# Patient Record
Sex: MALE | Race: BLACK OR AFRICAN AMERICAN | NOT HISPANIC OR LATINO | Employment: OTHER | URBAN - METROPOLITAN AREA
[De-identification: names, ages, dates, MRNs, and addresses within clinical notes are randomized per-mention and may not be internally consistent; named-entity substitution may affect disease eponyms.]

---

## 2021-11-11 LAB — CRC RECOMMENDATION EXT: NORMAL

## 2023-06-01 ENCOUNTER — LAB VISIT (OUTPATIENT)
Dept: LAB | Facility: HOSPITAL | Age: 68
End: 2023-06-01
Attending: STUDENT IN AN ORGANIZED HEALTH CARE EDUCATION/TRAINING PROGRAM
Payer: MEDICARE

## 2023-06-01 ENCOUNTER — PATIENT OUTREACH (OUTPATIENT)
Dept: ADMINISTRATIVE | Facility: HOSPITAL | Age: 68
End: 2023-06-01
Payer: MEDICARE

## 2023-06-01 ENCOUNTER — OFFICE VISIT (OUTPATIENT)
Dept: INTERNAL MEDICINE | Facility: CLINIC | Age: 68
End: 2023-06-01
Payer: MEDICARE

## 2023-06-01 VITALS
SYSTOLIC BLOOD PRESSURE: 138 MMHG | BODY MASS INDEX: 27.99 KG/M2 | WEIGHT: 174.19 LBS | TEMPERATURE: 98 F | DIASTOLIC BLOOD PRESSURE: 80 MMHG | OXYGEN SATURATION: 99 % | HEIGHT: 66 IN | HEART RATE: 85 BPM

## 2023-06-01 DIAGNOSIS — N52.9 ERECTILE DYSFUNCTION, UNSPECIFIED ERECTILE DYSFUNCTION TYPE: ICD-10-CM

## 2023-06-01 DIAGNOSIS — Z79.899 LONG TERM USE OF DRUG: ICD-10-CM

## 2023-06-01 DIAGNOSIS — E78.00 HYPERCHOLESTEROLEMIA: ICD-10-CM

## 2023-06-01 DIAGNOSIS — Z12.5 SCREENING PSA (PROSTATE SPECIFIC ANTIGEN): ICD-10-CM

## 2023-06-01 DIAGNOSIS — Z00.00 LABORATORY EXAMINATION ORDERED AS PART OF A ROUTINE GENERAL MEDICAL EXAMINATION: ICD-10-CM

## 2023-06-01 DIAGNOSIS — Z00.00 ENCOUNTER FOR PREVENTIVE HEALTH EXAMINATION: Primary | ICD-10-CM

## 2023-06-01 DIAGNOSIS — Z11.3 SCREENING FOR STD (SEXUALLY TRANSMITTED DISEASE): ICD-10-CM

## 2023-06-01 DIAGNOSIS — Z13.6 SCREENING FOR HEART DISEASE: ICD-10-CM

## 2023-06-01 DIAGNOSIS — M25.519 ARTHRALGIA OF SHOULDER, UNSPECIFIED LATERALITY: ICD-10-CM

## 2023-06-01 DIAGNOSIS — Z76.89 ESTABLISHING CARE WITH NEW DOCTOR, ENCOUNTER FOR: ICD-10-CM

## 2023-06-01 LAB
ALBUMIN SERPL BCP-MCNC: 3.9 G/DL (ref 3.5–5.2)
ALP SERPL-CCNC: 64 U/L (ref 55–135)
ALT SERPL W/O P-5'-P-CCNC: 16 U/L (ref 10–44)
ANION GAP SERPL CALC-SCNC: 8 MMOL/L (ref 8–16)
AST SERPL-CCNC: 20 U/L (ref 10–40)
BASOPHILS # BLD AUTO: 0.01 K/UL (ref 0–0.2)
BASOPHILS NFR BLD: 0.2 % (ref 0–1.9)
BILIRUB SERPL-MCNC: 0.5 MG/DL (ref 0.1–1)
BUN SERPL-MCNC: 15 MG/DL (ref 8–23)
CALCIUM SERPL-MCNC: 10.3 MG/DL (ref 8.7–10.5)
CHLORIDE SERPL-SCNC: 105 MMOL/L (ref 95–110)
CHOLEST SERPL-MCNC: 138 MG/DL (ref 120–199)
CHOLEST/HDLC SERPL: 3 {RATIO} (ref 2–5)
CO2 SERPL-SCNC: 26 MMOL/L (ref 23–29)
COMPLEXED PSA SERPL-MCNC: 1.3 NG/ML (ref 0–4)
CREAT SERPL-MCNC: 1.2 MG/DL (ref 0.5–1.4)
DIFFERENTIAL METHOD: ABNORMAL
EOSINOPHIL # BLD AUTO: 0.1 K/UL (ref 0–0.5)
EOSINOPHIL NFR BLD: 0.9 % (ref 0–8)
ERYTHROCYTE [DISTWIDTH] IN BLOOD BY AUTOMATED COUNT: 15.3 % (ref 11.5–14.5)
EST. GFR  (NO RACE VARIABLE): >60 ML/MIN/1.73 M^2
ESTIMATED AVG GLUCOSE: 120 MG/DL (ref 68–131)
GLUCOSE SERPL-MCNC: 102 MG/DL (ref 70–110)
HBA1C MFR BLD: 5.8 % (ref 4–5.6)
HCT VFR BLD AUTO: 42.2 % (ref 40–54)
HCV AB SERPL QL IA: NORMAL
HDLC SERPL-MCNC: 46 MG/DL (ref 40–75)
HDLC SERPL: 33.3 % (ref 20–50)
HGB BLD-MCNC: 13.3 G/DL (ref 14–18)
HIV 1+2 AB+HIV1 P24 AG SERPL QL IA: NORMAL
IMM GRANULOCYTES # BLD AUTO: 0.02 K/UL (ref 0–0.04)
IMM GRANULOCYTES NFR BLD AUTO: 0.3 % (ref 0–0.5)
LDLC SERPL CALC-MCNC: 80.2 MG/DL (ref 63–159)
LYMPHOCYTES # BLD AUTO: 1.3 K/UL (ref 1–4.8)
LYMPHOCYTES NFR BLD: 20.2 % (ref 18–48)
MCH RBC QN AUTO: 28.9 PG (ref 27–31)
MCHC RBC AUTO-ENTMCNC: 31.5 G/DL (ref 32–36)
MCV RBC AUTO: 92 FL (ref 82–98)
MONOCYTES # BLD AUTO: 0.4 K/UL (ref 0.3–1)
MONOCYTES NFR BLD: 5.7 % (ref 4–15)
NEUTROPHILS # BLD AUTO: 4.7 K/UL (ref 1.8–7.7)
NEUTROPHILS NFR BLD: 72.7 % (ref 38–73)
NONHDLC SERPL-MCNC: 92 MG/DL
NRBC BLD-RTO: 0 /100 WBC
PLATELET # BLD AUTO: 328 K/UL (ref 150–450)
PMV BLD AUTO: 9.1 FL (ref 9.2–12.9)
POTASSIUM SERPL-SCNC: 3.9 MMOL/L (ref 3.5–5.1)
PROT SERPL-MCNC: 8.4 G/DL (ref 6–8.4)
RBC # BLD AUTO: 4.6 M/UL (ref 4.6–6.2)
SODIUM SERPL-SCNC: 139 MMOL/L (ref 136–145)
T4 FREE SERPL-MCNC: 0.89 NG/DL (ref 0.71–1.51)
TRIGL SERPL-MCNC: 59 MG/DL (ref 30–150)
TSH SERPL DL<=0.005 MIU/L-ACNC: 2.04 UIU/ML (ref 0.4–4)
WBC # BLD AUTO: 6.5 K/UL (ref 3.9–12.7)

## 2023-06-01 PROCEDURE — 3079F PR MOST RECENT DIASTOLIC BLOOD PRESSURE 80-89 MM HG: ICD-10-PCS | Mod: CPTII,S$GLB,, | Performed by: STUDENT IN AN ORGANIZED HEALTH CARE EDUCATION/TRAINING PROGRAM

## 2023-06-01 PROCEDURE — 1159F PR MEDICATION LIST DOCUMENTED IN MEDICAL RECORD: ICD-10-PCS | Mod: CPTII,S$GLB,, | Performed by: STUDENT IN AN ORGANIZED HEALTH CARE EDUCATION/TRAINING PROGRAM

## 2023-06-01 PROCEDURE — 84153 ASSAY OF PSA TOTAL: CPT | Performed by: STUDENT IN AN ORGANIZED HEALTH CARE EDUCATION/TRAINING PROGRAM

## 2023-06-01 PROCEDURE — 3008F PR BODY MASS INDEX (BMI) DOCUMENTED: ICD-10-PCS | Mod: CPTII,S$GLB,, | Performed by: STUDENT IN AN ORGANIZED HEALTH CARE EDUCATION/TRAINING PROGRAM

## 2023-06-01 PROCEDURE — 80061 LIPID PANEL: CPT | Performed by: STUDENT IN AN ORGANIZED HEALTH CARE EDUCATION/TRAINING PROGRAM

## 2023-06-01 PROCEDURE — 3075F SYST BP GE 130 - 139MM HG: CPT | Mod: CPTII,S$GLB,, | Performed by: STUDENT IN AN ORGANIZED HEALTH CARE EDUCATION/TRAINING PROGRAM

## 2023-06-01 PROCEDURE — 1159F MED LIST DOCD IN RCRD: CPT | Mod: CPTII,S$GLB,, | Performed by: STUDENT IN AN ORGANIZED HEALTH CARE EDUCATION/TRAINING PROGRAM

## 2023-06-01 PROCEDURE — 3008F BODY MASS INDEX DOCD: CPT | Mod: CPTII,S$GLB,, | Performed by: STUDENT IN AN ORGANIZED HEALTH CARE EDUCATION/TRAINING PROGRAM

## 2023-06-01 PROCEDURE — 86592 SYPHILIS TEST NON-TREP QUAL: CPT | Performed by: STUDENT IN AN ORGANIZED HEALTH CARE EDUCATION/TRAINING PROGRAM

## 2023-06-01 PROCEDURE — 84439 ASSAY OF FREE THYROXINE: CPT | Performed by: STUDENT IN AN ORGANIZED HEALTH CARE EDUCATION/TRAINING PROGRAM

## 2023-06-01 PROCEDURE — 83036 HEMOGLOBIN GLYCOSYLATED A1C: CPT | Performed by: STUDENT IN AN ORGANIZED HEALTH CARE EDUCATION/TRAINING PROGRAM

## 2023-06-01 PROCEDURE — 87389 HIV-1 AG W/HIV-1&-2 AB AG IA: CPT | Performed by: STUDENT IN AN ORGANIZED HEALTH CARE EDUCATION/TRAINING PROGRAM

## 2023-06-01 PROCEDURE — 3075F PR MOST RECENT SYSTOLIC BLOOD PRESS GE 130-139MM HG: ICD-10-PCS | Mod: CPTII,S$GLB,, | Performed by: STUDENT IN AN ORGANIZED HEALTH CARE EDUCATION/TRAINING PROGRAM

## 2023-06-01 PROCEDURE — 84443 ASSAY THYROID STIM HORMONE: CPT | Performed by: STUDENT IN AN ORGANIZED HEALTH CARE EDUCATION/TRAINING PROGRAM

## 2023-06-01 PROCEDURE — 99387 PR PREVENTIVE VISIT,NEW,65 & OVER: ICD-10-PCS | Mod: GZ,S$GLB,, | Performed by: STUDENT IN AN ORGANIZED HEALTH CARE EDUCATION/TRAINING PROGRAM

## 2023-06-01 PROCEDURE — 80053 COMPREHEN METABOLIC PANEL: CPT | Performed by: STUDENT IN AN ORGANIZED HEALTH CARE EDUCATION/TRAINING PROGRAM

## 2023-06-01 PROCEDURE — 1126F PR PAIN SEVERITY QUANTIFIED, NO PAIN PRESENT: ICD-10-PCS | Mod: CPTII,S$GLB,, | Performed by: STUDENT IN AN ORGANIZED HEALTH CARE EDUCATION/TRAINING PROGRAM

## 2023-06-01 PROCEDURE — 36415 COLL VENOUS BLD VENIPUNCTURE: CPT | Performed by: STUDENT IN AN ORGANIZED HEALTH CARE EDUCATION/TRAINING PROGRAM

## 2023-06-01 PROCEDURE — 86803 HEPATITIS C AB TEST: CPT | Performed by: STUDENT IN AN ORGANIZED HEALTH CARE EDUCATION/TRAINING PROGRAM

## 2023-06-01 PROCEDURE — 85025 COMPLETE CBC W/AUTO DIFF WBC: CPT | Performed by: STUDENT IN AN ORGANIZED HEALTH CARE EDUCATION/TRAINING PROGRAM

## 2023-06-01 PROCEDURE — 3079F DIAST BP 80-89 MM HG: CPT | Mod: CPTII,S$GLB,, | Performed by: STUDENT IN AN ORGANIZED HEALTH CARE EDUCATION/TRAINING PROGRAM

## 2023-06-01 PROCEDURE — 1160F PR REVIEW ALL MEDS BY PRESCRIBER/CLIN PHARMACIST DOCUMENTED: ICD-10-PCS | Mod: CPTII,S$GLB,, | Performed by: STUDENT IN AN ORGANIZED HEALTH CARE EDUCATION/TRAINING PROGRAM

## 2023-06-01 PROCEDURE — 99387 INIT PM E/M NEW PAT 65+ YRS: CPT | Mod: GZ,S$GLB,, | Performed by: STUDENT IN AN ORGANIZED HEALTH CARE EDUCATION/TRAINING PROGRAM

## 2023-06-01 PROCEDURE — 1126F AMNT PAIN NOTED NONE PRSNT: CPT | Mod: CPTII,S$GLB,, | Performed by: STUDENT IN AN ORGANIZED HEALTH CARE EDUCATION/TRAINING PROGRAM

## 2023-06-01 PROCEDURE — 1160F RVW MEDS BY RX/DR IN RCRD: CPT | Mod: CPTII,S$GLB,, | Performed by: STUDENT IN AN ORGANIZED HEALTH CARE EDUCATION/TRAINING PROGRAM

## 2023-06-01 PROCEDURE — 99999 PR PBB SHADOW E&M-NEW PATIENT-LVL III: ICD-10-PCS | Mod: PBBFAC,,, | Performed by: STUDENT IN AN ORGANIZED HEALTH CARE EDUCATION/TRAINING PROGRAM

## 2023-06-01 PROCEDURE — 99999 PR PBB SHADOW E&M-NEW PATIENT-LVL III: CPT | Mod: PBBFAC,,, | Performed by: STUDENT IN AN ORGANIZED HEALTH CARE EDUCATION/TRAINING PROGRAM

## 2023-06-01 RX ORDER — SILDENAFIL 100 MG/1
100 TABLET, FILM COATED ORAL DAILY PRN
COMMUNITY
Start: 2023-02-23 | End: 2023-06-01 | Stop reason: SDUPTHER

## 2023-06-01 RX ORDER — TRIAMCINOLONE ACETONIDE 1 MG/G
CREAM TOPICAL 2 TIMES DAILY PRN
Qty: 45 G | Refills: 0 | Status: SHIPPED | OUTPATIENT
Start: 2023-06-01 | End: 2023-09-13

## 2023-06-01 RX ORDER — TRIAMCINOLONE ACETONIDE 1 MG/G
CREAM TOPICAL 2 TIMES DAILY PRN
COMMUNITY
Start: 2023-02-23 | End: 2023-06-01 | Stop reason: SDUPTHER

## 2023-06-01 RX ORDER — MELOXICAM 7.5 MG/1
7.5 TABLET ORAL 2 TIMES DAILY
COMMUNITY
Start: 2023-02-23 | End: 2023-06-01 | Stop reason: SDUPTHER

## 2023-06-01 RX ORDER — SILDENAFIL 100 MG/1
100 TABLET, FILM COATED ORAL DAILY PRN
Qty: 10 TABLET | Refills: 3 | Status: SHIPPED | OUTPATIENT
Start: 2023-06-01

## 2023-06-01 RX ORDER — ROSUVASTATIN CALCIUM 20 MG/1
20 TABLET, COATED ORAL
COMMUNITY
Start: 2023-02-23 | End: 2023-06-01 | Stop reason: SDUPTHER

## 2023-06-01 RX ORDER — ROSUVASTATIN CALCIUM 20 MG/1
20 TABLET, COATED ORAL DAILY
Qty: 90 TABLET | Refills: 3 | Status: SHIPPED | OUTPATIENT
Start: 2023-06-01 | End: 2024-05-26

## 2023-06-01 RX ORDER — MELOXICAM 7.5 MG/1
7.5 TABLET ORAL 2 TIMES DAILY
Qty: 60 TABLET | Refills: 0 | Status: SHIPPED | OUTPATIENT
Start: 2023-06-01 | End: 2023-09-13

## 2023-06-01 NOTE — PROGRESS NOTES
Health Maintenance Due   Topic Date Due    TETANUS VACCINE  Never done    Hemoglobin A1c (Diabetic Prevention Screening)  Never done    Colorectal Cancer Screening  Never done    Shingles Vaccine (1 of 2) Never done    PROSTATE-SPECIFIC ANTIGEN  06/11/2006    Lipid Panel  06/11/2010    Pneumococcal Vaccines (Age 65+) (1 - PCV) Never done    Abdominal Aortic Aneurysm Screening  Never done    COVID-19 Vaccine (5 - Moderna series) 02/28/2023     Triggered LINKS. Updated Care Everywhere. Record request sent to [facility] asking for a copy of pt's most recent colonoscopy, AAA screening, and immunizations results. Chart review completed.

## 2023-06-01 NOTE — LETTER
AUTHORIZATION FOR RELEASE OF   CONFIDENTIAL INFORMATION    Dear Dr. Osbaldo Butterfield,    We are seeing Felipe Jiménez, date of birth 1955, in the clinic at [facility] INTERNAL MEDICINE. Sami Asif MD is the patient's PCP. Felipe Jiménez has an outstanding lab/procedure at the time we reviewed his chart. In order to help keep his health information updated, he has authorized us to request the following medical record(s):        (  )  MAMMOGRAM                                      ( XX )  COLONOSCOPY      (  )  PAP SMEAR                                          (  )  OUTSIDE LAB RESULTS     (  )  DEXA SCAN                                          (  )  EYE EXAM            (  )  FOOT EXAM                                          (  )  ENTIRE RECORD     ( XX )  OUTSIDE IMMUNIZATIONS             ( XX )  CAR US AAA Screening         Please fax records to Sami Asif MD, [phone]     If you have any questions, please contact DALILA Morin at [phone].           Patient Name: Felipe Jiménez  : 1955  Patient Phone #: [phone]

## 2023-06-01 NOTE — LETTER
AUTHORIZATION FOR RELEASE OF   CONFIDENTIAL INFORMATION    Dear Mckayla Grullon  records,    We are seeing Felipe Jiménez, date of birth 1955, in the clinic at [facility] INTERNAL MEDICINE. Sami Asif MD is the patient's PCP. Felipe Jiménez has an outstanding lab/procedure at the time we reviewed his chart. In order to help keep his health information updated, he has authorized us to request the following medical record(s):        (  )  MAMMOGRAM                                      ( XX )  COLONOSCOPY      (  )  PAP SMEAR                                          (  )  OUTSIDE LAB RESULTS     (  )  DEXA SCAN                                          (  )  EYE EXAM            (  )  FOOT EXAM                                          (  )  ENTIRE RECORD     ( XX )  OUTSIDE IMMUNIZATIONS             ( XX )  CAR US AAA Screening         Please fax records to Sami Asif MD, [phone]     If you have any questions, please contact DALILA Morin at [phone].           Patient Name: Felipe Jiménez  : 1955  Patient Phone #: [phone]

## 2023-06-01 NOTE — PROGRESS NOTES
SUBJECTIVE     Chief Complaint   Patient presents with    Establish Care       HPI  Felipe Jiménez is a 67 y.o. male with  HLD, erectile dysfunction  that presents for establishment of care.     Takes Rosuvastatin for hypercholesterolemia.   Meloxicam for chronic shoulder pain.   Sildenafil PRN for erectile dysfunction.   History of dermatitis treated with PRN triamcinolone.   Recently moved back to the area from [place], where he had relocated after [place]. 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 He is , lives with his wife. Has adult children.     Risk Assessment:  Tobacco use: Quit in ; smoked ppd x 20 years. Never have used smokeless tobacco.   EtOH use: Infrequent.   Illicit drug use: Not currently. Has used THC and cocaine in the past.   Diet: Tries to keep a balanced diet.   Exercise: Starting to work out at [employer]  Contributory FHx: Early heart disease in father.      Vaccinations:  Flu: UTD  Pneumococcal: Not UTD  Tdap: Reports being up to date at [facility] in [place].   Shingrix: Reports being up to date at [facility] in [place].   COVID: Moderna x 4, last was in 10/2022. Declines booster today.      Health Maintenance:  Colonoscopy: Reports up to date in [place], done in .   HIV screen: Ordered  HCV screen: Ordered  Lipid panel: Ordered  PSA: Ordered  AAA US: Reports up to date in [place], done in .   LDCT: N/A     Functionality:  Lives with wife  Is able to complete daily activities  Ambulates without assistance       PAST MEDICAL HISTORY:  No past medical history on file.    PAST SURGICAL HISTORY:  No past surgical history on file.  Lipoma on left knee 77  Quad tendon surgery on left knee     FAMILY HISTORY:  No family history on file.  Heart disease in both parents  Father  at 38 from heart attack  Mother  at 43 from heart attack    ALLERGIES AND MEDICATIONS: updated and reviewed.  Review of patient's allergies  indicates:  Not on File  No current outpatient medications on file.     No current facility-administered medications for this visit.       ROS  Review of Systems   Constitutional:  Negative for activity change, chills and fever.   HENT:  Negative for congestion and hearing loss.    Eyes:  Negative for pain and visual disturbance.   Respiratory:  Negative for cough and shortness of breath.    Cardiovascular:  Negative for chest pain and palpitations.   Gastrointestinal:  Negative for abdominal pain, constipation, diarrhea, nausea and vomiting.   Endocrine: Negative.    Genitourinary: Negative.    Musculoskeletal:  Negative for arthralgias and myalgias.   Skin: Negative.    Allergic/Immunologic: Negative.    Neurological:  Negative for dizziness, light-headedness and headaches.   Hematological: Negative.        OBJECTIVE     Physical Exam  Vitals:    06/01/23 0909   BP: 138/80   Pulse: 85   Temp: 97.7 °F (36.5 °C)    Body mass index is 28.11 kg/m².            Physical Exam  Vitals reviewed.   Constitutional:       General: He is not in acute distress.     Appearance: Normal appearance.   HENT:      Head: Normocephalic and atraumatic.      Mouth/Throat:      Mouth: Mucous membranes are moist.      Pharynx: Oropharynx is clear.   Eyes:      Extraocular Movements: Extraocular movements intact.      Conjunctiva/sclera: Conjunctivae normal.      Pupils: Pupils are equal, round, and reactive to light.   Cardiovascular:      Rate and Rhythm: Normal rate and regular rhythm.      Pulses: Normal pulses.      Heart sounds: Normal heart sounds.   Pulmonary:      Effort: Pulmonary effort is normal.      Breath sounds: Normal breath sounds.   Abdominal:      General: Bowel sounds are normal. There is no distension.      Palpations: Abdomen is soft. There is no mass.      Tenderness: There is no abdominal tenderness. There is no guarding.   Musculoskeletal:         General: Normal range of motion.      Cervical back: Normal range of  motion and neck supple. No rigidity or tenderness.      Right lower leg: No edema.      Left lower leg: No edema.   Lymphadenopathy:      Cervical: No cervical adenopathy.   Skin:     General: Skin is warm and dry.   Neurological:      General: No focal deficit present.      Mental Status: He is alert.   Psychiatric:         Mood and Affect: Mood normal.         Behavior: Behavior normal.         Health Maintenance         Date Due Completion Date    COVID-19 Vaccine (1) Never done ---    TETANUS VACCINE Never done ---    Colorectal Cancer Screening Never done ---    Shingles Vaccine (1 of 2) Never done ---    Lipid Panel 06/11/2010 6/11/2005    Pneumococcal Vaccines (Age 65+) (1 - PCV) Never done ---    Influenza Vaccine (Season Ended) 09/01/2023 ---              ASSESSMENT     67 y.o. male with     1. Encounter for preventive health examination    2. Establishing care with new doctor, encounter for    3. Hypercholesterolemia    4. Laboratory examination ordered as part of a routine general medical examination    5. Screening PSA (prostate specific antigen)    6. Screening for STD (sexually transmitted disease)    7. Long term use of therapeutic medications    8. Screening for heart disease    9. Arthralgia of shoulder, unspecified laterality    10. Erectile dysfunction, unspecified erectile dysfunction type        PLAN:     Encounter for preventive health examination  - Discussed age and gender appropriate screenings at this visit and encouraged a healthy diet low in simple carbohydrates, and increased physical activity.  Counseled on medically appropriate vaccines based on age and current health condition.  Screening test reviewed and discussed with patient.       2. Establishing care with new doctor, encounter for  - Reviewed patient's medical, surgical, family, and social history; chart updated.     3. Hypercholesterolemia  - Stable  current regimen, continue.   - rosuvastatin (CRESTOR) 20 MG tablet; Take 1  tablet (20 mg total) by mouth once daily.  Dispense: 90 tablet; Refill: 3    4. Laboratory examination ordered as part of a routine general medical examination  - Hemoglobin A1C; Future  - T4, Free; Future  - TSH; Future  - Lipid Panel; Future  - Comprehensive Metabolic Panel; Future  - CBC Auto Differential; Future    5. Screening PSA (prostate specific antigen)  - No LUTS  - PSA, SCREENING; Future    6. Screening for STD (sexually transmitted disease)  - HIV 1/2 Ag/Ab (4th Gen); Future  - HEPATITIS C ANTIBODY; Future  - C. trachomatis/N. gonorrhoeae by AMP DNA [facility]; Urine; Future  - RPR; Future    7. Long term use of therapeutic medications  - Hemoglobin A1C; Future  - T4, Free; Future  - TSH; Future  - CBC Auto Differential; Future    8. Screening for heart disease  - Lipid Panel; Future    9. Arthralgia of shoulder, unspecified laterality  - Stable with PRN Mobic, continue.   - meloxicam (MOBIC) 7.5 MG tablet; Take 1 tablet (7.5 mg total) by mouth 2 (two) times daily.  Dispense: 60 tablet; Refill: 0    10. Erectile dysfunction, unspecified erectile dysfunction type  - Controlled with PRN Viagra, continue.   - sildenafiL (VIAGRA) 100 MG tablet; Take 1 tablet (100 mg total) by mouth daily as needed for Erectile Dysfunction.  Dispense: 10 tablet; Refill: 3        RTC in 6 months, earlier PRN.      Sami Asif MD  06/01/2023 7:57 AM        No follow-ups on file.

## 2023-06-02 ENCOUNTER — TELEPHONE (OUTPATIENT)
Dept: INTERNAL MEDICINE | Facility: CLINIC | Age: 68
End: 2023-06-02
Payer: MEDICARE

## 2023-06-02 ENCOUNTER — PATIENT MESSAGE (OUTPATIENT)
Dept: ADMINISTRATIVE | Facility: HOSPITAL | Age: 68
End: 2023-06-02
Payer: MEDICARE

## 2023-06-02 LAB — RPR SER QL: NORMAL

## 2023-06-02 NOTE — TELEPHONE ENCOUNTER
----- Message from Aminah Canas sent at 6/2/2023  8:08 AM CDT -----  Contact: [phone] Patient  Requesting an RX refill or new RX.  Is this a refill or new RX:   RX name and strength DIAZETAM 5 MG  Is this a 30 day or 90 day RX:   Pharmacy name and phone #[facility] - [place], [place] - [facility] [address]  The doctors have asked that we provide their patients with the following 2 reminders -- prescription refills can take up to 72 hours, and a friendly reminder that in the future you can use your [facility] account to request refills:     Comment :

## 2023-06-07 ENCOUNTER — PATIENT OUTREACH (OUTPATIENT)
Dept: ADMINISTRATIVE | Facility: HOSPITAL | Age: 68
End: 2023-06-07
Payer: MEDICARE

## 2023-06-07 NOTE — LETTER
AUTHORIZATION FOR RELEASE OF   CONFIDENTIAL INFORMATION    Dear [facility] records,    We are seeing Felipe Jiménez, date of birth 1955, in the clinic at [facility] INTERNAL MEDICINE. Sami Asif MD is the patient's PCP. Felipe Jiménez has an outstanding lab/procedure at the time we reviewed his chart. In order to help keep his health information updated, he has authorized us to request the following medical record(s):        (  )  MAMMOGRAM                                      (  )  COLONOSCOPY      (  )  PAP SMEAR                                          (  )  OUTSIDE LAB RESULTS     (  )  DEXA SCAN                                          (  )  EYE EXAM            (  )  FOOT EXAM                                          (  )  ENTIRE RECORD     ( XX )  OUTSIDE IMMUNIZATIONS             ( XX )  _CAR US AAA Screening_                Tetanus, Pneumococcal       Please fax records to Sami Asif MD, [phone]     If you have any questions, please contact DALILA Morin at [phone].           Patient Name: Felipe Jiménez  : 1955  Patient Phone #: [phone]

## 2023-06-07 NOTE — PROGRESS NOTES
Health Maintenance Due   Topic Date Due    TETANUS VACCINE  Never done    Shingles Vaccine (1 of 2) Never done    Pneumococcal Vaccines (Age 65+) (1 - PCV) Never done    Abdominal Aortic Aneurysm Screening  Never done    COVID-19 Vaccine (5 - Moderna series) 02/28/2023     Triggered LINKS. Updated Care Everywhere. Imported outside colonoscopy results received from [facility] [place] into HM. HM frequency set to repeat colonoscopy in 5 yrs per previous provider recommendation. Record request sent to [facility] [place] asking for a copy of pt's most recent AAA screening and immunizations results. Chart review completed.

## 2023-06-12 ENCOUNTER — PATIENT OUTREACH (OUTPATIENT)
Dept: ADMINISTRATIVE | Facility: HOSPITAL | Age: 68
End: 2023-06-12
Payer: MEDICARE

## 2023-06-12 ENCOUNTER — PES CALL (OUTPATIENT)
Dept: ADMINISTRATIVE | Facility: CLINIC | Age: 68
End: 2023-06-12
Payer: MEDICARE

## 2023-06-12 NOTE — PROGRESS NOTES
Health Maintenance Due   Topic Date Due    TETANUS VACCINE  Never done    Shingles Vaccine (1 of 2) Never done    Pneumococcal Vaccines (Age 65+) (1 - PCV) Never done    COVID-19 Vaccine (5 - Moderna series) 02/28/2023     Triggered LINKS. Updated Care Everywhere. Imported outside CAR  AAA screening results into . Chart review completed.

## 2023-06-15 RX ORDER — DIAZEPAM 5 MG/1
5 TABLET ORAL EVERY 12 HOURS PRN
Qty: 30 TABLET | Refills: 0 | Status: SHIPPED | OUTPATIENT
Start: 2023-06-15 | End: 2023-09-13

## 2023-07-14 ENCOUNTER — PES CALL (OUTPATIENT)
Dept: ADMINISTRATIVE | Facility: CLINIC | Age: 68
End: 2023-07-14
Payer: MEDICARE

## 2023-09-12 DIAGNOSIS — M25.519 ARTHRALGIA OF SHOULDER, UNSPECIFIED LATERALITY: ICD-10-CM

## 2023-09-12 NOTE — TELEPHONE ENCOUNTER
No care due was identified.  [facility] Embedded Care Due Messages. Reference number: [id number].   9/12/2023 3:26:04 PM CDT

## 2023-09-12 NOTE — TELEPHONE ENCOUNTER
Refill Routing Note   Medication(s) are not appropriate for processing by [facility] for the following reason(s):      Medication outside of protocol : Meloxicam, Diazepam  New or recently adjusted medication    [facility] action(s):  Defer  Route Care Due:  None identified     Medication Therapy Plan: New or recently adjusted medication: less than 90 days under the signature of responsible provider        Appointments  past 12m or future 3m with PCP    Date Provider   Last Visit   6/1/2023 Sami Asif MD   Next Visit   11/29/2023 Sami Asif MD   ED visits in past 90 days: 0        Note composed:4:32 PM 09/12/2023

## 2023-09-13 RX ORDER — MELOXICAM 7.5 MG/1
7.5 TABLET ORAL 2 TIMES DAILY
Qty: 60 TABLET | Refills: 0 | Status: SHIPPED | OUTPATIENT
Start: 2023-09-13

## 2023-09-13 RX ORDER — TRIAMCINOLONE ACETONIDE 1 MG/G
CREAM TOPICAL
Qty: 45 G | Refills: 0 | Status: SHIPPED | OUTPATIENT
Start: 2023-09-13

## 2023-09-13 RX ORDER — DIAZEPAM 5 MG/1
5 TABLET ORAL EVERY 12 HOURS PRN
Qty: 30 TABLET | Refills: 0 | Status: SHIPPED | OUTPATIENT
Start: 2023-09-13

## 2023-11-29 ENCOUNTER — HOSPITAL ENCOUNTER (OUTPATIENT)
Dept: RADIOLOGY | Facility: HOSPITAL | Age: 68
Discharge: HOME | End: 2023-11-29
Attending: STUDENT IN AN ORGANIZED HEALTH CARE EDUCATION/TRAINING PROGRAM
Payer: MEDICARE

## 2023-11-29 ENCOUNTER — OFFICE VISIT (OUTPATIENT)
Dept: INTERNAL MEDICINE | Facility: CLINIC | Age: 68
End: 2023-11-29
Payer: MEDICARE

## 2023-11-29 VITALS
SYSTOLIC BLOOD PRESSURE: 130 MMHG | OXYGEN SATURATION: 97 % | DIASTOLIC BLOOD PRESSURE: 70 MMHG | HEART RATE: 98 BPM | BODY MASS INDEX: 27.83 KG/M2 | WEIGHT: 172.38 LBS

## 2023-11-29 DIAGNOSIS — R10.31 RIGHT GROIN PAIN: ICD-10-CM

## 2023-11-29 DIAGNOSIS — M25.551 RIGHT HIP PAIN: Primary | ICD-10-CM

## 2023-11-29 DIAGNOSIS — M25.551 RIGHT HIP PAIN: ICD-10-CM

## 2023-11-29 DIAGNOSIS — E78.00 HYPERCHOLESTEROLEMIA: ICD-10-CM

## 2023-11-29 DIAGNOSIS — M26.621 ARTHRALGIA OF RIGHT TEMPOROMANDIBULAR JOINT: ICD-10-CM

## 2023-11-29 DIAGNOSIS — L29.0 ANAL PRURITUS: ICD-10-CM

## 2023-11-29 DIAGNOSIS — R73.03 PREDIABETES: ICD-10-CM

## 2023-11-29 PROBLEM — S46.009A INJURY OF TENDON OF ROTATOR CUFF: Status: ACTIVE | Noted: 2019-12-26

## 2023-11-29 PROCEDURE — 3075F PR MOST RECENT SYSTOLIC BLOOD PRESS GE 130-139MM HG: ICD-10-PCS | Mod: CPTII,S$GLB,, | Performed by: STUDENT IN AN ORGANIZED HEALTH CARE EDUCATION/TRAINING PROGRAM

## 2023-11-29 PROCEDURE — 99214 OFFICE O/P EST MOD 30 MIN: CPT | Mod: S$GLB,,, | Performed by: STUDENT IN AN ORGANIZED HEALTH CARE EDUCATION/TRAINING PROGRAM

## 2023-11-29 PROCEDURE — 3008F BODY MASS INDEX DOCD: CPT | Mod: CPTII,S$GLB,, | Performed by: STUDENT IN AN ORGANIZED HEALTH CARE EDUCATION/TRAINING PROGRAM

## 2023-11-29 PROCEDURE — 73502 X-RAY EXAM HIP UNI 2-3 VIEWS: CPT | Mod: 26,RT,, | Performed by: INTERNAL MEDICINE

## 2023-11-29 PROCEDURE — 99214 PR OFFICE/OUTPT VISIT, EST, LEVL IV, 30-39 MIN: ICD-10-PCS | Mod: S$GLB,,, | Performed by: STUDENT IN AN ORGANIZED HEALTH CARE EDUCATION/TRAINING PROGRAM

## 2023-11-29 PROCEDURE — 3078F DIAST BP <80 MM HG: CPT | Mod: CPTII,S$GLB,, | Performed by: STUDENT IN AN ORGANIZED HEALTH CARE EDUCATION/TRAINING PROGRAM

## 2023-11-29 PROCEDURE — 1101F PR PT FALLS ASSESS DOC 0-1 FALLS W/OUT INJ PAST YR: ICD-10-PCS | Mod: CPTII,S$GLB,, | Performed by: STUDENT IN AN ORGANIZED HEALTH CARE EDUCATION/TRAINING PROGRAM

## 2023-11-29 PROCEDURE — 1125F PR PAIN SEVERITY QUANTIFIED, PAIN PRESENT: ICD-10-PCS | Mod: CPTII,S$GLB,, | Performed by: STUDENT IN AN ORGANIZED HEALTH CARE EDUCATION/TRAINING PROGRAM

## 2023-11-29 PROCEDURE — 1159F PR MEDICATION LIST DOCUMENTED IN MEDICAL RECORD: ICD-10-PCS | Mod: CPTII,S$GLB,, | Performed by: STUDENT IN AN ORGANIZED HEALTH CARE EDUCATION/TRAINING PROGRAM

## 2023-11-29 PROCEDURE — 3288F PR FALLS RISK ASSESSMENT DOCUMENTED: ICD-10-PCS | Mod: CPTII,S$GLB,, | Performed by: STUDENT IN AN ORGANIZED HEALTH CARE EDUCATION/TRAINING PROGRAM

## 2023-11-29 PROCEDURE — 3078F PR MOST RECENT DIASTOLIC BLOOD PRESSURE < 80 MM HG: ICD-10-PCS | Mod: CPTII,S$GLB,, | Performed by: STUDENT IN AN ORGANIZED HEALTH CARE EDUCATION/TRAINING PROGRAM

## 2023-11-29 PROCEDURE — 3075F SYST BP GE 130 - 139MM HG: CPT | Mod: CPTII,S$GLB,, | Performed by: STUDENT IN AN ORGANIZED HEALTH CARE EDUCATION/TRAINING PROGRAM

## 2023-11-29 PROCEDURE — 99999 PR PBB SHADOW E&M-EST. PATIENT-LVL IV: CPT | Mod: PBBFAC,,, | Performed by: STUDENT IN AN ORGANIZED HEALTH CARE EDUCATION/TRAINING PROGRAM

## 2023-11-29 PROCEDURE — 99999 PR PBB SHADOW E&M-EST. PATIENT-LVL IV: ICD-10-PCS | Mod: PBBFAC,,, | Performed by: STUDENT IN AN ORGANIZED HEALTH CARE EDUCATION/TRAINING PROGRAM

## 2023-11-29 PROCEDURE — 3044F HG A1C LEVEL LT 7.0%: CPT | Mod: CPTII,S$GLB,, | Performed by: STUDENT IN AN ORGANIZED HEALTH CARE EDUCATION/TRAINING PROGRAM

## 2023-11-29 PROCEDURE — 1160F PR REVIEW ALL MEDS BY PRESCRIBER/CLIN PHARMACIST DOCUMENTED: ICD-10-PCS | Mod: CPTII,S$GLB,, | Performed by: STUDENT IN AN ORGANIZED HEALTH CARE EDUCATION/TRAINING PROGRAM

## 2023-11-29 PROCEDURE — 73502 X-RAY EXAM HIP UNI 2-3 VIEWS: CPT | Mod: TC,RT

## 2023-11-29 PROCEDURE — 1159F MED LIST DOCD IN RCRD: CPT | Mod: CPTII,S$GLB,, | Performed by: STUDENT IN AN ORGANIZED HEALTH CARE EDUCATION/TRAINING PROGRAM

## 2023-11-29 PROCEDURE — 1125F AMNT PAIN NOTED PAIN PRSNT: CPT | Mod: CPTII,S$GLB,, | Performed by: STUDENT IN AN ORGANIZED HEALTH CARE EDUCATION/TRAINING PROGRAM

## 2023-11-29 PROCEDURE — 1101F PT FALLS ASSESS-DOCD LE1/YR: CPT | Mod: CPTII,S$GLB,, | Performed by: STUDENT IN AN ORGANIZED HEALTH CARE EDUCATION/TRAINING PROGRAM

## 2023-11-29 PROCEDURE — 3288F FALL RISK ASSESSMENT DOCD: CPT | Mod: CPTII,S$GLB,, | Performed by: STUDENT IN AN ORGANIZED HEALTH CARE EDUCATION/TRAINING PROGRAM

## 2023-11-29 PROCEDURE — 73502 XR HIP WITH PELVIS WHEN PERFORMED, 2 OR 3  VIEWS RIGHT: ICD-10-PCS | Mod: 26,RT,, | Performed by: INTERNAL MEDICINE

## 2023-11-29 PROCEDURE — 1160F RVW MEDS BY RX/DR IN RCRD: CPT | Mod: CPTII,S$GLB,, | Performed by: STUDENT IN AN ORGANIZED HEALTH CARE EDUCATION/TRAINING PROGRAM

## 2023-11-29 PROCEDURE — 3008F PR BODY MASS INDEX (BMI) DOCUMENTED: ICD-10-PCS | Mod: CPTII,S$GLB,, | Performed by: STUDENT IN AN ORGANIZED HEALTH CARE EDUCATION/TRAINING PROGRAM

## 2023-11-29 PROCEDURE — 3044F PR MOST RECENT HEMOGLOBIN A1C LEVEL <7.0%: ICD-10-PCS | Mod: CPTII,S$GLB,, | Performed by: STUDENT IN AN ORGANIZED HEALTH CARE EDUCATION/TRAINING PROGRAM

## 2023-11-29 RX ORDER — MAG HYDROX/ALUMINUM HYD/SIMETH 200-200-20
SUSPENSION, ORAL (FINAL DOSE FORM) ORAL 2 TIMES DAILY
Qty: 28 G | Refills: 0 | Status: SHIPPED | OUTPATIENT
Start: 2023-11-29 | End: 2023-12-13

## 2023-11-29 NOTE — PROGRESS NOTES
SUBJECTIVE     Chief Complaint   Patient presents with    Follow-up       HPI  Felipe Jiménez is a 67 y.o. male with  HLD, h/o rotator cuff injury with chronic shoulder pain, anxiety, ED, prediabetes  that presents for follow-up. LOV 23.     A1c 5.8%. Patient reports that he has been taking a lemon and olive oil cocktail (1 tsp of each) twice a day. Staying active.     Patient reporting pain in his right jaw when he eats. Mostly when chewing fruits and nuts. Will be around the TMJ joint. H/o dental implants on that side.     Pain in his right groin. First noted when lifting heavy object.     Has pain in right hip that has been present for several weeks. Will be in the back, sometimes radiates to the front.     Has been having some itching around the anus. Has not noted any melena, hematochezia. Has a h/o hemorrhoids. Denies overt pain. Takes several showers daily. Patient is concerned about parasites.         PAST MEDICAL HISTORY:  Past Medical History:   Diagnosis Date    Hyperlipidemia     Male erectile dysfunction, unspecified        PAST SURGICAL HISTORY:  Past Surgical History:   Procedure Laterality Date    LIPOMA RESECTION Left     left knee    QUADRICEPS TENDON REPAIR Left        FAMILY HISTORY:  Family History   Problem Relation Age of Onset    Heart disease Mother     Heart attacks under age 50 Mother 43         2/2 heart attack.    Heart disease Father     Heart attacks under age 50 Father 38         2/2 heart attack.       ALLERGIES AND MEDICATIONS: updated and reviewed.  Review of patient's allergies indicates:  No Known Allergies  Current Outpatient Medications   Medication Sig Dispense Refill    diazePAM (VALIUM) 5 MG tablet TAKE 1 TABLET BY MOUTH EVERY 12 HOURS AS NEEDED FOR ANXIETY 30 tablet 0    meloxicam (MOBIC) 7.5 MG tablet Take 1 tablet by mouth twice daily 60 tablet 0    rosuvastatin (CRESTOR) 20 MG tablet Take 1 tablet (20 mg total) by mouth once daily. 90 tablet 3     sildenafiL (VIAGRA) 100 MG tablet Take 1 tablet (100 mg total) by mouth daily as needed for Erectile Dysfunction. 10 tablet 3    triamcinolone acetonide 0.1% (KENALOG) 0.1 % cream APPLY  CREAM EXTERNALLY TWICE DAILY AS NEEDED FOR  DERMATITIS 45 g 0     No current facility-administered medications for this visit.       ROS  Review of Systems      OBJECTIVE     Physical Exam  There were no vitals filed for this visit. There is no height or weight on file to calculate BMI.            Physical Exam  Vitals reviewed.   Constitutional:       General: He is not in acute distress.     Appearance: Normal appearance.   HENT:      Head: Normocephalic and atraumatic.      Jaw: Pain on movement (With palpable click over the right TMJ) present.      Mouth/Throat:      Mouth: Mucous membranes are moist.      Pharynx: Oropharynx is clear.   Eyes:      Extraocular Movements: Extraocular movements intact.      Conjunctiva/sclera: Conjunctivae normal.      Pupils: Pupils are equal, round, and reactive to light.   Cardiovascular:      Rate and Rhythm: Normal rate and regular rhythm.      Pulses: Normal pulses.      Heart sounds: Normal heart sounds.   Pulmonary:      Effort: Pulmonary effort is normal.      Breath sounds: Normal breath sounds.   Abdominal:      General: There is no distension.      Hernia: There is no hernia in the right femoral area or right inguinal area.   Musculoskeletal:         General: Normal range of motion.      Cervical back: Normal range of motion and neck supple.      Right hip: Tenderness (Positive LUISITO test) present. No bony tenderness or crepitus. Normal range of motion.   Skin:     General: Skin is warm and dry.   Neurological:      General: No focal deficit present.      Mental Status: He is alert.           Health Maintenance         Date Due Completion Date    TETANUS VACCINE Never done ---    Shingles Vaccine (1 of 2) Never done ---    RSV Vaccine (Age 60+ and Pregnant patients) (1 - 1-dose 60+  series) Never done ---    Pneumococcal Vaccines (Age 65+) (1 - PCV) Never done ---    Influenza Vaccine (1) 09/01/2023 10/28/2022    COVID-19 Vaccine (6 - 2023-24 season) 09/01/2023 10/28/2022    PROSTATE-SPECIFIC ANTIGEN 06/01/2024 6/1/2023    Hemoglobin A1c (Diabetic Prevention Screening) 06/01/2026 6/1/2023    Colorectal Cancer Screening 11/11/2026 11/11/2021    Lipid Panel 06/01/2028 6/1/2023              ASSESSMENT     67 y.o. male with     1. Right hip pain    2. Prediabetes    3. Hypercholesterolemia    4. Arthralgia of right temporomandibular joint    5. Right groin pain    6. Anal pruritus        PLAN:     1. Right hip pain  - Positive LUISITO test. Obtain imaging.   - continue meloxicam and Tylenol.  - Given stretches/exercises from the [employer] hip conditioning program.   - X-Ray Hip 2 or 3 views Right (with Pelvis when performed); Future    2. Prediabetes  - counseled on prediabetic diet.    3. Hypercholesterolemia  - Stable on current regimen, continue.    4. Arthralgia of right temporomandibular joint  - continue meloxicam and Tylenol.  Given stretches/exercises for TMJ joint.    5. Right groin pain  - no hernia on exam today, pain likely due to referred pain from hip.  Imaging ordered as above.  Rest of care as above.    6. Anal pruritus  - counseled on keeping area clean, avoiding tight clothing.  Reassurance provided that this is not secondary to a parasite.  - hydrocortisone 1 % ointment; Apply topically 2 (two) times daily. for 14 days  Dispense: 28 g; Refill: 0        RTC in 6 months       Sami Asif MD  Family Medicine  [facility]  11/29/2023    This document was created using voice recognition software (M*Modal Fluency Direct). Although it may be edited, this document may contain errors related to incorrect recognition of the spoken word. Please call the physician if clarification is needed.       No follow-ups on  file.

## 2023-11-29 NOTE — PATIENT INSTRUCTIONS
Do not use topical antihistamine for more than 14 days, this can thin out the skin.  Continue using the Meloxicam and Tylenol for hip pain. Start the home stretches/exercises I give you.

## 2023-12-19 ENCOUNTER — TELEPHONE (OUTPATIENT)
Dept: INTERNAL MEDICINE | Facility: CLINIC | Age: 68
End: 2023-12-19
Payer: MEDICARE

## 2023-12-19 NOTE — TELEPHONE ENCOUNTER
----- Message from Uzair Carroll sent at 12/19/2023 10:12 AM CST -----  Contact: Pt [phone]  Type: Test Results    What test was performed? Xray    When and where were the test performed? 11/29/23

## 2024-03-14 ENCOUNTER — TELEPHONE (OUTPATIENT)
Dept: INTERNAL MEDICINE | Facility: CLINIC | Age: 69
End: 2024-03-14
Payer: MEDICARE

## 2024-05-09 ENCOUNTER — OFFICE VISIT (OUTPATIENT)
Dept: INTERNAL MEDICINE | Facility: CLINIC | Age: 69
End: 2024-05-09
Payer: MEDICARE

## 2024-05-09 VITALS
OXYGEN SATURATION: 95 % | WEIGHT: 163.38 LBS | SYSTOLIC BLOOD PRESSURE: 130 MMHG | DIASTOLIC BLOOD PRESSURE: 74 MMHG | TEMPERATURE: 98 F | BODY MASS INDEX: 24.76 KG/M2 | HEART RATE: 89 BPM | HEIGHT: 68 IN | RESPIRATION RATE: 18 BRPM

## 2024-05-09 DIAGNOSIS — E78.00 HYPERCHOLESTEROLEMIA: ICD-10-CM

## 2024-05-09 DIAGNOSIS — Z23 NEED FOR PNEUMOCOCCAL VACCINATION: ICD-10-CM

## 2024-05-09 DIAGNOSIS — Z00.00 ENCOUNTER FOR PREVENTIVE HEALTH EXAMINATION: Primary | ICD-10-CM

## 2024-05-09 DIAGNOSIS — Z29.11 NEED FOR RSV IMMUNIZATION: ICD-10-CM

## 2024-05-09 PROCEDURE — 3075F SYST BP GE 130 - 139MM HG: CPT | Mod: CPTII,S$GLB,, | Performed by: NURSE PRACTITIONER

## 2024-05-09 PROCEDURE — 1158F ADVNC CARE PLAN TLK DOCD: CPT | Mod: CPTII,S$GLB,, | Performed by: NURSE PRACTITIONER

## 2024-05-09 PROCEDURE — 1101F PT FALLS ASSESS-DOCD LE1/YR: CPT | Mod: CPTII,S$GLB,, | Performed by: NURSE PRACTITIONER

## 2024-05-09 PROCEDURE — 1160F RVW MEDS BY RX/DR IN RCRD: CPT | Mod: CPTII,S$GLB,, | Performed by: NURSE PRACTITIONER

## 2024-05-09 PROCEDURE — 99999 PR PBB SHADOW E&M-EST. PATIENT-LVL IV: CPT | Mod: PBBFAC,,, | Performed by: NURSE PRACTITIONER

## 2024-05-09 PROCEDURE — 1170F FXNL STATUS ASSESSED: CPT | Mod: CPTII,S$GLB,, | Performed by: NURSE PRACTITIONER

## 2024-05-09 PROCEDURE — G0439 PPPS, SUBSEQ VISIT: HCPCS | Mod: S$GLB,,, | Performed by: NURSE PRACTITIONER

## 2024-05-09 PROCEDURE — 3288F FALL RISK ASSESSMENT DOCD: CPT | Mod: CPTII,S$GLB,, | Performed by: NURSE PRACTITIONER

## 2024-05-09 PROCEDURE — 1159F MED LIST DOCD IN RCRD: CPT | Mod: CPTII,S$GLB,, | Performed by: NURSE PRACTITIONER

## 2024-05-09 PROCEDURE — 3078F DIAST BP <80 MM HG: CPT | Mod: CPTII,S$GLB,, | Performed by: NURSE PRACTITIONER

## 2024-05-09 NOTE — PROGRESS NOTES
"  Felipe Jiménez presented for a  Medicare AWV and comprehensive Health Risk Assessment today. The following components were reviewed and updated:    Medical history  Family History  Social history  Allergies and Current Medications  Health Risk Assessment  Health Maintenance  Care Team         ** See Completed Assessments for Annual Wellness Visit within the encounter summary.**         The following assessments were completed:  Living Situation  CAGE  Depression Screening  Timed Get Up and Go  Whisper Test  Cognitive Function Screening    Nutrition Screening  ADL Screening  PAQ Screening      Opioid documentation:      Patient does not have a current opioid prescription.        Vitals:    05/09/24 1355   BP: 130/74   Pulse: 89   Resp: 18   Temp: 98 °F (36.7 °C)   TempSrc: Oral   SpO2: 95%   Weight: 74.1 kg (163 lb 5.8 oz)   Height: 5' 8" (1.727 m)     Body mass index is 24.84 kg/m².  Physical Exam  Vitals and nursing note reviewed.   Constitutional:       Appearance: Normal appearance. He is normal weight.   HENT:      Head: Normocephalic.      Nose: Nose normal.      Mouth/Throat:      Mouth: Mucous membranes are moist.   Eyes:      Conjunctiva/sclera: Conjunctivae normal.   Cardiovascular:      Rate and Rhythm: Normal rate and regular rhythm.      Heart sounds: Normal heart sounds.   Pulmonary:      Effort: Pulmonary effort is normal.      Breath sounds: Normal breath sounds.   Musculoskeletal:         General: Normal range of motion.      Cervical back: Normal range of motion.   Skin:     General: Skin is warm and dry.   Neurological:      General: No focal deficit present.      Mental Status: He is alert and oriented to person, place, and time.   Psychiatric:         Mood and Affect: Mood normal.         Behavior: Behavior normal.         Thought Content: Thought content normal.         Judgment: Judgment normal.             Diagnoses and health risks identified today and associated recommendations/orders:    1. " Encounter for preventive health examination  Exam done    Health Maintenance updated    Records reviewed    2. Hypercholesterolemia  Chronic, followed by PCP    3. Need for RSV immunization  Declined    4. Need for pneumococcal vaccination  Declined    5. BMI 24.0-24.9, adult  BMI reviewed      Provided Felipe with a 5-10 year written screening schedule and personal prevention plan. Recommendations were developed using the USPSTF age appropriate recommendations. Education, counseling, and referrals were provided as needed. After Visit Summary printed and given to patient which includes a list of additional screenings\tests needed.    Follow up in about 6 months (around 11/9/2024) for with PCP Dr. Asif or sooner as needed.    Saira Boles, ARSALAN      I offered to discuss advanced care planning, including how to pick a person who would make decisions for you if you were unable to make them for yourself, called a health care power of , and what kind of decisions you might make such as use of life sustaining treatments such as ventilators and tube feeding when faced with a life limiting illness recorded on a living will that they will need to know. (How you want to be cared for as you near the end of your natural life)     X Patient is interested in learning more about how to make advanced directives.  I provided them paperwork and offered to discuss this with them.

## 2024-05-09 NOTE — PATIENT INSTRUCTIONS
Counseling and Referral of Other Preventative  (Italic type indicates deductible and co-insurance are waived)    Patient Name: Felipe Jiménez  Today's Date: 5/9/2024    Health Maintenance         Date Due Completion Date    RSV Vaccine (Age 60+ and Pregnant patients) (1 - 1-dose 60+ series) 05/09/2024 (Originally 12/6/2013) ---    TETANUS VACCINE 05/09/2025 (Originally 12/6/1971) ---    Shingles Vaccine (1 of 2) 05/09/2025 (Originally 12/6/2003) ---    COVID-19 Vaccine (7 - 2023-24 season) 05/09/2025 (Originally 4/20/2024) 12/20/2023    Pneumococcal Vaccines (Age 65+) (1 of 1 - PCV) 05/09/2025 (Originally 12/6/2018) ---    PROSTATE-SPECIFIC ANTIGEN 06/01/2024 6/1/2023    Hemoglobin A1c (Prediabetes) 06/01/2024 6/1/2023    High Dose Statin 11/29/2024 11/29/2023    Colorectal Cancer Screening 11/11/2026 11/11/2021    Lipid Panel 06/01/2028 6/1/2023          No orders of the defined types were placed in this encounter.      The following information is provided to all patients.  This information is to help you find resources for any of the problems found today that may be affecting your health:                  Living healthy guide: www.[facility].[place].gov      Understanding Diabetes: www.diabetes.org      Eating healthy: www.cdc.gov/healthyweight      CDC home safety checklist: www.cdc.gov/steadi/patient.html      Agency on Aging: www.goea.[place].[facility]      Alcoholics anonymous (AA): www.aa.org      Physical Activity: www.irina.nih.gov/ng2deii      Tobacco use: www.quitwithusla.org

## 2024-05-15 ENCOUNTER — TELEPHONE (OUTPATIENT)
Dept: INTERNAL MEDICINE | Facility: CLINIC | Age: 69
End: 2024-05-15
Payer: MEDICARE

## 2024-05-15 NOTE — TELEPHONE ENCOUNTER
----- Message from Lynne Pérez sent at 5/15/2024 10:26 AM CDT -----  Contact: Self/ [phone]  1MEDICALADVICE     Patient is calling for Medical Advice regarding:need handicap decal     How long has patient had these symptoms:MIGUEL    Pharmacy name and phone#:NA    Would like response via [employer]:  Would like a return call     Comments: pt said that he is calling in regards to needing to get the handicap decal . Please advise

## 2024-06-10 ENCOUNTER — PATIENT MESSAGE (OUTPATIENT)
Dept: INTERNAL MEDICINE | Facility: CLINIC | Age: 69
End: 2024-06-10
Payer: MEDICARE

## 2024-06-20 ENCOUNTER — LAB VISIT (OUTPATIENT)
Dept: LAB | Facility: HOSPITAL | Age: 69
End: 2024-06-20
Attending: STUDENT IN AN ORGANIZED HEALTH CARE EDUCATION/TRAINING PROGRAM
Payer: MEDICARE

## 2024-06-20 ENCOUNTER — OFFICE VISIT (OUTPATIENT)
Dept: INTERNAL MEDICINE | Facility: CLINIC | Age: 69
End: 2024-06-20
Payer: MEDICARE

## 2024-06-20 VITALS
DIASTOLIC BLOOD PRESSURE: 74 MMHG | HEART RATE: 81 BPM | OXYGEN SATURATION: 98 % | WEIGHT: 162.25 LBS | BODY MASS INDEX: 24.59 KG/M2 | SYSTOLIC BLOOD PRESSURE: 128 MMHG | HEIGHT: 68 IN

## 2024-06-20 DIAGNOSIS — R73.03 PREDIABETES: ICD-10-CM

## 2024-06-20 DIAGNOSIS — Z12.5 SCREENING PSA (PROSTATE SPECIFIC ANTIGEN): ICD-10-CM

## 2024-06-20 DIAGNOSIS — N50.89 MASS OF RIGHT TESTICLE: ICD-10-CM

## 2024-06-20 DIAGNOSIS — G47.09 OTHER INSOMNIA: Primary | ICD-10-CM

## 2024-06-20 DIAGNOSIS — E78.00 HYPERCHOLESTEROLEMIA: ICD-10-CM

## 2024-06-20 LAB
ALBUMIN SERPL BCP-MCNC: 3.3 G/DL (ref 3.5–5.2)
ALP SERPL-CCNC: 76 U/L (ref 55–135)
ALT SERPL W/O P-5'-P-CCNC: 14 U/L (ref 10–44)
ANION GAP SERPL CALC-SCNC: 8 MMOL/L (ref 8–16)
AST SERPL-CCNC: 23 U/L (ref 10–40)
BASOPHILS # BLD AUTO: 0.01 K/UL (ref 0–0.2)
BASOPHILS NFR BLD: 0.1 % (ref 0–1.9)
BILIRUB SERPL-MCNC: 0.4 MG/DL (ref 0.1–1)
BUN SERPL-MCNC: 15 MG/DL (ref 8–23)
CALCIUM SERPL-MCNC: 10.3 MG/DL (ref 8.7–10.5)
CHLORIDE SERPL-SCNC: 103 MMOL/L (ref 95–110)
CHOLEST SERPL-MCNC: 110 MG/DL (ref 120–199)
CHOLEST/HDLC SERPL: 2.7 {RATIO} (ref 2–5)
CO2 SERPL-SCNC: 25 MMOL/L (ref 23–29)
COMPLEXED PSA SERPL-MCNC: 2.1 NG/ML (ref 0–4)
CREAT SERPL-MCNC: 1.4 MG/DL (ref 0.5–1.4)
DIFFERENTIAL METHOD BLD: ABNORMAL
EOSINOPHIL # BLD AUTO: 0 K/UL (ref 0–0.5)
EOSINOPHIL NFR BLD: 0.3 % (ref 0–8)
ERYTHROCYTE [DISTWIDTH] IN BLOOD BY AUTOMATED COUNT: 15.6 % (ref 11.5–14.5)
EST. GFR  (NO RACE VARIABLE): 54.1 ML/MIN/1.73 M^2
ESTIMATED AVG GLUCOSE: 128 MG/DL (ref 68–131)
GLUCOSE SERPL-MCNC: 102 MG/DL (ref 70–110)
HBA1C MFR BLD: 6.1 % (ref 4–5.6)
HCT VFR BLD AUTO: 37 % (ref 40–54)
HDLC SERPL-MCNC: 41 MG/DL (ref 40–75)
HDLC SERPL: 37.3 % (ref 20–50)
HGB BLD-MCNC: 11.4 G/DL (ref 14–18)
IMM GRANULOCYTES # BLD AUTO: 0.03 K/UL (ref 0–0.04)
IMM GRANULOCYTES NFR BLD AUTO: 0.3 % (ref 0–0.5)
LDLC SERPL CALC-MCNC: 59.4 MG/DL (ref 63–159)
LYMPHOCYTES # BLD AUTO: 1.5 K/UL (ref 1–4.8)
LYMPHOCYTES NFR BLD: 15.9 % (ref 18–48)
MCH RBC QN AUTO: 28.1 PG (ref 27–31)
MCHC RBC AUTO-ENTMCNC: 30.8 G/DL (ref 32–36)
MCV RBC AUTO: 91 FL (ref 82–98)
MONOCYTES # BLD AUTO: 0.6 K/UL (ref 0.3–1)
MONOCYTES NFR BLD: 6.4 % (ref 4–15)
NEUTROPHILS # BLD AUTO: 7.2 K/UL (ref 1.8–7.7)
NEUTROPHILS NFR BLD: 77 % (ref 38–73)
NONHDLC SERPL-MCNC: 69 MG/DL
NRBC BLD-RTO: 0 /100 WBC
PLATELET # BLD AUTO: 438 K/UL (ref 150–450)
PMV BLD AUTO: 9.7 FL (ref 9.2–12.9)
POTASSIUM SERPL-SCNC: 4.1 MMOL/L (ref 3.5–5.1)
PROT SERPL-MCNC: 9.1 G/DL (ref 6–8.4)
RBC # BLD AUTO: 4.06 M/UL (ref 4.6–6.2)
SODIUM SERPL-SCNC: 136 MMOL/L (ref 136–145)
TRIGL SERPL-MCNC: 48 MG/DL (ref 30–150)
TSH SERPL DL<=0.005 MIU/L-ACNC: 1.84 UIU/ML (ref 0.4–4)
WBC # BLD AUTO: 9.29 K/UL (ref 3.9–12.7)

## 2024-06-20 PROCEDURE — 1160F RVW MEDS BY RX/DR IN RCRD: CPT | Mod: CPTII,S$GLB,, | Performed by: STUDENT IN AN ORGANIZED HEALTH CARE EDUCATION/TRAINING PROGRAM

## 2024-06-20 PROCEDURE — 36415 COLL VENOUS BLD VENIPUNCTURE: CPT | Performed by: STUDENT IN AN ORGANIZED HEALTH CARE EDUCATION/TRAINING PROGRAM

## 2024-06-20 PROCEDURE — 84443 ASSAY THYROID STIM HORMONE: CPT | Performed by: STUDENT IN AN ORGANIZED HEALTH CARE EDUCATION/TRAINING PROGRAM

## 2024-06-20 PROCEDURE — 83036 HEMOGLOBIN GLYCOSYLATED A1C: CPT | Performed by: STUDENT IN AN ORGANIZED HEALTH CARE EDUCATION/TRAINING PROGRAM

## 2024-06-20 PROCEDURE — 3074F SYST BP LT 130 MM HG: CPT | Mod: CPTII,S$GLB,, | Performed by: STUDENT IN AN ORGANIZED HEALTH CARE EDUCATION/TRAINING PROGRAM

## 2024-06-20 PROCEDURE — G2211 COMPLEX E/M VISIT ADD ON: HCPCS | Mod: S$GLB,,, | Performed by: STUDENT IN AN ORGANIZED HEALTH CARE EDUCATION/TRAINING PROGRAM

## 2024-06-20 PROCEDURE — 3288F FALL RISK ASSESSMENT DOCD: CPT | Mod: CPTII,S$GLB,, | Performed by: STUDENT IN AN ORGANIZED HEALTH CARE EDUCATION/TRAINING PROGRAM

## 2024-06-20 PROCEDURE — 1159F MED LIST DOCD IN RCRD: CPT | Mod: CPTII,S$GLB,, | Performed by: STUDENT IN AN ORGANIZED HEALTH CARE EDUCATION/TRAINING PROGRAM

## 2024-06-20 PROCEDURE — 80061 LIPID PANEL: CPT | Performed by: STUDENT IN AN ORGANIZED HEALTH CARE EDUCATION/TRAINING PROGRAM

## 2024-06-20 PROCEDURE — 99214 OFFICE O/P EST MOD 30 MIN: CPT | Mod: S$GLB,,, | Performed by: STUDENT IN AN ORGANIZED HEALTH CARE EDUCATION/TRAINING PROGRAM

## 2024-06-20 PROCEDURE — 84153 ASSAY OF PSA TOTAL: CPT | Performed by: STUDENT IN AN ORGANIZED HEALTH CARE EDUCATION/TRAINING PROGRAM

## 2024-06-20 PROCEDURE — 80053 COMPREHEN METABOLIC PANEL: CPT | Performed by: STUDENT IN AN ORGANIZED HEALTH CARE EDUCATION/TRAINING PROGRAM

## 2024-06-20 PROCEDURE — 3008F BODY MASS INDEX DOCD: CPT | Mod: CPTII,S$GLB,, | Performed by: STUDENT IN AN ORGANIZED HEALTH CARE EDUCATION/TRAINING PROGRAM

## 2024-06-20 PROCEDURE — 3078F DIAST BP <80 MM HG: CPT | Mod: CPTII,S$GLB,, | Performed by: STUDENT IN AN ORGANIZED HEALTH CARE EDUCATION/TRAINING PROGRAM

## 2024-06-20 PROCEDURE — 1101F PT FALLS ASSESS-DOCD LE1/YR: CPT | Mod: CPTII,S$GLB,, | Performed by: STUDENT IN AN ORGANIZED HEALTH CARE EDUCATION/TRAINING PROGRAM

## 2024-06-20 PROCEDURE — 99999 PR PBB SHADOW E&M-EST. PATIENT-LVL IV: CPT | Mod: PBBFAC,,, | Performed by: STUDENT IN AN ORGANIZED HEALTH CARE EDUCATION/TRAINING PROGRAM

## 2024-06-20 PROCEDURE — 85025 COMPLETE CBC W/AUTO DIFF WBC: CPT | Performed by: STUDENT IN AN ORGANIZED HEALTH CARE EDUCATION/TRAINING PROGRAM

## 2024-06-20 PROCEDURE — 1126F AMNT PAIN NOTED NONE PRSNT: CPT | Mod: CPTII,S$GLB,, | Performed by: STUDENT IN AN ORGANIZED HEALTH CARE EDUCATION/TRAINING PROGRAM

## 2024-06-20 RX ORDER — ZOLPIDEM TARTRATE 5 MG/1
5 TABLET ORAL NIGHTLY PRN
Qty: 30 TABLET | Refills: 0 | Status: SHIPPED | OUTPATIENT
Start: 2024-06-20 | End: 2024-07-20

## 2024-06-20 NOTE — PATIENT INSTRUCTIONS
Behavioral Management of Sleep   Go to bed only when sleepy.   Do not use bed or bedroom for anything but sleep (or intimacy)  If not asleep within about 20-30 minutes at the beginning of the night or after an awakening, exit the bedroom.   If not asleep within 20-30 minutes of returning to bed, repeat #3.   Use alarm to awaken at the same time every morning, weekends and holidays included.   Do not nap.   Avoid caffeine after 12:00PM.   Avoid exercise, nicotine, alcohol, and big meals within 2 hours of bedtime.

## 2024-06-20 NOTE — PROGRESS NOTES
SUBJECTIVE     Chief Complaint   Patient presents with    Follow-up     Hasn't been sleeping well & feels fatigued       HPI  Felipe Jiménez is a 68 y.o. male with  HLD  that presents for follow-up. LOV 23.     Difficulty falling asleep over the past month. Sleeps well once he falls asleep.   Has been using Diazepam, not as effective as before.  Tries to force self to sleep, will toss and turn. Eventually will get up and do some things depending on what he has planned the next day.   No increased caffeine, EtOH intake.   Denies lack of need for sleep.   No other interventions tried.   Has felt fatigued in the daytime, so has had to do workouts in the evenings.   Says that daughter told him he has a mild snore, but no witnessed apneic spells.  No heightened anxiety.      Complaint of a nodule on the back of his right testicle. Feels a little larger than a grain of sand, sensitive to the touch. No pain otherwise. No skin changes. Reports that the testicles will seem larger over the course of the day. No hematuria, hematospermia. No dysuria. No accident or injury to area recently.     HLD -   Endorses taking statin as directed  Denies side effects or concerns while taking medication  : 2023  Lab Results   Component Value Date    LDLCALC 80.2 2023     Due for lipid recheck.        PAST MEDICAL HISTORY:  Past Medical History:   Diagnosis Date    Hyperlipidemia     Male erectile dysfunction, unspecified        PAST SURGICAL HISTORY:  Past Surgical History:   Procedure Laterality Date    FRACTURE SURGERY      LIPOMA RESECTION Left     left knee    QUADRICEPS TENDON REPAIR Left        FAMILY HISTORY:  Family History   Problem Relation Name Age of Onset    Heart disease Mother      Heart attacks under age 50 Mother  43         2/2 heart attack.    Heart disease Father      Heart attacks under age 50 Father  38         2/2 heart attack.       ALLERGIES AND  MEDICATIONS: updated and reviewed.  Review of patient's allergies indicates:  No Known Allergies  Current Outpatient Medications   Medication Sig Dispense Refill    diazePAM (VALIUM) 5 MG tablet TAKE 1 TABLET BY MOUTH EVERY 12 HOURS AS NEEDED FOR ANXIETY 30 tablet 0    meloxicam (MOBIC) 7.5 MG tablet Take 1 tablet by mouth twice daily 60 tablet 0    rosuvastatin (CRESTOR) 20 MG tablet Take 1 tablet (20 mg total) by mouth once daily. 90 tablet 3    sildenafiL (VIAGRA) 100 MG tablet Take 1 tablet (100 mg total) by mouth daily as needed for Erectile Dysfunction. 10 tablet 3    triamcinolone acetonide 0.1% (KENALOG) 0.1 % cream APPLY  CREAM EXTERNALLY TWICE DAILY AS NEEDED FOR  DERMATITIS 45 g 0     No current facility-administered medications for this visit.       ROS  Review of Systems   Constitutional:  Positive for fatigue. Negative for activity change, chills and fever.   HENT:  Negative for congestion and hearing loss.    Eyes:  Negative for pain and visual disturbance.   Respiratory:  Negative for cough and shortness of breath.    Cardiovascular:  Negative for chest pain and palpitations.   Gastrointestinal:  Negative for abdominal pain, constipation, diarrhea, nausea and vomiting.   Endocrine: Negative.    Genitourinary:  Positive for testicular pain. Negative for dysuria, genital sores, hematuria, penile discharge, penile pain and penile swelling.   Musculoskeletal:  Negative for arthralgias and myalgias.   Skin: Negative.    Allergic/Immunologic: Negative.    Neurological:  Negative for dizziness, light-headedness and headaches.   Hematological: Negative.    Psychiatric/Behavioral:  Positive for sleep disturbance. The patient is not nervous/anxious.          OBJECTIVE     Physical Exam  Vitals:    06/20/24 1101   BP: 128/74   Pulse: 81    Body mass index is 24.67 kg/m².            Physical Exam  Vitals reviewed.   Constitutional:       General: He is not in acute distress.     Appearance: Normal appearance.    HENT:      Head: Normocephalic and atraumatic.      Mouth/Throat:      Mouth: Mucous membranes are moist.      Pharynx: Oropharynx is clear.   Eyes:      Extraocular Movements: Extraocular movements intact.      Conjunctiva/sclera: Conjunctivae normal.      Pupils: Pupils are equal, round, and reactive to light.   Cardiovascular:      Rate and Rhythm: Normal rate and regular rhythm.      Pulses: Normal pulses.      Heart sounds: Normal heart sounds.   Pulmonary:      Effort: Pulmonary effort is normal.      Breath sounds: Normal breath sounds.   Abdominal:      General: There is no distension.      Hernia: A hernia is present. There is no hernia in the left inguinal area or right inguinal area.   Genitourinary:     Penis: Normal and uncircumcised.       Testes:         Right: Mass and tenderness present. Swelling not present. Right testis is descended.         Left: Mass, tenderness or swelling not present. Left testis is descended.      Bonifacio stage (genital): 5.   Musculoskeletal:         General: Normal range of motion.      Cervical back: Normal range of motion and neck supple.   Lymphadenopathy:      Lower Body: No right inguinal adenopathy. No left inguinal adenopathy.   Skin:     General: Skin is warm and dry.   Neurological:      General: No focal deficit present.      Mental Status: He is alert.           Health Maintenance         Date Due Completion Date    High Dose Statin Never done ---    RSV Vaccine (Age 60+ and Pregnant patients) (1 - 1-dose 60+ series) Never done ---    PROSTATE-SPECIFIC ANTIGEN 06/01/2024 6/1/2023    Hemoglobin A1c (Prediabetes) 06/01/2024 6/1/2023    TETANUS VACCINE 05/09/2025 (Originally 12/6/1971) ---    Shingles Vaccine (1 of 2) 05/09/2025 (Originally 12/6/2003) ---    COVID-19 Vaccine (7 - 2023-24 season) 05/09/2025 (Originally 4/20/2024) 12/20/2023    Pneumococcal Vaccines (Age 65+) (1 of 1 - PCV) 05/09/2025 (Originally 12/6/2018) ---    Colorectal Cancer Screening  11/11/2026 11/11/2021    Lipid Panel 06/01/2028 6/1/2023              ASSESSMENT     68 y.o. male with     1. Other insomnia    2. Mass of right testicle    3. Hypercholesterolemia    4. Prediabetes    5. Screening PSA (prostate specific antigen)        PLAN:     1. Other insomnia  - Counseled patient at length about sleep hygiene practices to help promote good sleep.  Reviewed use of melatonin.  Patient given copy of sleep hygiene recommendations.  - Will discontinue diazepam.  Trial Ambien.  Counseled patient at length about risk of chronic continuous use of benzodiazepines and sedative hypnotics, particularly there association with dementia in the setting of chronic, continuous use.  Patient verbalized understanding.  - Avoid evening work outs at least 2 hours before targeted bedtime.   -  reviewed.  - zolpidem (AMBIEN) 5 MG Tab; Take 1 tablet (5 mg total) by mouth nightly as needed (insomnia).  Dispense: 30 tablet; Refill: 0    2. Mass of right testicle  - Tender mass of right testicle.   - US Scrotum And Testicles; Future  - Urinalysis; Future  - C. trachomatis/N. gonorrhoeae by AMP DNA [facility]; Urine; Future    3. Hypercholesterolemia  - Stable on statin. Continue.   - Lipid Panel; Future  - Comprehensive Metabolic Panel; Future  - CBC Auto Differential; Future    4. Prediabetes  - Last A1c in 6/2023 was 5.8%. Encourage diet low in processed carbohydrates, continue exercise routine.   - Hemoglobin A1C; Future  - TSH; Future  - Comprehensive Metabolic Panel; Future    5. Screening PSA (prostate specific antigen)  - PSA, SCREENING; Future    Counseled patient on safe and effective use of new medication, including common adverse effects. Patient verbalized understanding.       RTC in 6 months, earlier PRN     Visit today included increased complexity associated with the care of the episodic problem of insomnia, right testicular mass addressed and managing the longitudinal care of the patient due to the serious  and/or complex managed problems of hypercholesterolemia and prediabetes.      Sami Asif MD  Family Medicine  [facility]  06/20/2024    This document was created using voice recognition software ([employer]). Although it may be edited, this document may contain errors related to incorrect recognition of the spoken word. Please call the physician if clarification is needed.       No follow-ups on file.

## 2024-06-21 ENCOUNTER — PATIENT MESSAGE (OUTPATIENT)
Dept: INTERNAL MEDICINE | Facility: CLINIC | Age: 69
End: 2024-06-21
Payer: MEDICARE

## 2024-06-21 ENCOUNTER — TELEPHONE (OUTPATIENT)
Dept: INTERNAL MEDICINE | Facility: CLINIC | Age: 69
End: 2024-06-21
Payer: MEDICARE

## 2024-06-21 DIAGNOSIS — N30.01 ACUTE CYSTITIS WITH HEMATURIA: ICD-10-CM

## 2024-06-21 DIAGNOSIS — D53.9 DEFICIENCY ANEMIA: ICD-10-CM

## 2024-06-21 DIAGNOSIS — R77.9 ELEVATED SERUM PROTEIN LEVEL: ICD-10-CM

## 2024-06-21 DIAGNOSIS — D64.9 NORMOCYTIC ANEMIA: Primary | ICD-10-CM

## 2024-06-21 RX ORDER — CIPROFLOXACIN 500 MG/1
500 TABLET ORAL 2 TIMES DAILY
Qty: 14 TABLET | Refills: 0 | Status: SHIPPED | OUTPATIENT
Start: 2024-06-21 | End: 2024-06-28

## 2024-06-21 NOTE — TELEPHONE ENCOUNTER
"Spoke to pt and gave results:"blood counts show worsening of anemia.  I would like to recheck his blood counts in a week along with checking for causes of anemia including iron, B12, folate levels.   Metabolic panel shows elevated serum protein.  Want to recheck CMP when he has his blood counts rechecked to make sure that this has just a transient elevation.    Urinalysis shows UTI.  I have since in a prescription for ciprofloxacin for him to take twice a day for 7 days.  We will follow up on his urine cultures to find out which bacteria is growing.   Labs also show prediabetes.  Monitor carbohydrate intake, continue physical activity.   Rest of labs look fine". Pateint verbalized understanding. Repeat labs scheduled and confirmed with patient    "

## 2024-07-01 ENCOUNTER — HOSPITAL ENCOUNTER (OUTPATIENT)
Dept: RADIOLOGY | Facility: HOSPITAL | Age: 69
Discharge: HOME | End: 2024-07-01
Attending: STUDENT IN AN ORGANIZED HEALTH CARE EDUCATION/TRAINING PROGRAM
Payer: MEDICARE

## 2024-07-01 DIAGNOSIS — N50.89 MASS OF RIGHT TESTICLE: ICD-10-CM

## 2024-07-01 PROCEDURE — 76870 US EXAM SCROTUM: CPT | Mod: 26,,, | Performed by: INTERNAL MEDICINE

## 2024-07-01 PROCEDURE — 93975 VASCULAR STUDY: CPT | Mod: 26,,, | Performed by: INTERNAL MEDICINE

## 2024-07-01 PROCEDURE — 93975 VASCULAR STUDY: CPT | Mod: TC

## 2024-07-09 ENCOUNTER — TELEPHONE (OUTPATIENT)
Dept: INTERNAL MEDICINE | Facility: CLINIC | Age: 69
End: 2024-07-09
Payer: MEDICARE

## 2024-07-09 NOTE — TELEPHONE ENCOUNTER
----- Message from Lanny Renee sent at 7/9/2024 11:01 AM CDT -----  Contact: [phone]  Pt has a form that he needs to have filled out by Dr Asif for a handicap license plates. Pt would like to know when he can drop it off. Please call to advise.                     Thank you

## 2024-07-09 NOTE — TELEPHONE ENCOUNTER
I called and spoke with pt, and he will drop off his paperwork.  I informed him that pnce it is done, he will receive a call to pick it up.

## 2024-07-16 ENCOUNTER — TELEPHONE (OUTPATIENT)
Dept: INTERNAL MEDICINE | Facility: CLINIC | Age: 69
End: 2024-07-16
Payer: MEDICARE

## 2024-07-17 ENCOUNTER — TELEPHONE (OUTPATIENT)
Dept: INTERNAL MEDICINE | Facility: CLINIC | Age: 69
End: 2024-07-17
Payer: MEDICARE

## 2024-07-17 NOTE — TELEPHONE ENCOUNTER
----- Message from Shelby Kumar sent at 7/17/2024 12:23 PM CDT -----  Contact: [phone] Patient  Patient is returning a phone call.    Who left a message for the patient: Emi Andersen MA    Does patient know what this is regarding:  paperwork/pt is asking where to  the paperwork.     Would you like a call back, or a response through your [facility] portal?:   call back    Comments:

## 2024-07-18 ENCOUNTER — TELEPHONE (OUTPATIENT)
Dept: INTERNAL MEDICINE | Facility: CLINIC | Age: 69
End: 2024-07-18
Payer: MEDICARE

## 2024-07-18 ENCOUNTER — PATIENT MESSAGE (OUTPATIENT)
Dept: INTERNAL MEDICINE | Facility: CLINIC | Age: 69
End: 2024-07-18
Payer: MEDICARE

## 2024-07-18 DIAGNOSIS — D50.9 IRON DEFICIENCY ANEMIA, UNSPECIFIED IRON DEFICIENCY ANEMIA TYPE: Primary | ICD-10-CM

## 2024-07-18 NOTE — TELEPHONE ENCOUNTER
----- Message from Sami Asif MD sent at 7/18/2024  2:45 PM CDT -----  Please let patient know that recent blood work showed that he has anemia that appears to be due to low iron. Because low iron is unusual in male patients and could be caused by chronic blood loss in the GI tract, I have placed a referral to GI for evaluation. Please help arrange.     Thanks,   TP  ----- Message -----  From: [facility] [employer] [facility]  Sent: 7/1/2024   3:32 PM CDT  To: Sami Asif MD

## 2024-07-19 ENCOUNTER — TELEPHONE (OUTPATIENT)
Dept: INTERNAL MEDICINE | Facility: CLINIC | Age: 69
End: 2024-07-19
Payer: MEDICARE

## 2024-07-19 NOTE — PROGRESS NOTES
Subjective:       Patient ID: Felipe Jiménez is a 68 y.o. male Body mass index is 23.36 kg/m².    Chief Complaint: Anemia    This patient is new to me.  Referring Provider: Dr. Sami Asif for anemia.       No s/s of bleeding - no blood in stool, no hematemesis   Ferritin is high but iron level is low.   24   H/H   10.7/33.0    Ferritin 424    Iron 27  Transferrin 170  TIBC 252      Review of Systems   Constitutional:  Negative for activity change, appetite change, fatigue, fever and unexpected weight change.   HENT:  Negative for sore throat and trouble swallowing.    Respiratory:  Negative for cough and shortness of breath.    Cardiovascular:  Negative for chest pain.   Gastrointestinal:  Negative for abdominal distention, abdominal pain, anal bleeding, blood in stool, constipation, diarrhea, nausea, rectal pain and vomiting.       No LMP for male patient.  Past Medical History:   Diagnosis Date    Hyperlipidemia     Male erectile dysfunction, unspecified      Past Surgical History:   Procedure Laterality Date    FRACTURE SURGERY      LIPOMA RESECTION Left     left knee    QUADRICEPS TENDON REPAIR Left      Family History   Problem Relation Name Age of Onset    Heart disease Mother      Heart attacks under age 50 Mother  43         2/2 heart attack.    Heart disease Father      Heart attacks under age 50 Father  38         2/2 heart attack.     Social History     Tobacco Use    Smoking status: Former     Current packs/day: 0.00     Average packs/day: 1 pack/day for 20.0 years (20.0 ttl pk-yrs)     Types: Cigarettes     Start date: 1987     Quit date: 2007     Years since quittin.5    Smokeless tobacco: Never   Substance Use Topics    Alcohol use: Yes     Comment: Infrequently    Drug use: Not Currently     Wt Readings from Last 10 Encounters:   24 69.7 kg (153 lb 10.6 oz)   24 73.6 kg (162 lb 4.1 oz)   24 74.1 kg (163 lb 5.8 oz)  "  11/29/23 78.2 kg (172 lb 6.4 oz)   06/01/23 79 kg (174 lb 2.6 oz)     Lab Results   Component Value Date    WBC 9.90 07/01/2024    HGB 10.7 (L) 07/01/2024    HCT 33.0 (L) 07/01/2024    MCV 88 07/01/2024     07/01/2024     CMP  Sodium   Date Value Ref Range Status   07/01/2024 135 (L) 136 - 145 mmol/L Final     Potassium   Date Value Ref Range Status   07/01/2024 3.7 3.5 - 5.1 mmol/L Final     Chloride   Date Value Ref Range Status   07/01/2024 104 95 - 110 mmol/L Final     CO2   Date Value Ref Range Status   07/01/2024 23 23 - 29 mmol/L Final     Glucose   Date Value Ref Range Status   07/01/2024 91 70 - 110 mg/dL Final     BUN   Date Value Ref Range Status   07/01/2024 10 8 - 23 mg/dL Final     Creatinine   Date Value Ref Range Status   07/01/2024 1.4 0.5 - 1.4 mg/dL Final     Calcium   Date Value Ref Range Status   07/01/2024 9.8 8.7 - 10.5 mg/dL Final     Total Protein   Date Value Ref Range Status   07/01/2024 8.8 (H) 6.0 - 8.4 g/dL Final     Albumin   Date Value Ref Range Status   07/01/2024 3.1 (L) 3.5 - 5.2 g/dL Final     Total Bilirubin   Date Value Ref Range Status   07/01/2024 0.4 0.1 - 1.0 mg/dL Final     Comment:     For infants and newborns, interpretation of results should be based  on gestational age, weight and in agreement with clinical  observations.    Premature Infant recommended reference ranges:  Up to 24 hours.............<8.0 mg/dL  Up to 48 hours............<12.0 mg/dL  3-5 days..................<15.0 mg/dL  6-29 days.................<15.0 mg/dL       Alkaline Phosphatase   Date Value Ref Range Status   07/01/2024 72 55 - 135 U/L Final     AST   Date Value Ref Range Status   07/01/2024 20 10 - 40 U/L Final     ALT   Date Value Ref Range Status   07/01/2024 13 10 - 44 U/L Final     Anion Gap   Date Value Ref Range Status   07/01/2024 8 8 - 16 mmol/L Final     No results found for: "AMYLASE"  No results found for: "LIPASE"  No results found for: "LIPASERES"  Lab Results   Component " Value Date    TSH 1.836 06/20/2024       Reviewed prior medical records including radiology report of no GI imaging to review at this time & endoscopy history (see surgical history).    Objective:      Physical Exam  Vitals and nursing note reviewed.   Constitutional:       General: He is not in acute distress.     Appearance: He is not ill-appearing.   Cardiovascular:      Rate and Rhythm: Normal rate.      Pulses: Normal pulses.   Pulmonary:      Effort: Pulmonary effort is normal. No respiratory distress.   Abdominal:      General: Abdomen is flat. Bowel sounds are normal. There is no distension.      Palpations: Abdomen is soft.      Tenderness: There is no abdominal tenderness.   Skin:     General: Skin is warm and dry.      Capillary Refill: Capillary refill takes 2 to 3 seconds.   Neurological:      Mental Status: He is alert and oriented to person, place, and time.         Assessment:       1. Iron deficiency anemia, unspecified iron deficiency anemia type        Plan:       Iron deficiency anemia, unspecified iron deficiency anemia type  - discussed with patient the different ways that anemia occurs: blood loss (such as from the gi tract), the body is not making enough, or the body is breaking down the rbcs too quickly; recommend EGD and colonoscopy to further evaluate gi tract for possible blood loss and pending results of endoscopies, possible UGI with Small Bowel Follow Through/video capsule study  -follow-up with PCP and/or hematology for continued evaluation and management     - schedule EGD, discussed procedure with patient, including risks and benefits, patient verbalized understanding  - schedule Colonoscopy, discussed procedure with the patient, including risks and benefits, patient verbalized understanding    -     Ambulatory referral/consult to Gastroenterology      No follow-ups on file.      If no improvement in symptoms or symptoms worsen, call/follow-up at clinic or go to [facility]  CALOS Angulo, FNP-C    Encounter includes face to face time and non-face to face time preparing to see the patient (eg, review of tests), obtaining and/or reviewing separately obtained history, documenting clinical information in the electronic or other health record, independently interpreting results (not separately reported) and communicating results to the patient/family/caregiver, or care coordination (not separately reported).     A dictation software program was used for this note. Please expect some simple typographical errors in this note.

## 2024-07-19 NOTE — TELEPHONE ENCOUNTER
Notified pt of message from PCP and scheduled pt's GI 'appt for Tuesday 07/23/24.    ----- Message from Sami Asif MD sent at 7/18/2024  2:45 PM CDT -----  Please let patient know that recent blood work showed that he has anemia that appears to be due to low iron. Because low iron is unusual in male patients and could be caused by chronic blood loss in the GI tract, I have placed a referral to GI for evaluation. Please help arrange.     Thanks,   TP  ----- Message -----  From: [facility] [employer] [facility] Interface  Sent: 7/1/2024   3:32 PM CDT  To: Sami Asif MD

## 2024-07-22 ENCOUNTER — TELEPHONE (OUTPATIENT)
Dept: GASTROENTEROLOGY | Facility: CLINIC | Age: 69
End: 2024-07-22
Payer: MEDICARE

## 2024-07-22 NOTE — TELEPHONE ENCOUNTER
----- Message from Maggy Lizarraga sent at 7/22/2024  2:03 PM CDT -----  Regarding: advise  Contact: patient  Type: Needs Medical Advice  Who Called:  patient   Symptoms (please be specific):    How long has patient had these symptoms:    Pharmacy name and phone #:    Best Call Back Number: [phone]    Additional Information:   rosalva christina pt wants to know how long his apt will be tomorrow MRN: [id number] MAIA MORRISON

## 2024-07-23 ENCOUNTER — OFFICE VISIT (OUTPATIENT)
Dept: GASTROENTEROLOGY | Facility: CLINIC | Age: 69
End: 2024-07-23
Payer: MEDICARE

## 2024-07-23 VITALS
WEIGHT: 153.69 LBS | HEART RATE: 93 BPM | BODY MASS INDEX: 23.29 KG/M2 | DIASTOLIC BLOOD PRESSURE: 81 MMHG | HEIGHT: 68 IN | SYSTOLIC BLOOD PRESSURE: 134 MMHG

## 2024-07-23 DIAGNOSIS — D50.9 IRON DEFICIENCY ANEMIA, UNSPECIFIED IRON DEFICIENCY ANEMIA TYPE: ICD-10-CM

## 2024-07-23 PROCEDURE — 3075F SYST BP GE 130 - 139MM HG: CPT | Mod: CPTII,S$GLB,,

## 2024-07-23 PROCEDURE — 3044F HG A1C LEVEL LT 7.0%: CPT | Mod: CPTII,S$GLB,,

## 2024-07-23 PROCEDURE — 99204 OFFICE O/P NEW MOD 45 MIN: CPT | Mod: S$GLB,,,

## 2024-07-23 PROCEDURE — 1101F PT FALLS ASSESS-DOCD LE1/YR: CPT | Mod: CPTII,S$GLB,,

## 2024-07-23 PROCEDURE — 1126F AMNT PAIN NOTED NONE PRSNT: CPT | Mod: CPTII,S$GLB,,

## 2024-07-23 PROCEDURE — 3288F FALL RISK ASSESSMENT DOCD: CPT | Mod: CPTII,S$GLB,,

## 2024-07-23 PROCEDURE — 1159F MED LIST DOCD IN RCRD: CPT | Mod: CPTII,S$GLB,,

## 2024-07-23 PROCEDURE — 3008F BODY MASS INDEX DOCD: CPT | Mod: CPTII,S$GLB,,

## 2024-07-23 PROCEDURE — 99999 PR PBB SHADOW E&M-EST. PATIENT-LVL III: CPT | Mod: PBBFAC,,,

## 2024-07-23 PROCEDURE — 3079F DIAST BP 80-89 MM HG: CPT | Mod: CPTII,S$GLB,,

## 2024-07-23 NOTE — PATIENT INSTRUCTIONS
Phayzme - OTC gas medication    MIRALAX PREP FOR COLONOSCOPY(OVER THE COUNTER PREP)    PROCEDURE DATE: 10/2/2024  REPORT TO [facility] ([address]) ONE HOUR BEFORE PROCEDURE.    NO BLOOD THINNERS/NO ASPIRIN (OK TO TAKE 81mg ASPIRIN) OR MEDICATIONS CONTAINING ASPIRIN, MOTRIN, IBUPROFEN, ALEVE, OR ANY ANTI-INFLAMMATORY MEDICATIONS FOR 7 DAYS PRIOR TO PROCEDURE. TYLENOL IS OK.    Avoid eating beans, peas, corn, nuts, popcorn, okra, and tomatoes for 5 days prior to your colonoscopy    **PURCHASE One 64 oz Bottle of Lemon-Lime Gatorade, one 8.3 oz bottle of MiraLAX (generic is acceptable), One box of Dulcolax Laxatives**    MIX MIRALAX PREP WITH GATORADE ON DAY BEFORE AND REFRIGERATE                          THE ENTIRE DAY BEFORE YOUR COLONOSCOPY CLEAR LIQUIDS ONLY (LISTED BELOW)                                                     NO FOOD   ___________________________________________________   Coffee (no cream), tea, carbonated beverages, gelatin-plain or fruit flavored, Gatorade, Crystal Light, Popsicles, snowballs, hard candy (avoid anything red, purple or blue). Juices - apple, white grape, or cranberry juice, lemonade, limeade, clear beef or chicken bouillon or broth. Avoid liquids not listed, Alcohol is not permitted.  Take 2 Dulcolax laxative tablets at 10a  Take 2 more Dulcolax laxative tablets at 12p  At 5p drink 32oz of MiraLAX prep mix   Follow with 5(8oz) fluids of clear liquid over next 5 hours  At 10p drink other 32oz of MiraLAX prep mix  Follow with 3(8oz) fluids of clear liquid within 3 hours     NOTHING BY MOUTH AFTER 4am THE MORNING OF YOUR COLONOSCOPY     Ok to take morning medication by 4am (except no Diabetic medications)    Since sedation is used, you need someone to drive you home, No TAXI   Any Questions, please call Kylee or Bianka [phone]

## 2024-08-15 ENCOUNTER — TELEPHONE (OUTPATIENT)
Dept: INTERNAL MEDICINE | Facility: CLINIC | Age: 69
End: 2024-08-15
Payer: MEDICARE

## 2024-08-15 NOTE — TELEPHONE ENCOUNTER
----- Message from Mary Lou Junior sent at 8/15/2024 10:40 AM CDT -----  Contact: Mobile  [phone]  There was a Boiled water advisory this week and the patient said that he cooked a pot of beans and he would like to know if he should throw them away.

## 2024-09-23 ENCOUNTER — TELEPHONE (OUTPATIENT)
Dept: GASTROENTEROLOGY | Facility: CLINIC | Age: 69
End: 2024-09-23
Payer: MEDICARE

## 2024-09-23 NOTE — TELEPHONE ENCOUNTER
----- Message from Ada Cavazos sent at 9/23/2024  1:12 PM CDT -----  Type: Needs Medical Advice  Who Called:  pt  Symptoms (please be specific):    How long has patient had these symptoms:    Pharmacy name and phone #:    Best Call Back Number: [phone]    Additional Information: pt needs some clarification on the pre colonoscopy meds and how to take them.  He is headed out to pharmacy to get this stuff now  Please call to advise  [phone]

## 2024-09-30 ENCOUNTER — NURSE TRIAGE (OUTPATIENT)
Dept: ADMINISTRATIVE | Facility: CLINIC | Age: 69
End: 2024-09-30
Payer: MEDICARE

## 2024-10-01 NOTE — TELEPHONE ENCOUNTER
Endo will review instructions again with patient today when they call with time of arrival and to confirm.

## 2024-10-01 NOTE — TELEPHONE ENCOUNTER
Pt calling to report that the instructions for Gatorade/ Miralax should be clarified better. Reports there is no 64oz Gatorade and you need two 28 ounce bottles. While on the line I have assisted him in finding a 24 hour pharmacy. No other questions at this time.   Reason for Disposition   Health information question, no triage required and triager able to answer question    Protocols used: Information Only Call - No Triage-A-

## 2024-10-02 ENCOUNTER — ANESTHESIA EVENT (OUTPATIENT)
Dept: ENDOSCOPY | Facility: HOSPITAL | Age: 69
End: 2024-10-02
Payer: MEDICARE

## 2024-10-02 ENCOUNTER — ANESTHESIA (OUTPATIENT)
Dept: ENDOSCOPY | Facility: HOSPITAL | Age: 69
End: 2024-10-02
Payer: MEDICARE

## 2024-10-02 ENCOUNTER — HOSPITAL ENCOUNTER (OUTPATIENT)
Facility: HOSPITAL | Age: 69
Discharge: HOME | End: 2024-10-02
Attending: INTERNAL MEDICINE | Admitting: INTERNAL MEDICINE
Payer: MEDICARE

## 2024-10-02 DIAGNOSIS — K44.9 HIATAL HERNIA: ICD-10-CM

## 2024-10-02 DIAGNOSIS — K64.8 INTERNAL HEMORRHOIDS: ICD-10-CM

## 2024-10-02 DIAGNOSIS — K29.60 EROSIVE GASTRITIS: Primary | ICD-10-CM

## 2024-10-02 DIAGNOSIS — K63.5 POLYP OF COLON, UNSPECIFIED PART OF COLON, UNSPECIFIED TYPE: ICD-10-CM

## 2024-10-02 DIAGNOSIS — D50.9 IRON DEFICIENCY ANEMIA: ICD-10-CM

## 2024-10-02 PROCEDURE — 27201012 HC FORCEPS, HOT/COLD, DISP: Performed by: INTERNAL MEDICINE

## 2024-10-02 PROCEDURE — 45380 COLONOSCOPY AND BIOPSY: CPT | Performed by: INTERNAL MEDICINE

## 2024-10-02 PROCEDURE — 88305 TISSUE EXAM BY PATHOLOGIST: CPT | Mod: TC,59 | Performed by: PATHOLOGY

## 2024-10-02 PROCEDURE — 25000003 PHARM REV CODE 250

## 2024-10-02 PROCEDURE — 43239 EGD BIOPSY SINGLE/MULTIPLE: CPT | Performed by: INTERNAL MEDICINE

## 2024-10-02 PROCEDURE — 37000008 HC ANESTHESIA 1ST 15 MINUTES: Performed by: INTERNAL MEDICINE

## 2024-10-02 PROCEDURE — 63600175 PHARM REV CODE 636 W HCPCS

## 2024-10-02 PROCEDURE — 25000003 PHARM REV CODE 250: Performed by: INTERNAL MEDICINE

## 2024-10-02 PROCEDURE — 45380 COLONOSCOPY AND BIOPSY: CPT | Mod: ,,, | Performed by: INTERNAL MEDICINE

## 2024-10-02 PROCEDURE — 43239 EGD BIOPSY SINGLE/MULTIPLE: CPT | Mod: 51,,, | Performed by: INTERNAL MEDICINE

## 2024-10-02 PROCEDURE — 37000009 HC ANESTHESIA EA ADD 15 MINS: Performed by: INTERNAL MEDICINE

## 2024-10-02 RX ORDER — PANTOPRAZOLE SODIUM 40 MG/1
40 TABLET, DELAYED RELEASE ORAL DAILY
Qty: 90 TABLET | Refills: 3 | Status: SHIPPED | OUTPATIENT
Start: 2024-10-02 | End: 2025-10-02

## 2024-10-02 RX ORDER — PROPOFOL 10 MG/ML
VIAL (ML) INTRAVENOUS
Status: DISCONTINUED | OUTPATIENT
Start: 2024-10-02 | End: 2024-10-02

## 2024-10-02 RX ORDER — LIDOCAINE HYDROCHLORIDE 20 MG/ML
INJECTION, SOLUTION EPIDURAL; INFILTRATION; INTRACAUDAL; PERINEURAL
Status: DISCONTINUED | OUTPATIENT
Start: 2024-10-02 | End: 2024-10-02

## 2024-10-02 RX ORDER — SODIUM CHLORIDE 9 MG/ML
INJECTION, SOLUTION INTRAVENOUS CONTINUOUS
Status: DISCONTINUED | OUTPATIENT
Start: 2024-10-02 | End: 2024-10-02 | Stop reason: HOSPADM

## 2024-10-02 RX ADMIN — PROPOFOL 50 MG: 10 INJECTION, EMULSION INTRAVENOUS at 11:10

## 2024-10-02 RX ADMIN — SODIUM CHLORIDE: 0.9 INJECTION, SOLUTION INTRAVENOUS at 11:10

## 2024-10-02 RX ADMIN — PROPOFOL 40 MG: 10 INJECTION, EMULSION INTRAVENOUS at 11:10

## 2024-10-02 RX ADMIN — LIDOCAINE HYDROCHLORIDE 100 MG: 20 INJECTION, SOLUTION EPIDURAL; INFILTRATION; INTRACAUDAL; PERINEURAL at 11:10

## 2024-10-02 RX ADMIN — PROPOFOL 80 MG: 10 INJECTION, EMULSION INTRAVENOUS at 11:10

## 2024-10-02 RX ADMIN — SODIUM CHLORIDE: 9 INJECTION, SOLUTION INTRAVENOUS at 10:10

## 2024-10-02 NOTE — ANESTHESIA PREPROCEDURE EVALUATION
10/02/2024  Felipe Jiménez is a 68 y.o., male.      Pre-op Assessment    I have reviewed the NPO Status.   I have reviewed the Medications.     Review of Systems  Cardiovascular:  Exercise tolerance: good                                               Physical Exam  General: Well nourished    Airway:  Mallampati: II   Mouth Opening: Normal  TM Distance: Normal  Tongue: Normal  Neck ROM: Normal ROM    Dental:  Intact    Chest/Lungs:  Clear to auscultation, Normal Respiratory Rate        Anesthesia Plan  Type of Anesthesia, risks & benefits discussed:    Anesthesia Type: Gen Natural Airway  Intra-op Monitoring Plan: Standard ASA Monitors  Induction:  IV  Informed Consent: Informed consent signed with the Patient and all parties understand the risks and agree with anesthesia plan.  All questions answered.   ASA Score: 2    Ready For Surgery From Anesthesia Perspective.     .

## 2024-10-02 NOTE — PLAN OF CARE
Vss, medhat po fluids, denies pain, ambulates easily. IV removed, catheter intact. Discharge instructions provided and states understanding. States ready to go home.  Discharged from facility with family per wheelchair.

## 2024-10-02 NOTE — H&P
CC: LENORA    68 year old male with above. States that symptoms are stable, no alleviating/exacerbating factors. No family history of colorectal CA. Positive personal history of polyps. No bleeding or weight loss.     ROS:  No headache, no fever/chills, no chest pain/SOB, no nausea/vomiting/diarrhea/constipation/GI bleeding/abdominal pain, no dysuria/hematuria.    VSSAF   Exam:   Alert and oriented x 3; no apparent distress   PERRLA, sclera anicteric  CV: Regular rate/rhythm, normal PMI   Lungs: Clear bilaterally with no wheeze/rales   Abdomen: Soft, NT/ND, normal bowel sounds   Ext: No cyanosis, clubbing     Impression:   As above    Plan:   Proceed with endoscopy. Further recs to follow.

## 2024-10-02 NOTE — TRANSFER OF CARE
Anesthesia Transfer of Care Note    Patient: Felipe Jiménez    Procedure(s) Performed: Procedure(s) (LRB):  EGD (ESOPHAGOGASTRODUODENOSCOPY) (N/A)  COLONOSCOPY (N/A)    Patient location: PACU    Anesthesia Type: general    Transport from OR: Transported from OR on room air with adequate spontaneous ventilation    Post pain: adequate analgesia    Post assessment: no apparent anesthetic complications and tolerated procedure well    Post vital signs: stable    Level of consciousness: sedated    Nausea/Vomiting: no nausea/vomiting    Complications: none    Transfer of care protocol was followed      Last vitals: Visit Vitals  /62 (BP Location: Left arm, Patient Position: Lying)   Pulse 90   Temp 36.9 °C (98.4 °F) (Skin)   Resp 18   SpO2 99%

## 2024-10-02 NOTE — PROVATION PATIENT INSTRUCTIONS
Discharge Summary/Instructions after an Endoscopic Procedure  Patient Name: Felipe Jiménez  Patient MRN: [id number]  Patient YOB: 1955 Wednesday, October 2, 2024  Brennan Spear MD  Dear patient,  As a result of recent federal legislation (The Federal Cures Act), you may   receive lab or pathology results from your procedure in your [facility]   account before your physician is able to contact you. Your physician or   their representative will relay the results to you with their   recommendations at their soonest availability.  Thank you,  RESTRICTIONS:  During your procedure today, you received medications for sedation.  These   medications may affect your judgment, balance and coordination.  Therefore,   for 24 hours, you have the following restrictions:   - DO NOT drive a car, operate machinery, make legal/financial decisions,   sign important papers or drink alcohol.    ACTIVITY:  Today: no heavy lifting, straining or running due to procedural   sedation/anesthesia.  The following day: return to full activity including work.  DIET:  Eat and drink normally unless instructed otherwise.     TREATMENT FOR COMMON SIDE EFFECTS:  - Mild abdominal pain, nausea, belching, bloating or excessive gas:  rest,   eat lightly and use a heating pad.  - Sore Throat: treat with throat lozenges and/or gargle with warm salt   water.  - Because air was used during the procedure, expelling large amounts of air   from your rectum or belching is normal.  - If a bowel prep was taken, you may not have a bowel movement for 1-3 days.    This is normal.  SYMPTOMS TO WATCH FOR AND REPORT TO YOUR PHYSICIAN:  1. Abdominal pain or bloating, other than gas cramps.  2. Chest pain.  3. Back pain.  4. Signs of infection such as: chills or fever occurring within 24 hours   after the procedure.  5. Rectal bleeding, which would show as bright red, maroon, or black stools.   (A tablespoon of blood from the rectum is not serious, especially  if   hemorrhoids are present.)  6. Vomiting.  7. Weakness or dizziness.  GO DIRECTLY TO THE NEAREST EMERGENCY ROOM IF YOU HAVE ANY OF THE FOLLOWING:      Difficulty breathing              Chills and/or fever over 101 F   Persistent vomiting and/or vomiting blood   Severe abdominal pain   Severe chest pain   Black, tarry stools   Bleeding- more than one tablespoon   Any other symptom or condition that you feel may need urgent attention  Your doctor recommends these additional instructions:  If any biopsies were taken, your doctors clinic will contact you in 1 to 2   weeks with any results.  - Patient has a contact number available for emergencies.  The signs and   symptoms of potential delayed complications were discussed with the   patient.  Return to normal activities tomorrow.  Written discharge   instructions were provided to the patient.   - Resume previous diet.   - Continue present medications.   - No aspirin, ibuprofen, naproxen, or other non-steroidal anti-inflammatory   drugs.   - Await pathology results.   - Discharge patient to home (ambulatory).   - Follow an antireflux regimen.   - Perform a colonoscopy today.   - Use Protonix (pantoprazole) 40 mg PO daily.   - Return to GI office after studies are complete.  For questions, problems or results please call your physician - Brennan Spear MD at Work:  [phone].  [facility], EMERGENCY ROOM PHONE NUMBER: [phone]  IF A COMPLICATION OR EMERGENCY SITUATION ARISES AND YOU ARE UNABLE TO REACH   YOUR PHYSICIAN - GO DIRECTLY TO THE EMERGENCY ROOM.  Brennan Spear MD  10/2/2024 11:41:32 AM  This report has been verified and signed electronically.  Dear patient,  As a result of recent federal legislation (The Federal Cures Act), you may   receive lab or pathology results from your procedure in your [facility]   account before your physician is able to contact you. Your physician or   their representative will relay the results to you with  their   recommendations at their soonest availability.  Thank you,  PROVATION

## 2024-10-02 NOTE — PROVATION PATIENT INSTRUCTIONS
Discharge Summary/Instructions after an Endoscopic Procedure  Patient Name: Felipe Jiménez  Patient MRN: [id number]  Patient YOB: 1955 Wednesday, October 2, 2024  Brennan Spear MD  Dear patient,  As a result of recent federal legislation (The Federal Cures Act), you may   receive lab or pathology results from your procedure in your [facility]   account before your physician is able to contact you. Your physician or   their representative will relay the results to you with their   recommendations at their soonest availability.  Thank you,  RESTRICTIONS:  During your procedure today, you received medications for sedation.  These   medications may affect your judgment, balance and coordination.  Therefore,   for 24 hours, you have the following restrictions:   - DO NOT drive a car, operate machinery, make legal/financial decisions,   sign important papers or drink alcohol.    ACTIVITY:  Today: no heavy lifting, straining or running due to procedural   sedation/anesthesia.  The following day: return to full activity including work.  DIET:  Eat and drink normally unless instructed otherwise.     TREATMENT FOR COMMON SIDE EFFECTS:  - Mild abdominal pain, nausea, belching, bloating or excessive gas:  rest,   eat lightly and use a heating pad.  - Sore Throat: treat with throat lozenges and/or gargle with warm salt   water.  - Because air was used during the procedure, expelling large amounts of air   from your rectum or belching is normal.  - If a bowel prep was taken, you may not have a bowel movement for 1-3 days.    This is normal.  SYMPTOMS TO WATCH FOR AND REPORT TO YOUR PHYSICIAN:  1. Abdominal pain or bloating, other than gas cramps.  2. Chest pain.  3. Back pain.  4. Signs of infection such as: chills or fever occurring within 24 hours   after the procedure.  5. Rectal bleeding, which would show as bright red, maroon, or black stools.   (A tablespoon of blood from the rectum is not serious, especially  if   hemorrhoids are present.)  6. Vomiting.  7. Weakness or dizziness.  GO DIRECTLY TO THE NEAREST EMERGENCY ROOM IF YOU HAVE ANY OF THE FOLLOWING:      Difficulty breathing              Chills and/or fever over 101 F   Persistent vomiting and/or vomiting blood   Severe abdominal pain   Severe chest pain   Black, tarry stools   Bleeding- more than one tablespoon   Any other symptom or condition that you feel may need urgent attention  Your doctor recommends these additional instructions:  If any biopsies were taken, your doctors clinic will contact you in 1 to 2   weeks with any results.  - Patient has a contact number available for emergencies.  The signs and   symptoms of potential delayed complications were discussed with the   patient.  Return to normal activities tomorrow.  Written discharge   instructions were provided to the patient.   - High fiber diet.   - Continue present medications.   - Await pathology results.   - Repeat colonoscopy in 5 years for surveillance.   - Discharge patient to home (ambulatory).   - To visualize the small bowel, perform video capsule endoscopy at   appointment to be scheduled.   - Return to GI office after studies are complete.  For questions, problems or results please call your physician - Brennan Spear MD at Work:  [phone].  [facility], EMERGENCY ROOM PHONE NUMBER: [phone]  IF A COMPLICATION OR EMERGENCY SITUATION ARISES AND YOU ARE UNABLE TO REACH   YOUR PHYSICIAN - GO DIRECTLY TO THE EMERGENCY ROOM.  Brennan Spear MD  10/2/2024 11:53:56 AM  This report has been verified and signed electronically.  Dear patient,  As a result of recent federal legislation (The Federal Cures Act), you may   receive lab or pathology results from your procedure in your [facility]   account before your physician is able to contact you. Your physician or   their representative will relay the results to you with their   recommendations at their soonest  availability.  Thank you,  PROVATION

## 2024-10-03 ENCOUNTER — TELEPHONE (OUTPATIENT)
Dept: GASTROENTEROLOGY | Facility: CLINIC | Age: 69
End: 2024-10-03
Payer: MEDICARE

## 2024-10-03 VITALS
TEMPERATURE: 98 F | DIASTOLIC BLOOD PRESSURE: 56 MMHG | HEART RATE: 74 BPM | OXYGEN SATURATION: 100 % | RESPIRATION RATE: 18 BRPM | SYSTOLIC BLOOD PRESSURE: 98 MMHG

## 2024-10-03 DIAGNOSIS — R63.4 WEIGHT LOSS: Primary | ICD-10-CM

## 2024-10-03 NOTE — ANESTHESIA POSTPROCEDURE EVALUATION
Anesthesia Post Evaluation    Patient: Felipe Jiménez    Procedure(s) Performed: Procedure(s) (LRB):  EGD (ESOPHAGOGASTRODUODENOSCOPY) (N/A)  COLONOSCOPY (N/A)    Final Anesthesia Type: general      Patient location during evaluation: PACU  Patient participation: Yes- Able to Participate  Level of consciousness: awake and alert and oriented  Post-procedure vital signs: reviewed and stable  Pain management: adequate  Airway patency: patent    PONV status at discharge: No PONV  Anesthetic complications: no      Cardiovascular status: blood pressure returned to baseline  Respiratory status: unassisted, spontaneous ventilation and room air  Hydration status: euvolemic  Follow-up not needed.              Vitals Value Taken Time   BP 98/56 10/02/24 1215   Temp 36.5 °C (97.7 °F) 10/02/24 1200   Pulse 74 10/02/24 1215   Resp 18 10/02/24 1215   SpO2 100 % 10/02/24 1215         Event Time   Out of Recovery 12:23:21         Pain/Rojas Score: Rojas Score: 10 (10/2/2024 12:04 PM)

## 2024-10-07 ENCOUNTER — TELEPHONE (OUTPATIENT)
Dept: INTERNAL MEDICINE | Facility: CLINIC | Age: 69
End: 2024-10-07
Payer: MEDICARE

## 2024-10-07 DIAGNOSIS — R39.9 URINARY TRACT INFECTION SYMPTOMS: Primary | ICD-10-CM

## 2024-10-07 NOTE — TELEPHONE ENCOUNTER
----- Message from [place] sent at 10/7/2024  9:55 AM CDT -----  Contact: Self [phone]  Would like to receive medical advice.    Would they like a call back or a response via [facility]:  call back    Additional information:  Calling to speak with the office about getting lab orders put in asap.

## 2024-10-08 ENCOUNTER — LAB VISIT (OUTPATIENT)
Dept: LAB | Facility: HOSPITAL | Age: 69
End: 2024-10-08
Attending: STUDENT IN AN ORGANIZED HEALTH CARE EDUCATION/TRAINING PROGRAM
Payer: MEDICARE

## 2024-10-08 DIAGNOSIS — R39.9 URINARY TRACT INFECTION SYMPTOMS: ICD-10-CM

## 2024-10-08 LAB
BACTERIA #/AREA URNS AUTO: ABNORMAL /HPF
BILIRUB UR QL STRIP: NEGATIVE
CLARITY UR REFRACT.AUTO: ABNORMAL
COLOR UR AUTO: YELLOW
GLUCOSE UR QL STRIP: NEGATIVE
HGB UR QL STRIP: ABNORMAL
HYALINE CASTS UR QL AUTO: 0 /LPF
KETONES UR QL STRIP: NEGATIVE
LEUKOCYTE ESTERASE UR QL STRIP: ABNORMAL
MICROSCOPIC COMMENT: ABNORMAL
NITRITE UR QL STRIP: NEGATIVE
PH UR STRIP: 6 [PH] (ref 5–8)
PROT UR QL STRIP: ABNORMAL
RBC #/AREA URNS AUTO: 95 /HPF (ref 0–4)
SP GR UR STRIP: 1.01 (ref 1–1.03)
SQUAMOUS #/AREA URNS AUTO: 8 /HPF
URN SPEC COLLECT METH UR: ABNORMAL
WBC #/AREA URNS AUTO: >100 /HPF (ref 0–5)

## 2024-10-08 PROCEDURE — 81001 URINALYSIS AUTO W/SCOPE: CPT | Performed by: STUDENT IN AN ORGANIZED HEALTH CARE EDUCATION/TRAINING PROGRAM

## 2024-10-08 PROCEDURE — 87086 URINE CULTURE/COLONY COUNT: CPT | Performed by: STUDENT IN AN ORGANIZED HEALTH CARE EDUCATION/TRAINING PROGRAM

## 2024-10-09 ENCOUNTER — OFFICE VISIT (OUTPATIENT)
Dept: INTERNAL MEDICINE | Facility: CLINIC | Age: 69
End: 2024-10-09
Payer: MEDICARE

## 2024-10-09 VITALS
OXYGEN SATURATION: 97 % | HEART RATE: 94 BPM | SYSTOLIC BLOOD PRESSURE: 116 MMHG | DIASTOLIC BLOOD PRESSURE: 61 MMHG | HEIGHT: 68 IN | BODY MASS INDEX: 22.38 KG/M2 | WEIGHT: 147.69 LBS

## 2024-10-09 DIAGNOSIS — E78.00 HYPERCHOLESTEROLEMIA: ICD-10-CM

## 2024-10-09 DIAGNOSIS — D50.9 IRON DEFICIENCY ANEMIA, UNSPECIFIED IRON DEFICIENCY ANEMIA TYPE: ICD-10-CM

## 2024-10-09 DIAGNOSIS — N52.9 ERECTILE DYSFUNCTION, UNSPECIFIED ERECTILE DYSFUNCTION TYPE: ICD-10-CM

## 2024-10-09 DIAGNOSIS — N30.00 ACUTE CYSTITIS WITHOUT HEMATURIA: Primary | ICD-10-CM

## 2024-10-09 LAB — BACTERIA UR CULT: NO GROWTH

## 2024-10-09 PROCEDURE — 1160F RVW MEDS BY RX/DR IN RCRD: CPT | Mod: CPTII,S$GLB,, | Performed by: STUDENT IN AN ORGANIZED HEALTH CARE EDUCATION/TRAINING PROGRAM

## 2024-10-09 PROCEDURE — 3078F DIAST BP <80 MM HG: CPT | Mod: CPTII,S$GLB,, | Performed by: STUDENT IN AN ORGANIZED HEALTH CARE EDUCATION/TRAINING PROGRAM

## 2024-10-09 PROCEDURE — 3288F FALL RISK ASSESSMENT DOCD: CPT | Mod: CPTII,S$GLB,, | Performed by: STUDENT IN AN ORGANIZED HEALTH CARE EDUCATION/TRAINING PROGRAM

## 2024-10-09 PROCEDURE — 3008F BODY MASS INDEX DOCD: CPT | Mod: CPTII,S$GLB,, | Performed by: STUDENT IN AN ORGANIZED HEALTH CARE EDUCATION/TRAINING PROGRAM

## 2024-10-09 PROCEDURE — 3074F SYST BP LT 130 MM HG: CPT | Mod: CPTII,S$GLB,, | Performed by: STUDENT IN AN ORGANIZED HEALTH CARE EDUCATION/TRAINING PROGRAM

## 2024-10-09 PROCEDURE — 1126F AMNT PAIN NOTED NONE PRSNT: CPT | Mod: CPTII,S$GLB,, | Performed by: STUDENT IN AN ORGANIZED HEALTH CARE EDUCATION/TRAINING PROGRAM

## 2024-10-09 PROCEDURE — G2211 COMPLEX E/M VISIT ADD ON: HCPCS | Mod: S$GLB,,, | Performed by: STUDENT IN AN ORGANIZED HEALTH CARE EDUCATION/TRAINING PROGRAM

## 2024-10-09 PROCEDURE — 99214 OFFICE O/P EST MOD 30 MIN: CPT | Mod: S$GLB,,, | Performed by: STUDENT IN AN ORGANIZED HEALTH CARE EDUCATION/TRAINING PROGRAM

## 2024-10-09 PROCEDURE — 3044F HG A1C LEVEL LT 7.0%: CPT | Mod: CPTII,S$GLB,, | Performed by: STUDENT IN AN ORGANIZED HEALTH CARE EDUCATION/TRAINING PROGRAM

## 2024-10-09 PROCEDURE — 99999 PR PBB SHADOW E&M-EST. PATIENT-LVL III: CPT | Mod: PBBFAC,,, | Performed by: STUDENT IN AN ORGANIZED HEALTH CARE EDUCATION/TRAINING PROGRAM

## 2024-10-09 PROCEDURE — 1101F PT FALLS ASSESS-DOCD LE1/YR: CPT | Mod: CPTII,S$GLB,, | Performed by: STUDENT IN AN ORGANIZED HEALTH CARE EDUCATION/TRAINING PROGRAM

## 2024-10-09 PROCEDURE — 1159F MED LIST DOCD IN RCRD: CPT | Mod: CPTII,S$GLB,, | Performed by: STUDENT IN AN ORGANIZED HEALTH CARE EDUCATION/TRAINING PROGRAM

## 2024-10-09 RX ORDER — CIPROFLOXACIN 500 MG/1
500 TABLET ORAL 2 TIMES DAILY
Qty: 20 TABLET | Refills: 0 | Status: SHIPPED | OUTPATIENT
Start: 2024-10-09 | End: 2024-10-19

## 2024-10-09 RX ORDER — SILDENAFIL 100 MG/1
100 TABLET, FILM COATED ORAL DAILY PRN
Qty: 10 TABLET | Refills: 3 | Status: SHIPPED | OUTPATIENT
Start: 2024-10-09

## 2024-10-09 RX ORDER — ROSUVASTATIN CALCIUM 20 MG/1
20 TABLET, COATED ORAL DAILY
Qty: 90 TABLET | Refills: 3 | Status: SHIPPED | OUTPATIENT
Start: 2024-10-09 | End: 2025-10-04

## 2024-10-09 NOTE — PROGRESS NOTES
SUBJECTIVE     Chief Complaint   Patient presents with    Follow-up       HPI  Felipe Jiménez is a 68 y.o. male with  HLD, ED  that presents for follow-up.     Recent dark, cloudy urine. No dysuria, flank pain, fever, retention, rectal pain. UA with Leukocytes, 3+ blood, 1+ protein.     Following up with GI for work up of LENORA. EGD biopsies with changes of marked erosive inactive chronic inflammation. Negative for H pylori. Started on PPI. Colonoscopy with tubular adenoma of the descending colon. Pill endoscopy scheduled for further evaluation.     Requesting refill for rosuvastatin, sildenafil.     PAST MEDICAL HISTORY:  Past Medical History:   Diagnosis Date    Hyperlipidemia     Male erectile dysfunction, unspecified        PAST SURGICAL HISTORY:  Past Surgical History:   Procedure Laterality Date    COLONOSCOPY N/A 10/2/2024    Procedure: COLONOSCOPY;  Surgeon: Brennan Balderrama MD;  Location: [facility];  Service: Endoscopy;  Laterality: N/A;    ESOPHAGOGASTRODUODENOSCOPY N/A 10/2/2024    Procedure: EGD (ESOPHAGOGASTRODUODENOSCOPY);  Surgeon: Brennan Balderrama MD;  Location: [facility];  Service: Endoscopy;  Laterality: N/A;    FRACTURE SURGERY      LIPOMA RESECTION Left     left knee    QUADRICEPS TENDON REPAIR Left        FAMILY HISTORY:  Family History   Problem Relation Name Age of Onset    Heart disease Mother      Heart attacks under age 50 Mother  43         2/2 heart attack.    Heart disease Father      Heart attacks under age 50 Father  38         2/2 heart attack.       ALLERGIES AND MEDICATIONS: updated and reviewed.  Review of patient's allergies indicates:  No Known Allergies  Current Outpatient Medications   Medication Sig Dispense Refill    diazePAM (VALIUM) 5 MG tablet TAKE 1 TABLET BY MOUTH EVERY 12 HOURS AS NEEDED FOR ANXIETY 30 tablet 0    meloxicam (MOBIC) 7.5 MG tablet Take 1 tablet by mouth twice daily 60 tablet 0    pantoprazole (PROTONIX) 40  "MG tablet Take 1 tablet (40 mg total) by mouth once daily. 90 tablet 3    triamcinolone acetonide 0.1% (KENALOG) 0.1 % cream APPLY  CREAM EXTERNALLY TWICE DAILY AS NEEDED FOR  DERMATITIS 45 g 0    ciprofloxacin HCl (CIPRO) 500 MG tablet Take 1 tablet (500 mg total) by mouth 2 (two) times daily. for 10 days 20 tablet 0    rosuvastatin (CRESTOR) 20 MG tablet Take 1 tablet (20 mg total) by mouth once daily. 90 tablet 3    sildenafiL (VIAGRA) 100 MG tablet Take 1 tablet (100 mg total) by mouth daily as needed for Erectile Dysfunction. 10 tablet 3    zolpidem (AMBIEN) 5 MG Tab Take 1 tablet (5 mg total) by mouth nightly as needed (insomnia). 30 tablet 0     No current facility-administered medications for this visit.       ROS  Review of Systems   Constitutional:  Negative for activity change, chills and fever.   HENT:  Negative for congestion and hearing loss.    Eyes:  Negative for pain and visual disturbance.   Respiratory:  Negative for cough and shortness of breath.    Cardiovascular:  Negative for chest pain and palpitations.   Gastrointestinal:  Negative for abdominal pain, constipation, diarrhea, nausea and vomiting.   Endocrine: Negative.    Musculoskeletal:  Negative for arthralgias and myalgias.   Skin: Negative.    Allergic/Immunologic: Negative.    Neurological:  Negative for dizziness, light-headedness and headaches.   Hematological: Negative.          OBJECTIVE     Physical Exam  Vitals:    10/09/24 1309   BP: 116/61   Pulse: 94    Body mass index is 22.46 kg/m².  Weight: 67 kg (147 lb 11.3 oz)   Height: 5' 8" (172.7 cm)     Physical Exam  Vitals reviewed.   Constitutional:       General: He is not in acute distress.     Appearance: Normal appearance.   HENT:      Head: Normocephalic and atraumatic.      Mouth/Throat:      Mouth: Mucous membranes are moist.      Pharynx: Oropharynx is clear.   Eyes:      Extraocular Movements: Extraocular movements intact.      Conjunctiva/sclera: Conjunctivae normal.      " Pupils: Pupils are equal, round, and reactive to light.   Cardiovascular:      Rate and Rhythm: Normal rate and regular rhythm.      Pulses: Normal pulses.      Heart sounds: Normal heart sounds.   Pulmonary:      Effort: Pulmonary effort is normal.      Breath sounds: Normal breath sounds.   Abdominal:      General: There is no distension.      Tenderness: There is no right CVA tenderness or left CVA tenderness.   Musculoskeletal:         General: Normal range of motion.      Cervical back: Normal range of motion and neck supple.   Skin:     General: Skin is warm and dry.   Neurological:      General: No focal deficit present.      Mental Status: He is alert.           Health Maintenance         Date Due Completion Date    High Dose Statin Never done ---    RSV Vaccine (Age 60+ and Pregnant patients) (1 - Risk 60-74 years 1-dose series) Never done ---    Influenza Vaccine (1) 09/01/2024 12/20/2023    COVID-19 Vaccine (7 - 2024-25 season) 09/01/2024 12/20/2023    TETANUS VACCINE 05/09/2025 (Originally 12/6/1973) ---    Shingles Vaccine (1 of 2) 05/09/2025 (Originally 12/6/2003) ---    Pneumococcal Vaccines (Age 65+) (1 of 1 - PCV) 05/09/2025 (Originally 12/6/2018) ---    PROSTATE-SPECIFIC ANTIGEN 06/20/2025 6/20/2024    Hemoglobin A1c (Prediabetes) 06/20/2025 6/20/2024    Lipid Panel 06/20/2029 6/20/2024    Colorectal Cancer Screening 10/02/2029 10/2/2024              ASSESSMENT     68 y.o. male with     1. Acute cystitis without hematuria    2. Erectile dysfunction, unspecified erectile dysfunction type    3. Hypercholesterolemia    4. Iron deficiency anemia, unspecified iron deficiency anemia type        PLAN:     1. Acute cystitis without hematuria  - Treat with empiric Cipro. Urine culture pending, follow up speciation and sensitivities.   - ciprofloxacin HCl (CIPRO) 500 MG tablet; Take 1 tablet (500 mg total) by mouth 2 (two) times daily. for 10 days  Dispense: 20 tablet; Refill: 0    2. Erectile dysfunction,  unspecified erectile dysfunction type  - Controlled on current regimen. Continue.   - sildenafiL (VIAGRA) 100 MG tablet; Take 1 tablet (100 mg total) by mouth daily as needed for Erectile Dysfunction.  Dispense: 10 tablet; Refill: 3    3. Hypercholesterolemia  - Stable on current regimen, continue.  - rosuvastatin (CRESTOR) 20 MG tablet; Take 1 tablet (20 mg total) by mouth once daily.  Dispense: 90 tablet; Refill: 3    4. Iron deficiency anemia, unspecified iron deficiency anemia type  - Continue PPI. Follow up with GI.     Visit today included increased complexity associated with the care of the episodic problem addressed and managing the longitudinal care of the patient due to the serious and/or complex managed problem(s).      RTC PRN       Sami Asif MD  Family Medicine  [facility]  10/09/2024    This document was created using voice recognition software (M*Modal Fluency Direct). Although it may be edited, this document may contain errors related to incorrect recognition of the spoken word. Please call the physician if clarification is needed.       No follow-ups on file.

## 2024-10-15 ENCOUNTER — TELEPHONE (OUTPATIENT)
Dept: RADIOLOGY | Facility: HOSPITAL | Age: 69
End: 2024-10-15

## 2024-10-16 ENCOUNTER — HOSPITAL ENCOUNTER (OUTPATIENT)
Dept: RADIOLOGY | Facility: HOSPITAL | Age: 69
Discharge: HOME | End: 2024-10-16
Attending: INTERNAL MEDICINE
Payer: MEDICARE

## 2024-10-16 DIAGNOSIS — R63.4 WEIGHT LOSS: ICD-10-CM

## 2024-10-16 PROCEDURE — 71260 CT THORAX DX C+: CPT | Mod: 26,,, | Performed by: RADIOLOGY

## 2024-10-16 PROCEDURE — 74177 CT ABD & PELVIS W/CONTRAST: CPT | Mod: TC

## 2024-10-16 PROCEDURE — 74177 CT ABD & PELVIS W/CONTRAST: CPT | Mod: 26,,, | Performed by: RADIOLOGY

## 2024-10-16 PROCEDURE — 71260 CT THORAX DX C+: CPT | Mod: TC

## 2024-10-16 PROCEDURE — A9698 NON-RAD CONTRAST MATERIALNOC: HCPCS

## 2024-10-16 PROCEDURE — 25500020 PHARM REV CODE 255

## 2024-10-16 RX ADMIN — IOHEXOL 75 ML: 350 INJECTION, SOLUTION INTRAVENOUS at 03:10

## 2024-10-16 RX ADMIN — IOHEXOL 1000 ML: 12 SOLUTION ORAL at 03:10

## 2024-10-16 NOTE — PROGRESS NOTES
Please advise patient that CT showed no obvious cause for weight loss but did show kidney inflammation likely related to his urinary issues.  Keep follow up with PCP for this and send referral to urology if he hasn't already been seen.

## 2024-10-22 ENCOUNTER — HOSPITAL ENCOUNTER (OUTPATIENT)
Facility: HOSPITAL | Age: 69
Discharge: HOME | End: 2024-10-22
Attending: INTERNAL MEDICINE | Admitting: INTERNAL MEDICINE
Payer: MEDICARE

## 2024-10-22 DIAGNOSIS — D50.9 IRON DEFICIENCY ANEMIA: ICD-10-CM

## 2024-10-22 PROCEDURE — 25000003 PHARM REV CODE 250: Performed by: INTERNAL MEDICINE

## 2024-10-22 PROCEDURE — 91110 GI TRC IMG INTRAL ESOPH-ILE: CPT | Mod: TC | Performed by: INTERNAL MEDICINE

## 2024-10-22 RX ORDER — DEXTROMETHORPHAN/PSEUDOEPHED 2.5-7.5/.8
DROPS ORAL
Status: DISCONTINUED
Start: 2024-10-22 | End: 2024-10-22 | Stop reason: HOSPADM

## 2024-10-22 RX ORDER — DEXTROMETHORPHAN/PSEUDOEPHED 2.5-7.5/.8
40 DROPS ORAL ONCE
Status: COMPLETED | OUTPATIENT
Start: 2024-10-22 | End: 2024-10-22

## 2024-10-22 RX ADMIN — SIMETHICONE 40 MG: 20 SUSPENSION/ DROPS ORAL at 07:10

## 2024-10-23 VITALS
DIASTOLIC BLOOD PRESSURE: 64 MMHG | SYSTOLIC BLOOD PRESSURE: 127 MMHG | HEART RATE: 100 BPM | OXYGEN SATURATION: 99 % | RESPIRATION RATE: 18 BRPM | TEMPERATURE: 100 F

## 2024-10-24 PROCEDURE — 91110 GI TRC IMG INTRAL ESOPH-ILE: CPT | Mod: 26,,, | Performed by: INTERNAL MEDICINE

## 2024-11-05 ENCOUNTER — TELEPHONE (OUTPATIENT)
Dept: INTERNAL MEDICINE | Facility: CLINIC | Age: 69
End: 2024-11-05
Payer: MEDICARE

## 2024-11-05 NOTE — TELEPHONE ENCOUNTER
Patient is calling in regards to flying out on Thursday morning, patient would like to know how would that effect his low blood pressure and anemia? Patient would also like to know if he still has protein in his urine

## 2024-11-05 NOTE — TELEPHONE ENCOUNTER
----- Message from Shelby sent at 11/5/2024  2:13 PM CST -----  Contact: [phone] Patient  Patient would like to get medical advice.  Symptoms (please be specific):   Pt states he has been calling to speak to Dr Asif or someone in his office and has not received a call back. Pt states he needs to speak to someone before he goes out of town on 11/07/2024. Pt did not provide any other information.   How long have you had these symptoms: N/A  Would you like a call back, or a response through your [facility] portal?:   call back   Pharmacy name and phone # (copy from chart):   N/A  Comments:

## 2024-11-25 ENCOUNTER — OFFICE VISIT (OUTPATIENT)
Dept: URGENT CARE | Facility: CLINIC | Age: 69
End: 2024-11-25
Payer: MEDICARE

## 2024-11-25 VITALS
SYSTOLIC BLOOD PRESSURE: 118 MMHG | BODY MASS INDEX: 21.22 KG/M2 | OXYGEN SATURATION: 96 % | RESPIRATION RATE: 18 BRPM | WEIGHT: 140 LBS | HEART RATE: 103 BPM | DIASTOLIC BLOOD PRESSURE: 72 MMHG | HEIGHT: 68 IN | TEMPERATURE: 99 F

## 2024-11-25 DIAGNOSIS — Z23 NEED FOR TDAP VACCINATION: ICD-10-CM

## 2024-11-25 DIAGNOSIS — S01.112A LACERATION OF LEFT EYEBROW, INITIAL ENCOUNTER: Primary | ICD-10-CM

## 2024-11-25 PROCEDURE — 99499 UNLISTED E&M SERVICE: CPT | Mod: S$GLB,,, | Performed by: NURSE PRACTITIONER

## 2024-11-25 PROCEDURE — 90715 TDAP VACCINE 7 YRS/> IM: CPT | Mod: S$GLB,,, | Performed by: FAMILY MEDICINE

## 2024-11-25 PROCEDURE — 90471 IMMUNIZATION ADMIN: CPT | Mod: S$GLB,,, | Performed by: FAMILY MEDICINE

## 2024-11-25 PROCEDURE — 12051 INTMD RPR FACE/MM 2.5 CM/<: CPT | Mod: S$GLB,,, | Performed by: NURSE PRACTITIONER

## 2024-11-25 RX ORDER — NAPROXEN SODIUM 220 MG/1
81 TABLET, FILM COATED ORAL DAILY
Status: ON HOLD | COMMUNITY

## 2024-11-25 NOTE — PROCEDURES
Laceration Repair    Date/Time: 2024 3:45 PM    Performed by: Neda Gerber NP  Authorized by: Neda Gerber NP  Consent Done: Yes  Consent: Verbal consent obtained.  Risks and benefits: risks, benefits and alternatives were discussed  Consent given by: patient  Patient understanding: patient states understanding of the procedure being performed  Patient consent: the patient's understanding of the procedure matches consent given  Procedure consent: procedure consent matches procedure scheduled  Relevant documents: relevant documents present and verified  Site marked: the operative site was marked  Patient identity confirmed:  and name  Body area: head/neck  Location details: left eyebrow  Laceration length: 1 cm  Foreign bodies: no foreign bodies  Tendon involvement: none  Nerve involvement: none  Vascular damage: no    Anesthesia:  Local Anesthetic: LET (lido,epi,tetracaine)  Irrigation solution: saline  Irrigation method: syringe  Amount of cleaning: extensive  Skin closure: Ethilon (6)  Number of sutures: 4  Approximation: close  Approximation difficulty: simple  Dressing: open (no dressing)  Patient tolerance: Patient tolerated the procedure well with no immediate complications

## 2024-11-25 NOTE — PATIENT INSTRUCTIONS
PLEASE READ YOUR DISCHARGE INSTRUCTIONS ENTIRELY AS IT CONTAINS IMPORTANT INFORMATION.      Do not wet laceration for 12 hours.  Do not submerge laceration in water.  Gently clean wound with soap and water at least twice daily unless more frequently soiled then clean more frequently.    DO NOT REMOVE SUTURES YOURSELF.  Return to clinic for suture removal as directed.   Signs of infection:  Increase in redness, increase in pain, purulent (pus) drainage. Contact clinic if infection concerns arise. Do not apply a great deal of tension or stress to the suture line.    Return in 5-7 days to have the sutures removed.     Please return or see your primary care doctor if you develop new or worsening symptoms (fever, spreading redness, pus drainage, severe pain or swelling).     Please arrange follow up with your primary medical clinic as soon as possible. You must understand that you've received an Urgent Care treatment only and that you may be released before all of your medical problems are known or treated. You, the patient, will arrange for follow up as instructed. If your symptoms worsen or fail to improve you should go to the Emergency Room.

## 2024-11-25 NOTE — PROGRESS NOTES
"Subjective:      Patient ID: Felipe Jiménez is a 68 y.o. male.    Vitals:  height is 5' 8" (1.727 m) and weight is 63.5 kg (140 lb). His oral temperature is 98.8 °F (37.1 °C). His blood pressure is 118/72 and his pulse is 103. His respiration is 18 and oxygen saturation is 96%.     Chief Complaint: Facial Laceration    69 yo male c/o facial laceration. Pt reports he was climbing into bed and he  slipped and hit his  right eyebrow area on the wooden bed railing. Pt presents with a  1 cm laceration on left eyebrow. Pt denies LOC, abdominal pain, chest pain, bladder/nahid incontinence, fever, headache, and SOB. Pt reports unknown last tetanus, reports he called his previous doctor and was notified that his tetanus is not up-to-date. Pt denies self medicating and being in pain.     Laceration   The incident occurred 6 to 12 hours ago. The laceration is located on the Face. The laceration is 2 cm in size. The laceration mechanism was a blunt object. The pain is at a severity of 0/10. The patient is experiencing no pain. The pain has been Constant since onset. It is unknown if a foreign body is present. His tetanus status is unknown.       Skin:  Positive for laceration. Negative for erythema.      Objective:     Physical Exam   Constitutional: He is oriented to person, place, and time. He appears well-developed.   HENT:   Head: Normocephalic. Head is with laceration (approx 1 cm lac to left eyebrow, no step-off noted, no active bleeding noted, no erythema or tenderness noted). Head is without abrasion and without contusion.   Ears:   Right Ear: External ear normal.   Left Ear: External ear normal.   Nose: Nose normal.   Mouth/Throat: Oropharynx is clear and moist and mucous membranes are normal.   Eyes: Conjunctivae, EOM and lids are normal. Pupils are equal, round, and reactive to light.   Neck: Trachea normal and phonation normal. Neck supple.   Cardiovascular: Normal rate, regular rhythm and normal heart sounds. "   Pulmonary/Chest: Effort normal and breath sounds normal. No stridor. No respiratory distress.   Musculoskeletal: Normal range of motion.         General: Normal range of motion.   Neurological: He is alert and oriented to person, place, and time.   Skin: Skin is warm, dry, intact and no rash. Capillary refill takes less than 2 seconds. No abrasion, No burn, No bruising, No erythema and No ecchymosis   Psychiatric: His speech is normal and behavior is normal. Judgment and thought content normal.   Nursing note and vitals reviewed.              Patient in no acute distress.  Vitals reassuring.  Discussed results/diagnosis/plan in depth with patient in clinic. Strict precautions given to patient to monitor for worsening signs and symptoms. Advised to follow up with primary.All questions answered. Strict ER precautions given. If your symptoms worsens or fail to improve you should go to the Emergency Room. Discharge and follow-up instructions given verbally/printed. Discharge and follow-up instructions discussed with the patient who expressed understanding and willingness to comply with my recommendations.Patient voiced understanding and in agreement with current treatment plan.     Please be advised this text was dictated with [employer] software and may contain errors due to translation.   Assessment:     1. Laceration of left eyebrow, initial encounter    2. Need for Tdap vaccination        Plan:       Laceration of left eyebrow, initial encounter  -     Tdap (BOOSTRIX) vaccine injection 0.5 mL  -     Laceration Repair    Need for Tdap vaccination  -     Tdap (BOOSTRIX) vaccine injection 0.5 mL          Medical Decision Making:   Urgent Care Management:  Patient in no acute distress.  Vitals reassuring.  On exam, patient is nontoxic appearing and afebrile.  Lungs CTA.  Tetanus updated in clinic.  Physical examination with approximately 1 cm laceration to left eyebrow.  No erythema step-off noted.  No tenderness noted.   No active bleeding noted.  Wound cleaned in clinic.  Laceration repaired with 4 sutures.  Extensive wound care discussed with patient in detail.  Advised to return back to clinic in 5-7 days for suture removal.  over-the-counter medication discussed with patient at length.  Proper hydration advised.  I reiterated the importance of further evaluation if no improvement symptoms and follow-up with primary. Patient voiced understanding and in agreement with current treatment plan.             Patient Instructions   PLEASE READ YOUR DISCHARGE INSTRUCTIONS ENTIRELY AS IT CONTAINS IMPORTANT INFORMATION.      Do not wet laceration for 12 hours.  Do not submerge laceration in water.  Gently clean wound with soap and water at least twice daily unless more frequently soiled then clean more frequently.    DO NOT REMOVE SUTURES YOURSELF.  Return to clinic for suture removal as directed.   Signs of infection:  Increase in redness, increase in pain, purulent (pus) drainage. Contact clinic if infection concerns arise. Do not apply a great deal of tension or stress to the suture line.    Return in 5-7 days to have the sutures removed.     Please return or see your primary care doctor if you develop new or worsening symptoms (fever, spreading redness, pus drainage, severe pain or swelling).     Please arrange follow up with your primary medical clinic as soon as possible. You must understand that you've received an Urgent Care treatment only and that you may be released before all of your medical problems are known or treated. You, the patient, will arrange for follow up as instructed. If your symptoms worsen or fail to improve you should go to the Emergency Room.

## 2024-11-26 ENCOUNTER — TELEPHONE (OUTPATIENT)
Dept: INTERNAL MEDICINE | Facility: CLINIC | Age: 69
End: 2024-11-26
Payer: MEDICARE

## 2024-11-26 NOTE — TELEPHONE ENCOUNTER
----- Message from [employer] sent at 11/22/2024  4:51 PM CST -----  Type : Patient Call      Who Called : Rebeka Jiménez      Does the patient know what this is regarding?: Patient daughter would like a call back regarding establishing care for pt; I did attempt to schedule pt a ECA apt; no available time; please advise      Would the patient rather a call back or a response via [facility]? Call        Best Call Back Number:  [phone]        Additional Information:

## 2024-11-26 NOTE — TELEPHONE ENCOUNTER
----- Message from Gloria sent at 11/26/2024 10:22 AM CST -----  Contact: [phone]  [email or web address]     Patient is calling for Medical Advice regarding: Pt said he needs a call back about getting a referral to   hematology  How long has patient had these symptoms:    Pharmacy name and phone#:    Patient wants a call back or thru [facility]:call back     Comments:    Please advise patient replies from provider may take up to 48 hours.

## 2024-11-26 NOTE — TELEPHONE ENCOUNTER
Spoke with patient in regards to getting a follow up appointment in regards to getting a referral patient rushed me off the phone and stated he has a very important call and hung up

## 2024-11-29 ENCOUNTER — HOSPITAL ENCOUNTER (INPATIENT)
Facility: HOSPITAL | Age: 69
LOS: 6 days | Discharge: HOME HEALTH | DRG: 853 | End: 2024-12-06
Attending: EMERGENCY MEDICINE | Admitting: HOSPITALIST
Payer: MEDICARE

## 2024-11-29 DIAGNOSIS — N20.0 NEPHROLITHIASIS: Primary | ICD-10-CM

## 2024-11-29 DIAGNOSIS — N20.0 STONE IN KIDNEY: ICD-10-CM

## 2024-11-29 DIAGNOSIS — R07.9 CHEST PAIN: ICD-10-CM

## 2024-11-29 DIAGNOSIS — R53.1 WEAKNESS GENERALIZED: ICD-10-CM

## 2024-11-29 DIAGNOSIS — N15.1 PERINEPHRIC ABSCESS: ICD-10-CM

## 2024-11-29 DIAGNOSIS — D64.9 SYMPTOMATIC ANEMIA: ICD-10-CM

## 2024-11-29 DIAGNOSIS — D50.9 IRON DEFICIENCY ANEMIA, UNSPECIFIED IRON DEFICIENCY ANEMIA TYPE: ICD-10-CM

## 2024-11-29 DIAGNOSIS — I50.9 CHF (CONGESTIVE HEART FAILURE): ICD-10-CM

## 2024-11-29 DIAGNOSIS — R06.02 SHORTNESS OF BREATH: ICD-10-CM

## 2024-11-29 PROBLEM — R79.89 POSITIVE D DIMER: Status: ACTIVE | Noted: 2024-11-29

## 2024-11-29 PROBLEM — N39.0 UTI (URINARY TRACT INFECTION): Status: ACTIVE | Noted: 2024-11-29

## 2024-11-29 PROBLEM — R42 DIZZINESS: Status: ACTIVE | Noted: 2024-11-29

## 2024-11-29 PROBLEM — D72.829 LEUCOCYTOSIS: Status: ACTIVE | Noted: 2024-11-29

## 2024-11-29 PROBLEM — R29.6 FALLS FREQUENTLY: Status: ACTIVE | Noted: 2024-11-29

## 2024-11-29 LAB
ABO + RH BLD: NORMAL
ALBUMIN SERPL BCP-MCNC: 2.1 G/DL (ref 3.5–5.2)
ALP SERPL-CCNC: 131 U/L (ref 40–150)
ALT SERPL W/O P-5'-P-CCNC: 24 U/L (ref 10–44)
ANION GAP SERPL CALC-SCNC: 9 MMOL/L (ref 8–16)
AST SERPL-CCNC: 45 U/L (ref 10–40)
BACTERIA #/AREA URNS AUTO: ABNORMAL /HPF
BASOPHILS # BLD AUTO: 0.03 K/UL (ref 0–0.2)
BASOPHILS NFR BLD: 0.2 % (ref 0–1.9)
BILIRUB SERPL-MCNC: 0.5 MG/DL (ref 0.1–1)
BILIRUB UR QL STRIP: NEGATIVE
BLD GP AB SCN CELLS X3 SERPL QL: NORMAL
BLD PROD TYP BPU: NORMAL
BLOOD UNIT EXPIRATION DATE: NORMAL
BLOOD UNIT TYPE CODE: 6200
BLOOD UNIT TYPE: NORMAL
BNP SERPL-MCNC: 92 PG/ML (ref 0–99)
BUN SERPL-MCNC: 19 MG/DL (ref 8–23)
CALCIUM SERPL-MCNC: 10.2 MG/DL (ref 8.7–10.5)
CHLORIDE SERPL-SCNC: 105 MMOL/L (ref 95–110)
CLARITY UR REFRACT.AUTO: ABNORMAL
CO2 SERPL-SCNC: 22 MMOL/L (ref 23–29)
CODING SYSTEM: NORMAL
COLOR UR AUTO: ABNORMAL
CREAT SERPL-MCNC: 1.3 MG/DL (ref 0.5–1.4)
CROSSMATCH INTERPRETATION: NORMAL
D DIMER PPP IA.FEU-MCNC: 2.07 MG/L FEU
DIFFERENTIAL METHOD BLD: ABNORMAL
DISPENSE STATUS: NORMAL
EOSINOPHIL # BLD AUTO: 0 K/UL (ref 0–0.5)
EOSINOPHIL NFR BLD: 0.1 % (ref 0–8)
ERYTHROCYTE [DISTWIDTH] IN BLOOD BY AUTOMATED COUNT: 17.6 % (ref 11.5–14.5)
EST. GFR  (NO RACE VARIABLE): 59.1 ML/MIN/1.73 M^2
GLUCOSE SERPL-MCNC: 109 MG/DL (ref 70–110)
GLUCOSE UR QL STRIP: NEGATIVE
HCT VFR BLD AUTO: 22.7 % (ref 40–54)
HCV AB SERPL QL IA: NORMAL
HGB BLD-MCNC: 7 G/DL (ref 14–18)
HGB UR QL STRIP: ABNORMAL
HIV 1+2 AB+HIV1 P24 AG SERPL QL IA: NORMAL
HYALINE CASTS UR QL AUTO: 0 /LPF
IMM GRANULOCYTES # BLD AUTO: 0.12 K/UL (ref 0–0.04)
IMM GRANULOCYTES NFR BLD AUTO: 0.7 % (ref 0–0.5)
KETONES UR QL STRIP: NEGATIVE
LEUKOCYTE ESTERASE UR QL STRIP: ABNORMAL
LYMPHOCYTES # BLD AUTO: 0.8 K/UL (ref 1–4.8)
LYMPHOCYTES NFR BLD: 4.1 % (ref 18–48)
MCH RBC QN AUTO: 23.9 PG (ref 27–31)
MCHC RBC AUTO-ENTMCNC: 30.8 G/DL (ref 32–36)
MCV RBC AUTO: 78 FL (ref 82–98)
MICROSCOPIC COMMENT: ABNORMAL
MONOCYTES # BLD AUTO: 1.3 K/UL (ref 0.3–1)
MONOCYTES NFR BLD: 7 % (ref 4–15)
NEUTROPHILS # BLD AUTO: 16.1 K/UL (ref 1.8–7.7)
NEUTROPHILS NFR BLD: 87.9 % (ref 38–73)
NITRITE UR QL STRIP: NEGATIVE
NRBC BLD-RTO: 0 /100 WBC
NUM UNITS TRANS PACKED RBC: NORMAL
OHS QRS DURATION: 84 MS
OHS QTC CALCULATION: 432 MS
PH UR STRIP: 6 [PH] (ref 5–8)
PLATELET # BLD AUTO: 616 K/UL (ref 150–450)
PMV BLD AUTO: 9.1 FL (ref 9.2–12.9)
POTASSIUM SERPL-SCNC: 3.9 MMOL/L (ref 3.5–5.1)
PROT SERPL-MCNC: 8.8 G/DL (ref 6–8.4)
PROT UR QL STRIP: ABNORMAL
RBC # BLD AUTO: 2.93 M/UL (ref 4.6–6.2)
RBC #/AREA URNS AUTO: 52 /HPF (ref 0–4)
SODIUM SERPL-SCNC: 136 MMOL/L (ref 136–145)
SP GR UR STRIP: 1.02 (ref 1–1.03)
SPECIMEN OUTDATE: NORMAL
TROPONIN I SERPL DL<=0.01 NG/ML-MCNC: 0.01 NG/ML (ref 0–0.03)
TROPONIN I SERPL DL<=0.01 NG/ML-MCNC: <0.006 NG/ML (ref 0–0.03)
URN SPEC COLLECT METH UR: ABNORMAL
WBC # BLD AUTO: 18.31 K/UL (ref 3.9–12.7)
WBC #/AREA URNS AUTO: >100 /HPF (ref 0–5)
WBC CLUMPS UR QL AUTO: ABNORMAL

## 2024-11-29 PROCEDURE — 85379 FIBRIN DEGRADATION QUANT: CPT | Performed by: PHYSICIAN ASSISTANT

## 2024-11-29 PROCEDURE — 80053 COMPREHEN METABOLIC PANEL: CPT | Performed by: PHYSICIAN ASSISTANT

## 2024-11-29 PROCEDURE — 30233N1 TRANSFUSION OF NONAUTOLOGOUS RED BLOOD CELLS INTO PERIPHERAL VEIN, PERCUTANEOUS APPROACH: ICD-10-PCS

## 2024-11-29 PROCEDURE — 87389 HIV-1 AG W/HIV-1&-2 AB AG IA: CPT | Performed by: PHYSICIAN ASSISTANT

## 2024-11-29 PROCEDURE — G0378 HOSPITAL OBSERVATION PER HR: HCPCS

## 2024-11-29 PROCEDURE — 83880 ASSAY OF NATRIURETIC PEPTIDE: CPT | Performed by: PHYSICIAN ASSISTANT

## 2024-11-29 PROCEDURE — 86920 COMPATIBILITY TEST SPIN: CPT | Performed by: PHYSICIAN ASSISTANT

## 2024-11-29 PROCEDURE — 84484 ASSAY OF TROPONIN QUANT: CPT | Mod: 91 | Performed by: PHYSICIAN ASSISTANT

## 2024-11-29 PROCEDURE — 63600175 PHARM REV CODE 636 W HCPCS: Performed by: PHYSICIAN ASSISTANT

## 2024-11-29 PROCEDURE — 86901 BLOOD TYPING SEROLOGIC RH(D): CPT | Performed by: PHYSICIAN ASSISTANT

## 2024-11-29 PROCEDURE — 84484 ASSAY OF TROPONIN QUANT: CPT | Performed by: PHYSICIAN ASSISTANT

## 2024-11-29 PROCEDURE — 85025 COMPLETE CBC W/AUTO DIFF WBC: CPT | Performed by: PHYSICIAN ASSISTANT

## 2024-11-29 PROCEDURE — 87040 BLOOD CULTURE FOR BACTERIA: CPT | Performed by: HOSPITALIST

## 2024-11-29 PROCEDURE — 81001 URINALYSIS AUTO W/SCOPE: CPT | Performed by: PHYSICIAN ASSISTANT

## 2024-11-29 PROCEDURE — 87086 URINE CULTURE/COLONY COUNT: CPT | Performed by: PHYSICIAN ASSISTANT

## 2024-11-29 PROCEDURE — 93005 ELECTROCARDIOGRAM TRACING: CPT

## 2024-11-29 PROCEDURE — 99285 EMERGENCY DEPT VISIT HI MDM: CPT | Mod: 25

## 2024-11-29 PROCEDURE — P9016 RBC LEUKOCYTES REDUCED: HCPCS | Performed by: PHYSICIAN ASSISTANT

## 2024-11-29 PROCEDURE — 36430 TRANSFUSION BLD/BLD COMPNT: CPT

## 2024-11-29 PROCEDURE — 86803 HEPATITIS C AB TEST: CPT | Performed by: PHYSICIAN ASSISTANT

## 2024-11-29 PROCEDURE — 96360 HYDRATION IV INFUSION INIT: CPT

## 2024-11-29 PROCEDURE — 93010 ELECTROCARDIOGRAM REPORT: CPT | Mod: ,,, | Performed by: INTERNAL MEDICINE

## 2024-11-29 RX ORDER — PANTOPRAZOLE SODIUM 40 MG/1
40 TABLET, DELAYED RELEASE ORAL DAILY
Status: DISCONTINUED | OUTPATIENT
Start: 2024-11-30 | End: 2024-12-04

## 2024-11-29 RX ORDER — IBUPROFEN 200 MG
24 TABLET ORAL
Status: DISCONTINUED | OUTPATIENT
Start: 2024-11-29 | End: 2024-12-06 | Stop reason: HOSPADM

## 2024-11-29 RX ORDER — GLUCAGON 1 MG
1 KIT INJECTION
Status: DISCONTINUED | OUTPATIENT
Start: 2024-11-29 | End: 2024-12-06 | Stop reason: HOSPADM

## 2024-11-29 RX ORDER — IBUPROFEN 200 MG
16 TABLET ORAL
Status: DISCONTINUED | OUTPATIENT
Start: 2024-11-29 | End: 2024-12-06 | Stop reason: HOSPADM

## 2024-11-29 RX ORDER — HYDROCODONE BITARTRATE AND ACETAMINOPHEN 500; 5 MG/1; MG/1
TABLET ORAL
Status: DISCONTINUED | OUTPATIENT
Start: 2024-11-29 | End: 2024-12-06 | Stop reason: HOSPADM

## 2024-11-29 RX ORDER — SODIUM CHLORIDE 0.9 % (FLUSH) 0.9 %
10 SYRINGE (ML) INJECTION EVERY 12 HOURS PRN
Status: DISCONTINUED | OUTPATIENT
Start: 2024-11-29 | End: 2024-12-06 | Stop reason: HOSPADM

## 2024-11-29 RX ORDER — ATORVASTATIN CALCIUM 40 MG/1
80 TABLET, FILM COATED ORAL DAILY
Status: DISCONTINUED | OUTPATIENT
Start: 2024-11-30 | End: 2024-12-06 | Stop reason: HOSPADM

## 2024-11-29 RX ORDER — NALOXONE HCL 0.4 MG/ML
0.02 VIAL (ML) INJECTION
Status: DISCONTINUED | OUTPATIENT
Start: 2024-11-29 | End: 2024-12-06 | Stop reason: HOSPADM

## 2024-11-29 RX ADMIN — SODIUM CHLORIDE, POTASSIUM CHLORIDE, SODIUM LACTATE AND CALCIUM CHLORIDE 500 ML: 600; 310; 30; 20 INJECTION, SOLUTION INTRAVENOUS at 03:11

## 2024-11-29 NOTE — H&P
[facility] - Emergency Dept  [facility] Medicine  History & Physical    Patient Name: Felipe Jiménez  MRN: [id number]  Patient Class: OP- Observation  Admission Date: 11/29/2024  Attending Physician: Gilmar Cid MD   Primary Care Provider: Sami Asif MD         Patient information was obtained from patient and ER records.     Subjective:     Principal Problem:Iron deficiency anemia    Chief Complaint:   Chief Complaint   Patient presents with    Shortness of Breath    Weakness     X 4 days        HPI: Felipe Moffett is a 67 Yo male with PMH of Iron deficiency anemia, hyperlipidemia presents ED with dizziness, generalized weakness and shortness of breath with exertion for the past 6 months.     AAOX3. c/o generalized weakness and shortness of breath with exertion for the past 6 months - progressively worsening.  reports he had  2 falls recently due to weakness.  denies loss of consciousness.   Followed by GI for LENORA and underwent recent colonoscopy, upper GI as well as video capsule endoscopy.  Reports occasional bright red blood when he wipes but otherwise denies any other abnormal bleeding.  50 lb weight loss over the past year,  initially on purpose as he was over 200 lb but mentions he had unintentional weight loss as well.   had iron-deficiency anemia since childhood was previously on iron supplementation which he discontinued a year ago in favor of dietary changes. Denies any leg swelling. currently on any anticoagulation. reports   occasional bright red blood when wiping but denies any melena.  on ASA 81 mg daily. admits taking tylenol and meloxicam intermittently for arthritis     EGD 10/2/24                           Normal esophagus.                          - Z-line regular, 40 cm from the incisors.                          - Small hiatal hernia.                          - Gastritis. Biopsied.                          - Normal examined duodenum. Biopsied   Colonoscopy 10/2/24   Non-bleeding  internal hemorrhoids.                          - One 3 mm polyp in the descending colon, removed                          with a cold biopsy forceps. Resected and retrieved.                          - The rectum, sigmoid colon, transverse colon,                          ascending colon, cecum, ileocecal valve and                          appendiceal orifice are normal.                          - The examined portion of the ileum was normal    VCE 10/22 Normal small bowel with no findings to explain anemia. No further GI endoscopy  recommended unless  overt bleeding occurs.     ER course - .  Hemoglobin is 7.0. Transfusion with  1 unit PRBC ordered . leukocytosis of 18 today but denies any infectious symptoms. UA + . UC pending.         Review of patient's allergies indicates:  No Known Allergies       Past Medical History:   Diagnosis Date    Hyperlipidemia      Male erectile dysfunction, unspecified              Past Surgical History:   Procedure Laterality Date    COLONOSCOPY N/A 10/2/2024     Procedure: COLONOSCOPY;  Surgeon: Brennan Balderrama MD;  Location: [facility];  Service: Endoscopy;  Laterality: N/A;    ESOPHAGOGASTRODUODENOSCOPY N/A 10/2/2024     Procedure: EGD (ESOPHAGOGASTRODUODENOSCOPY);  Surgeon: Brennan Balderrama MD;  Location: [facility];  Service: Endoscopy;  Laterality: N/A;    FRACTURE SURGERY        INTRALUMINAL GASTROINTESTINAL TRACT IMAGING VIA CAPSULE N/A 10/22/2024     Procedure: IMAGING PROCEDURE, GI TRACT, INTRALUMINAL, VIA CAPSULE;  Surgeon: Brennan Balderrama MD;  Location: [facility];  Service: Endoscopy;  Laterality: N/A;    LIPOMA RESECTION Left      left knee    QUADRICEPS TENDON REPAIR Left              Family History   Problem Relation Name Age of Onset    Heart disease Mother       Heart attacks under age 50 Mother  43          2/2 heart attack.    Heart disease Father       Heart attacks under age 50 Father  38          2/2 heart  attack.      Social History   Social History            Tobacco Use    Smoking status: Former       Current packs/day: 0.00       Average packs/day: 1 pack/day for 20.0 years (20.0 ttl pk-yrs)       Types: Cigarettes       Start date: 1987       Quit date: 2007       Years since quittin.9    Smokeless tobacco: Never   Substance Use Topics    Alcohol use: Yes       Comment: Infrequently    Drug use: Not Currently           Review of Systems:   Pain scale:   Constitutional:  fever,  chills, headache, vision loss, hearing loss, weight loss, Generalized weakness, falls, loss of smell, loss of taste, poor appetite,  sore throat, weight loss  Respiratory: cough, shortness of breath.   Cardiovascular: chest pain, dizziness, palpitations, orthopnea, swelling of feet, syncope  Gastrointestinal: nausea, vomiting, abdominal pain, diarrhea, black stool,  blood in stool, change in bowel habits, constipation  Genitourinary: hematuria, dysuria, urgency, frequency  Integument/Breast: rash,  pruritis  Hematologic/Lymphatic: easy bruising, lymphadenopathy  Musculoskeletal: arthralgias , myalgias, back pain, neck pain, knee pain  Neurological: confusion, seizures, tremors, slurred speech  Behavioral/Psych:  depression, anxiety, auditory or visual hallucinations     OBJECTIVE:     Physical Exam:  Body mass index is 21.29 kg/m².    Constitutional: Appears  thin built   Head: Normocephalic and atraumatic.   Neck: Normal range of motion. Neck supple.   Cardiovascular: Normal heart rate.  Regular heart rhythm.  Pulmonary/Chest: Effort normal.   Abdominal: No distension.  No tenderness  Musculoskeletal: Normal range of motion. No edema.   Neurological: Alert and oriented to person, place, and time. able to move bilateral upper and lower extremities without limitation   Skin: Skin is warm and dry.   Psychiatric: Normal mood and affect. Behavior is normal.                  Vital Signs  Temp: 98.7 °F (37.1 °C) (24  "1324)  Pulse: 91 (11/29/24 1802)  Resp: 18 (11/29/24 1700)  BP: 126/82 (11/29/24 1700)  SpO2: 99 % (11/29/24 1700)     24 Hour VS Range    Temp:  [98.7 °F (37.1 °C)]   Pulse:  []   Resp:  [16-18]   BP: (119-152)/(61-89)   SpO2:  [97 %-100 %]   No intake or output data in the 24 hours ending 11/29/24 1813      I/O This Shift:  No intake/output data recorded.    Wt Readings from Last 3 Encounters:   11/29/24 63.5 kg (140 lb)   11/25/24 63.5 kg (140 lb)   10/09/24 67 kg (147 lb 11.3 oz)       I have personally reviewed the vitals and recorded Intake/Output     Laboratory/Diagnostic Data:    CBC/Anemia Labs: Coags:    Recent Labs   Lab 11/29/24  1439   WBC 18.31*   HGB 7.0*   HCT 22.7*   *   MCV 78*   RDW 17.6*    No results for input(s): "PT", "INR", "APTT" in the last 168 hours.     Chemistries: ABG:   Recent Labs   Lab 11/29/24  1439      K 3.9      CO2 22*   BUN 19   CREATININE 1.3   CALCIUM 10.2   PROT 8.8*   BILITOT 0.5   ALKPHOS 131   ALT 24   AST 45*    No results for input(s): "PH", "PCO2", "PO2", "HCO3", "POCSATURATED", "BE" in the last 168 hours.     POCT Glucose: HbA1c:    No results for input(s): "POCTGLUCOSE" in the last 168 hours. Hemoglobin A1C   Date Value Ref Range Status   06/20/2024 6.1 (H) 4.0 - 5.6 % Final     Comment:     ADA Screening Guidelines:  5.7-6.4%  Consistent with prediabetes  >or=6.5%  Consistent with diabetes    High levels of fetal hemoglobin interfere with the HbA1C  assay. Heterozygous hemoglobin variants (HbS, HgC, etc)do  not significantly interfere with this assay.   However, presence of multiple variants may affect accuracy.     06/01/2023 5.8 (H) 4.0 - 5.6 % Final     Comment:     ADA Screening Guidelines:  5.7-6.4%  Consistent with prediabetes  >or=6.5%  Consistent with diabetes    High levels of fetal hemoglobin interfere with the HbA1C  assay. Heterozygous hemoglobin variants (HbS, HgC, etc)do  not significantly interfere with this assay.   However, " "presence of multiple variants may affect accuracy.          Cardiac Enzymes: Ejection Fractions:    Recent Labs     11/29/24  1439   TROPONINI 0.007    No results found for: "EF"       Recent Labs   Lab 11/29/24  1529   COLORU Orange*   APPEARANCEUA Cloudy*   PHUR 6.0   SPECGRAV 1.020   PROTEINUA 2+*   GLUCUA Negative   KETONESU Negative   BILIRUBINUA Negative   OCCULTUA 2+*   NITRITE Negative   LEUKOCYTESUR 3+*   RBCUA 52*   WBCUA >100*   BACTERIA Many*   HYALINECASTS 0       No results found for: "PROCAL", "LACTATE"  BNP (pg/mL)   Date Value   11/29/2024 92     No results found for: "CRP", "SEDRATE"  D-Dimer (mg/L FEU)   Date Value   11/29/2024 2.07 (H)     Ferritin (ng/mL)   Date Value   07/01/2024 424 (H)     No results found for: "LDH"  Troponin I (ng/mL)   Date Value   11/29/2024 0.007     No results found for this or any previous visit.  No results found for: "[id number]"    Microbiology labs for the last week  Microbiology Results (last 7 days)       Procedure Component Value Units Date/Time    Blood culture [[id number]] Collected: 11/29/24 1644    Order Status: Sent Specimen: Blood from Peripheral, Antecubital, Right Updated: 11/29/24 1705    Blood culture [[id number]] Collected: 11/29/24 1644    Order Status: Sent Specimen: Blood from Peripheral, Antecubital, Right Updated: 11/29/24 1705    Urine culture [[id number]] Collected: 11/29/24 1529    Order Status: No result Specimen: Urine Updated: 11/29/24 1624            Reviewed and noted in plan where applicable- Please see chart for full lab data.    Lines/Drains:       Peripheral IV - Single Lumen 11/29/24 1439 20 G Left Antecubital (Active)   Site Assessment Clean;Dry;Intact 11/29/24 1439   Extremity Assessment Distal to IV No abnormal discoloration;No redness;No swelling 11/29/24 1439   Dressing Status Clean;Dry;Intact 11/29/24 1439   Dressing Intervention Integrity maintained 11/29/24 1439   Number of days: 0       Imaging  ECG Results              " EKG 12-lead (Final result)        Collection Time Result Time QRS Duration OHS QTC Calculation    11/29/24 13:31:06 11/29/24 13:43:01 84 432                     Final result by Interface, Lab In [facility] (11/29/24 13:43:09)                   Narrative:    Test Reason : R53.1,    Vent. Rate :  93 BPM     Atrial Rate :  93 BPM     P-R Int : 128 ms          QRS Dur :  84 ms      QT Int : 348 ms       P-R-T Axes :  68  41  41 degrees    QTcB Int : 432 ms    Normal sinus rhythm  Normal ECG  No previous ECGs available  Confirmed by Dain Barajas ([id number]) on 11/29/2024 1:42:58 PM    Referred By:            Confirmed By: Dain Barajas                                    No results found for this or any previous visit.      X-Ray Chest AP Portable  Narrative: EXAMINATION:  XR CHEST AP PORTABLE    CLINICAL HISTORY:  shortness of breath;    TECHNIQUE:  Single frontal view of the chest was performed.    COMPARISON:  None    FINDINGS:  The lungs are clear, with normal appearance of pulmonary vasculature and no pleural effusion or pneumothorax.    The cardiac silhouette is normal in size. The hilar and mediastinal contours are unremarkable.    Bones are intact.  Impression: No acute abnormality.    Electronically signed by: Lynne Fagan MD  Date:    11/29/2024  Time:    16:14    EKG - normal sinus rhythm at 93bpm    Labs, Imaging, EKG and Diagnostic results from 11/29/2024 were reviewed.    Medications:  Medication list was reviewed and changes noted under Assessment/Plan.  No current facility-administered medications on file prior to encounter.     Current Outpatient Medications on File Prior to Encounter   Medication Sig Dispense Refill    aspirin 81 MG Chew Take 81 mg by mouth once daily.      diazePAM (VALIUM) 5 MG tablet TAKE 1 TABLET BY MOUTH EVERY 12 HOURS AS NEEDED FOR ANXIETY 30 tablet 0    meloxicam (MOBIC) 7.5 MG tablet Take 1 tablet by mouth twice daily 60 tablet 0    pantoprazole (PROTONIX) 40 MG tablet Take 1  tablet (40 mg total) by mouth once daily. 90 tablet 3    rosuvastatin (CRESTOR) 20 MG tablet Take 1 tablet (20 mg total) by mouth once daily. 90 tablet 3    sildenafiL (VIAGRA) 100 MG tablet Take 1 tablet (100 mg total) by mouth daily as needed for Erectile Dysfunction. 10 tablet 3    triamcinolone acetonide 0.1% (KENALOG) 0.1 % cream APPLY  CREAM EXTERNALLY TWICE DAILY AS NEEDED FOR  DERMATITIS 45 g 0    zolpidem (AMBIEN) 5 MG Tab Take 1 tablet (5 mg total) by mouth nightly as needed (insomnia). 30 tablet 0     Scheduled Medications:  Current Facility-Administered Medications   Medication Dose Route Frequency    [START ON 11/30/2024] atorvastatin  80 mg Oral Daily    [START ON 11/30/2024] pantoprazole  40 mg Oral Daily     PRN:   Current Facility-Administered Medications:     0.9%  NaCl infusion (for blood administration), , Intravenous, Q24H PRN    dextrose 10%, 12.5 g, Intravenous, PRN    dextrose 10%, 25 g, Intravenous, PRN    glucagon (human recombinant), 1 mg, Intramuscular, PRN    glucose, 16 g, Oral, PRN    glucose, 24 g, Oral, PRN    naloxone, 0.02 mg, Intravenous, PRN    sodium chloride 0.9%, 10 mL, Intravenous, Q12H PRN  Infusions:   Estimated Creatinine Clearance: 47.5 mL/min (based on SCr of 1.3 mg/dL).           Assessment/Plan:     * Iron deficiency anemia    Patient's with microcytic anemia.. Hemoglobin downtrending. Etiology likely due to Iron deficiency.  Current CBC reviewed-    Recent Labs   Lab 11/29/24  1439   HGB 7.0*         Component Value Date/Time    MCV 78 (L) 11/29/2024 1439    RDW 17.6 (H) 11/29/2024 1439    IRON 27 (L) 07/01/2024 1357    FERRITIN 424 (H) 07/01/2024 1357    RETIC 1.3 07/01/2024 1357    FOLATE 13.8 07/01/2024 1357    IBNSMTZL80 595 07/01/2024 1357     Monitor CBC and transfuse if H/H drops below 7/21.        EGD 10/2/24                           Normal esophagus.                          - Z-line regular, 40 cm from the incisors.                          - Small hiatal  hernia.                          - Gastritis. Biopsied.                          - Normal examined duodenum. Biopsied   Colonoscopy 10/2/24   Non-bleeding internal hemorrhoids.                          - One 3 mm polyp in the descending colon, removed                          with a cold biopsy forceps. Resected and retrieved.                          - The rectum, sigmoid colon, transverse colon,                          ascending colon, cecum, ileocecal valve and                          appendiceal orifice are normal.                          - The examined portion of the ileum was normal    VCE 10/22 Normal small bowel with no findings to explain anemia. No further GI endoscopy  recommended unless  overt bleeding occurs.     symptomatic anemia -  Hemoglobin is 7.0. Transfusion with  1 unit PRBC ordered . GI consulted       Falls frequently  secondary to above. fall precautions. PT/OT consulted   reported 2 falls in the last week. one with forehead trauma. CT head wo contrast ordered     Dizziness  likely from symptomatic anemia. orthostasis + by pulse. encourage oral hydration. monitor after PRBC. echo ordered . telemetry r/o arrhythmia       Positive D dimer  D dimer elevated at 2.07 . DVT sonogram LE ordered       UTI (urinary tract infection)  UA + . UC pending. started ceftriaxone        Leucocytosis    Patient with leucocytosis   Recent Labs   Lab 11/29/24  1439   WBC 18.31*     . Afebrile. BCX 2, urine culture pending . likely secondary to sepsis.?        Hypercholesterolemia  continue crestor         VTE Risk Mitigation (From admission, onward)           Ordered     IP VTE HIGH RISK PATIENT  Once         11/29/24 1638     Place sequential compression device  Until discontinued         11/29/24 1638                       On 11/29/2024, patient should be placed in hospital observation services under my care.             Gilmar Cid MD  Department of Hospital Medicine  [facility] Emergency  Dept

## 2024-11-29 NOTE — FIRST PROVIDER EVALUATION
Emergency Department TeleTriage Encounter Note      CHIEF COMPLAINT    Chief Complaint   Patient presents with    Shortness of Breath    Weakness     X 4 days       VITAL SIGNS   Initial Vitals [11/29/24 1324]   BP Pulse Resp Temp SpO2   138/61 (!) 124 16 98.7 °F (37.1 °C) 97 %      MAP       --            ALLERGIES    Review of patient's allergies indicates:  No Known Allergies    PROVIDER TRIAGE NOTE  This is a teletriage evaluation of a 68 y.o. male presenting to the ED complaining of shortness of breath. Patient reports shortness of breath, dizziness, lightheadedness, and generalized weakness for 4 days. He denies chest pain.     Patient is alert and oriented. He speaks in complete sentences. He is sitting upright in the chair in no distress.     Initial orders will be placed and care will be transferred to an alternate provider when patient is roomed for a full evaluation. Any additional orders and the final disposition will be determined by that provider.         ORDERS  Labs Reviewed   HEPATITIS C ANTIBODY   HIV 1 / 2 ANTIBODY   CBC W/ AUTO DIFFERENTIAL   COMPREHENSIVE METABOLIC PANEL   URINALYSIS, REFLEX TO URINE CULTURE   TROPONIN I   B-TYPE NATRIURETIC PEPTIDE       ED Orders (720h ago, onward)      Start Ordered     Status Ordering Provider    11/29/24 1435 11/29/24 1434  CBC auto differential  STAT         Ordered SUHAS, COURTNEY G.    11/29/24 1435 11/29/24 1434  Comprehensive metabolic panel  STAT         Ordered SUHAS, COURTNEY G.    11/29/24 1435 11/29/24 1434  Insert Saline lock IV  Once         Ordered SUHAS, COURTNEY G.    11/29/24 1435 11/29/24 1434  EKG 12-lead  Once         Ordered SUHAS, COURTNEY G.    11/29/24 1435 11/29/24 1434  Urinalysis, Reflex to Urine Culture Urine, Clean Catch  STAT         Ordered SUHAS, COURTNEY G.    11/29/24 1435 11/29/24 1434  Troponin I  STAT         Ordered SUHAS OCURTNEY G.    11/29/24 1435 11/29/24 1434  Brain natriuretic peptide  STAT         Ordered SUHAS, COURTNEY G.     11/29/24 1435 11/29/24 1434  Orthostatic blood pressure  Once         Ordered COURTNEY DAI.    11/29/24 1435 11/29/24 1434  X-Ray Chest AP Portable  1 time imaging         Ordered COURTNEY DAI.    11/29/24 1326 11/29/24 1325  Hepatitis C Antibody  STAT         Acknowledged KEEGAN LR    11/29/24 1326 11/29/24 1325  HIV 1/2 Ag/Ab (4th Gen)  STAT         Acknowledged KEEGAN LR    11/29/24 1326 11/29/24 1326  EKG 12-lead  Once         Final result PAGE REYES              Virtual Visit Note: The provider triage portion of this emergency department evaluation and documentation was performed via [employer], a HIPAA-compliant telemedicine application, in concert with a tele-presenter in the room. A face to face patient evaluation with one of my colleagues will occur once the patient is placed in an emergency department room.      DISCLAIMER: This note was prepared with [employer] voice recognition transcription software. Garbled syntax, mangled pronouns, and other bizarre constructions may be attributed to that software system.

## 2024-11-29 NOTE — ED TRIAGE NOTES
Patient arrived via personal transport for the chief complaint of SOB, weakness, fatigue and dizzy ness for the last 6 months. The patient stated that the symptoms got worse one month ago.

## 2024-11-29 NOTE — ASSESSMENT & PLAN NOTE
secondary to above. fall precautions. PT/OT consulted   reported 2 falls in the last week. one with forehead trauma. CT head wo contrast ordered

## 2024-11-29 NOTE — ED NOTES
Assumed care of the patient.Pt in hospital gown, side rails up X2, bed low and locked, and call light is placed within reach. No family/visitors at bedside at this time. Pt denies any complaints or needs.     Felipe Jiménez, a 68 y.o. male presents to the ED w/ complaint of shortness of breath, weakness    Triage note:  Chief Complaint   Patient presents with    Shortness of Breath    Weakness     X 4 days     Review of patient's allergies indicates:  No Known Allergies  Past Medical History:   Diagnosis Date    Hyperlipidemia     Male erectile dysfunction, unspecified

## 2024-11-29 NOTE — HPI
Felipe Moffett is a 69 Yo male with PMH of Iron deficiency anemia, hyperlipidemia presents ED with dizziness, generalized weakness and shortness of breath with exertion for the past 6 months.     AAOX3. c/o generalized weakness and shortness of breath with exertion for the past 6 months - progressively worsening.  reports he had  2 falls recently due to weakness.  denies loss of consciousness.   Followed by GI for LENORA and underwent recent colonoscopy, upper GI as well as video capsule endoscopy.  Reports occasional bright red blood when he wipes but otherwise denies any other abnormal bleeding.  50 lb weight loss over the past year,  initially on purpose as he was over 200 lb but mentions he had unintentional weight loss as well.   had iron-deficiency anemia since childhood was previously on iron supplementation which he discontinued a year ago in favor of dietary changes. Denies any leg swelling. currently not on any anticoagulation. reports   occasional bright red blood when wiping but denies any melena.  on ASA 81 mg daily. admits taking tylenol and meloxicam intermittently for arthritis     EGD 10/2/24                           Normal esophagus.                          - Z-line regular, 40 cm from the incisors.                          - Small hiatal hernia.                          - Gastritis. Biopsied.                          - Normal examined duodenum. Biopsied   Colonoscopy 10/2/24   Non-bleeding internal hemorrhoids.                          - One 3 mm polyp in the descending colon, removed                          with a cold biopsy forceps. Resected and retrieved.                          - The rectum, sigmoid colon, transverse colon,                          ascending colon, cecum, ileocecal valve and                          appendiceal orifice are normal.                          - The examined portion of the ileum was normal    VCE 10/22 Normal small bowel with no findings to explain anemia. No  further GI endoscopy  recommended unless  overt bleeding occurs.     ER course - .  Hemoglobin is 7.0. Transfusion with  1 unit PRBC ordered . leukocytosis of 18 today but denies any infectious symptoms. UA + . UC pending.

## 2024-11-29 NOTE — ED PROVIDER NOTES
Encounter Date: 11/29/2024       History     Chief Complaint   Patient presents with    Shortness of Breath    Weakness     X 4 days     68-year-old male with past history of hyperlipidemia presents ED with dizziness, generalized weakness and shortness of breath with exertion for the past 6 months.  Reports it his symptoms are progressively worsening.  Reports 2 falls recently due to weakness.  Denies any leg swelling.  He is not on any anticoagulation.  Followed by GI for LENORA and underwent recent colonoscopy, upper GI as well as video capsule endoscopy.  Reports occasional bright red blood when he wipes but otherwise denies any other abnormal bleeding.  Patient also reports 50 lb weight loss over the past year which she was concerned about.  States was initially on purpose as he was over 200 lb but states is continue weight loss is unintentional.  Additionally suffered from iron-deficiency anemia since childhood was previously on iron supplementation which she stopped a year ago in favor of dietary changes.        Review of patient's allergies indicates:  No Known Allergies  Past Medical History:   Diagnosis Date    Hyperlipidemia     Male erectile dysfunction, unspecified      Past Surgical History:   Procedure Laterality Date    COLONOSCOPY N/A 10/2/2024    Procedure: COLONOSCOPY;  Surgeon: Brennan Balderrama MD;  Location: [facility];  Service: Endoscopy;  Laterality: N/A;    ESOPHAGOGASTRODUODENOSCOPY N/A 10/2/2024    Procedure: EGD (ESOPHAGOGASTRODUODENOSCOPY);  Surgeon: Brennan Balderrama MD;  Location: [facility];  Service: Endoscopy;  Laterality: N/A;    FRACTURE SURGERY      INTRALUMINAL GASTROINTESTINAL TRACT IMAGING VIA CAPSULE N/A 10/22/2024    Procedure: IMAGING PROCEDURE, GI TRACT, INTRALUMINAL, VIA CAPSULE;  Surgeon: Brennan Balderrama MD;  Location: [facility];  Service: Endoscopy;  Laterality: N/A;    LIPOMA RESECTION Left 1979    left knee    QUADRICEPS TENDON REPAIR Left 2012     Family History    Problem Relation Name Age of Onset    Heart disease Mother      Heart attacks under age 50 Mother  43         2/2 heart attack.    Heart disease Father      Heart attacks under age 50 Father  38         2/2 heart attack.     Social History     Tobacco Use    Smoking status: Former     Current packs/day: 0.00     Average packs/day: 1 pack/day for 20.0 years (20.0 ttl pk-yrs)     Types: Cigarettes     Start date: 1987     Quit date: 2007     Years since quittin.9    Smokeless tobacco: Never   Substance Use Topics    Alcohol use: Yes     Comment: Infrequently    Drug use: Not Currently     Review of Systems    Physical Exam     Initial Vitals [24 1324]   BP Pulse Resp Temp SpO2   138/61 (!) 124 16 98.7 °F (37.1 °C) 97 %      MAP       --         Physical Exam    Nursing note and vitals reviewed.  Constitutional: He appears well-developed and well-nourished.   HENT:   Head: Normocephalic and atraumatic.   Eyes: Conjunctivae are normal. Pupils are equal, round, and reactive to light.   Neck: Neck supple.   Normal range of motion.  Cardiovascular:  Regular rhythm, normal heart sounds and intact distal pulses.           Pulmonary/Chest: Breath sounds normal.   Abdominal: Abdomen is soft. There is no abdominal tenderness.   Musculoskeletal:         General: Normal range of motion.      Cervical back: Normal range of motion and neck supple.     Neurological: He is alert and oriented to person, place, and time. GCS score is 15. GCS eye subscore is 4. GCS verbal subscore is 5. GCS motor subscore is 6.   Skin: Skin is warm and dry. Capillary refill takes less than 2 seconds.         ED Course   Procedures  Labs Reviewed   CBC W/ AUTO DIFFERENTIAL - Abnormal       Result Value    WBC 18.31 (*)     RBC 2.93 (*)     Hemoglobin 7.0 (*)     Hematocrit 22.7 (*)     MCV 78 (*)     MCH 23.9 (*)     MCHC 30.8 (*)     RDW 17.6 (*)     Platelets 616 (*)     MPV 9.1 (*)      Immature Granulocytes 0.7 (*)     Gran # (ANC) 16.1 (*)     Immature Grans (Abs) 0.12 (*)     Lymph # 0.8 (*)     Mono # 1.3 (*)     Eos # 0.0      Baso # 0.03      nRBC 0      Gran % 87.9 (*)     Lymph % 4.1 (*)     Mono % 7.0      Eosinophil % 0.1      Basophil % 0.2      Differential Method Automated     COMPREHENSIVE METABOLIC PANEL - Abnormal    Sodium 136      Potassium 3.9      Chloride 105      CO2 22 (*)     Glucose 109      BUN 19      Creatinine 1.3      Calcium 10.2      Total Protein 8.8 (*)     Albumin 2.1 (*)     Total Bilirubin 0.5      Alkaline Phosphatase 131      AST 45 (*)     ALT 24      eGFR 59.1 (*)     Anion Gap 9     TROPONIN I    Troponin I 0.007     B-TYPE NATRIURETIC PEPTIDE    BNP 92     HEPATITIS C ANTIBODY   HIV 1 / 2 ANTIBODY   URINALYSIS, REFLEX TO URINE CULTURE   D DIMER, QUANTITATIVE   TYPE & SCREEN   PREPARE RBC SOFT        ECG Results              EKG 12-lead (Final result)        Collection Time Result Time QRS Duration OHS QTC Calculation    11/29/24 13:31:06 11/29/24 13:43:01 84 432                     Final result by Interface, Lab In [facility] (11/29/24 13:43:09)                   Narrative:    Test Reason : R53.1,    Vent. Rate :  93 BPM     Atrial Rate :  93 BPM     P-R Int : 128 ms          QRS Dur :  84 ms      QT Int : 348 ms       P-R-T Axes :  68  41  41 degrees    QTcB Int : 432 ms    Normal sinus rhythm  Normal ECG  No previous ECGs available  Confirmed by Dain Barajas ([id number]) on 11/29/2024 1:42:58 PM    Referred By:            Confirmed By: Dain Barajas                                  Imaging Results              X-Ray Chest AP Portable (In process)                      Medications   lactated ringers bolus 500 mL (0 mLs Intravenous Stopped 11/29/24 1621)   0.9%  NaCl infusion (for blood administration) (has no administration in time range)     Medical Decision Making  68-year-old male presents ED with generalized weakness, fatigue and shortness of breath which has  progressively worsening.      Differential includes but not limited to PE, symptomatic anemia, dehydration, electrolyte derangement GI bleed     His abdominal exam is benign.  Significantly tachycardic arrival with rate of 124.  Reports occasional bright red blood on TP per denies any melena or other abnormal bleeding.  Not on any anticoagulation.  Has had extensive outpatient GI workup.  Hemoglobin continues to down trend 7.0 today and given his symptoms will transfuse 1 unit PRBC.  Consent was obtained.  Will admit to hospital medicine further management.    Amount and/or Complexity of Data Reviewed  Labs:  Decision-making details documented in ED Course.    Risk  Prescription drug management.               ED Course as of 11/29/24 1551   Fri Nov 29, 2024   1545 BNP: 92  Within normal limits [HJ]   1545 WBC(!): 18.31  Leukocytosis.  Denies any infectious symptoms. [HJ]   1545 Hemoglobin(!): 7.0  Significant microcytic anemia Down trending from previous [HJ]   1545 Creatinine: 1.3  No TOD [HJ]      ED Course User Index  [HJ] Farhan Melvin PA-C                           Clinical Impression:  Final diagnoses:  [R53.1] Weakness generalized  [R06.02] Shortness of breath  [D64.9] Symptomatic anemia (Primary)          ED Disposition Condition    Observation Stable                Farhan Melvin PA-C  11/29/24 1606       Farhan Melvin PA-C  11/29/24 1619

## 2024-11-29 NOTE — ASSESSMENT & PLAN NOTE
Patient's with microcytic anemia.. Hemoglobin downtrending. Etiology likely due to Iron deficiency.  Current CBC reviewed-    Recent Labs   Lab 11/29/24  1439   HGB 7.0*         Component Value Date/Time    MCV 78 (L) 11/29/2024 1439    RDW 17.6 (H) 11/29/2024 1439    IRON 27 (L) 07/01/2024 1357    FERRITIN 424 (H) 07/01/2024 1357    RETIC 1.3 07/01/2024 1357    FOLATE 13.8 07/01/2024 1357    [id number] 595 07/01/2024 1357     Monitor CBC and transfuse if H/H drops below 7/21.        EGD 10/2/24                           Normal esophagus.                          - Z-line regular, 40 cm from the incisors.                          - Small hiatal hernia.                          - Gastritis. Biopsied.                          - Normal examined duodenum. Biopsied   Colonoscopy 10/2/24   Non-bleeding internal hemorrhoids.                          - One 3 mm polyp in the descending colon, removed                          with a cold biopsy forceps. Resected and retrieved.                          - The rectum, sigmoid colon, transverse colon,                          ascending colon, cecum, ileocecal valve and                          appendiceal orifice are normal.                          - The examined portion of the ileum was normal    VCE 10/22 Normal small bowel with no findings to explain anemia. No further GI endoscopy  recommended unless  overt bleeding occurs.     symptomatic anemia -  Hemoglobin is 7.0. Transfusion with  1 unit PRBC ordered . GI consulted

## 2024-11-29 NOTE — ASSESSMENT & PLAN NOTE
Patient with leucocytosis   Recent Labs   Lab 11/29/24  1439   WBC 18.31*     . Afebrile. BCX 2, urine culture pending . likely secondary to sepsis.?

## 2024-11-29 NOTE — ASSESSMENT & PLAN NOTE
likely from symptomatic anemia. orthostasis + by pulse. encourage oral hydration. monitor after PRBC. echo ordered . telemetry r/o arrhythmia

## 2024-11-30 ENCOUNTER — ANESTHESIA EVENT (OUTPATIENT)
Dept: SURGERY | Facility: HOSPITAL | Age: 69
End: 2024-11-30
Payer: MEDICARE

## 2024-11-30 ENCOUNTER — ANESTHESIA (OUTPATIENT)
Dept: SURGERY | Facility: HOSPITAL | Age: 69
End: 2024-11-30
Payer: MEDICARE

## 2024-11-30 PROBLEM — N15.1 PERINEPHRIC ABSCESS: Status: ACTIVE | Noted: 2024-11-30

## 2024-11-30 PROBLEM — R63.4 WEIGHT LOSS: Status: ACTIVE | Noted: 2024-11-30

## 2024-11-30 PROBLEM — R50.9 FEVER: Status: ACTIVE | Noted: 2024-11-30

## 2024-11-30 PROBLEM — N13.30 HYDRONEPHROSIS OF RIGHT KIDNEY: Status: ACTIVE | Noted: 2024-11-30

## 2024-11-30 PROBLEM — N20.0 NEPHROLITHIASIS: Status: ACTIVE | Noted: 2024-11-30

## 2024-11-30 LAB
ALBUMIN SERPL BCP-MCNC: 1.9 G/DL (ref 3.5–5.2)
ALP SERPL-CCNC: 130 U/L (ref 40–150)
ALT SERPL W/O P-5'-P-CCNC: 23 U/L (ref 10–44)
ANION GAP SERPL CALC-SCNC: 7 MMOL/L (ref 8–16)
ASCENDING AORTA: 2.88 CM
AST SERPL-CCNC: 33 U/L (ref 10–40)
AV AREA BY CONTINUOUS VTI: 2.4 CM2
AV INDEX (PROSTH): 0.68
AV LVOT MEAN GRADIENT: 2 MMHG
AV LVOT PEAK GRADIENT: 4 MMHG
AV MEAN GRADIENT: 6.5 MMHG
AV PEAK GRADIENT: 9 MMHG
AV REGURGITATION PRESSURE HALF TIME: 577.21 MS
AV VALVE AREA BY VELOCITY RATIO: 2.3 CM²
AV VALVE AREA: 2.4 CM2
AV VELOCITY RATIO: 0.67
BACTERIA UR CULT: NO GROWTH
BASOPHILS # BLD AUTO: 0.02 K/UL (ref 0–0.2)
BASOPHILS # BLD AUTO: 0.03 K/UL (ref 0–0.2)
BASOPHILS NFR BLD: 0.1 % (ref 0–1.9)
BASOPHILS NFR BLD: 0.2 % (ref 0–1.9)
BILIRUB SERPL-MCNC: 1 MG/DL (ref 0.1–1)
BSA FOR ECHO PROCEDURE: 1.75 M2
BUN SERPL-MCNC: 19 MG/DL (ref 8–23)
CALCIUM SERPL-MCNC: 9.5 MG/DL (ref 8.7–10.5)
CHLORIDE SERPL-SCNC: 104 MMOL/L (ref 95–110)
CO2 SERPL-SCNC: 21 MMOL/L (ref 23–29)
CREAT SERPL-MCNC: 1.2 MG/DL (ref 0.5–1.4)
CV ECHO LV RWT: 0.4 CM
DIFFERENTIAL METHOD BLD: ABNORMAL
DIFFERENTIAL METHOD BLD: ABNORMAL
DOP CALC AO PEAK VEL: 1.5 M/S
DOP CALC AO VTI: 28.8 CM
DOP CALC LVOT AREA: 3.5 CM2
DOP CALC LVOT DIAMETER: 2.1 CM
DOP CALC LVOT PEAK VEL: 1 M/S
DOP CALC LVOT STROKE VOLUME: 67.9 CM3
DOP CALCLVOT PEAK VEL VTI: 19.6 CM
E WAVE DECELERATION TIME: 205.33 MS
E/A RATIO: 1.18
E/E' RATIO: 4.47 M/S
ECHO EF ESTIMATED: 42 %
ECHO LV POSTERIOR WALL: 0.9 CM (ref 0.6–1.1)
EJECTION FRACTION: 50 %
EOSINOPHIL # BLD AUTO: 0 K/UL (ref 0–0.5)
EOSINOPHIL # BLD AUTO: 0 K/UL (ref 0–0.5)
EOSINOPHIL NFR BLD: 0.2 % (ref 0–8)
EOSINOPHIL NFR BLD: 0.2 % (ref 0–8)
ERYTHROCYTE [DISTWIDTH] IN BLOOD BY AUTOMATED COUNT: 17.3 % (ref 11.5–14.5)
ERYTHROCYTE [DISTWIDTH] IN BLOOD BY AUTOMATED COUNT: 17.3 % (ref 11.5–14.5)
EST. GFR  (NO RACE VARIABLE): >60 ML/MIN/1.73 M^2
FERRITIN SERPL-MCNC: 3596 NG/ML (ref 20–300)
FOLATE SERPL-MCNC: 12.6 NG/ML (ref 4–24)
FRACTIONAL SHORTENING: 20 % (ref 28–44)
GLUCOSE SERPL-MCNC: 96 MG/DL (ref 70–110)
HAPTOGLOB SERPL-MCNC: 721 MG/DL (ref 30–250)
HCT VFR BLD AUTO: 22.6 % (ref 40–54)
HCT VFR BLD AUTO: 23.4 % (ref 40–54)
HGB BLD-MCNC: 7.2 G/DL (ref 14–18)
HGB BLD-MCNC: 7.6 G/DL (ref 14–18)
IMM GRANULOCYTES # BLD AUTO: 0.09 K/UL (ref 0–0.04)
IMM GRANULOCYTES # BLD AUTO: 0.11 K/UL (ref 0–0.04)
IMM GRANULOCYTES NFR BLD AUTO: 0.6 % (ref 0–0.5)
IMM GRANULOCYTES NFR BLD AUTO: 0.7 % (ref 0–0.5)
INTERVENTRICULAR SEPTUM: 0.7 CM (ref 0.6–1.1)
IRON SERPL-MCNC: 13 UG/DL (ref 45–160)
IVRT: 70.41 MS
LA MAJOR: 5.78 CM
LA MINOR: 5.86 CM
LA WIDTH: 3.69 CM
LDH SERPL L TO P-CCNC: 179 U/L (ref 110–260)
LEFT ATRIUM SIZE: 2.66 CM
LEFT ATRIUM VOLUME INDEX MOD: 32.2 ML/M2
LEFT ATRIUM VOLUME INDEX: 27.6 ML/M2
LEFT ATRIUM VOLUME MOD: 56.59 ML
LEFT ATRIUM VOLUME: 48.55 CM3
LEFT INTERNAL DIMENSION IN SYSTOLE: 3.6 CM (ref 2.1–4)
LEFT VENTRICLE DIASTOLIC VOLUME INDEX: 53.22 ML/M2
LEFT VENTRICLE DIASTOLIC VOLUME: 93.67 ML
LEFT VENTRICLE MASS INDEX: 64.6 G/M2
LEFT VENTRICLE SYSTOLIC VOLUME INDEX: 30.8 ML/M2
LEFT VENTRICLE SYSTOLIC VOLUME: 54.27 ML
LEFT VENTRICULAR INTERNAL DIMENSION IN DIASTOLE: 4.5 CM (ref 3.5–6)
LEFT VENTRICULAR MASS: 113.6 G
LV LATERAL E/E' RATIO: 3.94
LV SEPTAL E/E' RATIO: 5.15
LYMPHOCYTES # BLD AUTO: 0.9 K/UL (ref 1–4.8)
LYMPHOCYTES # BLD AUTO: 1.2 K/UL (ref 1–4.8)
LYMPHOCYTES NFR BLD: 6.1 % (ref 18–48)
LYMPHOCYTES NFR BLD: 7.8 % (ref 18–48)
MAGNESIUM SERPL-MCNC: 1.9 MG/DL (ref 1.6–2.6)
MCH RBC QN AUTO: 25 PG (ref 27–31)
MCH RBC QN AUTO: 25.3 PG (ref 27–31)
MCHC RBC AUTO-ENTMCNC: 31.9 G/DL (ref 32–36)
MCHC RBC AUTO-ENTMCNC: 32.5 G/DL (ref 32–36)
MCV RBC AUTO: 78 FL (ref 82–98)
MCV RBC AUTO: 79 FL (ref 82–98)
MONOCYTES # BLD AUTO: 1.4 K/UL (ref 0.3–1)
MONOCYTES # BLD AUTO: 1.4 K/UL (ref 0.3–1)
MONOCYTES NFR BLD: 9.5 % (ref 4–15)
MONOCYTES NFR BLD: 9.5 % (ref 4–15)
MV PEAK A VEL: 0.57 M/S
MV PEAK E VEL: 0.67 M/S
NEUTROPHILS # BLD AUTO: 12.4 K/UL (ref 1.8–7.7)
NEUTROPHILS # BLD AUTO: 12.5 K/UL (ref 1.8–7.7)
NEUTROPHILS NFR BLD: 81.6 % (ref 38–73)
NEUTROPHILS NFR BLD: 83.5 % (ref 38–73)
NRBC BLD-RTO: 0 /100 WBC
NRBC BLD-RTO: 0 /100 WBC
OHS CV RV/LV RATIO: 0.69 CM
OHS LV EJECTION FRACTION SIMPSONS BIPLANE MOD: 49 %
PHOSPHATE SERPL-MCNC: 3.1 MG/DL (ref 2.7–4.5)
PISA TR MAX VEL: 2.53 M/S
PLATELET # BLD AUTO: 565 K/UL (ref 150–450)
PLATELET # BLD AUTO: 616 K/UL (ref 150–450)
PMV BLD AUTO: 8.9 FL (ref 9.2–12.9)
PMV BLD AUTO: 9.2 FL (ref 9.2–12.9)
POTASSIUM SERPL-SCNC: 3.7 MMOL/L (ref 3.5–5.1)
PROT SERPL-MCNC: 7.9 G/DL (ref 6–8.4)
RA MAJOR: 4.23 CM
RA PRESSURE ESTIMATED: 3 MMHG
RA WIDTH: 3.5 CM
RBC # BLD AUTO: 2.88 M/UL (ref 4.6–6.2)
RBC # BLD AUTO: 3 M/UL (ref 4.6–6.2)
RETICS/RBC NFR AUTO: 0.9 % (ref 0.4–2)
RIGHT VENTRICLE DIASTOLIC BASEL DIMENSION: 3.1 CM
RV TB RVSP: 6 MMHG
RV TISSUE DOPPLER FREE WALL SYSTOLIC VELOCITY 1 (APICAL 4 CHAMBER VIEW): 14.35 CM/S
SATURATED IRON: 10 % (ref 20–50)
SINUS: 3.45 CM
SODIUM SERPL-SCNC: 132 MMOL/L (ref 136–145)
STJ: 2.84 CM
TDI LATERAL: 0.17 M/S
TDI SEPTAL: 0.13 M/S
TDI: 0.15 M/S
TOTAL IRON BINDING CAPACITY: 133 UG/DL (ref 250–450)
TR MAX PG: 26 MMHG
TRANSFERRIN SERPL-MCNC: 90 MG/DL (ref 200–375)
TRICUSPID ANNULAR PLANE SYSTOLIC EXCURSION: 1.97 CM
TV PEAK GRADIENT: 26 MMHG
TV REST PULMONARY ARTERY PRESSURE: 29 MMHG
VIT B12 SERPL-MCNC: 1087 PG/ML (ref 210–950)
WBC # BLD AUTO: 14.96 K/UL (ref 3.9–12.7)
WBC # BLD AUTO: 15.18 K/UL (ref 3.9–12.7)
Z-SCORE OF LEFT VENTRICULAR DIMENSION IN END DIASTOLE: -0.78
Z-SCORE OF LEFT VENTRICULAR DIMENSION IN END SYSTOLE: 1.41

## 2024-11-30 PROCEDURE — 25000003 PHARM REV CODE 250: Performed by: HOSPITALIST

## 2024-11-30 PROCEDURE — 84100 ASSAY OF PHOSPHORUS: CPT | Performed by: HOSPITALIST

## 2024-11-30 PROCEDURE — 37000008 HC ANESTHESIA 1ST 15 MINUTES: Performed by: UROLOGY

## 2024-11-30 PROCEDURE — 37000009 HC ANESTHESIA EA ADD 15 MINS: Performed by: UROLOGY

## 2024-11-30 PROCEDURE — C2617 STENT, NON-COR, TEM W/O DEL: HCPCS | Performed by: UROLOGY

## 2024-11-30 PROCEDURE — 27200677 HC TRANSDUCER MONITOR KIT SINGLE: Performed by: STUDENT IN AN ORGANIZED HEALTH CARE EDUCATION/TRAINING PROGRAM

## 2024-11-30 PROCEDURE — 84630 ASSAY OF ZINC: CPT | Performed by: STUDENT IN AN ORGANIZED HEALTH CARE EDUCATION/TRAINING PROGRAM

## 2024-11-30 PROCEDURE — 82746 ASSAY OF FOLIC ACID SERUM: CPT | Performed by: STUDENT IN AN ORGANIZED HEALTH CARE EDUCATION/TRAINING PROGRAM

## 2024-11-30 PROCEDURE — D9220A PRA ANESTHESIA: Mod: CRNA,,, | Performed by: NURSE ANESTHETIST, CERTIFIED REGISTERED

## 2024-11-30 PROCEDURE — 82525 ASSAY OF COPPER: CPT | Performed by: STUDENT IN AN ORGANIZED HEALTH CARE EDUCATION/TRAINING PROGRAM

## 2024-11-30 PROCEDURE — 99222 1ST HOSP IP/OBS MODERATE 55: CPT | Mod: 25,,, | Performed by: UROLOGY

## 2024-11-30 PROCEDURE — C1769 GUIDE WIRE: HCPCS | Performed by: UROLOGY

## 2024-11-30 PROCEDURE — C1751 CATH, INF, PER/CENT/MIDLINE: HCPCS | Performed by: STUDENT IN AN ORGANIZED HEALTH CARE EDUCATION/TRAINING PROGRAM

## 2024-11-30 PROCEDURE — 80053 COMPREHEN METABOLIC PANEL: CPT | Performed by: HOSPITALIST

## 2024-11-30 PROCEDURE — 71000015 HC POSTOP RECOV 1ST HR: Performed by: UROLOGY

## 2024-11-30 PROCEDURE — 0T768DZ DILATION OF RIGHT URETER WITH INTRALUMINAL DEVICE, VIA NATURAL OR ARTIFICIAL OPENING ENDOSCOPIC: ICD-10-PCS | Performed by: UROLOGY

## 2024-11-30 PROCEDURE — 25500020 PHARM REV CODE 255: Performed by: HOSPITALIST

## 2024-11-30 PROCEDURE — 36000706: Performed by: UROLOGY

## 2024-11-30 PROCEDURE — 25000003 PHARM REV CODE 250: Performed by: INTERNAL MEDICINE

## 2024-11-30 PROCEDURE — 85045 AUTOMATED RETICULOCYTE COUNT: CPT | Performed by: STUDENT IN AN ORGANIZED HEALTH CARE EDUCATION/TRAINING PROGRAM

## 2024-11-30 PROCEDURE — 82607 VITAMIN B-12: CPT | Performed by: STUDENT IN AN ORGANIZED HEALTH CARE EDUCATION/TRAINING PROGRAM

## 2024-11-30 PROCEDURE — 83540 ASSAY OF IRON: CPT | Performed by: STUDENT IN AN ORGANIZED HEALTH CARE EDUCATION/TRAINING PROGRAM

## 2024-11-30 PROCEDURE — 83615 LACTATE (LD) (LDH) ENZYME: CPT | Performed by: STUDENT IN AN ORGANIZED HEALTH CARE EDUCATION/TRAINING PROGRAM

## 2024-11-30 PROCEDURE — 82728 ASSAY OF FERRITIN: CPT | Performed by: STUDENT IN AN ORGANIZED HEALTH CARE EDUCATION/TRAINING PROGRAM

## 2024-11-30 PROCEDURE — 21400001 HC TELEMETRY ROOM

## 2024-11-30 PROCEDURE — 25000003 PHARM REV CODE 250: Performed by: NURSE ANESTHETIST, CERTIFIED REGISTERED

## 2024-11-30 PROCEDURE — 71000033 HC RECOVERY, INTIAL HOUR: Performed by: UROLOGY

## 2024-11-30 PROCEDURE — 52332 CYSTOSCOPY AND TREATMENT: CPT | Mod: RT,,, | Performed by: UROLOGY

## 2024-11-30 PROCEDURE — 99223 1ST HOSP IP/OBS HIGH 75: CPT | Mod: GC,,, | Performed by: HOSPITALIST

## 2024-11-30 PROCEDURE — 36620 INSERTION CATHETER ARTERY: CPT | Mod: 59,,, | Performed by: STUDENT IN AN ORGANIZED HEALTH CARE EDUCATION/TRAINING PROGRAM

## 2024-11-30 PROCEDURE — 83735 ASSAY OF MAGNESIUM: CPT | Performed by: HOSPITALIST

## 2024-11-30 PROCEDURE — 0T7D8ZZ DILATION OF URETHRA, VIA NATURAL OR ARTIFICIAL OPENING ENDOSCOPIC: ICD-10-PCS | Performed by: UROLOGY

## 2024-11-30 PROCEDURE — 83010 ASSAY OF HAPTOGLOBIN QUANT: CPT | Performed by: STUDENT IN AN ORGANIZED HEALTH CARE EDUCATION/TRAINING PROGRAM

## 2024-11-30 PROCEDURE — 36000707: Performed by: UROLOGY

## 2024-11-30 PROCEDURE — 36415 COLL VENOUS BLD VENIPUNCTURE: CPT | Performed by: HOSPITALIST

## 2024-11-30 PROCEDURE — 63600175 PHARM REV CODE 636 W HCPCS: Performed by: HOSPITALIST

## 2024-11-30 PROCEDURE — D9220A PRA ANESTHESIA: Mod: ANES,,, | Performed by: STUDENT IN AN ORGANIZED HEALTH CARE EDUCATION/TRAINING PROGRAM

## 2024-11-30 PROCEDURE — 85025 COMPLETE CBC W/AUTO DIFF WBC: CPT | Performed by: HOSPITALIST

## 2024-11-30 PROCEDURE — 63600175 PHARM REV CODE 636 W HCPCS: Performed by: NURSE ANESTHETIST, CERTIFIED REGISTERED

## 2024-11-30 DEVICE — STENT URETERAL UNIV 6FR 26CM: Type: IMPLANTABLE DEVICE | Site: URETER | Status: FUNCTIONAL

## 2024-11-30 RX ORDER — PHENYLEPHRINE HYDROCHLORIDE 10 MG/ML
INJECTION INTRAVENOUS
Status: DISCONTINUED | OUTPATIENT
Start: 2024-11-30 | End: 2024-11-30

## 2024-11-30 RX ORDER — LIDOCAINE HYDROCHLORIDE 20 MG/ML
INJECTION INTRAVENOUS
Status: DISCONTINUED | OUTPATIENT
Start: 2024-11-30 | End: 2024-11-30

## 2024-11-30 RX ORDER — GLUCAGON 1 MG
1 KIT INJECTION
Status: DISCONTINUED | OUTPATIENT
Start: 2024-11-30 | End: 2024-12-06 | Stop reason: HOSPADM

## 2024-11-30 RX ORDER — FENTANYL CITRATE 50 UG/ML
INJECTION, SOLUTION INTRAMUSCULAR; INTRAVENOUS
Status: DISCONTINUED | OUTPATIENT
Start: 2024-11-30 | End: 2024-11-30

## 2024-11-30 RX ORDER — HALOPERIDOL 5 MG/ML
0.5 INJECTION INTRAMUSCULAR EVERY 10 MIN PRN
Status: DISCONTINUED | OUTPATIENT
Start: 2024-11-30 | End: 2024-12-06 | Stop reason: HOSPADM

## 2024-11-30 RX ORDER — OXYCODONE HYDROCHLORIDE 5 MG/1
5 TABLET ORAL EVERY 6 HOURS PRN
Status: DISCONTINUED | OUTPATIENT
Start: 2024-11-30 | End: 2024-12-06 | Stop reason: HOSPADM

## 2024-11-30 RX ORDER — ETOMIDATE 2 MG/ML
INJECTION INTRAVENOUS
Status: DISCONTINUED | OUTPATIENT
Start: 2024-11-30 | End: 2024-11-30

## 2024-11-30 RX ORDER — FENTANYL CITRATE 50 UG/ML
25 INJECTION, SOLUTION INTRAMUSCULAR; INTRAVENOUS EVERY 5 MIN PRN
Status: DISCONTINUED | OUTPATIENT
Start: 2024-11-30 | End: 2024-12-03

## 2024-11-30 RX ORDER — SODIUM CHLORIDE 0.9 % (FLUSH) 0.9 %
10 SYRINGE (ML) INJECTION
Status: DISCONTINUED | OUTPATIENT
Start: 2024-11-30 | End: 2024-12-06 | Stop reason: HOSPADM

## 2024-11-30 RX ORDER — SUCCINYLCHOLINE CHLORIDE 20 MG/ML
INJECTION INTRAMUSCULAR; INTRAVENOUS
Status: DISCONTINUED | OUTPATIENT
Start: 2024-11-30 | End: 2024-11-30

## 2024-11-30 RX ORDER — PROPOFOL 10 MG/ML
VIAL (ML) INTRAVENOUS
Status: DISCONTINUED | OUTPATIENT
Start: 2024-11-30 | End: 2024-11-30

## 2024-11-30 RX ORDER — FERROUS GLUCONATE 324(37.5)
324 TABLET ORAL 2 TIMES DAILY WITH MEALS
Status: DISCONTINUED | OUTPATIENT
Start: 2024-11-30 | End: 2024-12-06 | Stop reason: HOSPADM

## 2024-11-30 RX ORDER — SODIUM CHLORIDE, SODIUM LACTATE, POTASSIUM CHLORIDE, CALCIUM CHLORIDE 600; 310; 30; 20 MG/100ML; MG/100ML; MG/100ML; MG/100ML
INJECTION, SOLUTION INTRAVENOUS CONTINUOUS
Status: DISCONTINUED | OUTPATIENT
Start: 2024-11-30 | End: 2024-12-01

## 2024-11-30 RX ORDER — CEFTRIAXONE 1 G/1
1 INJECTION, POWDER, FOR SOLUTION INTRAMUSCULAR; INTRAVENOUS
Status: DISCONTINUED | OUTPATIENT
Start: 2024-11-30 | End: 2024-11-30

## 2024-11-30 RX ORDER — ROCURONIUM BROMIDE 10 MG/ML
INJECTION, SOLUTION INTRAVENOUS
Status: DISCONTINUED | OUTPATIENT
Start: 2024-11-30 | End: 2024-11-30

## 2024-11-30 RX ORDER — ACETAMINOPHEN 325 MG/1
650 TABLET ORAL EVERY 6 HOURS PRN
Status: DISCONTINUED | OUTPATIENT
Start: 2024-11-30 | End: 2024-12-06 | Stop reason: HOSPADM

## 2024-11-30 RX ADMIN — PANTOPRAZOLE SODIUM 40 MG: 40 TABLET, DELAYED RELEASE ORAL at 08:11

## 2024-11-30 RX ADMIN — PHENYLEPHRINE HYDROCHLORIDE 200 MCG: 10 INJECTION INTRAVENOUS at 09:11

## 2024-11-30 RX ADMIN — SODIUM CHLORIDE, POTASSIUM CHLORIDE, SODIUM LACTATE AND CALCIUM CHLORIDE: 600; 310; 30; 20 INJECTION, SOLUTION INTRAVENOUS at 03:11

## 2024-11-30 RX ADMIN — SODIUM CHLORIDE, SODIUM GLUCONATE, SODIUM ACETATE, POTASSIUM CHLORIDE, MAGNESIUM CHLORIDE, SODIUM PHOSPHATE, DIBASIC, AND POTASSIUM PHOSPHATE: .53; .5; .37; .037; .03; .012; .00082 INJECTION, SOLUTION INTRAVENOUS at 09:11

## 2024-11-30 RX ADMIN — ACETAMINOPHEN 650 MG: 325 TABLET ORAL at 06:11

## 2024-11-30 RX ADMIN — ATORVASTATIN CALCIUM 80 MG: 40 TABLET, FILM COATED ORAL at 08:11

## 2024-11-30 RX ADMIN — CEFTRIAXONE SODIUM 1 G: 1 INJECTION, POWDER, FOR SOLUTION INTRAMUSCULAR; INTRAVENOUS at 08:11

## 2024-11-30 RX ADMIN — ROCURONIUM BROMIDE 10 MG: 10 INJECTION, SOLUTION INTRAVENOUS at 09:11

## 2024-11-30 RX ADMIN — IOHEXOL 100 ML: 350 INJECTION, SOLUTION INTRAVENOUS at 04:11

## 2024-11-30 RX ADMIN — VANCOMYCIN HYDROCHLORIDE 1500 MG: 1.5 INJECTION, POWDER, LYOPHILIZED, FOR SOLUTION INTRAVENOUS at 08:11

## 2024-11-30 RX ADMIN — FENTANYL CITRATE 50 MCG: 50 INJECTION, SOLUTION INTRAMUSCULAR; INTRAVENOUS at 10:11

## 2024-11-30 RX ADMIN — PHENYLEPHRINE HYDROCHLORIDE 100 MCG: 10 INJECTION INTRAVENOUS at 10:11

## 2024-11-30 RX ADMIN — LIDOCAINE HYDROCHLORIDE 100 MG: 20 INJECTION INTRAVENOUS at 09:11

## 2024-11-30 RX ADMIN — SUGAMMADEX 200 MG: 100 INJECTION, SOLUTION INTRAVENOUS at 10:11

## 2024-11-30 RX ADMIN — Medication 324 MG: at 05:11

## 2024-11-30 RX ADMIN — DEXTROSE MONOHYDRATE 1020 MG: 50 INJECTION, SOLUTION INTRAVENOUS at 02:11

## 2024-11-30 RX ADMIN — PIPERACILLIN SODIUM AND TAZOBACTAM SODIUM 4.5 G: 4; .5 INJECTION, POWDER, FOR SOLUTION INTRAVENOUS at 03:11

## 2024-11-30 RX ADMIN — SUCCINYLCHOLINE 120 MG: 20 INJECTION, SOLUTION INTRAMUSCULAR; INTRAVENOUS at 09:11

## 2024-11-30 RX ADMIN — ACETAMINOPHEN 650 MG: 325 TABLET ORAL at 05:11

## 2024-11-30 RX ADMIN — ETOMIDATE 6 MG: 2 INJECTION, SOLUTION INTRAVENOUS at 09:11

## 2024-11-30 RX ADMIN — PROPOFOL 100 MG: 10 INJECTION, EMULSION INTRAVENOUS at 09:11

## 2024-11-30 NOTE — TRANSFER OF CARE
Anesthesia Transfer of Care Note    Patient: Felipe Jiménez    Procedure(s) Performed: Procedure(s) (LRB):  CYSTOSCOPY, WITH URETERAL STENT INSERTION (Right)  DILATION, URETHRA (N/A)    Patient location: PACU    Anesthesia Type: general    Transport from OR: Transported from OR on 100% O2 by closed face mask with adequate spontaneous ventilation    Post pain: adequate analgesia    Post assessment: no apparent anesthetic complications and tolerated procedure well    Post vital signs: stable    Level of consciousness: awake, alert and oriented    Nausea/Vomiting: no nausea/vomiting    Complications: none    Transfer of care protocol was followed      Last vitals: Visit Vitals  BP (!) 100/59   Pulse 72   Temp 36.7 °C (98.1 °F) (Temporal)   Resp 18   Wt 63.5 kg (140 lb)   SpO2 100%   BMI 21.29 kg/m²

## 2024-11-30 NOTE — ASSESSMENT & PLAN NOTE
likely from symptomatic anemia. orthostasis + by pulse. encourage oral hydration. monitor after PRBC. echo ordered . telemetry r/o arrhythmia     11/30 orthostatic by pulse. received LR

## 2024-11-30 NOTE — PT/OT/SLP PROGRESS
Physical Therapy      Patient Name:  Felipe Jiménez   MRN:  [id number]    Patient not seen today secondary to Off the floor for procedure/surgery. Will follow-up tomorrow.    Wes Ramos, PT  11/30/2024

## 2024-11-30 NOTE — ASSESSMENT & PLAN NOTE
- Significant right renal stone burden with hydronephrosis and concern for infected urine along with fever and leukocytosis  - Plan for class A right ureteral stent placement  - Diet NPO  - Consent obtained, anesthesia notified

## 2024-11-30 NOTE — ASSESSMENT & PLAN NOTE
Nutrition consulted. Most recent weight and BMI monitored-     Measurements:  Wt Readings from Last 1 Encounters:   11/29/24 63.5 kg (140 lb)   Body mass index is 21.29 kg/m².    Patient has been screened and assessed by RD.    Malnutrition Type:  Context:    Level:      Malnutrition Characteristic Summary:       Interventions/Recommendations (treatment strategy):       11/30 GI no plan for further work up unless overt bleed. Hematology consulted for recurrent iron deficiency anemia/ s/p GI work up and weight loss.

## 2024-11-30 NOTE — OP NOTE
[facility]  Operative Note    Date: 11/30/2024    Pre-Op Diagnosis: Right UPJ stones    Patient Active Problem List    Diagnosis Date Noted    Nephrolithiasis 11/30/2024    Fever 11/30/2024    Weight loss 11/30/2024    Hydronephrosis of right kidney 11/30/2024    Iron deficiency anemia 11/29/2024    Leucocytosis 11/29/2024    UTI (urinary tract infection) 11/29/2024    Positive D dimer 11/29/2024    Dizziness 11/29/2024    Falls frequently 11/29/2024    Hypercholesterolemia 06/01/2023    Injury of tendon of rotator cuff 12/26/2019    Shoulder joint pain 02/16/2017         Post-Op Diagnosis: same    Procedure(s) Performed:    1. Cystoscopy with right ureteral JJ stent placement  2. Urethral dilation    Specimen(s): none    Staff Surgeon: Dain Eugene MD    Assistant Surgeon: Kameron Lipscomb MD    Anesthesia: Monitored Local Anesthesia with Sedation    Indications: Felipe Jiménez is a 68 y.o. male with right renal pelvis/UPJ stones, hydronephrosis and fever.    Findings:    Stones radiopaque on  film  Membranous urethral stricture passively dilated with scope  Turbid urine on bladder entry  Uncomplicated right ureteral stent placement  Katz placement over wire by MD    Estimated Blood Loss: min    Drains:  6 Mongolian x 26 cm right JJ ureteral stent without strings    Procedure in Detail:  After risks, benefits and possible complications of the procedure were explained, the patient elected to undergo the procedure and informed consent was obtained. All questions were answered in the geetha-operative area. The patient was transferred to the cystoscopy suite and placed on the fluoroscopy table in the supine position.  SCDs were applied and working. Time out was performed, geetha-procedural antibiotics were given. Anesthesia was administered.  After adequate anesthesia the patient was placed in dorsal lithotomy position and prepped and draped in the usual sterile fashion.     A rigid cystoscope in a 22  Fr sheath was introduced into the patients bladder per urethra.  A focal membranous urethral stricture was encountered and passively dilated with the scope over the wire.  Cystoscopy was performed which showed the right and left ureteral orifices in the normal anatomic position.  There were no bladder tumors, no  trabeculations, and no stones. Turbid urine was  present in the bladder.      Our attention was turned to the patient's right ureteral orifice.  A motion3 wire was advanced up the right ureteral orifice to the level of the expected renal pelvis.  This was confirmed using fluoroscopy.     We then passed a 6 Fr x 26 cm JJ ureteral stent without strings over the wire to the level of the renal pelvis under direct vision as well as flouroscopy. The guide wire was removed.  A 180 degree coil was observed in the renal pelvis as well as the bladder using fluoroscopy.  A 180 degree coil was also seen using direct visualization in the bladder.     The motion wire was then reintroduced into the bladder in the cystoscope removed.  An 18 Hungarian Eagle tip Katz was passed over the wire into the bladder and 10 cc were placed in the balloon.  The wire was removed.    The patient tolerated the procedure well and was transferred to the recovery room in stable condition.      Disposition: The patient will return to the [facility]  medicine. He will follow up outpatient to discuss stone management with Dr. Eugene.      Kameron Lipscomb MD    I have reviewed the operative note performed by Dr. Lipscomb, and I concur with her/his documentation of Felipe Jiménez. I was present for the critical or key portions of the procedure.

## 2024-11-30 NOTE — ANESTHESIA POSTPROCEDURE EVALUATION
Anesthesia Post Evaluation    Patient: Felipe Jiménez    Procedure(s) Performed: Procedure(s) (LRB):  CYSTOSCOPY, WITH URETERAL STENT INSERTION (Right)  DILATION, URETHRA (N/A)    Final Anesthesia Type: general      Patient location during evaluation: PACU  Patient participation: Yes- Able to Participate  Level of consciousness: awake and alert  Post-procedure vital signs: reviewed and stable  Pain management: adequate  Airway patency: patent    PONV status at discharge: No PONV  Anesthetic complications: no      Cardiovascular status: hemodynamically stable  Hydration status: euvolemic  Follow-up not needed.              Vitals Value Taken Time   BP 94/55 11/30/24 1116   Temp 36.8 °C (98.2 °F) 11/30/24 1115   Pulse 69 11/30/24 1120   Resp 8 11/30/24 1120   SpO2 100 % 11/30/24 1120   Vitals shown include unfiled device data.      Event Time   Out of Recovery 11/30/2024 10:39:00         Pain/Rojas Score: Pain Rating Prior to Med Admin: 0 (11/30/2024  6:01 AM)  Rojas Score: 10 (11/30/2024 10:40 AM)

## 2024-11-30 NOTE — ANESTHESIA PROCEDURE NOTES
Arterial    Diagnosis: sepsis, pyelnephritis    Patient location during procedure: done in OR    Staffing  Authorizing Provider: Howard Monroy MD  Performing Provider: Howard Monroy MD    Staffing  Performed by: Howard Monroy MD  Authorized by: Howard Monroy MD    Anesthesiologist was present at the time of the procedure.    Preanesthetic Checklist  Completed: patient identified, IV checked, site marked, risks and benefits discussed, surgical consent, monitors and equipment checked, pre-op evaluation, timeout performed and anesthesia consent givenArterial  Skin Prep: chlorhexidine gluconate  Orientation: left  Location: radial    Catheter Size: 18 G  Catheter placement by Ultrasound guidance. Heme positive aspiration all ports.   Vessel Caliber: patent, compressibility normal  Needle advanced into vessel with real time Ultrasound guidance.Insertion Attempts: 1  Assessment  Dressing: secured with tape and tegaderm  Patient: Tolerated well

## 2024-11-30 NOTE — ASSESSMENT & PLAN NOTE
11/30 Leucocytosis trended down to 14. fever of 101F. started on ceftriaxone.   CT abdomen 10/16 - Enlarged edematous right kidney with perinephric inflammatory changes and moderate hydronephrosis consistent with pyelonephritis.. Multiple right intrarenal stones including 3 stones in the pelvis measuring 1.1 cm, 8 mm and 5 mm an 1 cm stone in the upper pole calyx and several other smaller stones. Echo pending . repeat CT abdomen. Urology consulted for  right hydronephrosis/ fever

## 2024-11-30 NOTE — PROGRESS NOTES
[facility] Medicine  Progress Note    Patient Name: Felipe Jiménez  MRN: [id number]  Patient Class: OP- Observation   Admission Date: 11/29/2024  Length of Stay: 0 days  Attending Physician: Gilmar Cid MD  Primary Care Provider: Sami Asif MD        Subjective:     Principal Problem:Iron deficiency anemia        HPI:  Felipe Moffett is a 67 Yo male with PMH of Iron deficiency anemia, hyperlipidemia presents ED with dizziness, generalized weakness and shortness of breath with exertion for the past 6 months.     AAOX3. c/o generalized weakness and shortness of breath with exertion for the past 6 months - progressively worsening.  reports he had  2 falls recently due to weakness.  denies loss of consciousness.   Followed by GI for LENORA and underwent recent colonoscopy, upper GI as well as video capsule endoscopy.  Reports occasional bright red blood when he wipes but otherwise denies any other abnormal bleeding.  50 lb weight loss over the past year,  initially on purpose as he was over 200 lb but mentions he had unintentional weight loss as well.   had iron-deficiency anemia since childhood was previously on iron supplementation which he discontinued a year ago in favor of dietary changes. Denies any leg swelling. currently not on any anticoagulation. reports   occasional bright red blood when wiping but denies any melena.  on ASA 81 mg daily. admits taking tylenol and meloxicam intermittently for arthritis     EGD 10/2/24                           Normal esophagus.                          - Z-line regular, 40 cm from the incisors.                          - Small hiatal hernia.                          - Gastritis. Biopsied.                          - Normal examined duodenum. Biopsied   Colonoscopy 10/2/24   Non-bleeding internal hemorrhoids.                          - One 3 mm polyp in the descending colon, removed                          with a cold biopsy forceps. Resected and  retrieved.                          - The rectum, sigmoid colon, transverse colon,                          ascending colon, cecum, ileocecal valve and                          appendiceal orifice are normal.                          - The examined portion of the ileum was normal    VCE 10/22 Normal small bowel with no findings to explain anemia. No further GI endoscopy  recommended unless  overt bleeding occurs.     ER course - .  Hemoglobin is 7.0. Transfusion with  1 unit PRBC ordered . leukocytosis of 18 today but denies any infectious symptoms. UA + . UC pending.     Overview/Hospital Course:  11/30 CT head - No acute intracranial pathology.  Specifically no evidence of intracranial hemorrhage or major vascular distribution infarct. Mild generalized cerebral volume loss DVT sonogram -No imaging evidence of deep venous thrombosis in either lower extremity. Hb 7.2  s/p PRBC transfusion . GI no plan for further work up unless overt bleed. Hematology consulted for recurrent iron deficiency anemia/ s/p GI work up and weight loss.  Leucocytosis trended down to 14. fever of 101F. CT abdomen 10/16 - Enlarged edematous right kidney with perinephric inflammatory changes and moderate hydronephrosis consistent with pyelonephritis.. Multiple right intrarenal stones including 3 stones in the pelvis measuring 1.1 cm, 8 mm and 5 mm an 1 cm stone in the upper pole calyx and several other smaller stones. Echo pending . repeat CT abdomen.  Urology eval - s/p Membranous urethral stricture passively dilated with scope, patient had  right ureteral stent placement and Katz placement . CT abdomen today -. Enlarging mixed cystic and solid appearance of inferior pole of the right kidney with invasion into the right psoas muscle, concerning for developing abscess in setting of pyelonephritis. Incompletely characterized without IV contrast. repeat CT Abdomen with IV contrast. IR evaluation.  started on IV Zosyn and  vancomycin        Review of Systems:   Pain scale:   Constitutional:  fever,  chills, headache, vision loss, hearing loss, weight loss, Generalized weakness, falls, loss of smell, loss of taste, poor appetite,  sore throat, weight loss  Respiratory: cough, shortness of breath.   Cardiovascular: chest pain, dizziness, palpitations, orthopnea, swelling of feet, syncope  Gastrointestinal: nausea, vomiting, abdominal pain, diarrhea, black stool,  blood in stool, change in bowel habits, constipation  Genitourinary: hematuria, dysuria, urgency, frequency  Integument/Breast: rash,  pruritis  Hematologic/Lymphatic: easy bruising, lymphadenopathy  Musculoskeletal: arthralgias , myalgias, back pain, neck pain, knee pain  Neurological: confusion, seizures, tremors, slurred speech  Behavioral/Psych:  depression, anxiety, auditory or visual hallucinations     OBJECTIVE:     Physical Exam:  Body mass index is 21.29 kg/m².  Constitutional: Appears  thin built   Head: Normocephalic and atraumatic.   Neck: Normal range of motion. Neck supple.   Cardiovascular: Normal heart rate.  Regular heart rhythm.  Pulmonary/Chest: Effort normal.   Abdominal: No distension.  No tenderness  Musculoskeletal: Normal range of motion. No edema.   Neurological: Alert and oriented to person, place, and time. able to move bilateral upper and lower extremities without limitation   Skin: Skin is warm and dry.   Psychiatric: Normal mood and affect. Behavior is normal.       Vital Signs  Temp: 98.4 °F (36.9 °C) (11/30/24 1145)  Pulse: 71 (11/30/24 1421)  Resp: 16 (11/30/24 1145)  BP: (!) 104/57 (11/30/24 1421)  SpO2: 96 % (11/30/24 1145)     24 Hour VS Range    Temp:  [98.1 °F (36.7 °C)-101.1 °F (38.4 °C)]   Pulse:  []   Resp:  [10-23]   BP: ()/(53-89)   SpO2:  [95 %-100 %]     Intake/Output Summary (Last 24 hours) at 11/30/2024 7009  Last data filed at 11/30/2024 1035  Gross per 24 hour   Intake 500 ml   Output --   Net 500 ml         I/O This  "Shift:  I/O this shift:  In: 500 [IV Piggyback:500]  Out: -     Wt Readings from Last 3 Encounters:   11/30/24 63.5 kg (140 lb)   11/25/24 63.5 kg (140 lb)   10/09/24 67 kg (147 lb 11.3 oz)       I have personally reviewed the vitals and recorded Intake/Output     Laboratory/Diagnostic Data:    CBC/Anemia Labs: Coags:    Recent Labs   Lab 11/29/24  1439 11/30/24  0333 11/30/24  0918   WBC 18.31* 14.96* 15.18*   HGB 7.0* 7.2* 7.6*   HCT 22.7* 22.6* 23.4*   * 565* 616*   MCV 78* 79* 78*   RDW 17.6* 17.3* 17.3*   IRON  --   --  13*   FERRITIN  --   --  3,596*   RETIC  --   --  0.9   FOLATE  --   --  12.6   [id number]  --   --  1087*    No results for input(s): "PT", "INR", "APTT" in the last 168 hours.     Chemistries: ABG:   Recent Labs   Lab 11/29/24  1439 11/30/24  0333    132*   K 3.9 3.7    104   CO2 22* 21*   BUN 19 19   CREATININE 1.3 1.2   CALCIUM 10.2 9.5   PROT 8.8* 7.9   BILITOT 0.5 1.0   ALKPHOS 131 130   ALT 24 23   AST 45* 33   MG  --  1.9   PHOS  --  3.1    No results for input(s): "PH", "PCO2", "PO2", "HCO3", "POCSATURATED", "BE" in the last 168 hours.     POCT Glucose: HbA1c:    No results for input(s): "POCTGLUCOSE" in the last 168 hours. Hemoglobin A1C   Date Value Ref Range Status   06/20/2024 6.1 (H) 4.0 - 5.6 % Final     Comment:     ADA Screening Guidelines:  5.7-6.4%  Consistent with prediabetes  >or=6.5%  Consistent with diabetes    High levels of fetal hemoglobin interfere with the HbA1C  assay. Heterozygous hemoglobin variants (HbS, HgC, etc)do  not significantly interfere with this assay.   However, presence of multiple variants may affect accuracy.     06/01/2023 5.8 (H) 4.0 - 5.6 % Final     Comment:     ADA Screening Guidelines:  5.7-6.4%  Consistent with prediabetes  >or=6.5%  Consistent with diabetes    High levels of fetal hemoglobin interfere with the HbA1C  assay. Heterozygous hemoglobin variants (HbS, HgC, etc)do  not significantly interfere with this assay. " "  However, presence of multiple variants may affect accuracy.          Cardiac Enzymes: Ejection Fractions:    Recent Labs     11/29/24  1439   TROPONINI <0.006  0.007    EF   Date Value Ref Range Status   11/30/2024 50 % Final          Recent Labs   Lab 11/29/24  1529   COLORU Orange*   APPEARANCEUA Cloudy*   PHUR 6.0   SPECGRAV 1.020   PROTEINUA 2+*   GLUCUA Negative   KETONESU Negative   BILIRUBINUA Negative   OCCULTUA 2+*   NITRITE Negative   LEUKOCYTESUR 3+*   RBCUA 52*   WBCUA >100*   BACTERIA Many*   HYALINECASTS 0       No results found for: "PROCAL", "LACTATE"  BNP (pg/mL)   Date Value   11/29/2024 92     No results found for: "CRP", "SEDRATE"  D-Dimer (mg/L FEU)   Date Value   11/29/2024 2.07 (H)     Ferritin (ng/mL)   Date Value   11/30/2024 3,596 (H)   07/01/2024 424 (H)     LD (U/L)   Date Value   11/30/2024 179     Troponin I (ng/mL)   Date Value   11/29/2024 0.007   11/29/2024 <0.006     No results found for this or any previous visit.  No results found for: "[id number]"    Microbiology labs for the last week  Microbiology Results (last 7 days)       Procedure Component Value Units Date/Time    Blood culture [[id number]] Collected: 11/29/24 1644    Order Status: Completed Specimen: Blood from Peripheral, Antecubital, Right Updated: 11/30/24 0115     Blood Culture, Routine No Growth to date    Blood culture [[id number]] Collected: 11/29/24 1644    Order Status: Completed Specimen: Blood from Peripheral, Antecubital, Right Updated: 11/30/24 0115     Blood Culture, Routine No Growth to date    Urine culture [[id number]] Collected: 11/29/24 1529    Order Status: No result Specimen: Urine Updated: 11/29/24 1624            Reviewed and noted in plan where applicable- Please see chart for full lab data.    Lines/Drains:       Peripheral IV - Single Lumen 11/29/24 1439 20 G Left Antecubital (Active)   Site Assessment Clean;Dry;Intact;No redness;No swelling 11/29/24 2100   Extremity Assessment Distal to IV " No abnormal discoloration;No redness;No swelling;No warmth 11/29/24 2100   Dressing Status Clean;Dry;Intact 11/29/24 2100   Dressing Intervention Integrity maintained 11/29/24 2100   Number of days: 0            Peripheral IV - Single Lumen 11/29/24 1646 20 G Right Antecubital (Active)   Site Assessment Clean;Dry;Intact;No redness;No swelling 11/29/24 2100   Extremity Assessment Distal to IV No abnormal discoloration;No redness;No swelling;No warmth 11/29/24 2100   Line Status Saline locked 11/29/24 2100   Dressing Status Clean;Dry;Intact 11/29/24 2100   Dressing Intervention Integrity maintained 11/29/24 2100   Number of days: 0       Imaging  ECG Results              EKG 12-lead (Final result)        Collection Time Result Time QRS Duration OHS QTC Calculation    11/29/24 13:31:06 11/29/24 13:43:01 84 432                     Final result by Interface, Lab In [facility] (11/29/24 13:43:09)                   Narrative:    Test Reason : R53.1,    Vent. Rate :  93 BPM     Atrial Rate :  93 BPM     P-R Int : 128 ms          QRS Dur :  84 ms      QT Int : 348 ms       P-R-T Axes :  68  41  41 degrees    QTcB Int : 432 ms    Normal sinus rhythm  Normal ECG  No previous ECGs available  Confirmed by Dain Barajas ([id number]) on 11/29/2024 1:42:58 PM    Referred By:            Confirmed By: Dain Barajas                                    No results found for this or any previous visit.      Echo    Left Ventricle: The left ventricle is normal in size. Ventricular mass   is normal. Normal wall thickness. Low normal ro slight  global hypokinesis   present. There is low normal to mildly reduced systolic function with a   visually estimated ejection fraction of 50 - 55%. Ejection fraction is   approximately 50%. Quantitated ejection fraction is 49%. There is normal   diastolic function.    Right Ventricle: Normal right ventricular cavity size. Wall thickness   is normal. Systolic function is normal.    Aortic Valve: There is aortic valve  sclerosis. There is mild aortic   regurgitation.    Mitral Valve: There is mild regurgitation.    Tricuspid Valve: There is mild to moderate regurgitation.    Pulmonary Artery: The estimated pulmonary artery systolic pressure is   29 mmHg.    IVC/SVC: Normal venous pressure at 3 mmHg.  CT Abdomen Pelvis  Without Contrast  Narrative: EXAMINATION:  CT ABDOMEN PELVIS WITHOUT CONTRAST    CLINICAL HISTORY:  fever;    TECHNIQUE:  Low dose axial images, sagittal and coronal reformations were obtained from the lung bases to the pubic symphysis.  Oral contrast was not administered.    COMPARISON:  CT chest abdomen pelvis 10/16/2024    FINDINGS:  Evaluation of soft tissue and vascular structures is limited due to lack of intravenous contrast.    SOFT TISSUES: Unremarkable.    LUNG BASES/VISUALIZED MEDIASTINUM: Bibasilar dependent atelectasis.  Unchanged right middle lobe lung cyst.  Visualized heart is normal size without evidence pericardial effusion.  Hypoattenuation of the mediastinal blood pool which may be seen the setting of anemia.    HEPATOBILIARY: Liver is normal size.  Fluid attenuating lesion in the left hepatic lobe, likely represents cyst.  No biliary ductal dilatation. Normal gallbladder.    PANCREAS: No focal masses or ductal dilatation.    SPLEEN: Normal size.    ADRENALS: No adrenal nodules.    KIDNEYS/URETERS: Interval placement of right double-J ureteral stent.  Right kidney is enlarged with numerous hypoattenuating regions within the renal medulla.  Along the lower pole of the right kidney, there is an ill-defined 4.0 x 8.6 cm hypoattenuating lesion with adjacent inflammatory stranding an apparent invasion into the right psoas muscle.  No anu right-sided hydronephrosis.  Several calcifications throughout the right kidney, likely stones.  Punctate nonobstructive stone in the inferior pole of left kidney.  Otherwise, left kidney is unremarkable.    BLADDER/PELVIC ORGANS: Bladder is decompressed around a  Katz catheter.  Dystrophic prostate calcification.    PERITONEUM / RETROPERITONEUM: No free air or fluid.    LYMPH NODES: No lymphadenopathy.    VESSELS: Aorta maintains normal course and caliber.  Mild aortoiliac calcific atherosclerosis.    GI TRACT: Stomach and duodenum are unremarkable.  No evidence of bowel obstruction or inflammation.  Appendix is not definitively visualized..    BONES: Degenerative changes of the spine and hips.  No acute fractures or suspicious osseous lesions.  Impression: 1. Enlarged right kidney with adjacent inflammatory change and several hypoattenuating regions throughout the renal medulla without typical appearance of hydronephrosis interval placement of right-sided double-J ureteral stent.  Enlarging mixed cystic and solid appearance of inferior pole of the right kidney with invasion into the right psoas muscle, concerning for developing abscess in setting of pyelonephritis.  Incompletely characterized without IV contrast.  Recommend clinical correlation.  2. Additional findings as above.  This report was flagged in Epic as abnormal.    Electronically signed by resident: Panchito Goel  Date:    11/30/2024  Time:    11:43    Electronically signed by: Garo Covarrubias  Date:    11/30/2024  Time:    12:28  SURG FL Surgery Fluoro Usage  See OP Notes for results.     IMPRESSION: See OP Notes for results.     This procedure was auto-finalized by: Virtual Radiologist  US Lower Extremity Veins Bilateral  Narrative: EXAMINATION:  US LOWER EXTREMITY VEINS BILATERAL    CLINICAL HISTORY:  r/o DVT;    TECHNIQUE:  Duplex and color flow Doppler and dynamic compression was performed of the bilateral lower extremity veins was performed.    COMPARISON:  CT chest abdomen pelvis 10/16/2024    FINDINGS:  Right thigh veins: The common femoral, femoral, popliteal, upper greater saphenous, and deep femoral veins are patent and free of thrombus. The veins are normally compressible and have normal phasic  flow and augmentation response.    Right calf veins: The visualized calf veins are patent.    Left thigh veins: The common femoral, femoral, popliteal, upper greater saphenous, and deep femoral veins are patent and free of thrombus. The veins are normally compressible and have normal phasic flow and augmentation response.    Left calf veins: The visualized calf veins are patent.    Miscellaneous: None  Impression: No imaging evidence of deep venous thrombosis in either lower extremity.    Electronically signed by resident: Mayito Martinez  Date:    11/29/2024  Time:    23:32    Electronically signed by: Aramis Hopson  Date:    11/30/2024  Time:    02:51  CT Head Without Contrast  Narrative: EXAMINATION:  CT HEAD WITHOUT CONTRAST    CLINICAL HISTORY:  fall/ head trauma;    TECHNIQUE:  Low dose axial CT images obtained throughout the head without the use of intravenous contrast.  Axial, sagittal and coronal reconstructions were performed.    COMPARISON:  None    FINDINGS:  Mild generalized cerebral volume loss with dilation of the ventricles and sulci.  No evidence of hydrocephalus.  Basal cisterns are patent.  Few punctate hypodensities with in the bilateral periventricular white matter favored to represent prominent perivascular spaces.  No new hemorrhage, edema, or acute major vascular distribution infarct.  No mass effect or midline shift.    No new extra-axial blood or fluid collections.    No displaced calvarial fracture.    Mastoid air cells and paranasal sinuses are essentially clear.  Impression: No acute intracranial pathology.  Specifically no evidence of intracranial hemorrhage or major vascular distribution infarct.    Mild generalized cerebral volume loss.    Electronically signed by resident: Mayito Martinez  Date:    11/30/2024  Time:    01:04    Electronically signed by: Aramis Hopson  Date:    11/30/2024  Time:    02:25      Labs, Imaging, EKG and Diagnostic results from 11/30/2024 were  reviewed.    Medications:  Medication list was reviewed and changes noted under Assessment/Plan.  No current facility-administered medications on file prior to encounter.     Current Outpatient Medications on File Prior to Encounter   Medication Sig Dispense Refill    aspirin 81 MG Chew Take 81 mg by mouth once daily.      diazePAM (VALIUM) 5 MG tablet TAKE 1 TABLET BY MOUTH EVERY 12 HOURS AS NEEDED FOR ANXIETY 30 tablet 0    meloxicam (MOBIC) 7.5 MG tablet Take 1 tablet by mouth twice daily 60 tablet 0    pantoprazole (PROTONIX) 40 MG tablet Take 1 tablet (40 mg total) by mouth once daily. 90 tablet 3    rosuvastatin (CRESTOR) 20 MG tablet Take 1 tablet (20 mg total) by mouth once daily. 90 tablet 3    sildenafiL (VIAGRA) 100 MG tablet Take 1 tablet (100 mg total) by mouth daily as needed for Erectile Dysfunction. 10 tablet 3    triamcinolone acetonide 0.1% (KENALOG) 0.1 % cream APPLY  CREAM EXTERNALLY TWICE DAILY AS NEEDED FOR  DERMATITIS 45 g 0    zolpidem (AMBIEN) 5 MG Tab Take 1 tablet (5 mg total) by mouth nightly as needed (insomnia). 30 tablet 0     Scheduled Medications:  Current Facility-Administered Medications   Medication Dose Route Frequency    atorvastatin  80 mg Oral Daily    ferrous gluconate  324 mg Oral BID WM    pantoprazole  40 mg Oral Daily    piperacillin-tazobactam (Zosyn) IV (PEDS and ADULTS) (extended infusion is not appropriate)  4.5 g Intravenous Q8H     PRN:   Current Facility-Administered Medications:     0.9%  NaCl infusion (for blood administration), , Intravenous, Q24H PRN    acetaminophen, 650 mg, Oral, Q6H PRN    dextrose 10%, 12.5 g, Intravenous, PRN    dextrose 10%, 12.5 g, Intravenous, PRN    dextrose 10%, 25 g, Intravenous, PRN    dextrose 10%, 25 g, Intravenous, PRN    fentaNYL, 25 mcg, Intravenous, Q5 Min PRN    glucagon (human recombinant), 1 mg, Intramuscular, PRN    glucagon (human recombinant), 1 mg, Intramuscular, PRN    glucose, 16 g, Oral, PRN    glucose, 24 g, Oral,  PRN    haloperidol lactate, 0.5 mg, Intravenous, Q10 Min PRN    naloxone, 0.02 mg, Intravenous, PRN    sodium chloride 0.9%, 10 mL, Intravenous, Q12H PRN    sodium chloride 0.9%, 10 mL, Intravenous, PRN    Pharmacy to dose Vancomycin consult, , , Once **AND** vancomycin - pharmacy to dose, , Intravenous, pharmacy to manage frequency  Infusions:    lactated ringers   Intravenous Continuous         Estimated Creatinine Clearance: 51.4 mL/min (based on SCr of 1.2 mg/dL).             Assessment/Plan:      * Iron deficiency anemia    Patient's with microcytic anemia.. Hemoglobin downtrending. Etiology likely due to Iron deficiency.  Current CBC reviewed-    Recent Labs   Lab 11/29/24  1439 11/30/24  0333 11/30/24  0918   HGB 7.0* 7.2* 7.6*         Component Value Date/Time    MCV 78 (L) 11/30/2024 0918    RDW 17.3 (H) 11/30/2024 0918    IRON 13 (L) 11/30/2024 0918    FERRITIN 3,596 (H) 11/30/2024 0918    RETIC 0.9 11/30/2024 0918    FOLATE 12.6 11/30/2024 0918    [id number] 1087 (H) 11/30/2024 0918     Monitor CBC and transfuse if H/H drops below 7/21.        EGD 10/2/24                           Normal esophagus.                          - Z-line regular, 40 cm from the incisors.                          - Small hiatal hernia.                          - Gastritis. Biopsied.                          - Normal examined duodenum. Biopsied   Colonoscopy 10/2/24   Non-bleeding internal hemorrhoids.                          - One 3 mm polyp in the descending colon, removed                          with a cold biopsy forceps. Resected and retrieved.                          - The rectum, sigmoid colon, transverse colon,                          ascending colon, cecum, ileocecal valve and                          appendiceal orifice are normal.                          - The examined portion of the ileum was normal    VCE 10/22 Normal small bowel with no findings to explain anemia. No further GI endoscopy  recommended unless   overt bleeding occurs.     symptomatic anemia -  Hemoglobin is 7.0. Transfusion with  1 unit PRBC ordered . GI consulted   11/30 Hb 7.6  s/p PRBC transfusion . hematology evaluation. started on ferrous gluconate and Feraheme IV x 2 doses. needs hematolgoy f/u outpatient     Perinephric abscess  11/30 CT abdomen today -. Enlarging mixed cystic and solid appearance of inferior pole of the right kidney with invasion into the right psoas muscle, concerning for developing abscess in setting of pyelonephritis. Incompletely characterized without IV contrast. repeat CT Abdomen with IV contrast. IR evaluation.  started on IV Zosyn and vancomycin      Hydronephrosis of right kidney  CT abdomen 10/16 - Enlarged edematous right kidney with perinephric inflammatory changes and moderate hydronephrosis consistent with pyelonephritis.. Multiple right intrarenal stones including 3 stones in the pelvis measuring 1.1 cm, 8 mm and 5 mm an 1 cm stone in the upper pole calyx and several other smaller stones. Echo pending . repeat CT abdomen.  Urology eval - right ureteral stent placement today      Nephrolithiasis  11/30 CT abdomen 10/16 - Enlarged edematous right kidney with perinephric inflammatory changes and moderate hydronephrosis consistent with pyelonephritis.. Multiple right intrarenal stones including 3 stones in the pelvis measuring 1.1 cm, 8 mm and 5 mm an 1 cm stone in the upper pole calyx and several other smaller stones. Echo pending . repeat CT abdomen.  Urology eval -  Urology eval - s/p Membranous urethral stricture passively dilated with scope, patient had  right ureteral stent placement and Katz placement       Weight loss  Nutrition consulted. Most recent weight and BMI monitored-     Measurements:  Wt Readings from Last 1 Encounters:   11/29/24 63.5 kg (140 lb)   Body mass index is 21.29 kg/m².    Patient has been screened and assessed by RD.    Malnutrition Type:  Context:    Level:      Malnutrition Characteristic  Summary:       Interventions/Recommendations (treatment strategy):       11/30 GI no plan for further work up unless overt bleed. Hematology consulted for recurrent iron deficiency anemia/ s/p GI work up and weight loss.      Fever  11/30 Leucocytosis trended down to 14. fever of 101F. started on ceftriaxone.   CT abdomen 10/16 - Enlarged edematous right kidney with perinephric inflammatory changes and moderate hydronephrosis consistent with pyelonephritis.. Multiple right intrarenal stones including 3 stones in the pelvis measuring 1.1 cm, 8 mm and 5 mm an 1 cm stone in the upper pole calyx and several other smaller stones. Echo pending . repeat CT abdomen. Urology consulted for  right hydronephrosis/ fever       Falls frequently  secondary to above. fall precautions. PT/OT consulted   reported 2 falls in the last week. one with forehead trauma. CT head wo contrast ordered   11/30  CT head - No acute intracranial pathology.  Specifically no evidence of intracranial hemorrhage or major vascular distribution infarct. Mild generalized cerebral volume loss     Dizziness  likely from symptomatic anemia. orthostasis + by pulse. encourage oral hydration. monitor after PRBC. echo ordered . telemetry r/o arrhythmia     11/30 orthostatic by pulse. received LR       Positive D dimer  D dimer elevated at 2.07 . DVT sonogram- No imaging evidence of deep venous thrombosis in either lower extremity.       UTI (urinary tract infection)  UA + . UC pending. started ceftriaxone        Leucocytosis    Patient with leucocytosis   Recent Labs   Lab 11/29/24  1439 11/30/24  0333   WBC 18.31* 14.96*     . Afebrile. BCX 2, urine culture pending . likely secondary to sepsis.?    11/30 Leucocytosis trended down to 14. fever of 101F. started on ceftriaxone. Echo pending       Hypercholesterolemia  continue crestor         VTE Risk Mitigation (From admission, onward)           Ordered     IP VTE HIGH RISK PATIENT  Once         11/29/24 7048      Place sequential compression device  Until discontinued         11/29/24 1638                    Discharge Planning   LYNN: 12/1/2024     Code Status: Full Code   Is the patient medically ready for discharge?:     Reason for patient still in hospital (select all that apply): Treatment                     Gilmar Cid MD  Department of Hospital Medicine   [facility]

## 2024-11-30 NOTE — SUBJECTIVE & OBJECTIVE
Past Medical History:   Diagnosis Date    Hyperlipidemia     Male erectile dysfunction, unspecified        Past Surgical History:   Procedure Laterality Date    COLONOSCOPY N/A 10/2/2024    Procedure: COLONOSCOPY;  Surgeon: Brennan Balderrama MD;  Location: [facility];  Service: Endoscopy;  Laterality: N/A;    ESOPHAGOGASTRODUODENOSCOPY N/A 10/2/2024    Procedure: EGD (ESOPHAGOGASTRODUODENOSCOPY);  Surgeon: Brennan Balderrama MD;  Location: [facility];  Service: Endoscopy;  Laterality: N/A;    FRACTURE SURGERY      INTRALUMINAL GASTROINTESTINAL TRACT IMAGING VIA CAPSULE N/A 10/22/2024    Procedure: IMAGING PROCEDURE, GI TRACT, INTRALUMINAL, VIA CAPSULE;  Surgeon: Brennan Balderrama MD;  Location: [facility] END[facility];  Service: Endoscopy;  Laterality: N/A;    LIPOMA RESECTION Left     left knee    QUADRICEPS TENDON REPAIR Left        Review of patient's allergies indicates:  No Known Allergies    Family History       Problem Relation (Age of Onset)    Heart attacks under age 50 Mother (43), Father (38)    Heart disease Mother, Father            Tobacco Use    Smoking status: Former     Current packs/day: 0.00     Average packs/day: 1 pack/day for 20.0 years (20.0 ttl pk-yrs)     Types: Cigarettes     Start date: 1987     Quit date: 2007     Years since quittin.9    Smokeless tobacco: Never   Substance and Sexual Activity    Alcohol use: Yes     Comment: Infrequently    Drug use: Not Currently    Sexual activity: Yes     Partners: Female       Review of Systems  See HPI  Objective:     Temp:  [98.1 °F (36.7 °C)-101.1 °F (38.4 °C)] 98.1 °F (36.7 °C)  Pulse:  [] 74  Resp:  [16-20] 18  SpO2:  [95 %-100 %] 98 %  BP: (105-152)/(55-89) 121/59  Weight: 63.5 kg (140 lb)  Body mass index is 21.29 kg/m².           Drains       None                    Physical Exam  Constitutional:       General: He is not in acute distress.     Appearance: Normal appearance. He is not ill-appearing.   HENT:      Head:  Normocephalic and atraumatic.      Mouth/Throat:      Mouth: Mucous membranes are moist.   Eyes:      Extraocular Movements: Extraocular movements intact.   Cardiovascular:      Rate and Rhythm: Normal rate.   Pulmonary:      Effort: Pulmonary effort is normal.   Abdominal:      Palpations: Abdomen is soft.      Tenderness: There is right CVA tenderness. There is no left CVA tenderness.   Musculoskeletal:      Cervical back: Normal range of motion.   Skin:     General: Skin is warm and dry.   Neurological:      Mental Status: He is alert and oriented to person, place, and time.   Psychiatric:         Mood and Affect: Mood normal.         Behavior: Behavior normal.          Significant Labs:    BMP:  Recent Labs   Lab 11/29/24  1439 11/30/24  0333    132*   K 3.9 3.7    104   CO2 22* 21*   BUN 19 19   CREATININE 1.3 1.2   CALCIUM 10.2 9.5       CBC:  Recent Labs   Lab 11/29/24  1439 11/30/24  0333   WBC 18.31* 14.96*   HGB 7.0* 7.2*   HCT 22.7* 22.6*   * 565*       Urine Studies:   Recent Labs   Lab 11/29/24  1529   COLORU Orange*   APPEARANCEUA Cloudy*   PHUR 6.0   SPECGRAV 1.020   PROTEINUA 2+*   GLUCUA Negative   KETONESU Negative   BILIRUBINUA Negative   OCCULTUA 2+*   NITRITE Negative   LEUKOCYTESUR 3+*   RBCUA 52*   WBCUA >100*   BACTERIA Many*   HYALINECASTS 0     All pertinent labs results from the past 24 hours have been reviewed.    Significant Imaging:  All pertinent imaging results/findings from the past 24 hours have been reviewed.

## 2024-11-30 NOTE — ASSESSMENT & PLAN NOTE
secondary to above. fall precautions. PT/OT consulted   reported 2 falls in the last week. one with forehead trauma. CT head wo contrast ordered   11/30  CT head - No acute intracranial pathology.  Specifically no evidence of intracranial hemorrhage or major vascular distribution infarct. Mild generalized cerebral volume loss

## 2024-11-30 NOTE — ASSESSMENT & PLAN NOTE
D dimer elevated at 2.07 . DVT sonogram- No imaging evidence of deep venous thrombosis in either lower extremity.

## 2024-11-30 NOTE — ASSESSMENT & PLAN NOTE
Patient with leucocytosis   Recent Labs   Lab 11/29/24  1439 11/30/24  0333   WBC 18.31* 14.96*     . Afebrile. BCX 2, urine culture pending . likely secondary to sepsis.?    11/30 Leucocytosis trended down to 14. fever of 101F. started on ceftriaxone. Echo pending

## 2024-11-30 NOTE — PT/OT/SLP PROGRESS
Occupational Therapy      Patient Name:  Felipe Jiménez   MRN:  [id number]    Patient not seen today secondary to AM attempt pt was at emergent sx to address multiple R ureter stones, severe hydronephrosis and fever; midday check in pt was in CT following sx, PM attempt 1515 pt meeting with MD team following sx regarding plan  . Will follow-up 12/1 as appropriate.    11/30/2024

## 2024-11-30 NOTE — PLAN OF CARE
Pt IV flushed. Pt does not c/o pain or discomfort. Katz in place and draining, sediment in urine noted. CT scan before returning to room

## 2024-11-30 NOTE — ASSESSMENT & PLAN NOTE
11/30 CT abdomen -. Enlarging mixed cystic and solid appearance of inferior pole of the right kidney with invasion into the right psoas muscle, concerning for developing abscess in setting of pyelonephritis. Incompletely characterized without IV contrast. repeat CT Abdomen with IV contrast -enlarged right kidney with multiple hypodense parenchymal collections, largest collection extending inferiorly and posteriorly with involvement of the right psoas muscle/posterior chest wall. 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 . IR evaluation.  started on IV Zosyn and vancomycin  Plan:  -s/p perirenal drain placement by IR  -Transitioned  from Unasyn to Augmentin to complete a total of 14 day course from the day of drain placement with IR due to source control. needs CT scan on outpatient basis to assess for abscess resolution. Discharge with ID and Urology follow up   -contact Urology before discharge to assess if drain can be removed   -contacted RN for accurate output measurements

## 2024-11-30 NOTE — CONSULTS
[facility]  Urology  Consult Note    Patient Name: Felipe Jiménez  MRN: [id number]  Admission Date: 11/29/2024  Hospital Length of Stay: 0   Code Status: Full Code   Attending Provider: Gilmar Cid MD   Consulting Provider: Kameron Lipscomb MD  Primary Care Physician: Sami Asif MD  Principal Problem:<principal problem not specified>    Inpatient consult to Urology  Consult performed by: Kameron Lipscomb MD  Consult ordered by: Gilmar Cid MD  Reason for consult: Fever, right hydronephrosis        Subjective:     HPI:  Felipe Jiménez is a 68 y.o.M with history of iron deficiency anemia who has had shortness of breath with exertion who presented to the [facility] ED overnight due to weakness, dizziness, and a fall earlier this week. He was found to be anemic on presentation with hemoglobin 7.0. He was transfused 1 unit with follow up hemoglobin 7.2. Urology consulted for right hydronephrosis and fever.     On assessment, he is AFVSS, however he has been febrile to 101.1 this morning and intermittently tachycardic to 124. WBC 15 from 18, Hgb 7.2, Cr 1.2 (baseline). UA nitrite negative with many bacteria, >52 RBCs, >100 WBCs. Blood cultures NGTD, urine culture pending. He denies nausea or vomiting. Denies issues voiding, dysuria, frequency, or urgency.  CT chest abdomen pelvis form 10/16/2024 obtained for workup of anemia/weight loss showed multiple right renal stones including approximately 2-2.5 cm of stone burden in the renal pelvis with moderate to severe hydronephrosis. No ureteral stones bilaterally. Last PSA 06/2024 2.1.     Past Medical History:   Diagnosis Date    Hyperlipidemia     Male erectile dysfunction, unspecified        Past Surgical History:   Procedure Laterality Date    COLONOSCOPY N/A 10/2/2024    Procedure: COLONOSCOPY;  Surgeon: Brennan Balderrama MD;  Location: [facility];  Service: Endoscopy;  Laterality: N/A;    ESOPHAGOGASTRODUODENOSCOPY N/A 10/2/2024    Procedure: EGD  (ESOPHAGOGASTRODUODENOSCOPY);  Surgeon: Brennan Balderrama MD;  Location: [facility] ENDO;  Service: Endoscopy;  Laterality: N/A;    FRACTURE SURGERY      INTRALUMINAL GASTROINTESTINAL TRACT IMAGING VIA CAPSULE N/A 10/22/2024    Procedure: IMAGING PROCEDURE, GI TRACT, INTRALUMINAL, VIA CAPSULE;  Surgeon: Brennan Balderrama MD;  Location: [facility] ENDO;  Service: Endoscopy;  Laterality: N/A;    LIPOMA RESECTION Left     left knee    QUADRICEPS TENDON REPAIR Left        Review of patient's allergies indicates:  No Known Allergies    Family History       Problem Relation (Age of Onset)    Heart attacks under age 50 Mother (43), Father (38)    Heart disease Mother, Father            Tobacco Use    Smoking status: Former     Current packs/day: 0.00     Average packs/day: 1 pack/day for 20.0 years (20.0 ttl pk-yrs)     Types: Cigarettes     Start date: 1987     Quit date: 2007     Years since quittin.9    Smokeless tobacco: Never   Substance and Sexual Activity    Alcohol use: Yes     Comment: Infrequently    Drug use: Not Currently    Sexual activity: Yes     Partners: Female       Review of Systems  See HPI  Objective:     Temp:  [98.1 °F (36.7 °C)-101.1 °F (38.4 °C)] 98.1 °F (36.7 °C)  Pulse:  [] 74  Resp:  [16-20] 18  SpO2:  [95 %-100 %] 98 %  BP: (105-152)/(55-89) 121/59  Weight: 63.5 kg (140 lb)  Body mass index is 21.29 kg/m².           Drains       None                    Physical Exam  Constitutional:       General: He is not in acute distress.     Appearance: Normal appearance. He is not ill-appearing.   HENT:      Head: Normocephalic and atraumatic.      Mouth/Throat:      Mouth: Mucous membranes are moist.   Eyes:      Extraocular Movements: Extraocular movements intact.   Cardiovascular:      Rate and Rhythm: Normal rate.   Pulmonary:      Effort: Pulmonary effort is normal.   Abdominal:      Palpations: Abdomen is soft.      Tenderness: There is right CVA tenderness. There is no left CVA  tenderness.   Musculoskeletal:      Cervical back: Normal range of motion.   Skin:     General: Skin is warm and dry.   Neurological:      Mental Status: He is alert and oriented to person, place, and time.   Psychiatric:         Mood and Affect: Mood normal.         Behavior: Behavior normal.          Significant Labs:    BMP:  Recent Labs   Lab 11/29/24  1439 11/30/24  0333    132*   K 3.9 3.7    104   CO2 22* 21*   BUN 19 19   CREATININE 1.3 1.2   CALCIUM 10.2 9.5       CBC:  Recent Labs   Lab 11/29/24  1439 11/30/24  0333   WBC 18.31* 14.96*   HGB 7.0* 7.2*   HCT 22.7* 22.6*   * 565*       Urine Studies:   Recent Labs   Lab 11/29/24  1529   COLORU Orange*   APPEARANCEUA Cloudy*   PHUR 6.0   SPECGRAV 1.020   PROTEINUA 2+*   GLUCUA Negative   KETONESU Negative   BILIRUBINUA Negative   OCCULTUA 2+*   NITRITE Negative   LEUKOCYTESUR 3+*   RBCUA 52*   WBCUA >100*   BACTERIA Many*   HYALINECASTS 0     All pertinent labs results from the past 24 hours have been reviewed.    Significant Imaging:  All pertinent imaging results/findings from the past 24 hours have been reviewed.                    Assessment and Plan:     Nephrolithiasis  - Significant right renal stone burden with hydronephrosis and concern for infected urine along with fever and leukocytosis  - Plan for class A right ureteral stent placement  - Diet NPO  - Consent obtained, anesthesia notified      Weight loss  - Last PSA 2.1 this year  - Do not suspect weight loss is secondary to advanced prostate malignancy    Fever  - Received 1g Rocephin today at approximately 8:30 am  - Blood cultures NGTD  - Urine culture in process  - Follow up cultures, tailor antibiotics to cultures    UTI (urinary tract infection)  - Urine culture pending  - Follow up and tailor antibiotics to urine culture    Iron deficiency anemia  - Transfuse PRN  - Do not suspect urologic source of anemia at this time        Kameron Lipscomb MD  Urology  [facility]  Surg    I have reviewed and concur with the resident's history, physical, assessment, and plan.  I have personally interviewed and examined the patient at bedside.  See below addendum for my evaluation and additional findings.  To OR for cystoscopy and right ureteral stent placement.

## 2024-11-30 NOTE — HPI
Felipe Jiménez is a 68 y.o.M with history of iron deficiency anemia who has had shortness of breath with exertion who presented to the [facility] ED overnight due to weakness, dizziness, and a fall earlier this week. He was found to be anemic on presentation with hemoglobin 7.0. He was transfused 1 unit with follow up hemoglobin 7.2. Urology consulted for right hydronephrosis and fever.     On assessment, he is AFVSS, however he has been febrile to 101.1 this morning and intermittently tachycardic to 124. WBC 15 from 18, Hgb 7.2, Cr 1.2 (baseline). UA nitrite negative with many bacteria, >52 RBCs, >100 WBCs. Blood cultures NGTD, urine culture pending. He denies nausea or vomiting. Denies issues voiding, dysuria, frequency, or urgency.  CT chest abdomen pelvis form 10/16/2024 obtained for workup of anemia/weight loss showed multiple right renal stones including approximately 2-2.5 cm of stone burden in the renal pelvis with moderate to severe hydronephrosis. No ureteral stones bilaterally. Last PSA 06/2024 2.1.

## 2024-11-30 NOTE — ADDENDUM NOTE
Addendum  created 11/30/24 1502 by Howard Monroy MD    Clinical Note Signed, Intraprocedure Blocks edited, SmartForm saved

## 2024-11-30 NOTE — ASSESSMENT & PLAN NOTE
11/30 CT abdomen 10/16 - Enlarged edematous right kidney with perinephric inflammatory changes and moderate hydronephrosis consistent with pyelonephritis.. Multiple right intrarenal stones including 3 stones in the pelvis measuring 1.1 cm, 8 mm and 5 mm an 1 cm stone in the upper pole calyx and several other smaller stones. Echo pending . repeat CT abdomen.  Urology eval -  Urology eval - s/p Membranous urethral stricture passively dilated with scope, patient had  right ureteral stent placement and Katz placement

## 2024-11-30 NOTE — ASSESSMENT & PLAN NOTE
- Received 1g Rocephin today at approximately 8:30 am  - Blood cultures NGTD  - Urine culture in process  - Follow up cultures, tailor antibiotics to cultures

## 2024-11-30 NOTE — CONSULTS
Hematology Oncology Consult Note    Inpatient consult to Hematology/Oncology  Consult performed by: Simeon Tirado MD  Consult ordered by: Gilmar Cid MD        SUBJECTIVE:     History of Present Illness:  Patient is a 68 y.o. male with a PMH of iron deficiency anemia, HLD presents to the ED with a 6 months history of dizziness, SOB, weakness over the past 6 months. He had 2 falls recently as a result of weakness. He has occasional BRBPR but denies any hematemesis or melanotic stools. He has had 50lb intentional weight loss over the past year. He stopped eating red meat. He was admitted by the hospitalist service 11/29/2024 as a result of symptomatic iron deficiency anemia. He endorses subjective fever, night sweats. He has a history of kidney stones in 2017. On presentation, WBC 18.31, hgb 7.0, Plt 616, MCV 78, RDW 17.6. UA suggestive of UTI and has been started on antibiotics. Of note, review of labs shows normocytic anemia since 6/1/23 which has progressively worsened from 13.3 to currently 7.0 during this admission. He has had GI work up on oct 2024 to include EGD, Colonoscopy and VCE which were negative for any bleeding. Hematology has been consulted for management of iron deficiency anemia.     Review of patient's allergies indicates:  No Known Allergies  Past Medical History:   Diagnosis Date    Hyperlipidemia     Male erectile dysfunction, unspecified      Past Surgical History:   Procedure Laterality Date    COLONOSCOPY N/A 10/2/2024    Procedure: COLONOSCOPY;  Surgeon: Brennan Balderrama MD;  Location: [facility];  Service: Endoscopy;  Laterality: N/A;    ESOPHAGOGASTRODUODENOSCOPY N/A 10/2/2024    Procedure: EGD (ESOPHAGOGASTRODUODENOSCOPY);  Surgeon: Brennan Balderrama MD;  Location: [facility];  Service: Endoscopy;  Laterality: N/A;    FRACTURE SURGERY      INTRALUMINAL GASTROINTESTINAL TRACT IMAGING VIA CAPSULE N/A 10/22/2024    Procedure: IMAGING PROCEDURE, GI TRACT, INTRALUMINAL, VIA  CAPSULE;  Surgeon: Brennan Balderrama MD;  Location: [facility];  Service: Endoscopy;  Laterality: N/A;    LIPOMA RESECTION Left     left knee    QUADRICEPS TENDON REPAIR Left      Family History   Problem Relation Name Age of Onset    Heart disease Mother      Heart attacks under age 50 Mother  43         2/2 heart attack.    Heart disease Father      Heart attacks under age 50 Father  38         2/2 heart attack.     Social History     Tobacco Use    Smoking status: Former     Current packs/day: 0.00     Average packs/day: 1 pack/day for 20.0 years (20.0 ttl pk-yrs)     Types: Cigarettes     Start date: 1987     Quit date: 2007     Years since quittin.9    Smokeless tobacco: Never   Substance Use Topics    Alcohol use: Yes     Comment: Infrequently    Drug use: Not Currently     Review of Systems   Constitutional:  Positive for weight loss.   Respiratory:  Positive for shortness of breath.    Neurological:  Positive for dizziness and weakness.     OBJECTIVE:     Vital Signs:  Temp:  [98.1 °F (36.7 °C)-101.1 °F (38.4 °C)]   Pulse:  [74-96]   Resp:  [18-20]   BP: (105-123)/(58-64)   SpO2:  [98 %-100 %]     Physical Exam  Cardiovascular:      Rate and Rhythm: Normal rate and regular rhythm.   Pulmonary:      Effort: Pulmonary effort is normal.      Breath sounds: Normal breath sounds.   Neurological:      General: No focal deficit present.      Mental Status: He is alert and oriented to person, place, and time.   Psychiatric:         Mood and Affect: Mood normal.       Laboratory:  I have reviewed all pertinent lab results within the past 24 hours.      Diagnostic Results:  Labs: Reviewed      ASSESSMENT/PLAN:     Iron deficiency Anemia  68 yr old with history of LENORA currently not on iron supplement presenting with symptomatic iron deficiency anemia. He has had extensive GI work up with has been negative for any bleeding. On presentation, CBC shows  microcytic anemia with thrombocytosis and elevated RDW. He is also being managed for UTI. CT abdomen/pelvis shows enlarged right kidney with adjacent inflammatory change and several hypoattenuating regions throughout the renal medulla without typical appearance of hydronephrosis. Enlarging mixed cystic and solid appearance of inferior pole of the right kidney with invasion into the right psoas muscle, concerning for developing abscess in setting of pyelonephritis.  He had a ureteral stent placed by urology this morning.    Recommendations:   -Iron panel today shows Iron low 13 ,TIBC low 133, TSAT low 10%, Ferritin high 3596, Haptoglobin elevated. With microcytic anemia, this is suggestive of anemia due to inflammation and iron deficiency anemia  -Recommend CT abdomen/pelvis with IV contrast to better characterize perinephric abscess.  -Recommend treatment possible infected kidney stones and developing abscess by urology  -Continue antibiotics  -Recommend correction with IV iron.   -Anemia will likely improve when inflammation resolves and iron deficiency corrected  -Transfuse prbc to keep hgb >7  -Outpatient follow up with hematology at discharge      Discussed with Dr. Centeno      We will sign off. Let us know if any questions.      Simeon Tirado MD  Hematology/Oncology PGY-IV

## 2024-11-30 NOTE — ASSESSMENT & PLAN NOTE
CT abdomen 10/16 - Enlarged edematous right kidney with perinephric inflammatory changes and moderate hydronephrosis consistent with pyelonephritis.. Multiple right intrarenal stones including 3 stones in the pelvis measuring 1.1 cm, 8 mm and 5 mm an 1 cm stone in the upper pole calyx and several other smaller stones. Echo pending . repeat CT abdomen.  Urology eval - right ureteral stent placement today

## 2024-11-30 NOTE — HOSPITAL COURSE
Admitted with weakness/dizziness and after a recent fall. Workup including CTH negative. Workup was significant for Hb 7.2, s/p pRBC transfusion. Hematology consulted, recommended IV iron/treatment for LENORA. CT A/P was obtained which noted - Enlarged edematous right kidney with perinephric inflammatory changes and moderate hydronephrosis consistent with pyelonephritis.. Multiple right intrarenal stones including 3 stones in the pelvis measuring 1.1 cm, 8 mm and 5 mm an 1 cm stone in the upper pole calyx and several other smaller stones. Status post R sided ureteral stent by urology on 11/30. Complicated by perinephric abscess which was visualized on repeat CT. Started on broad spectrum abx. IR consulted, s/p perirenal drain placement on 12/1 with plan for 14 days Augmentin from day of drain placement. CT scan on outpatient basis to assess for abscess resolution. Urology follow up for drain removal, or during admission if drain output minimal. Episodes of dark stool reported, GI consulted, likely in the setting of iron supplementation. In addition, he has had an extensive recent endoscopic eval including an EGD, Colonoscopy and VCE.     Pt discharged with PCP, ID, Urology follow up and CT on 12/19. Pt advised to monitor drain perinephric abscess drain output in a daily log for Urology evaluation on when to pull drain. Advised on Augmentin until 12/15.  PT/OT ordered. Instructions provided to pt and his spouse at bedside who voiced understanding.

## 2024-11-30 NOTE — ASSESSMENT & PLAN NOTE
- Last PSA 2.1 this year  - Do not suspect weight loss is secondary to advanced prostate malignancy

## 2024-11-30 NOTE — ANESTHESIA PROCEDURE NOTES
Intubation    Date/Time: 11/30/2024 9:58 AM    Performed by: Davide Stratton CRNA  Authorized by: Howard Monroy MD    Intubation:     Induction:  Rapid sequence induction    Intubated:  Postinduction    Mask Ventilation:  Not attempted    Attempted By:  CRNA    Method of Intubation:  Video laryngoscopy    Blade:  Nelson 3    Laryngeal View Grade: Grade I - full view of cords      Difficult Airway Encountered?: No      Complications:  None    Airway Device:  Oral endotracheal tube    Airway Device Size:  7.5    Style/Cuff Inflation:  Cuffed    Inflation Amount (mL):  5    Tube secured:  22    Secured at:  The lips    Placement Verified By:  Capnometry and Revisualization with laryngoscopy    Complicating Factors:  None    Findings Post-Intubation:  BS equal bilateral and atraumatic/condition of teeth unchanged

## 2024-11-30 NOTE — NURSING TRANSFER
Nursing Transfer Note      11/30/2024   11:15 AM    Nurse giving handoff:Alyssa COBB RN  Nurse receiving handoff:Kat MSU    Reason patient is being transferred: meets criteria    Transfer To: CT scan, 625    Transfer via stretcher    Transfer with cardiac monitoring    Transported by transport    Transfer Vital Signs:  Blood Pressure:94/55  Heart Rate:71  O2:100  Temperature:98.2  Respirations:18    Telemetry: Rhythm sr  Order for Tele Monitor? Yes    Additional Lines: Katz Catheter    Medicines sent: none    Any special needs or follow-up needed: CT scan    Patient belongings transferred with patient: No    Chart send with patient: Yes    Notified: no family on file    Patient reassessed at: 11/30

## 2024-11-30 NOTE — ASSESSMENT & PLAN NOTE
Patient's with microcytic anemia.. Hemoglobin downtrending. Etiology likely due to Iron deficiency.  Current CBC reviewed-    Recent Labs   Lab 11/29/24  1439 11/30/24  0333 11/30/24 0918   HGB 7.0* 7.2* 7.6*         Component Value Date/Time    MCV 78 (L) 11/30/2024 0918    RDW 17.3 (H) 11/30/2024 0918    IRON 13 (L) 11/30/2024 0918    FERRITIN 3,596 (H) 11/30/2024 0918    RETIC 0.9 11/30/2024 0918    FOLATE 12.6 11/30/2024 0918    [id number] 1087 (H) 11/30/2024 0918     Monitor CBC and transfuse if H/H drops below 7/21.        EGD 10/2/24                           Normal esophagus.                          - Z-line regular, 40 cm from the incisors.                          - Small hiatal hernia.                          - Gastritis. Biopsied.                          - Normal examined duodenum. Biopsied   Colonoscopy 10/2/24   Non-bleeding internal hemorrhoids.                          - One 3 mm polyp in the descending colon, removed                          with a cold biopsy forceps. Resected and retrieved.                          - The rectum, sigmoid colon, transverse colon,                          ascending colon, cecum, ileocecal valve and                          appendiceal orifice are normal.                          - The examined portion of the ileum was normal    VCE 10/22 Normal small bowel with no findings to explain anemia. No further GI endoscopy  recommended unless  overt bleeding occurs.     symptomatic anemia -  Hemoglobin is 7.0. Transfusion with  1 unit PRBC ordered . GI consulted   11/30 Hb 7.6  s/p PRBC transfusion . hematology evaluation. started on ferrous gluconate and Feraheme IV x 2 doses. needs hematolgoy f/u outpatient

## 2024-11-30 NOTE — ANESTHESIA PREPROCEDURE EVALUATION
11/30/2024  [facility]  Anesthesia Pre-Operative Evaluation     Patient Name: Felipe Jiménez  YOB: 1955  MRN: [id number]  [facility]: [id number]       Admit Date: 11/29/2024   Admit Team: [facility]  Hospital Day: 2  Date of Procedure: 11/30/2024  Anesthesia: General/MAC Procedure: Procedure(s) (LRB):  CYSTOSCOPY, WITH URETERAL STENT INSERTION (Right)  Pre-Operative Diagnosis: Nephrolithiasis [N20.0]  Proceduralist:Surgeons and Role:     * Dain Eugene MD - Primary  Code Status: Full Code   Advanced Directive: <no information>  Isolation Precautions: No active isolations  Capacity: Full capacity     SUBJECTIVE:   Felipe Jiménez is a 68 y.o. male who  has a past medical history of Hyperlipidemia and Male erectile dysfunction, unspecified.  Felipe Jiménez is a 68 y.o.M with history of iron deficiency anemia who has had shortness of breath with exertion who presented to the [facility] ED overnight due to weakness, dizziness, and a fall earlier this week. He was found to be anemic on presentation with hemoglobin 7.0. He was transfused 1 unit with follow up hemoglobin 7.2. Urology consulted for right hydronephrosis and fever.     On assessment, he is AFVSS, however he has been febrile to 101.1 this morning and intermittently tachycardic to 124. WBC 15 from 18, Hgb 7.2, Cr 1.2 (baseline). UA nitrite negative with many bacteria, >52 RBCs, >100 WBCs. Blood cultures NGTD, urine culture pending. He denies nausea or vomiting. Denies issues voiding, dysuria, frequency, or urgency.  CT chest abdomen pelvis form 10/16/2024 obtained for workup of anemia/weight loss showed multiple right renal stones including approximately 2-2.5 cm of stone burden in the renal pelvis with moderate to severe hydronephrosis. No ureteral stones bilaterally. Last PSA 06/2024 2.1.       Hospital LOS: 0 days  ICU LOS:  Patient does not have an ICU stay during this admission.    he has a current medication list which includes the following long-term medication(s): aspirin, diazepam, pantoprazole, rosuvastatin, sildenafil, triamcinolone acetonide 0.1%, and zolpidem.     ALLERGIES:   Review of patient's allergies indicates:  No Known Allergies  LDA:   AIRWAY:         [unfilled]     Lines/Drains/Airways       Peripheral Intravenous Line  Duration                  Peripheral IV - Single Lumen 11/29/24 1439 20 G Left Antecubital <1 day         Peripheral IV - Single Lumen 11/29/24 1646 20 G Right Antecubital <1 day                   Anesthesia Evaluation      Airway   Mallampati: II  Neck ROM: Normal ROM  Dental      Pulmonary    Cardiovascular     Neuro/Psych      GI/Hepatic/Renal      Endo/Other    Abdominal                    MEDICATIONS:     Current Outpatient Medications on File Prior to Encounter   Medication Sig Dispense Refill Last Dose/Taking    aspirin 81 MG Chew Take 81 mg by mouth once daily.       diazePAM (VALIUM) 5 MG tablet TAKE 1 TABLET BY MOUTH EVERY 12 HOURS AS NEEDED FOR ANXIETY 30 tablet 0     meloxicam (MOBIC) 7.5 MG tablet Take 1 tablet by mouth twice daily 60 tablet 0     pantoprazole (PROTONIX) 40 MG tablet Take 1 tablet (40 mg total) by mouth once daily. 90 tablet 3     rosuvastatin (CRESTOR) 20 MG tablet Take 1 tablet (20 mg total) by mouth once daily. 90 tablet 3     sildenafiL (VIAGRA) 100 MG tablet Take 1 tablet (100 mg total) by mouth daily as needed for Erectile Dysfunction. 10 tablet 3     triamcinolone acetonide 0.1% (KENALOG) 0.1 % cream APPLY  CREAM EXTERNALLY TWICE DAILY AS NEEDED FOR  DERMATITIS 45 g 0     zolpidem (AMBIEN) 5 MG Tab Take 1 tablet (5 mg total) by mouth nightly as needed (insomnia). 30 tablet 0       Inpatient Medications:  Antibiotics (From admission, onward)      Start     Stop Route Frequency Ordered    11/30/24 0800  cefTRIAXone injection 1 g         -- IV Every 24 hours  (non-standard times) 11/30/24 0655          VTE Risk Mitigation (From admission, onward)           Ordered     IP VTE HIGH RISK PATIENT  Once         11/29/24 1638     Place sequential compression device  Until discontinued         11/29/24 1638                   atorvastatin  80 mg Oral Daily    cefTRIAXone (Rocephin) IV (PEDS and ADULTS)  1 g Intravenous Q24H    pantoprazole  40 mg Oral Daily       Current Facility-Administered Medications   Medication Dose Route Frequency Provider Last Rate Last Admin    0.9%  NaCl infusion (for blood administration)   Intravenous Q24H PRN Farhan Melvin PA-C        acetaminophen tablet 650 mg  650 mg Oral Q6H PRN Matthew Busby MD   650 mg at 11/30/24 0601    atorvastatin tablet 80 mg  80 mg Oral Daily Gilmar Cid MD   80 mg at 11/30/24 0840    cefTRIAXone injection 1 g  1 g Intravenous Q24H Gilmar Cid MD   1 g at 11/30/24 0840    dextrose 10% bolus 125 mL 125 mL  12.5 g Intravenous PRN Gilmar Cid MD        dextrose 10% bolus 250 mL 250 mL  25 g Intravenous PRN Gilmar Cid MD        glucagon (human recombinant) injection 1 mg  1 mg Intramuscular PRN Gilmar Cid MD        glucose chewable tablet 16 g  16 g Oral PRN Gilmar Cid MD        glucose chewable tablet 24 g  24 g Oral PRN Gilmar Cid MD        naloxone 0.4 mg/mL injection 0.02 mg  0.02 mg Intravenous PRN Gilmar Cid MD        pantoprazole EC tablet 40 mg  40 mg Oral Daily Gilmar Cid MD   40 mg at 11/30/24 0840    sodium chloride 0.9% flush 10 mL  10 mL Intravenous Q12H PRN Gilmar Cid MD              History:     Active Hospital Problems    Diagnosis  POA    Fever [R50.9]  Unknown    Weight loss [R63.4]  Unknown    Nephrolithiasis [N20.0]  Unknown    Iron deficiency anemia [D50.9]  Unknown    Leucocytosis [D72.829]  Unknown    UTI (urinary tract infection) [N39.0]  Unknown    Positive D dimer [R79.89]  Unknown    Dizziness [R42]  Unknown     Falls frequently [R29.6]  Not Applicable    Hypercholesterolemia [E78.00]  Yes      Resolved Hospital Problems   No resolved problems to display.     Surgical History:    has a past surgical history that includes Lipoma resection (Left, 1979); Quadriceps tendon repair (Left, 2012); Fracture surgery; Esophagogastroduodenoscopy (N/A, 10/2/2024); Colonoscopy (N/A, 10/2/2024); and Intraluminal gastrointestinal tract imaging via capsule (N/A, 10/22/2024).   Social History:    reports being sexually active and has had partner(s) who are female.  reports that he quit smoking about 17 years ago. His smoking use included cigarettes. He started smoking about 37 years ago. He has a 20 pack-year smoking history. He has never used smokeless tobacco. He reports current alcohol use. He reports that he does not currently use drugs.    Vitals:    11/30/24 0301 11/30/24 0430 11/30/24 0601 11/30/24 0741   BP:  107/61  (!) 121/59   BP Location:  Left arm     Patient Position:  Lying  Lying   Pulse: 96 86  74   Resp:  20  18   Temp:  (!) 38.4 °C (101.1 °F) (!) 38.4 °C (101.1 °F) 36.7 °C (98.1 °F)   TempSrc:  Oral  Oral   SpO2:  100%  98%   Weight:         Vital Signs Range (Last 24H):  Temp:  [36.7 °C (98.1 °F)-38.4 °C (101.1 °F)]   Pulse:  []   Resp:  [16-20]   BP: (105-152)/(55-89)   SpO2:  [95 %-100 %]     Body mass index is 21.29 kg/m².  Wt Readings from Last 4 Encounters:   11/29/24 63.5 kg (140 lb)   11/25/24 63.5 kg (140 lb)   10/09/24 67 kg (147 lb 11.3 oz)   07/23/24 69.7 kg (153 lb 10.6 oz)        Intake/Output - Last 3 Shifts         11/28 0700  11/29 0659 11/29 0700  11/30 0659 11/30 0700 12/01 0659           Stool Occurrence  1 x           Lab Results   Component Value Date    WBC 14.96 (H) 11/30/2024    HGB 7.2 (L) 11/30/2024    HCT 22.6 (L) 11/30/2024     (H) 11/30/2024     (L) 11/30/2024    K 3.7 11/30/2024     11/30/2024    CREATININE 1.2 11/30/2024    BUN 19 11/30/2024    CO2 21 (L) 11/30/2024     GLU 96 11/30/2024    CALCIUM 9.5 11/30/2024    MG 1.9 11/30/2024    PHOS 3.1 11/30/2024    ALKPHOS 130 11/30/2024    ALT 23 11/30/2024    AST 33 11/30/2024    ALBUMIN 1.9 (L) 11/30/2024    HGBA1C 6.1 (H) 06/20/2024    TROPONINI 0.007 11/29/2024    TROPONINI <0.006 11/29/2024    BNP 92 11/29/2024     Recent Results (from the past 12 hours)   CBC Auto Differential    Collection Time: 11/30/24  3:33 AM   Result Value Ref Range    WBC 14.96 (H) 3.90 - 12.70 K/uL    RBC 2.88 (L) 4.60 - 6.20 M/uL    Hemoglobin 7.2 (L) 14.0 - 18.0 g/dL    Hematocrit 22.6 (L) 40.0 - 54.0 %    MCV 79 (L) 82 - 98 fL    MCH 25.0 (L) 27.0 - 31.0 pg    MCHC 31.9 (L) 32.0 - 36.0 g/dL    RDW 17.3 (H) 11.5 - 14.5 %    Platelets 565 (H) 150 - 450 K/uL    MPV 9.2 9.2 - 12.9 fL    Immature Granulocytes 0.6 (H) 0.0 - 0.5 %    Gran # (ANC) 12.5 (H) 1.8 - 7.7 K/uL    Immature Grans (Abs) 0.09 (H) 0.00 - 0.04 K/uL    Lymph # 0.9 (L) 1.0 - 4.8 K/uL    Mono # 1.4 (H) 0.3 - 1.0 K/uL    Eos # 0.0 0.0 - 0.5 K/uL    Baso # 0.02 0.00 - 0.20 K/uL    nRBC 0 0 /100 WBC    Gran % 83.5 (H) 38.0 - 73.0 %    Lymph % 6.1 (L) 18.0 - 48.0 %    Mono % 9.5 4.0 - 15.0 %    Eosinophil % 0.2 0.0 - 8.0 %    Basophil % 0.1 0.0 - 1.9 %    Differential Method Automated    Phosphorus    Collection Time: 11/30/24  3:33 AM   Result Value Ref Range    Phosphorus 3.1 2.7 - 4.5 mg/dL   Magnesium    Collection Time: 11/30/24  3:33 AM   Result Value Ref Range    Magnesium 1.9 1.6 - 2.6 mg/dL   Comprehensive Metabolic Panel    Collection Time: 11/30/24  3:33 AM   Result Value Ref Range    Sodium 132 (L) 136 - 145 mmol/L    Potassium 3.7 3.5 - 5.1 mmol/L    Chloride 104 95 - 110 mmol/L    CO2 21 (L) 23 - 29 mmol/L    Glucose 96 70 - 110 mg/dL    BUN 19 8 - 23 mg/dL    Creatinine 1.2 0.5 - 1.4 mg/dL    Calcium 9.5 8.7 - 10.5 mg/dL    Total Protein 7.9 6.0 - 8.4 g/dL    Albumin 1.9 (L) 3.5 - 5.2 g/dL    Total Bilirubin 1.0 0.1 - 1.0 mg/dL    Alkaline Phosphatase 130 40 - 150 U/L    AST 33  "10 - 40 U/L    ALT 23 10 - 44 U/L    eGFR >60.0 >60 mL/min/1.73 m^2    Anion Gap 7 (L) 8 - 16 mmol/L     Recent Labs   Lab 11/29/24  1439 11/30/24  0333   WBC 18.31* 14.96*   HGB 7.0* 7.2*   HCT 22.7* 22.6*   * 565*    132*   K 3.9 3.7   CREATININE 1.3 1.2    96     No LMP for male patient.    EKG:   Results for orders placed or performed during the hospital encounter of 11/29/24   EKG 12-lead    Collection Time: 11/29/24  1:31 PM   Result Value Ref Range    QRS Duration 84 ms    [facility] QTC Calculation 432 ms    Narrative    Test Reason : R53.1,    Vent. Rate :  93 BPM     Atrial Rate :  93 BPM     P-R Int : 128 ms          QRS Dur :  84 ms      QT Int : 348 ms       P-R-T Axes :  68  41  41 degrees    QTcB Int : 432 ms    Normal sinus rhythm  Normal ECG  No previous ECGs available  Confirmed by Dain Barajas ([id number]) on 11/29/2024 1:42:58 PM    Referred By:            Confirmed By: Dain Barajas     TTE:  No results found for this or any previous visit.  No results found for this or any previous visit.  BEKA:  No results found for this or any previous visit.  Stress Test:  No results found for this or any previous visit.     LHC:  No results found for this or any previous visit.     PFT:  No results found for: "FEV1", "FVC", "BLQ0YNW", "TLC", "DLCO"           Pre-op Assessment    I have reviewed the Patient Summary Reports.     I have reviewed the Nursing Notes. I have reviewed the NPO Status.   I have reviewed the Medications.     Review of Systems  Anesthesia Hx:               Denies Personal Hx of Anesthesia complications.                        Physical Exam  General: Alert, Cooperative and Oriented    Airway:  Mallampati: II   Mouth Opening: Normal  Neck ROM: Normal ROM        Anesthesia Plan  Type of Anesthesia, risks & benefits discussed:    Anesthesia Type: Gen ETT  Intra-op Monitoring Plan: Standard ASA Monitors and Art Line  Post Op Pain Control Plan: multimodal analgesia  Induction:  " IV  Airway Plan: Direct  Informed Consent: Informed consent signed with the Patient and all parties understand the risks and agree with anesthesia plan.  All questions answered.   ASA Score: 3 Emergent  Day of Surgery Review of History & Physical: H&P Update referred to the surgeon/provider.    Ready For Surgery From Anesthesia Perspective.     .

## 2024-11-30 NOTE — PROGRESS NOTES
Pharmacokinetic Initial Assessment: IV Vancomycin    Assessment/Plan:    -Initiate intravenous vancomycin with loading dose of 1500 mg once   -Followed by a maintenance dose of vancomycin 1250mg IV every 24 hours  -Desired empiric serum trough concentration is 10 to 20 mcg/mL  -Draw vancomycin trough level 60 min prior to third dose on 12/2 at approximately 1500  -Pharmacy will continue to follow and monitor vancomycin.      Please contact pharmacy at extension [phone] with any questions regarding this assessment.     Thank you for the consult,   Gonzalo Best       Patient brief summary:  Felipe Jiménez is a 68 y.o. male initiated on antimicrobial therapy with IV Vancomycin for treatment of suspected sepsis    Drug Allergies:   Review of patient's allergies indicates:  No Known Allergies    Actual Body Weight:   63.5 kg    Renal Function:   Estimated Creatinine Clearance: 51.4 mL/min (based on SCr of 1.2 mg/dL).,     Dialysis Method (if applicable):  N/A    CBC (last 72 hours):  Recent Labs   Lab Result Units 11/29/24  1439 11/30/24  0333 11/30/24  0918   WBC K/uL 18.31* 14.96* 15.18*   Hemoglobin g/dL 7.0* 7.2* 7.6*   Hematocrit % 22.7* 22.6* 23.4*   Platelets K/uL 616* 565* 616*   Gran % % 87.9* 83.5* 81.6*   Lymph % % 4.1* 6.1* 7.8*   Mono % % 7.0 9.5 9.5   Eosinophil % % 0.1 0.2 0.2   Basophil % % 0.2 0.1 0.2   Differential Method  Automated Automated Automated       Metabolic Panel (last 72 hours):  Recent Labs   Lab Result Units 11/29/24  1439 11/29/24  1529 11/30/24  0333   Sodium mmol/L 136  --  132*   Potassium mmol/L 3.9  --  3.7   Chloride mmol/L 105  --  104   CO2 mmol/L 22*  --  21*   Glucose mg/dL 109  --  96   Glucose, UA   --  Negative  --    BUN mg/dL 19  --  19   Creatinine mg/dL 1.3  --  1.2   Albumin g/dL 2.1*  --  1.9*   Total Bilirubin mg/dL 0.5  --  1.0   Alkaline Phosphatase U/L 131  --  130   AST U/L 45*  --  33   ALT U/L 24  --  23   Magnesium mg/dL  --   --  1.9   Phosphorus mg/dL  --   --   "3.1       Drug levels (last 3 results):  No results for input(s): "VANCOMYCINRA", "VANCORANDOM", "VANCOMYCINPE", "VANCOPEAK", "VANCOMYCINTR", "VANCOTROUGH" in the last 72 hours.    Microbiologic Results:  Microbiology Results (last 7 days)       Procedure Component Value Units Date/Time    Blood culture [[id number]] Collected: 11/29/24 1644    Order Status: Completed Specimen: Blood from Peripheral, Antecubital, Right Updated: 11/30/24 0115     Blood Culture, Routine No Growth to date    Blood culture [[id number]] Collected: 11/29/24 1644    Order Status: Completed Specimen: Blood from Peripheral, Antecubital, Right Updated: 11/30/24 0115     Blood Culture, Routine No Growth to date    Urine culture [[id number]] Collected: 11/29/24 1529    Order Status: No result Specimen: Urine Updated: 11/29/24 1624            "

## 2024-12-01 PROBLEM — E43 SEVERE PROTEIN-CALORIE MALNUTRITION: Status: ACTIVE | Noted: 2024-12-01

## 2024-12-01 PROBLEM — N35.919 URETHRAL STRICTURE: Status: ACTIVE | Noted: 2024-12-01

## 2024-12-01 LAB
ALBUMIN SERPL BCP-MCNC: 1.8 G/DL (ref 3.5–5.2)
ALP SERPL-CCNC: 133 U/L (ref 40–150)
ALT SERPL W/O P-5'-P-CCNC: 26 U/L (ref 10–44)
ANION GAP SERPL CALC-SCNC: 9 MMOL/L (ref 8–16)
ANISOCYTOSIS BLD QL SMEAR: SLIGHT
AST SERPL-CCNC: 39 U/L (ref 10–40)
BASOPHILS # BLD AUTO: 0.02 K/UL (ref 0–0.2)
BASOPHILS # BLD AUTO: 0.04 K/UL (ref 0–0.2)
BASOPHILS # BLD AUTO: 0.05 K/UL (ref 0–0.2)
BASOPHILS NFR BLD: 0.1 % (ref 0–1.9)
BASOPHILS NFR BLD: 0.2 % (ref 0–1.9)
BASOPHILS NFR BLD: 0.2 % (ref 0–1.9)
BILIRUB SERPL-MCNC: 0.7 MG/DL (ref 0.1–1)
BLD PROD TYP BPU: NORMAL
BLOOD UNIT EXPIRATION DATE: NORMAL
BLOOD UNIT TYPE CODE: 6200
BLOOD UNIT TYPE: NORMAL
BUN SERPL-MCNC: 16 MG/DL (ref 8–23)
CALCIUM SERPL-MCNC: 9.4 MG/DL (ref 8.7–10.5)
CHLORIDE SERPL-SCNC: 104 MMOL/L (ref 95–110)
CO2 SERPL-SCNC: 22 MMOL/L (ref 23–29)
CODING SYSTEM: NORMAL
CREAT SERPL-MCNC: 1.3 MG/DL (ref 0.5–1.4)
CROSSMATCH INTERPRETATION: NORMAL
DACRYOCYTES BLD QL SMEAR: ABNORMAL
DIFFERENTIAL METHOD BLD: ABNORMAL
DISPENSE STATUS: NORMAL
EOSINOPHIL # BLD AUTO: 0 K/UL (ref 0–0.5)
EOSINOPHIL NFR BLD: 0.1 % (ref 0–8)
EOSINOPHIL NFR BLD: 0.2 % (ref 0–8)
EOSINOPHIL NFR BLD: 0.3 % (ref 0–8)
ERYTHROCYTE [DISTWIDTH] IN BLOOD BY AUTOMATED COUNT: 17.2 % (ref 11.5–14.5)
ERYTHROCYTE [DISTWIDTH] IN BLOOD BY AUTOMATED COUNT: 17.3 % (ref 11.5–14.5)
ERYTHROCYTE [DISTWIDTH] IN BLOOD BY AUTOMATED COUNT: 17.5 % (ref 11.5–14.5)
EST. GFR  (NO RACE VARIABLE): 59.1 ML/MIN/1.73 M^2
GLUCOSE SERPL-MCNC: 102 MG/DL (ref 70–110)
HCT VFR BLD AUTO: 21.7 % (ref 40–54)
HCT VFR BLD AUTO: 22.1 % (ref 40–54)
HCT VFR BLD AUTO: 23.2 % (ref 40–54)
HGB BLD-MCNC: 6.9 G/DL (ref 14–18)
HGB BLD-MCNC: 7.2 G/DL (ref 14–18)
HGB BLD-MCNC: 7.6 G/DL (ref 14–18)
HYPOCHROMIA BLD QL SMEAR: ABNORMAL
IMM GRANULOCYTES # BLD AUTO: 0.08 K/UL (ref 0–0.04)
IMM GRANULOCYTES # BLD AUTO: 0.12 K/UL (ref 0–0.04)
IMM GRANULOCYTES # BLD AUTO: 0.2 K/UL (ref 0–0.04)
IMM GRANULOCYTES NFR BLD AUTO: 0.5 % (ref 0–0.5)
IMM GRANULOCYTES NFR BLD AUTO: 0.7 % (ref 0–0.5)
IMM GRANULOCYTES NFR BLD AUTO: 0.8 % (ref 0–0.5)
INR PPP: 1.4 (ref 0.8–1.2)
LYMPHOCYTES # BLD AUTO: 0.9 K/UL (ref 1–4.8)
LYMPHOCYTES # BLD AUTO: 1.4 K/UL (ref 1–4.8)
LYMPHOCYTES # BLD AUTO: 1.4 K/UL (ref 1–4.8)
LYMPHOCYTES NFR BLD: 5.4 % (ref 18–48)
LYMPHOCYTES NFR BLD: 5.6 % (ref 18–48)
LYMPHOCYTES NFR BLD: 8.4 % (ref 18–48)
MAGNESIUM SERPL-MCNC: 2 MG/DL (ref 1.6–2.6)
MCH RBC QN AUTO: 24.5 PG (ref 27–31)
MCH RBC QN AUTO: 24.9 PG (ref 27–31)
MCH RBC QN AUTO: 26.1 PG (ref 27–31)
MCHC RBC AUTO-ENTMCNC: 31.8 G/DL (ref 32–36)
MCHC RBC AUTO-ENTMCNC: 32.6 G/DL (ref 32–36)
MCHC RBC AUTO-ENTMCNC: 32.8 G/DL (ref 32–36)
MCV RBC AUTO: 77 FL (ref 82–98)
MCV RBC AUTO: 77 FL (ref 82–98)
MCV RBC AUTO: 80 FL (ref 82–98)
MONOCYTES # BLD AUTO: 1.3 K/UL (ref 0.3–1)
MONOCYTES # BLD AUTO: 1.5 K/UL (ref 0.3–1)
MONOCYTES # BLD AUTO: 1.5 K/UL (ref 0.3–1)
MONOCYTES NFR BLD: 5.7 % (ref 4–15)
MONOCYTES NFR BLD: 8 % (ref 4–15)
MONOCYTES NFR BLD: 8.8 % (ref 4–15)
NEUTROPHILS # BLD AUTO: 13.6 K/UL (ref 1.8–7.7)
NEUTROPHILS # BLD AUTO: 13.6 K/UL (ref 1.8–7.7)
NEUTROPHILS # BLD AUTO: 22.7 K/UL (ref 1.8–7.7)
NEUTROPHILS NFR BLD: 81.7 % (ref 38–73)
NEUTROPHILS NFR BLD: 85.5 % (ref 38–73)
NEUTROPHILS NFR BLD: 87.8 % (ref 38–73)
NRBC BLD-RTO: 0 /100 WBC
OVALOCYTES BLD QL SMEAR: ABNORMAL
PHOSPHATE SERPL-MCNC: 3.6 MG/DL (ref 2.7–4.5)
PLATELET # BLD AUTO: 555 K/UL (ref 150–450)
PLATELET # BLD AUTO: 571 K/UL (ref 150–450)
PLATELET # BLD AUTO: 591 K/UL (ref 150–450)
PLATELET BLD QL SMEAR: ABNORMAL
PMV BLD AUTO: 8.9 FL (ref 9.2–12.9)
PMV BLD AUTO: 9.1 FL (ref 9.2–12.9)
PMV BLD AUTO: 9.2 FL (ref 9.2–12.9)
POIKILOCYTOSIS BLD QL SMEAR: SLIGHT
POLYCHROMASIA BLD QL SMEAR: ABNORMAL
POTASSIUM SERPL-SCNC: 3.6 MMOL/L (ref 3.5–5.1)
PROT SERPL-MCNC: 7.5 G/DL (ref 6–8.4)
PROTHROMBIN TIME: 15.1 SEC (ref 9–12.5)
RBC # BLD AUTO: 2.82 M/UL (ref 4.6–6.2)
RBC # BLD AUTO: 2.89 M/UL (ref 4.6–6.2)
RBC # BLD AUTO: 2.91 M/UL (ref 4.6–6.2)
SODIUM SERPL-SCNC: 135 MMOL/L (ref 136–145)
TRANS ERYTHROCYTES VOL PATIENT: NORMAL ML
WBC # BLD AUTO: 15.95 K/UL (ref 3.9–12.7)
WBC # BLD AUTO: 16.67 K/UL (ref 3.9–12.7)
WBC # BLD AUTO: 25.8 K/UL (ref 3.9–12.7)

## 2024-12-01 PROCEDURE — 97116 GAIT TRAINING THERAPY: CPT

## 2024-12-01 PROCEDURE — 85025 COMPLETE CBC W/AUTO DIFF WBC: CPT | Mod: 91 | Performed by: HOSPITALIST

## 2024-12-01 PROCEDURE — 87075 CULTR BACTERIA EXCEPT BLOOD: CPT

## 2024-12-01 PROCEDURE — 36415 COLL VENOUS BLD VENIPUNCTURE: CPT | Mod: XB | Performed by: HOSPITALIST

## 2024-12-01 PROCEDURE — 80053 COMPREHEN METABOLIC PANEL: CPT | Performed by: HOSPITALIST

## 2024-12-01 PROCEDURE — 36415 COLL VENOUS BLD VENIPUNCTURE: CPT | Mod: XB | Performed by: STUDENT IN AN ORGANIZED HEALTH CARE EDUCATION/TRAINING PROGRAM

## 2024-12-01 PROCEDURE — 84100 ASSAY OF PHOSPHORUS: CPT | Performed by: HOSPITALIST

## 2024-12-01 PROCEDURE — P9021 RED BLOOD CELLS UNIT: HCPCS | Performed by: HOSPITALIST

## 2024-12-01 PROCEDURE — 87070 CULTURE OTHR SPECIMN AEROBIC: CPT

## 2024-12-01 PROCEDURE — 25000003 PHARM REV CODE 250: Performed by: HOSPITALIST

## 2024-12-01 PROCEDURE — 85610 PROTHROMBIN TIME: CPT | Performed by: STUDENT IN AN ORGANIZED HEALTH CARE EDUCATION/TRAINING PROGRAM

## 2024-12-01 PROCEDURE — 97165 OT EVAL LOW COMPLEX 30 MIN: CPT

## 2024-12-01 PROCEDURE — 63600175 PHARM REV CODE 636 W HCPCS: Performed by: HOSPITALIST

## 2024-12-01 PROCEDURE — 21400001 HC TELEMETRY ROOM

## 2024-12-01 PROCEDURE — 83735 ASSAY OF MAGNESIUM: CPT | Performed by: HOSPITALIST

## 2024-12-01 PROCEDURE — 86920 COMPATIBILITY TEST SPIN: CPT | Performed by: HOSPITALIST

## 2024-12-01 PROCEDURE — 97161 PT EVAL LOW COMPLEX 20 MIN: CPT

## 2024-12-01 PROCEDURE — 87206 SMEAR FLUORESCENT/ACID STAI: CPT

## 2024-12-01 PROCEDURE — 0T9030Z DRAINAGE OF RIGHT KIDNEY WITH DRAINAGE DEVICE, PERCUTANEOUS APPROACH: ICD-10-PCS | Performed by: STUDENT IN AN ORGANIZED HEALTH CARE EDUCATION/TRAINING PROGRAM

## 2024-12-01 PROCEDURE — 97530 THERAPEUTIC ACTIVITIES: CPT

## 2024-12-01 PROCEDURE — 97535 SELF CARE MNGMENT TRAINING: CPT

## 2024-12-01 PROCEDURE — 63600175 PHARM REV CODE 636 W HCPCS: Performed by: STUDENT IN AN ORGANIZED HEALTH CARE EDUCATION/TRAINING PROGRAM

## 2024-12-01 PROCEDURE — 87116 MYCOBACTERIA CULTURE: CPT

## 2024-12-01 PROCEDURE — 99223 1ST HOSP IP/OBS HIGH 75: CPT | Mod: GC,,, | Performed by: INTERNAL MEDICINE

## 2024-12-01 RX ORDER — SODIUM CHLORIDE, SODIUM LACTATE, POTASSIUM CHLORIDE, CALCIUM CHLORIDE 600; 310; 30; 20 MG/100ML; MG/100ML; MG/100ML; MG/100ML
INJECTION, SOLUTION INTRAVENOUS CONTINUOUS
Status: DISCONTINUED | OUTPATIENT
Start: 2024-12-01 | End: 2024-12-02

## 2024-12-01 RX ORDER — HYDROCODONE BITARTRATE AND ACETAMINOPHEN 500; 5 MG/1; MG/1
TABLET ORAL
Status: DISCONTINUED | OUTPATIENT
Start: 2024-12-01 | End: 2024-12-06 | Stop reason: HOSPADM

## 2024-12-01 RX ORDER — DIPHENHYDRAMINE HYDROCHLORIDE 50 MG/ML
INJECTION INTRAMUSCULAR; INTRAVENOUS
Status: COMPLETED | OUTPATIENT
Start: 2024-12-01 | End: 2024-12-01

## 2024-12-01 RX ORDER — FENTANYL CITRATE 50 UG/ML
INJECTION, SOLUTION INTRAMUSCULAR; INTRAVENOUS
Status: COMPLETED | OUTPATIENT
Start: 2024-12-01 | End: 2024-12-01

## 2024-12-01 RX ADMIN — ATORVASTATIN CALCIUM 80 MG: 40 TABLET, FILM COATED ORAL at 08:12

## 2024-12-01 RX ADMIN — PIPERACILLIN SODIUM AND TAZOBACTAM SODIUM 4.5 G: 4; .5 INJECTION, POWDER, FOR SOLUTION INTRAVENOUS at 11:12

## 2024-12-01 RX ADMIN — Medication 324 MG: at 08:12

## 2024-12-01 RX ADMIN — SODIUM CHLORIDE, POTASSIUM CHLORIDE, SODIUM LACTATE AND CALCIUM CHLORIDE: 600; 310; 30; 20 INJECTION, SOLUTION INTRAVENOUS at 02:12

## 2024-12-01 RX ADMIN — VANCOMYCIN HYDROCHLORIDE 1250 MG: 1.25 INJECTION, POWDER, LYOPHILIZED, FOR SOLUTION INTRAVENOUS at 04:12

## 2024-12-01 RX ADMIN — DIPHENHYDRAMINE HYDROCHLORIDE 25 MG: 50 INJECTION, SOLUTION INTRAMUSCULAR; INTRAVENOUS at 12:12

## 2024-12-01 RX ADMIN — Medication 324 MG: at 05:12

## 2024-12-01 RX ADMIN — PIPERACILLIN SODIUM AND TAZOBACTAM SODIUM 4.5 G: 4; .5 INJECTION, POWDER, FOR SOLUTION INTRAVENOUS at 03:12

## 2024-12-01 RX ADMIN — PIPERACILLIN SODIUM AND TAZOBACTAM SODIUM 4.5 G: 4; .5 INJECTION, POWDER, FOR SOLUTION INTRAVENOUS at 12:12

## 2024-12-01 RX ADMIN — FENTANYL CITRATE 50 MCG: 0.05 INJECTION, SOLUTION INTRAMUSCULAR; INTRAVENOUS at 12:12

## 2024-12-01 RX ADMIN — PIPERACILLIN SODIUM AND TAZOBACTAM SODIUM 4.5 G: 4; .5 INJECTION, POWDER, FOR SOLUTION INTRAVENOUS at 06:12

## 2024-12-01 RX ADMIN — PANTOPRAZOLE SODIUM 40 MG: 40 TABLET, DELAYED RELEASE ORAL at 08:12

## 2024-12-01 NOTE — ASSESSMENT & PLAN NOTE
Nutrition consulted. Most recent weight and BMI monitored-     Measurements:  Wt Readings from Last 1 Encounters:   11/30/24 63.5 kg (140 lb)   Body mass index is 21.29 kg/m².    Patient has been screened and assessed by RD.    Malnutrition Type:  Context:    Level:      Malnutrition Characteristic Summary:       Interventions/Recommendations (treatment strategy):       11/30 GI no plan for further work up unless overt bleed. Hematology consulted for recurrent iron deficiency anemia/ s/p GI work up and weight loss.

## 2024-12-01 NOTE — PLAN OF CARE
Procedure completed. Patient tolerated well; VSS. Site CDI. Specimen collected and sent to lab.  Patient to be transported back to inpatient Rm 625.

## 2024-12-01 NOTE — CONSULTS
"[facility] Surg  Infectious Disease  Consult Note    Patient Name: Felipe Jiménez  MRN: [id number]  Admission Date: 11/29/2024  Hospital Length of Stay: 1 days  Attending Physician: Gilmar Cid MD  Primary Care Provider: Sami Asif MD     Isolation Status: No active isolations    Patient information was obtained from patient, relative(s), and ER records.      Inpatient consult to Infectious Diseases  Consult performed by: Maryanne Regalado DO  Consult ordered by: Gilmar Cid MD        Assessment/Plan:     Renal/  Perinephric abscess  Mr. Jiménez is a 68M with PMH of LENORA, HLD, R hydronephrosis and nephrolithiasis, presented with weakness/dizziness and a recent fall, s/p R ureteral stent placement with urology on 11/30, with postop CT showing possible perinephric abscess, pending IR drain placement today 12/1. Blood and urine cultures no growth.    Recommendations  Continue vancomycin and zosyn for now  Follow up cultures sent from IR drain today        Thank you for your consult. I will follow-up with patient. Please contact us if you have any additional questions.    Maryanne Regalado DO  Infectious Disease  [facility] Surg    Subjective:     Principal Problem: Iron deficiency anemia    HPI: Mr. Jiménez is a 68M with PMH of LENORA, HLD, R hydronephrosis and nephrolithiasis, presented with weakness/dizziness and a recent fall, s/p R ureteral stent placement with urology on 11/30, with postop CT showing possible perinephric abscess, pending IR drain placement today 12/1. Infectious disease consulted for "perinephric abscess?".     Patient was last febrile at 6am yesterday. Blood and urine cultures no growth. Does have leukocytosis. Currently on vancomycin and zosyn.         Past Medical History:   Diagnosis Date    Hyperlipidemia     Male erectile dysfunction, unspecified        Past Surgical History:   Procedure Laterality Date    COLONOSCOPY N/A 10/2/2024    Procedure: COLONOSCOPY;  Surgeon: Tacos" "Brennan TELLES MD;  Location: [facility];  Service: Endoscopy;  Laterality: N/A;    ESOPHAGOGASTRODUODENOSCOPY N/A 10/2/2024    Procedure: EGD (ESOPHAGOGASTRODUODENOSCOPY);  Surgeon: Brennan Balderrama MD;  Location: [facility] ENDO;  Service: Endoscopy;  Laterality: N/A;    FRACTURE SURGERY      INTRALUMINAL GASTROINTESTINAL TRACT IMAGING VIA CAPSULE N/A 10/22/2024    Procedure: IMAGING PROCEDURE, GI TRACT, INTRALUMINAL, VIA CAPSULE;  Surgeon: Brennan Balderrama MD;  Location: [facility] ENDO;  Service: Endoscopy;  Laterality: N/A;    LIPOMA RESECTION Left 1979    left knee    QUADRICEPS TENDON REPAIR Left 2012       Review of patient's allergies indicates:  No Known Allergies    Medications:  Medications Prior to Admission   Medication Sig    aspirin 81 MG Chew Take 81 mg by mouth once daily.    diazePAM (VALIUM) 5 MG tablet TAKE 1 TABLET BY MOUTH EVERY 12 HOURS AS NEEDED FOR ANXIETY    meloxicam (MOBIC) 7.5 MG tablet Take 1 tablet by mouth twice daily    pantoprazole (PROTONIX) 40 MG tablet Take 1 tablet (40 mg total) by mouth once daily.    rosuvastatin (CRESTOR) 20 MG tablet Take 1 tablet (20 mg total) by mouth once daily.    sildenafiL (VIAGRA) 100 MG tablet Take 1 tablet (100 mg total) by mouth daily as needed for Erectile Dysfunction.    triamcinolone acetonide 0.1% (KENALOG) 0.1 % cream APPLY  CREAM EXTERNALLY TWICE DAILY AS NEEDED FOR  DERMATITIS    zolpidem (AMBIEN) 5 MG Tab Take 1 tablet (5 mg total) by mouth nightly as needed (insomnia).     Antibiotics (From admission, onward)      Start     Stop Route Frequency Ordered    12/01/24 1600  vancomycin 1,250 mg in 0.9% NaCl 250 mL IVPB (admixture device)         -- IV Every 24 hours (non-standard times) 11/30/24 1501    11/30/24 1552  vancomycin - pharmacy to dose  (vancomycin IVPB (PEDS and ADULTS))        Placed in "And" Linked Group    -- IV pharmacy to manage frequency 11/30/24 1452    11/30/24 1515  piperacillin-tazobactam (ZOSYN) 4.5 g in D5W 100 mL IVPB (MB+)         " -- IV Every 8 hours (non-standard times) 24 1413          Antifungals (From admission, onward)      None          Antivirals (From admission, onward)      None             Immunization History   Administered Date(s) Administered    COVID-19 MRNA, LN-S PF (MODERNA HALF 0.25 ML DOSE) 2021    COVID-19, MRNA, LN-S, PF (MODERNA FULL 0.5 ML DOSE) 2021, 2021    COVID-19, MRNA, LN-S, PF (Pfizer) (Purple Cap) 10/28/2022    COVID-19, mRNA, LNP-S, PF (Moderna) Ages 12+ 2023    COVID-19, mRNA, LNP-S, PF, william-sucrose, 30 mcg/0.3 mL (Pfizer Ages 12+) 2024    COVID-19, mRNA, LNP-S, bivalent booster, PF (Moderna Omicron)12 + YEARS 10/28/2022    Influenza (FLUAD) - Quadrivalent - Adjuvanted - PF *Preferred* (65+) 2023    Influenza - Quadrivalent - PF *Preferred* (6 months and older) 10/28/2022    Influenza - Trivalent - Afluria, Fluzone MDV 10/28/2022    Influenza - Trivalent - Fluad - Adjuvanted - PF (65 years and older 2024    Tdap 2024       Family History       Problem Relation (Age of Onset)    Heart attacks under age 50 Mother (43), Father (38)    Heart disease Mother, Father          Social History     Socioeconomic History    Marital status:    Tobacco Use    Smoking status: Former     Current packs/day: 0.00     Average packs/day: 1 pack/day for 20.0 years (20.0 ttl pk-yrs)     Types: Cigarettes     Start date: 1987     Quit date: 2007     Years since quittin.9    Smokeless tobacco: Never   Substance and Sexual Activity    Alcohol use: Yes     Comment: Infrequently    Drug use: Not Currently    Sexual activity: Yes     Partners: Female     Social Drivers of Health     Financial Resource Strain: Medium Risk (2024)    Overall Financial Resource Strain (CARDIA)     Difficulty of Paying Living Expenses: Somewhat hard   Food Insecurity: No Food Insecurity (2024)    Hunger Vital Sign     Worried About Running Out of Food in the Last Year: Never  true     Ran Out of Food in the Last Year: Never true   Recent Concern: Food Insecurity - Food Insecurity Present (5/8/2024)    Hunger Vital Sign     Worried About Running Out of Food in the Last Year: Sometimes true     Ran Out of Food in the Last Year: Sometimes true   Transportation Needs: No Transportation Needs (5/8/2024)    PRAPARE - Transportation     Lack of Transportation (Medical): No     Lack of Transportation (Non-Medical): No   Physical Activity: Sufficiently Active (6/13/2024)    Exercise Vital Sign     Days of Exercise per Week: 4 days     Minutes of Exercise per Session: 130 min   Stress: No Stress Concern Present (6/13/2024)    [employer] [place] [employer] - Occupational Stress Questionnaire     Feeling of Stress : Only a little   Recent Concern: Stress - Stress Concern Present (5/8/2024)    [employer] [place] [employer] - Occupational Stress Questionnaire     Feeling of Stress : To some extent   Housing Stability: Unknown (6/13/2024)    Housing Stability Vital Sign     Unable to Pay for Housing in the Last Year: No     Review of Systems   Constitutional:  Positive for fatigue. Negative for chills and fever.   Respiratory:  Negative for cough and shortness of breath.    Cardiovascular:  Negative for chest pain.   Gastrointestinal:  Positive for abdominal pain. Negative for constipation, diarrhea, nausea and vomiting.   Genitourinary:  Negative for dysuria and hematuria.   Musculoskeletal:  Negative for arthralgias and myalgias.   Skin:  Negative for rash.   Neurological:  Positive for weakness. Negative for headaches.     Objective:     Vital Signs (Most Recent):  Temp: 98 °F (36.7 °C) (12/01/24 1105)  Pulse: 83 (12/01/24 1105)  Resp: 18 (12/01/24 1105)  BP: (!) 103/56 (12/01/24 1105)  SpO2: 97 % (12/01/24 1105) Vital Signs (24h Range):  Temp:  [97.9 °F (36.6 °C)-99.9 °F (37.7 °C)] 98 °F (36.7 °C)  Pulse:  [67-87] 83  Resp:  [16-18] 18  SpO2:  [95 %-99 %] 97 %  BP:  ()/(55-66) 103/56     Weight: 63.5 kg (140 lb)  Body mass index is 21.29 kg/m².    Estimated Creatinine Clearance: 47.5 mL/min (based on SCr of 1.3 mg/dL).     Physical Exam  Vitals reviewed.   Constitutional:       General: He is not in acute distress.     Appearance: Normal appearance. He is not ill-appearing.   HENT:      Head: Normocephalic and atraumatic.   Eyes:      Extraocular Movements: Extraocular movements intact.      Conjunctiva/sclera: Conjunctivae normal.   Cardiovascular:      Rate and Rhythm: Normal rate and regular rhythm.      Heart sounds: No murmur heard.  Pulmonary:      Effort: Pulmonary effort is normal. No respiratory distress.      Breath sounds: Normal breath sounds.   Abdominal:      General: Abdomen is flat. Bowel sounds are normal.      Palpations: Abdomen is soft.      Tenderness: There is no abdominal tenderness.   Musculoskeletal:      Cervical back: Normal range of motion.   Skin:     General: Skin is warm and dry.   Neurological:      General: No focal deficit present.      Mental Status: He is alert and oriented to person, place, and time.   Psychiatric:         Mood and Affect: Mood normal.         Behavior: Behavior normal.         Thought Content: Thought content normal.          Significant Labs: All pertinent labs within the past 24 hours have been reviewed.  Recent Lab Results         12/01/24  1024   12/01/24  0814   12/01/24  0355   11/30/24  1420        Albumin     1.8         ALP     133         ALT     26         Anion Gap     9         Ascending aorta       2.88       Ao peak eduar       1.5       Ao VTI       28.8       AST     39         AV valve area       2.4       KEVIN by Velocity Ratio       2.3       AV area by cont VTI       2.4       AV mean gradient       6.5       AV index (prosthetic)       0.68       LVOT mn grad       2       AV LVOT peak gradient       4       AV peak gradient       9.0       AV regurgitation pressure 1/2 time       577.21       AV  Velocity Ratio       0.67       Baso #   0.04   0.02         Basophil %   0.2   0.1         BILIRUBIN TOTAL     0.7  Comment: For infants and newborns, interpretation of results should be based  on gestational age, weight and in agreement with clinical  observations.    Premature Infant recommended reference ranges:  Up to 24 hours.............<8.0 mg/dL  Up to 48 hours............<12.0 mg/dL  3-5 days..................<15.0 mg/dL  6-29 days.................<15.0 mg/dL           BSA       1.75       BUN     16         Calcium     9.4         Chloride     104         CO2     22         Creatinine     1.3         Left Ventricle Relative Wall Thickness       0.40       Differential Method   Automated   Automated         E/A ratio       1.18       Echo EF Estimated       42       E/E' ratio       4.47       eGFR     59.1         EF       50       Eos #   0.0   0.0         Eos %   0.2   0.3         E wave deceleration time       205.33       FS       20.0       Glucose     102         Gran # (ANC)   13.6   13.6         Gran %   81.7   85.5         Hematocrit   22.1   21.7         Hemoglobin   7.2   6.9         Immature Grans (Abs)   0.12  Comment: Mild elevation in immature granulocytes is non specific and   can be seen in a variety of conditions including stress response,   acute inflammation, trauma and pregnancy. Correlation with other   laboratory and clinical findings is essential.     0.08  Comment: Mild elevation in immature granulocytes is non specific and   can be seen in a variety of conditions including stress response,   acute inflammation, trauma and pregnancy. Correlation with other   laboratory and clinical findings is essential.           Immature Granulocytes   0.7   0.5         INR 1.4  Comment: Coumadin Therapy:  2.0 - 3.0 for INR for all indicators except mechanical heart valves  and antiphospholipid syndromes which should use 2.5 - 3.5.               IVRT       70.41       IVSd       0.7       LA  WIDTH       3.69       Left Atrium Major Axis       5.78       Left Atrium Minor Axis       5.86       LA size       2.66       LA Vol       48.55              56.59       LA vol index       27.6       KEHINDE (MOD)       32.2       LVOT area       3.5       LV LATERAL E/E' RATIO       3.94       LV SEPTAL E/E' RATIO       5.15       LV EDV BP       93.67       LV Diastolic Volume Index       53.22       LVIDd       4.5       LVIDs       3.6       LV mass       113.6       LV Mass Index       64.6       LVOT diameter       2.1       LVOT peak eduar       1.0       LVOT stroke volume       67.9       LVOT peak VTI       19.6       LV ESV BP       54.27       LV Systolic Volume Index       30.8       Lymph #   1.4   0.9         Lymph %   8.4   5.6         Magnesium      2.0         MCH   24.9   24.5         MCHC   32.6   31.8         MCV   77   77         Mean e'       0.15       Mono #   1.5   1.3         Mono %   8.8   8.0         MPV   8.9   9.2         MV Peak A Eduar       0.57       MV Peak E Eduar       0.67       nRBC   0   0         Jefferson's Biplane MOD Ejection Fraction       49       Phosphorus Level     3.6         Platelet Count   571   591         Potassium     3.6         PROTEIN TOTAL     7.5         PT 15.1             PW       0.9       RA Major Axis       4.23       Est. RA pres       3       RA Width       3.50       RBC   2.89   2.82         RDW   17.3   17.2         RV S'       14.35       RV TB RVSP       6       RV/LV Ratio       0.69       RV- lópez basal diam       3.1       Sinus       3.45       Sodium     135         STJ       2.84       TAPSE       1.97       TDI SEPTAL       0.13       TDI LATERAL       0.17       Triscuspid Valve Regurgitation Peak Gradient       26       TR Max Eduar       2.53       TV PG       26       TV resting pulmonary artery pressure       29       WBC   16.67   15.95         ZLVIDD       -0.78       ZLVIDS       1.41               Significant Imaging: I have reviewed all  pertinent imaging results/findings within the past 24 hours.

## 2024-12-01 NOTE — ASSESSMENT & PLAN NOTE
Malnutrition Type:  Context: chronic illness  Level: severe    Related to (etiology):   Inadequate PRO- calorie intake    Signs and Symptoms (as evidenced by):   Significant wt loss of -8% x 3 months, pt reports low appetite x several years and moderate to severe muscle/fat wasting     Malnutrition Characteristic Summary:  Weight Loss (Malnutrition): greater than 7.5% in 3 months (-8.4% x 3 months)  Energy Intake (Malnutrition): less than or equal to 75% for greater than or equal to 1 month  Subcutaneous Fat (Malnutrition):  (moderate to severe)  Muscle Mass (Malnutrition):  (moderate to severe)    Interventions/Recommendations (treatment strategy):  Collaboration of nutritional care with other providers.   ONS    Nutrition Diagnosis Status:   New

## 2024-12-01 NOTE — PLAN OF CARE
Pt arrived to IR CT room 173 for perirenal drain placement. Pt oriented to unit and staff, Pt safely transferred from stretcher to procedural table. Fall risk reviewed and comfort measures utilized with interventions. Safety strap applied, position pillows to minimize pressure points. Blankets applied. Pt prepped and draped utilizing standard sterile technique. Patient placed on continuous monitoring, as required by sedation policy. Timeouts implemented utilizing standard universal time-out per department and facility policy. RN to remain at bedside with continuous monitoring. Pt resting comfortably. Denies pain/discomfort. Will continue to monitor. See flow sheets for monitoring, medication administration, and updates. patient verbalizes understanding.

## 2024-12-01 NOTE — ASSESSMENT & PLAN NOTE
- Needed passive dilation with the cystoscope during procedure on 11/30/24.   - Maintain Katz catheter for 3 days until Tuesday 12/3 then perform voiding trial.

## 2024-12-01 NOTE — PLAN OF CARE
[facility]  Initial Discharge Assessment       Primary Care Provider: Sami Asif MD    Admission Diagnosis: Shortness of breath [R06.02]  CHF (congestive heart failure) [I50.9]  Weakness generalized [R53.1]  Chest pain [R07.9]  Symptomatic anemia [D64.9]  Iron deficiency anemia, unspecified iron deficiency anemia type [D50.9]    Admission Date: 11/29/2024  Expected Discharge Date: 12/3/2024    Transition of Care Barriers: None    [facility] [employer] [facility] / Plan: [employer] CHOICES / Product Type: Medicare Advantage /     Extended Emergency Contact Information  Primary Emergency Contact: Miguel Angel Jiménez  Mobile Phone: [phone]  Relation: Daughter   needed? No  Secondary Emergency Contact: Miryam Jiménez  Mobile Phone: [phone]  Relation: Daughter   needed? No    Discharge Plan A: Home with family  Discharge Plan B: Home Health      [facility] [address] [place] [address] - [address]  [address] [facility] [address]  Phone: [phone] Fax: [phone]      Initial Assessment (most recent)       Adult Discharge Assessment - 12/01/24 1534          Discharge Assessment    Assessment Type Discharge Planning Assessment     Confirmed/corrected address, phone number and insurance Yes     Confirmed Demographics Correct on Facesheet     Source of Information patient;family     Does patient/caregiver understand observation status Yes     Communicated LYNN with patient/caregiver Yes     Reason For Admission SOB     People in Home alone     Facility Arrived From: Home     Do you expect to return to your current living situation? Yes     Do you have help at home or someone to help you manage your care at home? Yes     Who are your caregiver(s) and their phone number(s)? Miguel Angel Jiménez [facility]      Prior to hospitilization cognitive status: Alert/Oriented;No Deficits     Current cognitive status: Alert/Oriented;No Deficits     Walking or Climbing Stairs  Difficulty no     Dressing/Bathing Difficulty no     Home Accessibility wheelchair accessible     Home Layout Able to live on 1st floor     Equipment Currently Used at Home none     Readmission within 30 days? No     Patient currently being followed by outpatient case management? No     Do you currently have service(s) that help you manage your care at home? No     Do you take prescription medications? Yes     Do you have prescription coverage? Yes     Do you have any problems affording any of your prescribed medications? No     Is the patient taking medications as prescribed? yes     Who is going to help you get home at discharge? Dght Miguel Angel     How do you get to doctors appointments? car, drives self     Are you on dialysis? No     Do you take coumadin? No     Discharge Plan A Home with family     Discharge Plan B Home Health     DME Needed Upon Discharge  none     Discharge Plan discussed with: Adult children     Transition of Care Barriers None        Physical Activity    On average, how many days per week do you engage in moderate to strenuous exercise (like a brisk walk)? 5 days     On average, how many minutes do you engage in exercise at this level? 30 min        Financial Resource Strain    How hard is it for you to pay for the very basics like food, housing, medical care, and heating? Not very hard        Housing Stability    In the last 12 months, was there a time when you were not able to pay the mortgage or rent on time? No     At any time in the past 12 months, were you homeless or living in a shelter (including now)? No        Transportation Needs    Has the lack of transportation kept you from medical appointments, meetings, work or from getting things needed for daily living? No        Food Insecurity    Within the past 12 months, you worried that your food would run out before you got the money to buy more. Never true     Within the past 12 months, the food you bought just didn't last and you didn't  have money to get more. Never true        Stress    Do you feel stress - tense, restless, nervous, or anxious, or unable to sleep at night because your mind is troubled all the time - these days? Only a little        Social Isolation    How often do you feel lonely or isolated from those around you?  Never        Alcohol Use    Q1: How often do you have a drink containing alcohol? Never     Q2: How many drinks containing alcohol do you have on a typical day when you are drinking? Patient does not drink     Q3: How often do you have six or more drinks on one occasion? Never        Utilities    In the past 12 months has the electric, gas, oil, or water company threatened to shut off services in your home? No        Health Literacy    How often do you need to have someone help you when you read instructions, pamphlets, or other written material from your doctor or pharmacy? Never                   Discharge Plan A and Plan B have been determined by review of patient's clinical status, future medical and therapeutic needs, and coverage/benefits for post-acute care in coordination with multidisciplinary team members.     Yessenia spoke with guilherme Michelle while Pt was in the room responding to [facility] with Yessenia's questions. Pt lives at home alone, address verified, Pt has no DME at home nor use any HH services. Pt is not on HD, was still driving and confirmed his insurance pays for his prescription medications. Pt's PCP is now Guanako Gaines and not Sami Asif, Yessenia will note to update in his chart. Sw to follow for any dc needs when medically stable.

## 2024-12-01 NOTE — ASSESSMENT & PLAN NOTE
- Urine culture no growth.  - Recommend still treating as complicated UTI with 2 weeks of empiric antibiotics.

## 2024-12-01 NOTE — PLAN OF CARE
PT eval completed- see note for details, goals and POC established.     Problem: Physical Therapy  Goal: Physical Therapy Goal  Description: Goals to be met by: 24     Patient will increase functional independence with mobility by performin. Sit to stand transfer with Modified Green  2. Bed to chair transfer with Modified Green using LRAD  3. Gait  x 150 feet with Modified Green using LRAD.   4. Lower extremity exercise program x30 reps per handout, with independence    Outcome: Progressing   2024

## 2024-12-01 NOTE — PLAN OF CARE
Yessenia placed call to [facility] Miguel Angel Jiménez at , had to leave a VM asking her to return Yessenia's call to complete dc planning assessment, will follow.

## 2024-12-01 NOTE — ASSESSMENT & PLAN NOTE
11/30 Leucocytosis trended down to 14. fever of 101F. started on ceftriaxone.   CT abdomen 10/16 - Enlarged edematous right kidney with perinephric inflammatory changes and moderate hydronephrosis consistent with pyelonephritis.. Multiple right intrarenal stones including 3 stones in the pelvis measuring 1.1 cm, 8 mm and 5 mm an 1 cm stone in the upper pole calyx and several other smaller stones. Echo pending . repeat CT abdomen. Urology consulted for  right hydronephrosis/ fever   12/1BC x2 and UC NGTD.  12/2 fever of 101F overnight. cultures sent from IR drain pending. drain output 130ml.

## 2024-12-01 NOTE — ASSESSMENT & PLAN NOTE
Patient with leucocytosis   Recent Labs   Lab 12/04/24  0243 12/04/24  1917 12/05/24  0508   WBC 14.46* 15.11* 12.75*     . Afebrile. BCX 2, urine culture pending . likely secondary to sepsis.?    11/30 Leucocytosis trended down to 14. fever of 101F. started on ceftriaxone. Echo pending  12/1 BC x2 and UC NGTD.

## 2024-12-01 NOTE — ASSESSMENT & PLAN NOTE
Patient's with microcytic anemia.. Hemoglobin downtrending. Etiology likely due to Iron deficiency.  Current CBC reviewed-    Recent Labs   Lab 12/02/24  0011 12/03/24  1058 12/04/24  0243   HGB 7.8* 8.6* 8.3*         Component Value Date/Time    MCV 82 12/04/2024 0243    RDW 18.7 (H) 12/04/2024 0243    IRON 13 (L) 11/30/2024 0918    FERRITIN 3,596 (H) 11/30/2024 0918    RETIC 0.9 11/30/2024 0918    FOLATE 12.6 11/30/2024 0918    [id number] 1087 (H) 11/30/2024 0918     Monitor CBC and transfuse if H/H drops below 7/21.        EGD 10/2/24                           Normal esophagus.                          - Z-line regular, 40 cm from the incisors.                          - Small hiatal hernia.                          - Gastritis. Biopsied.                          - Normal examined duodenum. Biopsied   Colonoscopy 10/2/24   Non-bleeding internal hemorrhoids.                          - One 3 mm polyp in the descending colon, removed                          with a cold biopsy forceps. Resected and retrieved.                          - The rectum, sigmoid colon, transverse colon,                          ascending colon, cecum, ileocecal valve and                          appendiceal orifice are normal.                          - The examined portion of the ileum was normal    VCE 10/22 Normal small bowel with no findings to explain anemia. No further GI endoscopy  recommended unless  overt bleeding occurs.     symptomatic anemia -  Hemoglobin is 7.0. Transfusion with  1 unit PRBC ordered . GI consulted   11/30 Hb 7.6  s/p PRBC transfusion . hematology evaluation. started on ferrous gluconate and Feraheme IV x 2 doses. needs hematolgoy f/u outpatient   12/1Hb 6.9. Transfusion with 1 unit of PRBC     Continue iron supplementation

## 2024-12-01 NOTE — PLAN OF CARE
Problem: Occupational Therapy  Goal: Occupational Therapy Goal  Description: Goals to be met by: 01/01/2025     Patient will increase functional independence with ADLs by performing:    UE Dressing with Secretary.  LE Dressing with Modified Secretary.  Grooming while standing at sink with Modified Secretary.  Toileting from toilet with Modified Secretary for hygiene and clothing management.   Supine to sit with Modified Secretary.  Step transfer with Modified Secretary  Toilet transfer to toilet with Modified Secretary.    Outcome: Progressing     OT eval complete & goals established.

## 2024-12-01 NOTE — PT/OT/SLP EVAL
Physical Therapy Co-Evaluation and Treatment    OT present for coeval due to pt's multiple medical comorbidities and functional/cognition deficits requiring two skilled therapists to appropriately progress pt's musculoskeletal strength, neuromuscular control, and endurance while taking into consideration medical acuity and pt safety.    Patient Name:  Felipe Jiménez   MRN:  [id number]    Recommendations:     Discharge Recommendations: Low Intensity Therapy   Discharge Equipment Recommendations: shower chair   Barriers to discharge: None    Assessment:     Felipe Jiménez is a 68 y.o. male admitted with a medical diagnosis of Iron deficiency anemia.  He presents with the following impairments/functional limitations: weakness, impaired endurance, impaired functional mobility, gait instability, impaired balance     Pt receptive and tolerated PT co-eval with OT well. Pt able to amb to the bathroom and back to bed with SBA -CGA this session. Patient currently demonstrates a need for low intensity therapy on a scheduled basis secondary to a decline in functional status due to illness.    Rehab Prognosis: Good; patient would benefit from acute skilled PT services to address these deficits and reach maximum level of function.    Recent Surgery: Procedure(s) (LRB):  CYSTOSCOPY, WITH URETERAL STENT INSERTION (Right)  DILATION, URETHRA (N/A) 1 Day Post-Op    Plan:     During this hospitalization, patient to be seen 3 x/week to address the identified rehab impairments via gait training, therapeutic activities, therapeutic exercises, neuromuscular re-education and progress toward the following goals:    Plan of Care Expires:  12/31/24    Subjective     Chief Complaint: none reported  Patient/Family Comments/goals: To get stronger  Pain/Comfort:  Pain Rating 1: 0/10  Pain Rating Post-Intervention 1: 0/10    Patients cultural, spiritual, Zoroastrian conflicts given the current situation: no    Patient History:     Living Environment: Pt  lives alone in [facility] with 1 SUSHIL. Bathroom: tub/shower combo   Prior Level of Function: mod I using SPC intermittently  DME owned: SPC  Caregiver Assistance: limited    Objective:     Communicated with RN prior to session.  Patient found HOB elevated with peripheral IV, mendez catheter, telemetry  upon PT entry to room.    General Precautions: Standard, fall  Orthopedic Precautions:N/A   Braces: N/A  Respiratory Status: Room air    Exams:  Gross Motor Coordination:  WFL  Sensation:    -       Impaired  light/touch Pt reports numbness to [place] thighs  RLE ROM: WFL  RLE Strength: WFL  LLE ROM: WFL  LLE Strength: WFL    Functional Mobility:    Bed Mobility:   Supine > Sit: stand by assistance  Sit > Supine: stand by assistance  Scooting: stand by assistance    Transfers:   Sit <> Stand Transfer: stand by assistance from EOB using rolling walker   Bed <> Chair: supervision without AD    Balance:   Sitting balance: FAIR+: Maintains balance through MINIMAL excursions of active trunk motion  Standing balance:   FAIR: Maintains without assist but unable to take challenges  FAIR+: Needs CLOSE SUPERVISION during gait and is able to right self with minor LOB                 Gait:  Distance: 10 ft x2 to bathroom and back to bed   Assistive Device: RW and no AD  Assistance Level: stand by assistance and contact guard assistance  Gait Assessment: Pt amb to the bathroom with RW and amb back to the bed without an AD. Pt amb with decreased darby but no LOB      AM-PAC 6 CLICK MOBILITY  Total Score:20       Treatment & Education:  Pt educated on tip to reduce fall risk and safety with mobility and using call button for assistance from nursing staff with OOB mobility.  Pt educated on sitting up in chair throughout most of day  Pt educated on amb 2-3x per day with assistance from staff and increased movement during hospital stay.  All questions answered within the scope of PT.  White board updated accordingly.    Patient left HOB elevated  with all lines intact, call button in reach, RN notified, and daughter present.    GOALS:   Multidisciplinary Problems       Physical Therapy Goals          Problem: Physical Therapy    Goal Priority Disciplines Outcome Interventions   Physical Therapy Goal     PT, PT/OT Progressing    Description: Goals to be met by: 24     Patient will increase functional independence with mobility by performin. Sit to stand transfer with Modified [place]  2. Bed to chair transfer with Modified [place] using LRAD  3. Gait  x 150 feet with Modified [place] using LRAD.   4. Lower extremity exercise program x30 reps per handout, with independence                         History:     Past Medical History:   Diagnosis Date    Hyperlipidemia     Male erectile dysfunction, unspecified        Past Surgical History:   Procedure Laterality Date    COLONOSCOPY N/A 10/2/2024    Procedure: COLONOSCOPY;  Surgeon: Brennan Balderrama MD;  Location: [facility];  Service: Endoscopy;  Laterality: N/A;    ESOPHAGOGASTRODUODENOSCOPY N/A 10/2/2024    Procedure: EGD (ESOPHAGOGASTRODUODENOSCOPY);  Surgeon: Brennan Balderrama MD;  Location: [facility];  Service: Endoscopy;  Laterality: N/A;    FRACTURE SURGERY      INTRALUMINAL GASTROINTESTINAL TRACT IMAGING VIA CAPSULE N/A 10/22/2024    Procedure: IMAGING PROCEDURE, GI TRACT, INTRALUMINAL, VIA CAPSULE;  Surgeon: Brennan Balderrama MD;  Location: [facility];  Service: Endoscopy;  Laterality: N/A;    LIPOMA RESECTION Left     left knee    QUADRICEPS TENDON REPAIR Left        Time Tracking:     PT Received On: 24  PT Start Time: 0939     PT Stop Time: 1012  PT Total Time (min): 33 min     Billable Minutes: Evaluation 10, Gait Training 13, and Therapeutic Activity 10      2024

## 2024-12-01 NOTE — PROGRESS NOTES
[facility]  Urology  Progress Note    Patient Name: Felipe Jiménez  MRN: [id number]  Admission Date: 11/29/2024  Hospital Length of Stay: 1 days  Code Status: Full Code   Attending Provider: Gilmar Cid MD   Primary Care Physician: Sami Asif MD    Subjective:     HPI:  Felipe Jiménez is a 68 y.o.M with history of iron deficiency anemia who has had shortness of breath with exertion who presented to the [facility] ED overnight due to weakness, dizziness, and a fall earlier this week. He was found to be anemic on presentation with hemoglobin 7.0. He was transfused 1 unit with follow up hemoglobin 7.2. Urology consulted for right hydronephrosis and fever.     On assessment, he is AFVSS, however he has been febrile to 101.1 this morning and intermittently tachycardic to 124. WBC 15 from 18, Hgb 7.2, Cr 1.2 (baseline). UA nitrite negative with many bacteria, >52 RBCs, >100 WBCs. Blood cultures NGTD, urine culture pending. He denies nausea or vomiting. Denies issues voiding, dysuria, frequency, or urgency.  CT chest abdomen pelvis form 10/16/2024 obtained for workup of anemia/weight loss showed multiple right renal stones including approximately 2-2.5 cm of stone burden in the renal pelvis with moderate to severe hydronephrosis. No ureteral stones bilaterally. Last PSA 06/2024 2.1.     Interval History: NAEON. AFVSS. CT showed perirenal abscess, stent in good position. Pain controlled. NPO for IR drain.      Objective:     Temp:  [97.9 °F (36.6 °C)-99.9 °F (37.7 °C)] 99.9 °F (37.7 °C)  Pulse:  [67-87] 77  Resp:  [10-23] 18  SpO2:  [95 %-100 %] 98 %  BP: ()/(53-66) 110/55     Body mass index is 21.29 kg/m².    Date 12/01/24 0700 - 12/02/24 0659   Shift 3945-9967 3881-8122 4464-4837 24 Hour Total   INTAKE   Shift Total(mL/kg)       OUTPUT   Urine(mL/kg/hr) 350   350   Shift Total(mL/kg) 350(5.5)   350(5.5)   Weight (kg) 63.5 63.5 63.5 63.5          Drains       Drain  Duration                  Urethral  Catheter 11/30/24 1035 <1 day                     Physical Exam  Vitals reviewed.   Constitutional:       General: He is not in acute distress.     Appearance: He is not diaphoretic.   HENT:      Head: Normocephalic and atraumatic.      Nose: Nose normal.   Eyes:      Conjunctiva/sclera: Conjunctivae normal.   Cardiovascular:      Rate and Rhythm: Normal rate.   Pulmonary:      Effort: Pulmonary effort is normal. No respiratory distress.   Abdominal:      General: There is no distension.   Genitourinary:     Comments: Katz catheter in place draining clear, yellow urine  Musculoskeletal:         General: Normal range of motion.      Cervical back: Normal range of motion.   Skin:     General: Skin is dry.   Neurological:      Mental Status: He is alert.   Psychiatric:         Behavior: Behavior normal.         Thought Content: Thought content normal.           Significant Labs:    BMP:  Recent Labs   Lab 11/29/24  1439 11/30/24  0333 12/01/24  0355    132* 135*   K 3.9 3.7 3.6    104 104   CO2 22* 21* 22*   BUN 19 19 16   CREATININE 1.3 1.2 1.3   CALCIUM 10.2 9.5 9.4       CBC:   Recent Labs   Lab 11/30/24  0333 11/30/24  0918 12/01/24  0355   WBC 14.96* 15.18* 15.95*   HGB 7.2* 7.6* 6.9*   HCT 22.6* 23.4* 21.7*   * 616* 591*       All pertinent labs results from the past 24 hours have been reviewed.    Significant Imaging:  All pertinent imaging results/findings from the past 24 hours have been reviewed.                  Assessment/Plan:     * Iron deficiency anemia  - Transfuse PRN  - Do not suspect urologic source of anemia at this time    Urethral stricture  - Needed passive dilation with the cystoscope during procedure on 11/30/24.   - Maintain Katz catheter for 3 days until Tuesday 12/3 then perform voiding trial.     Perinephric abscess  - Recommend drain placement by IR. Discussed this with IR, planning for today.  - NPO.  - Ok for diet afterwards.    Nephrolithiasis  - Significant right  renal stone burden with hydronephrosis and concern for infected urine along with fever and leukocytosis  - S/p class A right ureteral stent placement and Katz placement on 11/30/24.      Weight loss  - Last PSA 2.1 this year  - Do not suspect weight loss is secondary to advanced prostate malignancy    Fever  - On abx  - Blood cultures NGTD  - Urine culture no growth    UTI (urinary tract infection)  - Urine culture no growth.  - Recommend still treating as complicated UTI with 2 weeks of empiric antibiotics.        VTE Risk Mitigation (From admission, onward)           Ordered     IP VTE HIGH RISK PATIENT  Once         11/29/24 1638     Place sequential compression device  Until discontinued         11/29/24 1638                    Mani Nair MD  Urology  [facility]

## 2024-12-01 NOTE — PROGRESS NOTES
[facility] Medicine  Progress Note    Patient Name: Felipe Jiménez  MRN: [id number]  Patient Class: IP- Inpatient   Admission Date: 11/29/2024  Length of Stay: 1 days  Attending Physician: Gilmar Cid MD  Primary Care Provider: Sami Asif MD        Subjective:     Principal Problem:Iron deficiency anemia        HPI:  Felipe Moffett is a 67 Yo male with PMH of Iron deficiency anemia, hyperlipidemia presents ED with dizziness, generalized weakness and shortness of breath with exertion for the past 6 months.     AAOX3. c/o generalized weakness and shortness of breath with exertion for the past 6 months - progressively worsening.  reports he had  2 falls recently due to weakness.  denies loss of consciousness.   Followed by GI for LENORA and underwent recent colonoscopy, upper GI as well as video capsule endoscopy.  Reports occasional bright red blood when he wipes but otherwise denies any other abnormal bleeding.  50 lb weight loss over the past year,  initially on purpose as he was over 200 lb but mentions he had unintentional weight loss as well.   had iron-deficiency anemia since childhood was previously on iron supplementation which he discontinued a year ago in favor of dietary changes. Denies any leg swelling. currently not on any anticoagulation. reports   occasional bright red blood when wiping but denies any melena.  on ASA 81 mg daily. admits taking tylenol and meloxicam intermittently for arthritis     EGD 10/2/24                           Normal esophagus.                          - Z-line regular, 40 cm from the incisors.                          - Small hiatal hernia.                          - Gastritis. Biopsied.                          - Normal examined duodenum. Biopsied   Colonoscopy 10/2/24   Non-bleeding internal hemorrhoids.                          - One 3 mm polyp in the descending colon, removed                          with a cold biopsy forceps. Resected and  retrieved.                          - The rectum, sigmoid colon, transverse colon,                          ascending colon, cecum, ileocecal valve and                          appendiceal orifice are normal.                          - The examined portion of the ileum was normal    VCE 10/22 Normal small bowel with no findings to explain anemia. No further GI endoscopy  recommended unless  overt bleeding occurs.     ER course - .  Hemoglobin is 7.0. Transfusion with  1 unit PRBC ordered . leukocytosis of 18 today but denies any infectious symptoms. UA + . UC pending.     Overview/Hospital Course:  11/30 CT head - No acute intracranial pathology.  Specifically no evidence of intracranial hemorrhage or major vascular distribution infarct. Mild generalized cerebral volume loss DVT sonogram -No imaging evidence of deep venous thrombosis in either lower extremity. Hb 7.2  s/p PRBC transfusion . GI no plan for further work up unless overt bleed. Hematology consulted for recurrent iron deficiency anemia/ s/p GI work up and weight loss.  Leucocytosis trended down to 14. fever of 101F. CT abdomen 10/16 - Enlarged edematous right kidney with perinephric inflammatory changes and moderate hydronephrosis consistent with pyelonephritis.. Multiple right intrarenal stones including 3 stones in the pelvis measuring 1.1 cm, 8 mm and 5 mm an 1 cm stone in the upper pole calyx and several other smaller stones. Echo pending . repeat CT abdomen.  Urology eval - s/p Membranous urethral stricture passively dilated with scope, patient had  right ureteral stent placement and Katz placement . CT abdomen today -. Enlarging mixed cystic and solid appearance of inferior pole of the right kidney with invasion into the right psoas muscle, concerning for developing abscess in setting of pyelonephritis. Incompletely characterized without IV contrast. repeat CT Abdomen with IV contrast - enlarged right kidney with multiple hypodense parenchymal  collections, largest collection extending inferiorly and posteriorly with involvement of the right psoas muscle/posterior chest wall. 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 IR evaluation.  started on IV Zosyn and vancomycin  12/1 IR drain placement today. Hb 6.9. BC x2 and UC NGTD. Transfusion with 1 unit of PRBC         Review of Systems:   Pain scale:   Constitutional:  fever,  chills, headache, vision loss, hearing loss, weight loss, Generalized weakness, falls, loss of smell, loss of taste, poor appetite,  sore throat, weight loss  Respiratory: cough, shortness of breath.   Cardiovascular: chest pain, dizziness, palpitations, orthopnea, swelling of feet, syncope  Gastrointestinal: nausea, vomiting, abdominal pain, diarrhea, black stool,  blood in stool, change in bowel habits, constipation  Genitourinary: hematuria, dysuria, urgency, frequency  Integument/Breast: rash,  pruritis  Hematologic/Lymphatic: easy bruising, lymphadenopathy  Musculoskeletal: arthralgias , myalgias, back pain, neck pain, knee pain  Neurological: confusion, seizures, tremors, slurred speech  Behavioral/Psych:  depression, anxiety, auditory or visual hallucinations     OBJECTIVE:     Physical Exam:  Body mass index is 21.29 kg/m².  Constitutional: Appears  thin built   Head: Normocephalic and atraumatic.   Neck: Normal range of motion. Neck supple.   Cardiovascular: Normal heart rate.  Regular heart rhythm.  Pulmonary/Chest: Effort normal.   Abdominal: No distension.  No tenderness  Musculoskeletal: Normal range of motion. No edema.   Neurological: Alert and oriented to person, place, and time. able to move bilateral upper and lower extremities without limitation   Skin: Skin is warm and dry.   Psychiatric: Normal mood and affect. Behavior is normal.       Vital Signs  Temp: 98 °F (36.7 °C) (12/01/24 1105)  Pulse: 83 (12/01/24  "1105)  Resp: 18 (12/01/24 1105)  BP: (!) 103/56 (12/01/24 1105)  SpO2: 97 % (12/01/24 1105)     24 Hour VS Range    Temp:  [97.9 °F (36.6 °C)-99.9 °F (37.7 °C)]   Pulse:  [67-87]   Resp:  [16-18]   BP: ()/(55-66)   SpO2:  [95 %-99 %]     Intake/Output Summary (Last 24 hours) at 12/1/2024 1116  Last data filed at 12/1/2024 0715  Gross per 24 hour   Intake --   Output 750 ml   Net -750 ml         I/O This Shift:  I/O this shift:  In: -   Out: 350 [Urine:350]    Wt Readings from Last 3 Encounters:   11/30/24 63.5 kg (140 lb)   11/25/24 63.5 kg (140 lb)   10/09/24 67 kg (147 lb 11.3 oz)       I have personally reviewed the vitals and recorded Intake/Output     Laboratory/Diagnostic Data:    CBC/Anemia Labs: Coags:    Recent Labs   Lab 11/30/24  0918 12/01/24  0355 12/01/24  0814   WBC 15.18* 15.95* 16.67*   HGB 7.6* 6.9* 7.2*   HCT 23.4* 21.7* 22.1*   * 591* 571*   MCV 78* 77* 77*   RDW 17.3* 17.2* 17.3*   IRON 13*  --   --    FERRITIN 3,596*  --   --    RETIC 0.9  --   --    FOLATE 12.6  --   --    [id number] 1087*  --   --     Recent Labs   Lab 12/01/24  1024   INR 1.4*        Chemistries: ABG:   Recent Labs   Lab 11/29/24  1439 11/30/24  0333 12/01/24  0355    132* 135*   K 3.9 3.7 3.6    104 104   CO2 22* 21* 22*   BUN 19 19 16   CREATININE 1.3 1.2 1.3   CALCIUM 10.2 9.5 9.4   PROT 8.8* 7.9 7.5   BILITOT 0.5 1.0 0.7   ALKPHOS 131 130 133   ALT 24 23 26   AST 45* 33 39   MG  --  1.9 2.0   PHOS  --  3.1 3.6    No results for input(s): "PH", "PCO2", "PO2", "HCO3", "POCSATURATED", "BE" in the last 168 hours.     POCT Glucose: HbA1c:    No results for input(s): "POCTGLUCOSE" in the last 168 hours. Hemoglobin A1C   Date Value Ref Range Status   06/20/2024 6.1 (H) 4.0 - 5.6 % Final     Comment:     ADA Screening Guidelines:  5.7-6.4%  Consistent with prediabetes  >or=6.5%  Consistent with diabetes    High levels of fetal hemoglobin interfere with the HbA1C  assay. Heterozygous hemoglobin variants " "(HbS, HgC, etc)do  not significantly interfere with this assay.   However, presence of multiple variants may affect accuracy.     06/01/2023 5.8 (H) 4.0 - 5.6 % Final     Comment:     ADA Screening Guidelines:  5.7-6.4%  Consistent with prediabetes  >or=6.5%  Consistent with diabetes    High levels of fetal hemoglobin interfere with the HbA1C  assay. Heterozygous hemoglobin variants (HbS, HgC, etc)do  not significantly interfere with this assay.   However, presence of multiple variants may affect accuracy.          Cardiac Enzymes: Ejection Fractions:    Recent Labs     11/29/24  1439   TROPONINI <0.006  0.007    EF   Date Value Ref Range Status   11/30/2024 50 % Final          Recent Labs   Lab 11/29/24  1529   COLORU Orange*   APPEARANCEUA Cloudy*   PHUR 6.0   SPECGRAV 1.020   PROTEINUA 2+*   GLUCUA Negative   KETONESU Negative   BILIRUBINUA Negative   OCCULTUA 2+*   NITRITE Negative   LEUKOCYTESUR 3+*   RBCUA 52*   WBCUA >100*   BACTERIA Many*   HYALINECASTS 0       No results found for: "PROCAL", "LACTATE"  BNP (pg/mL)   Date Value   11/29/2024 92     No results found for: "CRP", "SEDRATE"  D-Dimer (mg/L FEU)   Date Value   11/29/2024 2.07 (H)     Ferritin (ng/mL)   Date Value   11/30/2024 3,596 (H)   07/01/2024 424 (H)     LD (U/L)   Date Value   11/30/2024 179     Troponin I (ng/mL)   Date Value   11/29/2024 0.007   11/29/2024 <0.006     No results found for this or any previous visit.  No results found for: "[id number]"    Microbiology labs for the last week  Microbiology Results (last 7 days)       Procedure Component Value Units Date/Time    Urine culture [[id number]] Collected: 11/29/24 1529    Order Status: Completed Specimen: Urine Updated: 11/30/24 1943     Urine Culture, Routine No growth    Narrative:      Specimen Source->Urine    Blood culture [[id number]] Collected: 11/29/24 1644    Order Status: Completed Specimen: Blood from Peripheral, Antecubital, Right Updated: 11/30/24 1812     Blood " Culture, Routine No Growth to date      No Growth to date    Blood culture [[id number]] Collected: 11/29/24 1644    Order Status: Completed Specimen: Blood from Peripheral, Antecubital, Right Updated: 11/30/24 1812     Blood Culture, Routine No Growth to date      No Growth to date    Culture, Anaerobe [[id number]]     Order Status: No result Specimen: Abscess from Kidney, Right     Aerobic culture [[id number]]     Order Status: No result Specimen: Abscess from Kidney, Right             Reviewed and noted in plan where applicable- Please see chart for full lab data.    Lines/Drains:       Peripheral IV - Single Lumen 11/29/24 1439 20 G Left Antecubital (Active)   Site Assessment Clean;Dry;Intact;No redness;No swelling 11/29/24 2100   Extremity Assessment Distal to IV No abnormal discoloration;No redness;No swelling;No warmth 11/29/24 2100   Dressing Status Clean;Dry;Intact 11/29/24 2100   Dressing Intervention Integrity maintained 11/29/24 2100   Number of days: 0            Peripheral IV - Single Lumen 11/29/24 1646 20 G Right Antecubital (Active)   Site Assessment Clean;Dry;Intact;No redness;No swelling 11/29/24 2100   Extremity Assessment Distal to IV No abnormal discoloration;No redness;No swelling;No warmth 11/29/24 2100   Line Status Saline locked 11/29/24 2100   Dressing Status Clean;Dry;Intact 11/29/24 2100   Dressing Intervention Integrity maintained 11/29/24 2100   Number of days: 0       Imaging  ECG Results              EKG 12-lead (Final result)        Collection Time Result Time QRS Duration OHS QTC Calculation    11/29/24 13:31:06 11/29/24 13:43:01 84 432                     Final result by Interface, Lab In [facility] (11/29/24 13:43:09)                   Narrative:    Test Reason : R53.1,    Vent. Rate :  93 BPM     Atrial Rate :  93 BPM     P-R Int : 128 ms          QRS Dur :  84 ms      QT Int : 348 ms       P-R-T Axes :  68  41  41 degrees    QTcB Int : 432 ms    Normal sinus rhythm  Normal ECG  No  previous ECGs available  Confirmed by Dain Barajas ([id number]) on 11/29/2024 1:42:58 PM    Referred By:            Confirmed By: Dain Barajas                                    No results found for this or any previous visit.      CT Abdomen Pelvis With IV Contrast NO Oral Contrast  Narrative: EXAMINATION:  CT ABDOMEN PELVIS WITH IV CONTRAST    CLINICAL HISTORY:  Renal abscess;    TECHNIQUE:  Axial images of the abdomen and pelvis were acquired  after the use of 100 cc Omnipaque 350 IV contrast. No oral contrast was administered.  Coronal and sagittal reconstructions were also obtained.    COMPARISON:  CT abdomen pelvis same date.  CT chest abdomen pelvis 10/16/2024.    FINDINGS:  LUNG BASES: Partially imaged heart and pericardium are within normal limits.  Unchanged thin-walled cyst in the right middle lobe, unchanged.  Otherwise, lung bases are clear.    HEPATOBILIARY: Subcentimeter hypodensity in the posterior right hepatic lobe, too small for characterization.  Stable suspected small cyst in the left lobe.  Gallbladder appears normal.  No bile duct dilatation.    SPLEEN: Normal size.    PANCREAS: No focal masses or ductal dilatation.    ADRENALS: No adrenal nodules.    KIDNEYS/URETERS: Enlarged right kidney demonstrating a delayed nephrogram, marked perinephric stranding, and multiple hypodense parenchymal collections with subtle rim enhancement.  The largest of these collections is multi-septated and originates along the inferior aspect of the right kidney extending inferiorly along the retroperitoneum and invading the adjacent right psoas muscle and posterior abdominal wall.  This collection measures approximately 7 x 8 x 6 cm.  There is a right kidney double-J stent in place.  There are scattered parenchymal calcifications and/or renal stones in the right kidney, largest measuring 1.1 cm.  The left kidney demonstrates no hydronephrosis.  There is a punctate renal calculus in the lower left kidney.  Left ureter is  normal in course and caliber.  Similar minimal nonspecific left perinephric stranding.    BLADDER/PELVIC ORGANS: Similar circumferential bladder wall thickening.  Katz catheter is visualized.  There is moderate volume air in the urinary bladder, which may be secondary to instrumentation.    PERITONEUM / RETROPERITONEUM: No ascites.  Grossly similar suspected trace volume non organized fluid tracking along the right retroperitoneum/pararenal space.  No free air.  No additional organized abdominopelvic fluid collections elsewhere.    LYMPH NODES: No lymphadenopathy.    VESSELS: Mild calcified atherosclerosis.    GI TRACT: No distention or wall thickening.  Appendix is normal.    SOFT TISSUES: Stable appearance of the abdominopelvic soft tissues.    BONES: Degenerative changes throughout the lumbar spine.  No osseous lesions.  Impression: Redemonstrated findings of enlarged right kidney with multiple hypodense parenchymal collections, largest collection extending inferiorly and posteriorly with involvement of the right psoas muscle/posterior chest wall.  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    Grossly stable other findings in the body of the report when compared to unenhanced CT study performed earlier same day at 11:30 hours.    This report was flagged in Epic as abnormal.    Electronically signed by resident: Truman De La Rosa  Date:    11/30/2024  Time:    17:34    Electronically signed by: Spike Zepeda MD  Date:    11/30/2024  Time:    18:11  Echo    Left Ventricle: The left ventricle is normal in size. Ventricular mass   is normal. Normal wall thickness. Low normal ro slight  global hypokinesis   present. There is low normal to mildly reduced systolic function with a   visually estimated ejection fraction of 50 - 55%. Ejection fraction is   approximately 50%. Quantitated ejection fraction is 49%. There is normal    diastolic function.    Right Ventricle: Normal right ventricular cavity size. Wall thickness   is normal. Systolic function is normal.    Aortic Valve: There is aortic valve sclerosis. There is mild aortic   regurgitation.    Mitral Valve: There is mild regurgitation.    Tricuspid Valve: There is mild to moderate regurgitation.    Pulmonary Artery: The estimated pulmonary artery systolic pressure is   29 mmHg.    IVC/SVC: Normal venous pressure at 3 mmHg.  CT Abdomen Pelvis  Without Contrast  Narrative: EXAMINATION:  CT ABDOMEN PELVIS WITHOUT CONTRAST    CLINICAL HISTORY:  fever;    TECHNIQUE:  Low dose axial images, sagittal and coronal reformations were obtained from the lung bases to the pubic symphysis.  Oral contrast was not administered.    COMPARISON:  CT chest abdomen pelvis 10/16/2024    FINDINGS:  Evaluation of soft tissue and vascular structures is limited due to lack of intravenous contrast.    SOFT TISSUES: Unremarkable.    LUNG BASES/VISUALIZED MEDIASTINUM: Bibasilar dependent atelectasis.  Unchanged right middle lobe lung cyst.  Visualized heart is normal size without evidence pericardial effusion.  Hypoattenuation of the mediastinal blood pool which may be seen the setting of anemia.    HEPATOBILIARY: Liver is normal size.  Fluid attenuating lesion in the left hepatic lobe, likely represents cyst.  No biliary ductal dilatation. Normal gallbladder.    PANCREAS: No focal masses or ductal dilatation.    SPLEEN: Normal size.    ADRENALS: No adrenal nodules.    KIDNEYS/URETERS: Interval placement of right double-J ureteral stent.  Right kidney is enlarged with numerous hypoattenuating regions within the renal medulla.  Along the lower pole of the right kidney, there is an ill-defined 4.0 x 8.6 cm hypoattenuating lesion with adjacent inflammatory stranding an apparent invasion into the right psoas muscle.  No anu right-sided hydronephrosis.  Several calcifications throughout the right kidney, likely  stones.  Punctate nonobstructive stone in the inferior pole of left kidney.  Otherwise, left kidney is unremarkable.    BLADDER/PELVIC ORGANS: Bladder is decompressed around a Katz catheter.  Dystrophic prostate calcification.    PERITONEUM / RETROPERITONEUM: No free air or fluid.    LYMPH NODES: No lymphadenopathy.    VESSELS: Aorta maintains normal course and caliber.  Mild aortoiliac calcific atherosclerosis.    GI TRACT: Stomach and duodenum are unremarkable.  No evidence of bowel obstruction or inflammation.  Appendix is not definitively visualized..    BONES: Degenerative changes of the spine and hips.  No acute fractures or suspicious osseous lesions.  Impression: 1. Enlarged right kidney with adjacent inflammatory change and several hypoattenuating regions throughout the renal medulla without typical appearance of hydronephrosis interval placement of right-sided double-J ureteral stent.  Enlarging mixed cystic and solid appearance of inferior pole of the right kidney with invasion into the right psoas muscle, concerning for developing abscess in setting of pyelonephritis.  Incompletely characterized without IV contrast.  Recommend clinical correlation.  2. Additional findings as above.  This report was flagged in Epic as abnormal.    Electronically signed by resident: Panchito Goel  Date:    11/30/2024  Time:    11:43    Electronically signed by: Garo Covarrubias  Date:    11/30/2024  Time:    12:28  SURG FL Surgery Fluoro Usage  See OP Notes for results.     IMPRESSION: See OP Notes for results.     This procedure was auto-finalized by: Virtual Radiologist  US Lower Extremity Veins Bilateral  Narrative: EXAMINATION:  US LOWER EXTREMITY VEINS BILATERAL    CLINICAL HISTORY:  r/o DVT;    TECHNIQUE:  Duplex and color flow Doppler and dynamic compression was performed of the bilateral lower extremity veins was performed.    COMPARISON:  CT chest abdomen pelvis 10/16/2024    FINDINGS:  Right thigh veins: The  common femoral, femoral, popliteal, upper greater saphenous, and deep femoral veins are patent and free of thrombus. The veins are normally compressible and have normal phasic flow and augmentation response.    Right calf veins: The visualized calf veins are patent.    Left thigh veins: The common femoral, femoral, popliteal, upper greater saphenous, and deep femoral veins are patent and free of thrombus. The veins are normally compressible and have normal phasic flow and augmentation response.    Left calf veins: The visualized calf veins are patent.    Miscellaneous: None  Impression: No imaging evidence of deep venous thrombosis in either lower extremity.    Electronically signed by resident: Mayito Martinez  Date:    11/29/2024  Time:    23:32    Electronically signed by: Aramis Hopson  Date:    11/30/2024  Time:    02:51  CT Head Without Contrast  Narrative: EXAMINATION:  CT HEAD WITHOUT CONTRAST    CLINICAL HISTORY:  fall/ head trauma;    TECHNIQUE:  Low dose axial CT images obtained throughout the head without the use of intravenous contrast.  Axial, sagittal and coronal reconstructions were performed.    COMPARISON:  None    FINDINGS:  Mild generalized cerebral volume loss with dilation of the ventricles and sulci.  No evidence of hydrocephalus.  Basal cisterns are patent.  Few punctate hypodensities with in the bilateral periventricular white matter favored to represent prominent perivascular spaces.  No new hemorrhage, edema, or acute major vascular distribution infarct.  No mass effect or midline shift.    No new extra-axial blood or fluid collections.    No displaced calvarial fracture.    Mastoid air cells and paranasal sinuses are essentially clear.  Impression: No acute intracranial pathology.  Specifically no evidence of intracranial hemorrhage or major vascular distribution infarct.    Mild generalized cerebral volume loss.    Electronically signed by resident: Mayito  Michelle  Date:    11/30/2024  Time:    01:04    Electronically signed by: Aramis Hopson  Date:    11/30/2024  Time:    02:25      Labs, Imaging, EKG and Diagnostic results from 12/1/2024 were reviewed.    Medications:  Medication list was reviewed and changes noted under Assessment/Plan.  No current facility-administered medications on file prior to encounter.     Current Outpatient Medications on File Prior to Encounter   Medication Sig Dispense Refill    aspirin 81 MG Chew Take 81 mg by mouth once daily.      diazePAM (VALIUM) 5 MG tablet TAKE 1 TABLET BY MOUTH EVERY 12 HOURS AS NEEDED FOR ANXIETY 30 tablet 0    meloxicam (MOBIC) 7.5 MG tablet Take 1 tablet by mouth twice daily 60 tablet 0    pantoprazole (PROTONIX) 40 MG tablet Take 1 tablet (40 mg total) by mouth once daily. 90 tablet 3    rosuvastatin (CRESTOR) 20 MG tablet Take 1 tablet (20 mg total) by mouth once daily. 90 tablet 3    sildenafiL (VIAGRA) 100 MG tablet Take 1 tablet (100 mg total) by mouth daily as needed for Erectile Dysfunction. 10 tablet 3    triamcinolone acetonide 0.1% (KENALOG) 0.1 % cream APPLY  CREAM EXTERNALLY TWICE DAILY AS NEEDED FOR  DERMATITIS 45 g 0    zolpidem (AMBIEN) 5 MG Tab Take 1 tablet (5 mg total) by mouth nightly as needed (insomnia). 30 tablet 0     Scheduled Medications:  Current Facility-Administered Medications   Medication Dose Route Frequency    atorvastatin  80 mg Oral Daily    ferrous gluconate  324 mg Oral BID WM    pantoprazole  40 mg Oral Daily    piperacillin-tazobactam (Zosyn) IV (PEDS and ADULTS) (extended infusion is not appropriate)  4.5 g Intravenous Q8H    vancomycin (VANCOCIN) IV (PEDS and ADULTS)  20 mg/kg Intravenous Q24H     PRN:   Current Facility-Administered Medications:     0.9%  NaCl infusion (for blood administration), , Intravenous, Q24H PRN    0.9%  NaCl infusion (for blood administration), , Intravenous, Q24H PRN    acetaminophen, 650 mg, Oral, Q6H PRN    dextrose 10%, 12.5 g, Intravenous,  PRN    dextrose 10%, 12.5 g, Intravenous, PRN    dextrose 10%, 25 g, Intravenous, PRN    dextrose 10%, 25 g, Intravenous, PRN    fentaNYL, 25 mcg, Intravenous, Q5 Min PRN    glucagon (human recombinant), 1 mg, Intramuscular, PRN    glucagon (human recombinant), 1 mg, Intramuscular, PRN    glucose, 16 g, Oral, PRN    glucose, 24 g, Oral, PRN    haloperidol lactate, 0.5 mg, Intravenous, Q10 Min PRN    naloxone, 0.02 mg, Intravenous, PRN    oxyCODONE, 5 mg, Oral, Q6H PRN    sodium chloride 0.9%, 10 mL, Intravenous, Q12H PRN    sodium chloride 0.9%, 10 mL, Intravenous, PRN    Pharmacy to dose Vancomycin consult, , , Once **AND** vancomycin - pharmacy to dose, , Intravenous, pharmacy to manage frequency  Infusions:    lactated ringers   Intravenous Continuous         Estimated Creatinine Clearance: 47.5 mL/min (based on SCr of 1.3 mg/dL).             Assessment/Plan:      * Iron deficiency anemia    Patient's with microcytic anemia.. Hemoglobin downtrending. Etiology likely due to Iron deficiency.  Current CBC reviewed-    Recent Labs   Lab 11/30/24  0333 11/30/24  0918 12/01/24  0355   HGB 7.2* 7.6* 6.9*         Component Value Date/Time    MCV 77 (L) 12/01/2024 0355    RDW 17.2 (H) 12/01/2024 0355    IRON 13 (L) 11/30/2024 0918    FERRITIN 3,596 (H) 11/30/2024 0918    RETIC 0.9 11/30/2024 0918    FOLATE 12.6 11/30/2024 0918    YNFKPPEX17 1087 (H) 11/30/2024 0918     Monitor CBC and transfuse if H/H drops below 7/21.        EGD 10/2/24                           Normal esophagus.                          - Z-line regular, 40 cm from the incisors.                          - Small hiatal hernia.                          - Gastritis. Biopsied.                          - Normal examined duodenum. Biopsied   Colonoscopy 10/2/24   Non-bleeding internal hemorrhoids.                          - One 3 mm polyp in the descending colon, removed                          with a cold biopsy forceps. Resected and retrieved.                           - The rectum, sigmoid colon, transverse colon,                          ascending colon, cecum, ileocecal valve and                          appendiceal orifice are normal.                          - The examined portion of the ileum was normal    VCE 10/22 Normal small bowel with no findings to explain anemia. No further GI endoscopy  recommended unless  overt bleeding occurs.     symptomatic anemia -  Hemoglobin is 7.0. Transfusion with  1 unit PRBC ordered . GI consulted   11/30 Hb 7.6  s/p PRBC transfusion . hematology evaluation. started on ferrous gluconate and Feraheme IV x 2 doses. needs hematolgoy f/u outpatient   12/1Hb 6.9. Transfusion with 1 unit of PRBC     Perinephric abscess  11/30 CT abdomen today -. Enlarging mixed cystic and solid appearance of inferior pole of the right kidney with invasion into the right psoas muscle, concerning for developing abscess in setting of pyelonephritis. Incompletely characterized without IV contrast. repeat CT Abdomen with IV contrast -enlarged right kidney with multiple hypodense parenchymal collections, largest collection extending inferiorly and posteriorly with involvement of the right psoas muscle/posterior chest wall. 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 . IR evaluation.  started on IV Zosyn and vancomycin  12/1 IR drain placement today.     Hydronephrosis of right kidney  CT abdomen 10/16 - Enlarged edematous right kidney with perinephric inflammatory changes and moderate hydronephrosis consistent with pyelonephritis.. Multiple right intrarenal stones including 3 stones in the pelvis measuring 1.1 cm, 8 mm and 5 mm an 1 cm stone in the upper pole calyx and several other smaller stones. Echo pending . repeat CT abdomen.  Urology eval - right ureteral stent placement today      Nephrolithiasis  11/30 CT abdomen 10/16 - Enlarged edematous right  kidney with perinephric inflammatory changes and moderate hydronephrosis consistent with pyelonephritis.. Multiple right intrarenal stones including 3 stones in the pelvis measuring 1.1 cm, 8 mm and 5 mm an 1 cm stone in the upper pole calyx and several other smaller stones. Echo pending . repeat CT abdomen.  Urology eval -  Urology eval - s/p Membranous urethral stricture passively dilated with scope, patient had  right ureteral stent placement and Katz placement       Weight loss  Nutrition consulted. Most recent weight and BMI monitored-     Measurements:  Wt Readings from Last 1 Encounters:   11/30/24 63.5 kg (140 lb)   Body mass index is 21.29 kg/m².    Patient has been screened and assessed by RD.    Malnutrition Type:  Context:    Level:      Malnutrition Characteristic Summary:       Interventions/Recommendations (treatment strategy):       11/30 GI no plan for further work up unless overt bleed. Hematology consulted for recurrent iron deficiency anemia/ s/p GI work up and weight loss.      Fever  11/30 Leucocytosis trended down to 14. fever of 101F. started on ceftriaxone.   CT abdomen 10/16 - Enlarged edematous right kidney with perinephric inflammatory changes and moderate hydronephrosis consistent with pyelonephritis.. Multiple right intrarenal stones including 3 stones in the pelvis measuring 1.1 cm, 8 mm and 5 mm an 1 cm stone in the upper pole calyx and several other smaller stones. Echo pending . repeat CT abdomen. Urology consulted for  right hydronephrosis/ fever   12/1BC x2 and UC NGTD.    Falls frequently  secondary to above. fall precautions. PT/OT consulted   reported 2 falls in the last week. one with forehead trauma. CT head wo contrast ordered   11/30  CT head - No acute intracranial pathology.  Specifically no evidence of intracranial hemorrhage or major vascular distribution infarct. Mild generalized cerebral volume loss     Dizziness  likely from symptomatic anemia. orthostasis + by  pulse. encourage oral hydration. monitor after PRBC. echo ordered . telemetry r/o arrhythmia     11/30 orthostatic by pulse. received LR       Positive D dimer  D dimer elevated at 2.07 . DVT sonogram- No imaging evidence of deep venous thrombosis in either lower extremity.       UTI (urinary tract infection)  UA + . UC pending.   12/1  BC x2 and UC NGTD.     Leucocytosis    Patient with leucocytosis   Recent Labs   Lab 11/30/24  0333 11/30/24  0918 12/01/24  0355   WBC 14.96* 15.18* 15.95*     . Afebrile. BCX 2, urine culture pending . likely secondary to sepsis.?    11/30 Leucocytosis trended down to 14. fever of 101F. started on ceftriaxone. Echo pending  12/1 BC x2 and UC NGTD.     Hypercholesterolemia  continue crestor         VTE Risk Mitigation (From admission, onward)           Ordered     IP VTE HIGH RISK PATIENT  Once         11/29/24 1638     Place sequential compression device  Until discontinued         11/29/24 1638                    Discharge Planning   LYNN: 12/3/2024     Code Status: Full Code   Is the patient medically ready for discharge?:     Reason for patient still in hospital (select all that apply): Treatment                     Gilmar Cid MD  Department of Hospital Medicine   [facility]

## 2024-12-01 NOTE — SUBJECTIVE & OBJECTIVE
Interval History: NAEON. AFVSS. CT showed perirenal abscess, stent in good position. Pain controlled. NPO for IR drain.      Objective:     Temp:  [97.9 °F (36.6 °C)-99.9 °F (37.7 °C)] 99.9 °F (37.7 °C)  Pulse:  [67-87] 77  Resp:  [10-23] 18  SpO2:  [95 %-100 %] 98 %  BP: ()/(53-66) 110/55     Body mass index is 21.29 kg/m².    Date 12/01/24 0700 - 12/02/24 0659   Shift 3235-2110 0307-1541 8245-6603 24 Hour Total   INTAKE   Shift Total(mL/kg)       OUTPUT   Urine(mL/kg/hr) 350   350   Shift Total(mL/kg) 350(5.5)   350(5.5)   Weight (kg) 63.5 63.5 63.5 63.5          Drains       Drain  Duration                  Urethral Catheter 11/30/24 1035 <1 day                     Physical Exam  Vitals reviewed.   Constitutional:       General: He is not in acute distress.     Appearance: He is not diaphoretic.   HENT:      Head: Normocephalic and atraumatic.      Nose: Nose normal.   Eyes:      Conjunctiva/sclera: Conjunctivae normal.   Cardiovascular:      Rate and Rhythm: Normal rate.   Pulmonary:      Effort: Pulmonary effort is normal. No respiratory distress.   Abdominal:      General: There is no distension.   Genitourinary:     Comments: Katz catheter in place draining clear, yellow urine  Musculoskeletal:         General: Normal range of motion.      Cervical back: Normal range of motion.   Skin:     General: Skin is dry.   Neurological:      Mental Status: He is alert.   Psychiatric:         Behavior: Behavior normal.         Thought Content: Thought content normal.           Significant Labs:    BMP:  Recent Labs   Lab 11/29/24  1439 11/30/24  0333 12/01/24  0355    132* 135*   K 3.9 3.7 3.6    104 104   CO2 22* 21* 22*   BUN 19 19 16   CREATININE 1.3 1.2 1.3   CALCIUM 10.2 9.5 9.4       CBC:   Recent Labs   Lab 11/30/24  0333 11/30/24  0918 12/01/24  0355   WBC 14.96* 15.18* 15.95*   HGB 7.2* 7.6* 6.9*   HCT 22.6* 23.4* 21.7*   * 616* 591*       All pertinent labs results from the past 24  hours have been reviewed.    Significant Imaging:  All pertinent imaging results/findings from the past 24 hours have been reviewed.

## 2024-12-01 NOTE — PT/OT/SLP EVAL
Occupational Therapy   Co-Evaluation  Co-evaluation/treatment performed due to suspected deficits requiring two skilled therapists to appropriately and safely assess patient's strength and endurance while facilitating functional tasks in addition to accommodating for patient's activity tolerance.        Name: Felipe Jiménez  MRN: [id number]  Admitting Diagnosis: Iron deficiency anemia  Recent Surgery: Procedure(s) (LRB):  CYSTOSCOPY, WITH URETERAL STENT INSERTION (Right)  DILATION, URETHRA (N/A) 1 Day Post-Op    Recommendations:     Discharge Recommendations: No Therapy Indicated  Discharge Equipment Recommendations:  none  Barriers to discharge:  None    Assessment:     Felipe Jiménez is a 68 y.o. male with a medical diagnosis of Iron deficiency anemia.  He presents with the following. Performance deficits affecting function: weakness, impaired endurance, impaired functional mobility, impaired self care skills.      Pt agreeable to therapy and tolerated fairly well. However, pt remains minimally limited in ADLs, functional mobility, and functional transfers and is currently not performing tasks at PLOF. Pt would continue to benefit from skilled OT services to maximize functional independence with ADLs and functional mobility, reduce caregiver burden, and facilitate safe discharge in the least restrictive environment.      Rehab Prognosis: Good; patient would benefit from acute skilled OT services to address these deficits and reach maximum level of function.       Plan:     Patient to be seen 3 x/week to address the above listed problems via self-care/home management, therapeutic activities, therapeutic exercises  Plan of Care Expires: 01/01/25  Plan of Care Reviewed with: patient    Subjective     Chief Complaint: none reported  Patient/Family Comments/goals: regain PLOF    Occupational Profile:  Living Environment: pt lives alone in a [facility] with 1 SUSHIL. Bathroom: tub/shower combo  Previous level of function:  Independent  Roles and Routines: pt enjoys going to the gym  Equipment Used at Home: cane, straight  Assistance upon Discharge: limited    Pain/Comfort:  Pain Rating 1: 0/10    Patients cultural, spiritual, Mormon conflicts given the current situation: no    Objective:     Communicated with: RN prior to session.  Patient found HOB elevated with peripheral IV, mendez catheter upon OT entry to room.    General Precautions: Standard, fall  Orthopedic Precautions: N/A  Braces: N/A  Respiratory Status: Room air    Occupational Performance:    Bed Mobility:    Patient completed Scooting to EOB with stand by assistance    Patient completed Supine to Sit with stand by assistance    Patient completed Sit to Supine with stand by assistance    Functional Mobility/Transfers:  Patient completed 2 Sit <> Stand Transfers  1st STS (from EOB) with stand by assistance  with  rolling walker   2nd STS(from toilet) with supervision using no RW    Patient completed Toilet Transfer Step Transfer technique with stand by assistance with  rolling walker    Functional Mobility: pt ambulated in room to simulate household environment to improve overall strength, coordination, activity tolerance & safety awareness required for participation/independence wit MRADLs/IADLs  Pt ambulated from EOB<->toilet with SBA using RW  Pt utilized RW to bathroom but ambulated back to bed with NO AD    Activities of Daily Living:  Lower Body Dressing: stand by assistance to dof/don socks while seated EOB    Cognitive/Visual Perceptual:  Cognitive/Psychosocial Skills:     -       Oriented to: Place   -       Follows Commands/attention:Follows multistep  commands  -       Safety awareness/insight to disability: intact   -       Mood/Affect/Coping skills/emotional control: Pleasant    Physical Exam:  Dominant hand: -       RIght  Upper Extremity Range of Motion:     -       Right Upper Extremity: WFL  -       Left Upper Extremity: WFL  Upper Extremity Strength:     -       Right Upper Extremity: WFL  -       Left Upper Extremity: WFL   Strength: -       Right Upper Extremity: WFL  -       Left Upper Extremity: WFL  Fine Motor Coordination:    -       Intact  Left hand thumb/finger opposition skills and Right hand thumb/finger opposition skills  Gross motor coordination:   WFL    AMPAC 6 Click ADL:  AMPAC Total Score: 22    Treatment & Education:  -Education on task modification to maximize safety and (I) during ADLs and mobility  -Education on importance of OOB activity to improve overall strength, activity tolerance and promote recovery  -Pt educated to call for assistance and to transfer with hospital staff only  -Provided education regarding role of OT & POC with pt verbalizing understanding. Pt had no further questions & when asked whether there were any concerns pt reported none.     Patient left HOB elevated with all lines intact, call button in reach, and daughter present    GOALS:   Multidisciplinary Problems       Occupational Therapy Goals          Problem: Occupational Therapy    Goal Priority Disciplines Outcome Interventions   Occupational Therapy Goal     OT, PT/OT Progressing    Description: Goals to be met by: 01/01/2025     Patient will increase functional independence with ADLs by performing:    UE Dressing with [place].  LE Dressing with Modified [place].  Grooming while standing at sink with Modified [place].  Toileting from toilet with Modified [place] for hygiene and clothing management.   Supine to sit with Modified [place].  Step transfer with Modified [place]  Toilet transfer to toilet with Modified [place].                         History:     Past Medical History:   Diagnosis Date    Hyperlipidemia     Male erectile dysfunction, unspecified          Past Surgical History:   Procedure Laterality Date    COLONOSCOPY N/A 10/2/2024    Procedure: COLONOSCOPY;  Surgeon: Brennan Balderrama MD;  Location: [facility];   Service: Endoscopy;  Laterality: N/A;    ESOPHAGOGASTRODUODENOSCOPY N/A 10/2/2024    Procedure: EGD (ESOPHAGOGASTRODUODENOSCOPY);  Surgeon: Brennan Balderrama MD;  Location: [facility];  Service: Endoscopy;  Laterality: N/A;    FRACTURE SURGERY      INTRALUMINAL GASTROINTESTINAL TRACT IMAGING VIA CAPSULE N/A 10/22/2024    Procedure: IMAGING PROCEDURE, GI TRACT, INTRALUMINAL, VIA CAPSULE;  Surgeon: Brennan Balderrama MD;  Location: [facility];  Service: Endoscopy;  Laterality: N/A;    LIPOMA RESECTION Left 1979    left knee    QUADRICEPS TENDON REPAIR Left 2012       Time Tracking:     OT Date of Treatment: 12/01/24  OT Start Time: 0939  OT Stop Time: 1012  OT Total Time (min): 33 min    Billable Minutes:Evaluation 10  Self Care/Home Management 23 12/1/2024

## 2024-12-01 NOTE — PLAN OF CARE
Recommendations     1.) Recommend continuing with Regular Diet as tolerated, encouraging PO intake.      2.) Recommend Boost Plus TID in to help meet needs.      3.) RD to monitor wt, PO intake, skin, labs.      Goals: to meet % of EEN/EPN by next RD f/u  Nutrition Goal Status: new  Communication of RD Recs:  (POC)

## 2024-12-01 NOTE — SUBJECTIVE & OBJECTIVE
Past Medical History:   Diagnosis Date    Hyperlipidemia     Male erectile dysfunction, unspecified        Past Surgical History:   Procedure Laterality Date    COLONOSCOPY N/A 10/2/2024    Procedure: COLONOSCOPY;  Surgeon: Brennan Balderrama MD;  Location: [facility];  Service: Endoscopy;  Laterality: N/A;    ESOPHAGOGASTRODUODENOSCOPY N/A 10/2/2024    Procedure: EGD (ESOPHAGOGASTRODUODENOSCOPY);  Surgeon: Brennan Balderrama MD;  Location: [facility];  Service: Endoscopy;  Laterality: N/A;    FRACTURE SURGERY      INTRALUMINAL GASTROINTESTINAL TRACT IMAGING VIA CAPSULE N/A 10/22/2024    Procedure: IMAGING PROCEDURE, GI TRACT, INTRALUMINAL, VIA CAPSULE;  Surgeon: Brennan Balderrama MD;  Location: [facility];  Service: Endoscopy;  Laterality: N/A;    LIPOMA RESECTION Left 1979    left knee    QUADRICEPS TENDON REPAIR Left 2012       Review of patient's allergies indicates:  No Known Allergies    Medications:  Medications Prior to Admission   Medication Sig    aspirin 81 MG Chew Take 81 mg by mouth once daily.    diazePAM (VALIUM) 5 MG tablet TAKE 1 TABLET BY MOUTH EVERY 12 HOURS AS NEEDED FOR ANXIETY    meloxicam (MOBIC) 7.5 MG tablet Take 1 tablet by mouth twice daily    pantoprazole (PROTONIX) 40 MG tablet Take 1 tablet (40 mg total) by mouth once daily.    rosuvastatin (CRESTOR) 20 MG tablet Take 1 tablet (20 mg total) by mouth once daily.    sildenafiL (VIAGRA) 100 MG tablet Take 1 tablet (100 mg total) by mouth daily as needed for Erectile Dysfunction.    triamcinolone acetonide 0.1% (KENALOG) 0.1 % cream APPLY  CREAM EXTERNALLY TWICE DAILY AS NEEDED FOR  DERMATITIS    zolpidem (AMBIEN) 5 MG Tab Take 1 tablet (5 mg total) by mouth nightly as needed (insomnia).     Antibiotics (From admission, onward)      Start     Stop Route Frequency Ordered    12/01/24 1600  vancomycin 1,250 mg in 0.9% NaCl 250 mL IVPB (admixture device)         -- IV Every 24 hours (non-standard times) 11/30/24 1501    11/30/24 1552  vancomycin  "- pharmacy to dose  (vancomycin IVPB (PEDS and ADULTS))        Placed in "And" Linked Group    -- IV pharmacy to manage frequency 24 1452    24 1515  piperacillin-tazobactam (ZOSYN) 4.5 g in D5W 100 mL IVPB (MB+)         -- IV Every 8 hours (non-standard times) 24 1413          Antifungals (From admission, onward)      None          Antivirals (From admission, onward)      None             Immunization History   Administered Date(s) Administered    COVID-19 MRNA, LN-S PF (MODERNA HALF 0.25 ML DOSE) 2021    COVID-19, MRNA, LN-S, PF (MODERNA FULL 0.5 ML DOSE) 2021, 2021    COVID-19, MRNA, LN-S, PF (Pfizer) (Purple Cap) 10/28/2022    COVID-19, mRNA, LNP-S, PF (Moderna) Ages 12+ 2023    COVID-19, mRNA, LNP-S, PF, william-sucrose, 30 mcg/0.3 mL (Pfizer Ages 12+) 2024    COVID-19, mRNA, LNP-S, bivalent booster, PF (Moderna Omicron)12 + YEARS 10/28/2022    Influenza (FLUAD) - Quadrivalent - Adjuvanted - PF *Preferred* (65+) 2023    Influenza - Quadrivalent - PF *Preferred* (6 months and older) 10/28/2022    Influenza - Trivalent - Afluria, Fluzone MDV 10/28/2022    Influenza - Trivalent - Fluad - Adjuvanted - PF (65 years and older 2024    Tdap 2024       Family History       Problem Relation (Age of Onset)    Heart attacks under age 50 Mother (43), Father (38)    Heart disease Mother, Father          Social History     Socioeconomic History    Marital status:    Tobacco Use    Smoking status: Former     Current packs/day: 0.00     Average packs/day: 1 pack/day for 20.0 years (20.0 ttl pk-yrs)     Types: Cigarettes     Start date: 1987     Quit date: 2007     Years since quittin.9    Smokeless tobacco: Never   Substance and Sexual Activity    Alcohol use: Yes     Comment: Infrequently    Drug use: Not Currently    Sexual activity: Yes     Partners: Female     Social Drivers of Health     Financial Resource Strain: Medium Risk (2024)    " Overall Financial Resource Strain (CARDIA)     Difficulty of Paying Living Expenses: Somewhat hard   Food Insecurity: No Food Insecurity (6/13/2024)    Hunger Vital Sign     Worried About Running Out of Food in the Last Year: Never true     Ran Out of Food in the Last Year: Never true   Recent Concern: Food Insecurity - Food Insecurity Present (5/8/2024)    Hunger Vital Sign     Worried About Running Out of Food in the Last Year: Sometimes true     Ran Out of Food in the Last Year: Sometimes true   Transportation Needs: No Transportation Needs (5/8/2024)    PRAPARE - Transportation     Lack of Transportation (Medical): No     Lack of Transportation (Non-Medical): No   Physical Activity: Sufficiently Active (6/13/2024)    Exercise Vital Sign     Days of Exercise per Week: 4 days     Minutes of Exercise per Session: 130 min   Stress: No Stress Concern Present (6/13/2024)    [employer] - Occupational Stress Questionnaire     Feeling of Stress : Only a little   Recent Concern: Stress - Stress Concern Present (5/8/2024)    [employer] - Occupational Stress Questionnaire     Feeling of Stress : To some extent   Housing Stability: Unknown (6/13/2024)    Housing Stability Vital Sign     Unable to Pay for Housing in the Last Year: No     Review of Systems   Constitutional:  Positive for fatigue. Negative for chills and fever.   Respiratory:  Negative for cough and shortness of breath.    Cardiovascular:  Negative for chest pain.   Gastrointestinal:  Positive for abdominal pain. Negative for constipation, diarrhea, nausea and vomiting.   Genitourinary:  Negative for dysuria and hematuria.   Musculoskeletal:  Negative for arthralgias and myalgias.   Skin:  Negative for rash.   Neurological:  Positive for weakness. Negative for headaches.     Objective:     Vital Signs (Most Recent):  Temp: 98 °F (36.7 °C) (12/01/24 1105)  Pulse: 83 (12/01/24 1105)  Resp: 18 (12/01/24  1105)  BP: (!) 103/56 (12/01/24 1105)  SpO2: 97 % (12/01/24 1105) Vital Signs (24h Range):  Temp:  [97.9 °F (36.6 °C)-99.9 °F (37.7 °C)] 98 °F (36.7 °C)  Pulse:  [67-87] 83  Resp:  [16-18] 18  SpO2:  [95 %-99 %] 97 %  BP: ()/(55-66) 103/56     Weight: 63.5 kg (140 lb)  Body mass index is 21.29 kg/m².    Estimated Creatinine Clearance: 47.5 mL/min (based on SCr of 1.3 mg/dL).     Physical Exam  Vitals reviewed.   Constitutional:       General: He is not in acute distress.     Appearance: Normal appearance. He is not ill-appearing.   HENT:      Head: Normocephalic and atraumatic.   Eyes:      Extraocular Movements: Extraocular movements intact.      Conjunctiva/sclera: Conjunctivae normal.   Cardiovascular:      Rate and Rhythm: Normal rate and regular rhythm.      Heart sounds: No murmur heard.  Pulmonary:      Effort: Pulmonary effort is normal. No respiratory distress.      Breath sounds: Normal breath sounds.   Abdominal:      General: Abdomen is flat. Bowel sounds are normal.      Palpations: Abdomen is soft.      Tenderness: There is no abdominal tenderness.   Musculoskeletal:      Cervical back: Normal range of motion.   Skin:     General: Skin is warm and dry.   Neurological:      General: No focal deficit present.      Mental Status: He is alert and oriented to person, place, and time.   Psychiatric:         Mood and Affect: Mood normal.         Behavior: Behavior normal.         Thought Content: Thought content normal.          Significant Labs: All pertinent labs within the past 24 hours have been reviewed.  Recent Lab Results         12/01/24  1024   12/01/24  0814   12/01/24  0355   11/30/24  1420        Albumin     1.8         ALP     133         ALT     26         Anion Gap     9         Ascending aorta       2.88       Ao peak eduar       1.5       Ao VTI       28.8       AST     39         AV valve area       2.4       KEVIN by Velocity Ratio       2.3       AV area by cont VTI       2.4       AV  mean gradient       6.5       AV index (prosthetic)       0.68       LVOT mn grad       2       AV LVOT peak gradient       4       AV peak gradient       9.0       AV regurgitation pressure 1/2 time       577.21       AV Velocity Ratio       0.67       Baso #   0.04   0.02         Basophil %   0.2   0.1         BILIRUBIN TOTAL     0.7  Comment: For infants and newborns, interpretation of results should be based  on gestational age, weight and in agreement with clinical  observations.    Premature Infant recommended reference ranges:  Up to 24 hours.............<8.0 mg/dL  Up to 48 hours............<12.0 mg/dL  3-5 days..................<15.0 mg/dL  6-29 days.................<15.0 mg/dL           BSA       1.75       BUN     16         Calcium     9.4         Chloride     104         CO2     22         Creatinine     1.3         Left Ventricle Relative Wall Thickness       0.40       Differential Method   Automated   Automated         E/A ratio       1.18       Echo EF Estimated       42       E/E' ratio       4.47       eGFR     59.1         EF       50       Eos #   0.0   0.0         Eos %   0.2   0.3         E wave deceleration time       205.33       FS       20.0       Glucose     102         Gran # (ANC)   13.6   13.6         Gran %   81.7   85.5         Hematocrit   22.1   21.7         Hemoglobin   7.2   6.9         Immature Grans (Abs)   0.12  Comment: Mild elevation in immature granulocytes is non specific and   can be seen in a variety of conditions including stress response,   acute inflammation, trauma and pregnancy. Correlation with other   laboratory and clinical findings is essential.     0.08  Comment: Mild elevation in immature granulocytes is non specific and   can be seen in a variety of conditions including stress response,   acute inflammation, trauma and pregnancy. Correlation with other   laboratory and clinical findings is essential.           Immature Granulocytes   0.7   0.5         INR  1.4  Comment: Coumadin Therapy:  2.0 - 3.0 for INR for all indicators except mechanical heart valves  and antiphospholipid syndromes which should use 2.5 - 3.5.               IVRT       70.41       IVSd       0.7       LA WIDTH       3.69       Left Atrium Major Axis       5.78       Left Atrium Minor Axis       5.86       LA size       2.66       LA Vol       48.55              56.59       LA vol index       27.6       KEHINDE (MOD)       32.2       LVOT area       3.5       LV LATERAL E/E' RATIO       3.94       LV SEPTAL E/E' RATIO       5.15       LV EDV BP       93.67       LV Diastolic Volume Index       53.22       LVIDd       4.5       LVIDs       3.6       LV mass       113.6       LV Mass Index       64.6       LVOT diameter       2.1       LVOT peak eduar       1.0       LVOT stroke volume       67.9       LVOT peak VTI       19.6       LV ESV BP       54.27       LV Systolic Volume Index       30.8       Lymph #   1.4   0.9         Lymph %   8.4   5.6         Magnesium      2.0         MCH   24.9   24.5         MCHC   32.6   31.8         MCV   77   77         Mean e'       0.15       Mono #   1.5   1.3         Mono %   8.8   8.0         MPV   8.9   9.2         MV Peak A Eduar       0.57       MV Peak E Eduar       0.67       nRBC   0   0         Jefferson's Biplane MOD Ejection Fraction       49       Phosphorus Level     3.6         Platelet Count   571   591         Potassium     3.6         PROTEIN TOTAL     7.5         PT 15.1             PW       0.9       RA Major Axis       4.23       Est. RA pres       3       RA Width       3.50       RBC   2.89   2.82         RDW   17.3   17.2         RV S'       14.35       RV TB RVSP       6       RV/LV Ratio       0.69       RV- lópez basal diam       3.1       Sinus       3.45       Sodium     135         STJ       2.84       TAPSE       1.97       TDI SEPTAL       0.13       TDI LATERAL       0.17       Triscuspid Valve Regurgitation Peak Gradient       26       TR  Max Rasheed       2.53       TV PG       26       TV resting pulmonary artery pressure       29       WBC   16.67   15.95         ZLVIDD       -0.78       ZLVIDS       1.41               Significant Imaging: I have reviewed all pertinent imaging results/findings within the past 24 hours.

## 2024-12-01 NOTE — CONSULTS
Vascular and Interventional Radiology History & Physical    Date:  11/30/2024    Chief Complaint:   Perirenal fluid collection    History of Present Illness:  Felipe Jiménez is a 68 y.o.M with history of iron deficiency anemia who has had shortness of breath with exertion who presented to the [facility] ED overnight due to weakness, dizziness, and a fall earlier this week. He was found to be anemic on presentation with hemoglobin 7.0. He was transfused 1 unit with follow up hemoglobin 7.2. CT chest abdomen pelvis from 10/16/2024 obtained for workup of anemia/weight loss showed multiple right renal stones with moderate to severe hydronephrosis. Urology was consulted for right sided hydronephrosis and fever and took him for class A right ureteral stent placement.     Post-procedural CT renal stone study revealed an enlarging mixed cystic and solid appearance of the inferior pole of the right kidney with invasion into psoas concerning for developing abscess. Follow up CT w contrast revealed findings concerning for abscess throughout the right kidney with extension into R psoas vs XPG. IR has been consulted for drainage of perirenal collection.    On exam, patient is awake and alert and conversant. BP is stable. No fever since admission. WBC still mildly elevated.      Past Medical History:  Past Medical History:   Diagnosis Date    Hyperlipidemia     Male erectile dysfunction, unspecified        Past Surgical History:  Past Surgical History:   Procedure Laterality Date    COLONOSCOPY N/A 10/2/2024    Procedure: COLONOSCOPY;  Surgeon: Brennan Balderrama MD;  Location: [facility];  Service: Endoscopy;  Laterality: N/A;    ESOPHAGOGASTRODUODENOSCOPY N/A 10/2/2024    Procedure: EGD (ESOPHAGOGASTRODUODENOSCOPY);  Surgeon: Brennan Balderrama MD;  Location: [facility];  Service: Endoscopy;  Laterality: N/A;    FRACTURE SURGERY      INTRALUMINAL GASTROINTESTINAL TRACT IMAGING VIA CAPSULE N/A 10/22/2024    Procedure: IMAGING PROCEDURE, GI  TRACT, INTRALUMINAL, VIA CAPSULE;  Surgeon: Brennan Balderrama MD;  Location: [facility];  Service: Endoscopy;  Laterality: N/A;    LIPOMA RESECTION Left     left knee    QUADRICEPS TENDON REPAIR Left         Sedation History:    Denies any adverse reactions.  Denies problems laying flat.    Social History:  Social History     Tobacco Use    Smoking status: Former     Current packs/day: 0.00     Average packs/day: 1 pack/day for 20.0 years (20.0 ttl pk-yrs)     Types: Cigarettes     Start date: 1987     Quit date: 2007     Years since quittin.9    Smokeless tobacco: Never   Substance Use Topics    Alcohol use: Yes     Comment: Infrequently    Drug use: Not Currently        Home Medications:   Prior to Admission medications    Medication Sig Start Date End Date Taking? Authorizing Provider   aspirin 81 MG Chew Take 81 mg by mouth once daily.    Provider, Negrita   diazePAM (VALIUM) 5 MG tablet TAKE 1 TABLET BY MOUTH EVERY 12 HOURS AS NEEDED FOR ANXIETY 23   Sami Asif MD   meloxicam (MOBIC) 7.5 MG tablet Take 1 tablet by mouth twice daily 23   Sami Asif MD   pantoprazole (PROTONIX) 40 MG tablet Take 1 tablet (40 mg total) by mouth once daily. 10/2/24 10/2/25  Brennan Balderrama MD   rosuvastatin (CRESTOR) 20 MG tablet Take 1 tablet (20 mg total) by mouth once daily. 10/9/24 10/4/25  Sami Asif MD   sildenafiL (VIAGRA) 100 MG tablet Take 1 tablet (100 mg total) by mouth daily as needed for Erectile Dysfunction. 10/9/24   Sami Asif MD   triamcinolone acetonide 0.1% (KENALOG) 0.1 % cream APPLY  CREAM EXTERNALLY TWICE DAILY AS NEEDED FOR  DERMATITIS 23   Sami Asif MD   zolpidem (AMBIEN) 5 MG Tab Take 1 tablet (5 mg total) by mouth nightly as needed (insomnia). 24  Sami Asif MD       Inpatient Medications:    Current Facility-Administered Medications:     0.9%  NaCl infusion (for blood administration), , Intravenous,  Q24H PRN, Farhan Melvin PA-C    acetaminophen tablet 650 mg, 650 mg, Oral, Q6H PRN, Matthew Busby MD, 650 mg at 11/30/24 1756    atorvastatin tablet 80 mg, 80 mg, Oral, Daily, Gilmar Cid MD, 80 mg at 11/30/24 0840    dextrose 10% bolus 125 mL 125 mL, 12.5 g, Intravenous, PRN, Gilmar Cid MD    dextrose 10% bolus 125 mL 125 mL, 12.5 g, Intravenous, PRN, Howard Monroy MD    dextrose 10% bolus 250 mL 250 mL, 25 g, Intravenous, PRN, Gilmar Cid MD    dextrose 10% bolus 250 mL 250 mL, 25 g, Intravenous, PRN, Howard Monroy MD    fentaNYL 50 mcg/mL injection 25 mcg, 25 mcg, Intravenous, Q5 Min PRN, Howard Monroy MD    ferrous gluconate tablet 324 mg, 324 mg, Oral, BID WM, Gilmar Cid MD, 324 mg at 11/30/24 1750    glucagon (human recombinant) injection 1 mg, 1 mg, Intramuscular, PRN, Gilmar Cid MD    glucagon (human recombinant) injection 1 mg, 1 mg, Intramuscular, PRN, Howard Monroy MD    glucose chewable tablet 16 g, 16 g, Oral, PRN, Gilmar Cid MD    glucose chewable tablet 24 g, 24 g, Oral, PRN, Gilmar Cid MD    haloperidol lactate injection 0.5 mg, 0.5 mg, Intravenous, Q10 Min PRN, Howard Monroy MD    lactated ringers infusion, , Intravenous, Continuous, Gilmar Cid MD, Last Rate: 60 mL/hr at 11/30/24 1517, New Bag at 11/30/24 1517    naloxone 0.4 mg/mL injection 0.02 mg, 0.02 mg, Intravenous, PRN, Gilmar Cid MD    oxyCODONE immediate release tablet 5 mg, 5 mg, Oral, Q6H PRN, Gilmar Cid MD    pantoprazole EC tablet 40 mg, 40 mg, Oral, Daily, Gilmar Cid MD, 40 mg at 11/30/24 0840    piperacillin-tazobactam (ZOSYN) 4.5 g in D5W 100 mL IVPB (MB+), 4.5 g, Intravenous, Q8H, Gilmar Cid MD, Last Rate: 25 mL/hr at 11/30/24 1519, 4.5 g at 11/30/24 1519    sodium chloride 0.9% flush 10 mL, 10 mL, Intravenous, Q12H PRN, Gilmar Cid MD    sodium chloride 0.9% flush 10 mL, 10 mL, Intravenous, PRN, Howard Monroy,  "MD    Pharmacy to dose Vancomycin consult, , , Once **AND** vancomycin - pharmacy to dose, , Intravenous, pharmacy to manage frequency, Gilmar Cid MD    [START ON 12/1/2024] vancomycin 1,250 mg in 0.9% NaCl 250 mL IVPB (admixture device), 20 mg/kg, Intravenous, Q24H, Gilmar Cid MD    vancomycin 1,500 mg in 0.9% NaCl 250 mL IVPB (admixture device), 25 mg/kg, Intravenous, Once, Gilmar Cid MD     Anticoagulants/Antiplatelets:   no anticoagulation    Allergies:   Review of patient's allergies indicates:  No Known Allergies    Review of Systems:   As documented in primary provider H&P.    Vital Signs (Most Recent):  Temp: 97.9 °F (36.6 °C) (11/30/24 1532)  Pulse: 71 (11/30/24 1532)  Resp: 18 (11/30/24 1532)  BP: 122/66 (11/30/24 1532)  SpO2: 99 % (11/30/24 1532)    Physical Exam:  No acute distress, laying comfortably in bed, pleasant and cooperative  Regular rate and rhythm  Breathing unlabored  Abdomen benign  Extremities warm and well perfused    Sedation Exam:  ASA: II - Patient appears to have mild systemic disease, adequately controlled   Mallampati: II (hard and soft palate, upper portion of tonsils anduvula visible)     Laboratory:  No results found for: "INR", "PT", "PTT"    Lab Results   Component Value Date    WBC 15.18 (H) 11/30/2024    HGB 7.6 (L) 11/30/2024    HCT 23.4 (L) 11/30/2024    MCV 78 (L) 11/30/2024     (H) 11/30/2024      Lab Results   Component Value Date    GLU 96 11/30/2024     (L) 11/30/2024    K 3.7 11/30/2024     11/30/2024    CO2 21 (L) 11/30/2024    BUN 19 11/30/2024    CREATININE 1.2 11/30/2024    CALCIUM 9.5 11/30/2024    MG 1.9 11/30/2024    ALT 23 11/30/2024    AST 33 11/30/2024    ALBUMIN 1.9 (L) 11/30/2024    BILITOT 1.0 11/30/2024       Imaging:  Reviewed.      ASSESSMENT/PLAN:   Felipe Jiménez is a 68 y.o.M with history of iron deficiency anemia who has had shortness of breath with exertion who [facility] due to " weakness, dizziness, and a fall earlier this week. He was found to have R sided hydronephrosis and fever for which urology was consulted and placed a right sided ureteral stent. Postprocedural imaging revealed enlarging R renal/perirenal fluid collection concerning for abscess. IR has been consulted for drain placement.    -Pertinent labs and imaging reviewed  -Plan to proceed with drain placement tomorrow  -NPO at midnight  -Risks and benefits of the procedure were discussed with the patient and his family. Informed consent was obtained.             Sedation Plan: moderate sedation      Panchito Goel MD  [facility]

## 2024-12-01 NOTE — ASSESSMENT & PLAN NOTE
Mr. Jiménez is a 68M with PMH of LENORA, HLD, R hydronephrosis and nephrolithiasis, presented with weakness/dizziness and a recent fall, s/p R ureteral stent placement with urology on 11/30, with postop CT showing possible perinephric abscess, pending IR drain placement today 12/1. Blood and urine cultures no growth.    Recommendations  Continue vancomycin and zosyn for now  Follow up cultures sent from IR drain today

## 2024-12-01 NOTE — ASSESSMENT & PLAN NOTE
- Significant right renal stone burden with hydronephrosis and concern for infected urine along with fever and leukocytosis  - S/p class A right ureteral stent placement and Katz placement on 11/30/24.

## 2024-12-01 NOTE — ASSESSMENT & PLAN NOTE
UA + . UC pending.   12/1  BC x2 and UC NGTD.   12/4 ID f/u - Discontinued Vancomycin as cultures remain negative. Transitioned  from Unasyn to Augmentin to complete a total of 14 day course from the day of drain placement with IR due to source control. needs CT scan on outpatient basis to assess for abscess resolution . drain output 75ml. discharge with ID and Urology follow up

## 2024-12-01 NOTE — ASSESSMENT & PLAN NOTE
- Recommend drain placement by IR. Discussed this with IR, planning for today.  - NPO.  - Ok for diet afterwards.

## 2024-12-01 NOTE — CONSULTS
[facility] S[facility]  Adult Nutrition  Consult Note    SUMMARY     Recommendations    1.) Recommend continuing with Regular Diet as tolerated, encouraging PO intake.     2.) Recommend Boost Plus TID in to help meet needs.     3.) RD to monitor wt, PO intake, skin, labs.     Goals: to meet % of EEN/EPN by next RD f/u  Nutrition Goal Status: new  Communication of RD Recs:  (POC)    Assessment and Plan    Endocrine  Severe protein-calorie malnutrition  Malnutrition Type:  Context: chronic illness  Level: severe    Related to (etiology):   Inadequate PRO- calorie intake    Signs and Symptoms (as evidenced by):   Significant wt loss of -8% x 3 months, pt reports low appetite x several years and moderate to severe muscle/fat wasting     Malnutrition Characteristic Summary:  Weight Loss (Malnutrition): greater than 7.5% in 3 months (-8.4% x 3 months)  Energy Intake (Malnutrition): less than or equal to 75% for greater than or equal to 1 month  Subcutaneous Fat (Malnutrition):  (moderate to severe)  Muscle Mass (Malnutrition):  (moderate to severe)    Interventions/Recommendations (treatment strategy):  Collaboration of nutritional care with other providers.   ONS    Nutrition Diagnosis Status:   New         Malnutrition Assessment  Malnutrition Context: chronic illness  Malnutrition Level: severe          Weight Loss (Malnutrition): greater than 7.5% in 3 months (-8.4% x 3 months)  Energy Intake (Malnutrition): less than or equal to 75% for greater than or equal to 1 month  Subcutaneous Fat (Malnutrition):  (moderate to severe)  Muscle Mass (Malnutrition):  (moderate to severe)   Orbital Region (Subcutaneous Fat Loss): moderate depletion  Upper Arm Region (Subcutaneous Fat Loss): severe depletion  Thoracic and Lumbar Region: severe depletion   Adventism Region (Muscle Loss): severe depletion  Clavicle Bone Region (Muscle Loss): severe depletion  Clavicle and Acromion Bone Region (Muscle Loss): severe depletion  Dorsal  "Hand (Muscle Loss): moderate depletion     Reason for Assessment    Reason For Assessment: consult  Diagnosis: other (see comments) (Fe deficiency anemia)    General Information Comments: RD consulted for wt loss. PMHx: HLD. Pt denies n/v/d/c. Pt endorses low appetite. Diet was advanced to Regular from NPO today. Pt stated appetite has been low for years. Per chart review, significant wt loss noted: -8% x 3 months. RD performed NFPE: RD feels pt meets the criteria for severe malnutrition in the context of chronic disease. Please see PES for details. Pt requested diet edu for foods high in Fe. RD stated to pt that they will provide edu at f/u. RD team to continue to monitor and f/u.     Nutrition Discharge Planning: Regular Diet and Boost TID    Nutrition Related Social Determinants of Health: SDOH: None Identified     Nutrition Risk Screen    Nutrition Risk Screen: no indicators present    Nutrition/Diet History    Patient Reported Diet/Restrictions/Preferences: general  Typical Food/Fluid Intake: 1- 2 meals/day  Food Preferences: pt does not eat red meat  Spiritual, Cultural Beliefs, Bahai Practices, Values that Affect Care: no  Food Allergies: NKFA  Factors Affecting Nutritional Intake: decreased appetite    Anthropometrics    Temp: 98.1 °F (36.7 °C)  Height: 5' 8" (172.7 cm)  Height (inches): 68 in  Weight: 63.5 kg (140 lb)  Weight (lb): 140 lb  Ideal Body Weight (IBW), Male: 154 lb  % Ideal Body Weight, Male (lb): 90.91 %  BMI (Calculated): 21.3    Lab/Procedures/Meds    Pertinent Labs Reviewed: reviewed  Pertinent Labs Comments: Na: 135, GFR: 59.1  Pertinent Medications Reviewed: reviewed  Pertinent Medications Comments: Statin, Fe Gluconate, abx, LR    Estimated/Assessed Needs    Weight Used For Calorie Calculations: 63.5 kg (139 lb 15.9 oz)  Energy Calorie Requirements (kcal): 1905- 2223 kcal  Energy Need Method: Kcal/kg (30-35 kcal/kg)    Protein Requirements: 76- 95 g (1.2- 1.5g/kg)  Weight Used For " Protein Calculations: 63.5 kg (139 lb 15.9 oz)    Fluid Requirements (mL): as per MD or RDA  Estimated Fluid Requirement Method: RDA Method  RDA Method (mL): 1905    Nutrition Prescription Ordered    Current Diet Order: NPO    Evaluation of Received Nutrient/Fluid Intake    I/O: +100ml since admit  Energy Calories Required: not meeting needs  Protein Required: not meeting needs  Fluid Required:  (as per MD)  Comments: LBM 12/1  Tolerance:  (NPO status)  % Intake of Estimated Energy Needs: 0 - 25 %  % Meal Intake: NPO    Nutrition Risk    Level of Risk/Frequency of Follow-up: low - moderate     Monitor and Evaluation    Food and Nutrient Intake: energy intake, food and beverage intake  Food and Nutrient Adminstration: diet order  Anthropometric Measurements: weight, weight change, body mass index  Biochemical Data, Medical Tests and Procedures: electrolyte and renal panel, gastrointestinal profile, glucose/endocrine profile, inflammatory profile, lipid profile  Nutrition-Focused Physical Findings: overall appearance, skin     Nutrition Follow-Up    RD Follow-up?: Yes

## 2024-12-02 LAB
ALBUMIN SERPL BCP-MCNC: 1.7 G/DL (ref 3.5–5.2)
ALP SERPL-CCNC: 138 U/L (ref 40–150)
ALT SERPL W/O P-5'-P-CCNC: 17 U/L (ref 10–44)
ANION GAP SERPL CALC-SCNC: 9 MMOL/L (ref 8–16)
AST SERPL-CCNC: 24 U/L (ref 10–40)
BASOPHILS # BLD AUTO: 0.03 K/UL (ref 0–0.2)
BASOPHILS NFR BLD: 0.2 % (ref 0–1.9)
BILIRUB SERPL-MCNC: 0.7 MG/DL (ref 0.1–1)
BUN SERPL-MCNC: 13 MG/DL (ref 8–23)
CALCIUM SERPL-MCNC: 9.3 MG/DL (ref 8.7–10.5)
CHLORIDE SERPL-SCNC: 103 MMOL/L (ref 95–110)
CO2 SERPL-SCNC: 21 MMOL/L (ref 23–29)
CREAT SERPL-MCNC: 1.3 MG/DL (ref 0.5–1.4)
DIFFERENTIAL METHOD BLD: ABNORMAL
EOSINOPHIL # BLD AUTO: 0.1 K/UL (ref 0–0.5)
EOSINOPHIL NFR BLD: 0.6 % (ref 0–8)
ERYTHROCYTE [DISTWIDTH] IN BLOOD BY AUTOMATED COUNT: 17.4 % (ref 11.5–14.5)
EST. GFR  (NO RACE VARIABLE): 59.1 ML/MIN/1.73 M^2
GLUCOSE SERPL-MCNC: 86 MG/DL (ref 70–110)
HCT VFR BLD AUTO: 23.9 % (ref 40–54)
HGB BLD-MCNC: 7.8 G/DL (ref 14–18)
IMM GRANULOCYTES # BLD AUTO: 0.17 K/UL (ref 0–0.04)
IMM GRANULOCYTES NFR BLD AUTO: 0.9 % (ref 0–0.5)
INFLUENZA A, MOLECULAR: NOT DETECTED
INFLUENZA B, MOLECULAR: NOT DETECTED
LYMPHOCYTES # BLD AUTO: 1.6 K/UL (ref 1–4.8)
LYMPHOCYTES NFR BLD: 8 % (ref 18–48)
MAGNESIUM SERPL-MCNC: 1.9 MG/DL (ref 1.6–2.6)
MCH RBC QN AUTO: 25.2 PG (ref 27–31)
MCHC RBC AUTO-ENTMCNC: 32.6 G/DL (ref 32–36)
MCV RBC AUTO: 77 FL (ref 82–98)
MONOCYTES # BLD AUTO: 1.2 K/UL (ref 0.3–1)
MONOCYTES NFR BLD: 5.8 % (ref 4–15)
NEUTROPHILS # BLD AUTO: 16.8 K/UL (ref 1.8–7.7)
NEUTROPHILS NFR BLD: 84.5 % (ref 38–73)
NRBC BLD-RTO: 0 /100 WBC
OHS QRS DURATION: 92 MS
OHS QTC CALCULATION: 427 MS
PHOSPHATE SERPL-MCNC: 3.4 MG/DL (ref 2.7–4.5)
PLATELET # BLD AUTO: 557 K/UL (ref 150–450)
PMV BLD AUTO: 8.8 FL (ref 9.2–12.9)
POCT GLUCOSE: 156 MG/DL (ref 70–110)
POTASSIUM SERPL-SCNC: 3.3 MMOL/L (ref 3.5–5.1)
PROT SERPL-MCNC: 7.2 G/DL (ref 6–8.4)
RBC # BLD AUTO: 3.09 M/UL (ref 4.6–6.2)
RSV AG BY MOLECULAR METHOD: NOT DETECTED
SARS-COV-2 RNA RESP QL NAA+PROBE: NOT DETECTED
SODIUM SERPL-SCNC: 133 MMOL/L (ref 136–145)
TROPONIN I SERPL DL<=0.01 NG/ML-MCNC: <0.006 NG/ML (ref 0–0.03)
TROPONIN I SERPL DL<=0.01 NG/ML-MCNC: <0.006 NG/ML (ref 0–0.03)
VANCOMYCIN TROUGH SERPL-MCNC: 9.6 UG/ML (ref 10–22)
WBC # BLD AUTO: 19.84 K/UL (ref 3.9–12.7)

## 2024-12-02 PROCEDURE — 94761 N-INVAS EAR/PLS OXIMETRY MLT: CPT

## 2024-12-02 PROCEDURE — 99233 SBSQ HOSP IP/OBS HIGH 50: CPT | Mod: GC,,, | Performed by: INTERNAL MEDICINE

## 2024-12-02 PROCEDURE — 80053 COMPREHEN METABOLIC PANEL: CPT | Performed by: HOSPITALIST

## 2024-12-02 PROCEDURE — 25000003 PHARM REV CODE 250: Performed by: HOSPITALIST

## 2024-12-02 PROCEDURE — 0241U SARS-COV2 (COVID) WITH FLU/RSV BY PCR: CPT | Performed by: HOSPITALIST

## 2024-12-02 PROCEDURE — 63600175 PHARM REV CODE 636 W HCPCS: Performed by: HOSPITALIST

## 2024-12-02 PROCEDURE — 36415 COLL VENOUS BLD VENIPUNCTURE: CPT | Performed by: HOSPITALIST

## 2024-12-02 PROCEDURE — 84100 ASSAY OF PHOSPHORUS: CPT | Performed by: HOSPITALIST

## 2024-12-02 PROCEDURE — 93010 ELECTROCARDIOGRAM REPORT: CPT | Mod: ,,, | Performed by: INTERNAL MEDICINE

## 2024-12-02 PROCEDURE — 80202 ASSAY OF VANCOMYCIN: CPT | Performed by: HOSPITALIST

## 2024-12-02 PROCEDURE — 85025 COMPLETE CBC W/AUTO DIFF WBC: CPT | Performed by: HOSPITALIST

## 2024-12-02 PROCEDURE — 93005 ELECTROCARDIOGRAM TRACING: CPT

## 2024-12-02 PROCEDURE — 25000003 PHARM REV CODE 250: Performed by: INTERNAL MEDICINE

## 2024-12-02 PROCEDURE — 21400001 HC TELEMETRY ROOM

## 2024-12-02 PROCEDURE — 84484 ASSAY OF TROPONIN QUANT: CPT | Mod: 91 | Performed by: HOSPITALIST

## 2024-12-02 PROCEDURE — 83735 ASSAY OF MAGNESIUM: CPT | Performed by: HOSPITALIST

## 2024-12-02 PROCEDURE — 63600175 PHARM REV CODE 636 W HCPCS: Performed by: STUDENT IN AN ORGANIZED HEALTH CARE EDUCATION/TRAINING PROGRAM

## 2024-12-02 PROCEDURE — 25000003 PHARM REV CODE 250: Performed by: STUDENT IN AN ORGANIZED HEALTH CARE EDUCATION/TRAINING PROGRAM

## 2024-12-02 PROCEDURE — 99232 SBSQ HOSP IP/OBS MODERATE 35: CPT | Mod: ,,, | Performed by: UROLOGY

## 2024-12-02 RX ORDER — DOCUSATE SODIUM 100 MG
300 CAPSULE ORAL
Status: DISCONTINUED | OUTPATIENT
Start: 2024-12-02 | End: 2024-12-06 | Stop reason: HOSPADM

## 2024-12-02 RX ORDER — NITROGLYCERIN 0.4 MG/1
0.4 TABLET SUBLINGUAL EVERY 5 MIN PRN
Status: DISCONTINUED | OUTPATIENT
Start: 2024-12-02 | End: 2024-12-06 | Stop reason: HOSPADM

## 2024-12-02 RX ADMIN — AMPICILLIN SODIUM AND SULBACTAM SODIUM 3 G: 2; 1 INJECTION, POWDER, FOR SOLUTION INTRAMUSCULAR; INTRAVENOUS at 05:12

## 2024-12-02 RX ADMIN — POTASSIUM BICARBONATE 40 MEQ: 391 TABLET, EFFERVESCENT ORAL at 06:12

## 2024-12-02 RX ADMIN — PIPERACILLIN SODIUM AND TAZOBACTAM SODIUM 4.5 G: 4; .5 INJECTION, POWDER, FOR SOLUTION INTRAVENOUS at 06:12

## 2024-12-02 RX ADMIN — ACETAMINOPHEN 650 MG: 325 TABLET ORAL at 03:12

## 2024-12-02 RX ADMIN — Medication 300 ML: at 06:12

## 2024-12-02 RX ADMIN — Medication 300 ML: at 01:12

## 2024-12-02 RX ADMIN — Medication 324 MG: at 09:12

## 2024-12-02 RX ADMIN — ATORVASTATIN CALCIUM 80 MG: 40 TABLET, FILM COATED ORAL at 09:12

## 2024-12-02 RX ADMIN — Medication 300 ML: at 10:12

## 2024-12-02 RX ADMIN — VANCOMYCIN HYDROCHLORIDE 1250 MG: 1.25 INJECTION, POWDER, LYOPHILIZED, FOR SOLUTION INTRAVENOUS at 04:12

## 2024-12-02 RX ADMIN — Medication 324 MG: at 04:12

## 2024-12-02 RX ADMIN — PANTOPRAZOLE SODIUM 40 MG: 40 TABLET, DELAYED RELEASE ORAL at 09:12

## 2024-12-02 NOTE — PROGRESS NOTES
[facility]  Urology  Progress Note    Patient Name: Felipe Jiménez  MRN: [id number]  Admission Date: 11/29/2024  Hospital Length of Stay: 2 days  Code Status: Full Code   Attending Provider: Gilmar Cid MD   Primary Care Physician: Sami Asif MD    Subjective:     HPI:  Felipe Jiménez is a 68 y.o.M with history of iron deficiency anemia who has had shortness of breath with exertion who presented to the [facility] ED overnight due to weakness, dizziness, and a fall earlier this week. He was found to be anemic on presentation with hemoglobin 7.0. He was transfused 1 unit with follow up hemoglobin 7.2. Urology consulted for right hydronephrosis and fever.     On assessment, he is AFVSS, however he has been febrile to 101.1 this morning and intermittently tachycardic to 124. WBC 15 from 18, Hgb 7.2, Cr 1.2 (baseline). UA nitrite negative with many bacteria, >52 RBCs, >100 WBCs. Blood cultures NGTD, urine culture pending. He denies nausea or vomiting. Denies issues voiding, dysuria, frequency, or urgency.  CT chest abdomen pelvis form 10/16/2024 obtained for workup of anemia/weight loss showed multiple right renal stones including approximately 2-2.5 cm of stone burden in the renal pelvis with moderate to severe hydronephrosis. No ureteral stones bilaterally. Last PSA 06/2024 2.1.     Interval History: Febrile to 101 at midnight, otherwise non tachycardic, normotensive. Resting comfortably in bed this morning. 1250 CC clear yellow urine in mendez, 100 cc drain output that is sero-purulent. WBC decreasing to 19, from 25.     On pip-tazo, vanc  Objective:     Temp:  [97.4 °F (36.3 °C)-101 °F (38.3 °C)] 97.4 °F (36.3 °C)  Pulse:  [76-86] 78  Resp:  [16-21] 18  SpO2:  [94 %-100 %] 98 %  BP: (103-128)/(55-64) 112/61     Body mass index is 21.29 kg/m².           Drains       Drain  Duration                  Urethral Catheter 11/30/24 1035 1 day         Closed/Suction Drain 12/01/24 1219 Right Back Bulb 14 Fr. <1 day                      Physical Exam  Eyes:      Pupils: Pupils are equal, round, and reactive to light.   Cardiovascular:      Rate and Rhythm: Normal rate.   Pulmonary:      Effort: Pulmonary effort is normal.   Abdominal:      General: Abdomen is flat.   Genitourinary:     Comments: Katz draining clear yellow urine  Musculoskeletal:      Cervical back: Normal range of motion.   Skin:     General: Skin is warm.   Neurological:      Mental Status: He is alert.           Significant Labs:    BMP:  Recent Labs   Lab 11/29/24  1439 11/30/24  0333 12/01/24  0355    132* 135*   K 3.9 3.7 3.6    104 104   CO2 22* 21* 22*   BUN 19 19 16   CREATININE 1.3 1.2 1.3   CALCIUM 10.2 9.5 9.4       CBC:   Recent Labs   Lab 12/01/24  0814 12/01/24  1627 12/02/24  0011   WBC 16.67* 25.80* 19.84*   HGB 7.2* 7.6* 7.8*   HCT 22.1* 23.2* 23.9*   * 555* 557*       All pertinent labs results from the past 24 hours have been reviewed.    Significant Imaging:  All pertinent imaging results/findings from the past 24 hours have been reviewed.                  Assessment/Plan:     * Iron deficiency anemia  - Transfuse PRN  - Do not suspect urologic source of anemia at this time  - Hgb stable at 7.8    Urethral stricture  - Needed passive dilation with the cystoscope during procedure on 11/30/24.   - Maintain Katz catheter for 3 days until Tuesday 12/3 then perform voiding trial.     Perinephric abscess  - s/p drain placement with IR 12/1/24  - 100 cc seropurulent drain output overnight  - maintain drain    Hydronephrosis of right kidney  - Will order outpatient Mag-3 scan    Nephrolithiasis  - Significant right renal stone burden with hydronephrosis and concern for infected urine along with fever and leukocytosis  - S/p class A right ureteral stent placement and Katz placement on 11/30/24.      Weight loss  - Last PSA 2.1 this year  - Do not suspect weight loss is secondary to advanced prostate malignancy    Fever  -  Received 1g Rocephin today at approximately 8:30 am  - Blood cultures NGTD  - Urine culture in process  - Follow up cultures, tailor antibiotics to cultures    UTI (urinary tract infection)  - Urine culture no growth.  - Recommend still treating as complicated UTI with 2 weeks of empiric antibiotics.        VTE Risk Mitigation (From admission, onward)           Ordered     IP VTE HIGH RISK PATIENT  Once         11/29/24 1638     Place sequential compression device  Until discontinued         11/29/24 1638                    Mony Silva MD  Urology  [facility] S[facility]

## 2024-12-02 NOTE — PROGRESS NOTES
"[facility]urg  Infectious Disease  Progress Note    Patient Name: Felipe Jiménez  MRN: [id number]  Admission Date: 11/29/2024  Length of Stay: 2 days  Attending Physician: Gilmar Cid MD  Primary Care Provider: No primary care provider on file.    Isolation Status: No active isolations  Assessment/Plan:      Renal/  Perinephric abscess  Mr. Jiménez is a 68M with PMH of LENORA, HLD, R hydronephrosis and nephrolithiasis, presented with weakness/dizziness and a recent fall, s/p R ureteral stent placement with urology on 11/30, with postop CT showing possible perinephric abscess. S/p IR perirenal drain placement with cultures collected.     Recommendations  Continue vancomycin, with plans for de-escalation tomorrow pending culture data   Will switch Zosyn to Unasyn  Added on AFB culture of abscess fluid due to concern for Actinomyces and other culture data being negative thusfar  Follow up cultures sent from IR drain             Thank you for your consult. I will follow-up with patient. Please contact us if you have any additional questions.    Simón Vences, DO  Infectious Disease  [facility]    Subjective:     Principal Problem:Iron deficiency anemia    HPI: Mr. Jiménez is a 68M with PMH of LENORA, HLD, R hydronephrosis and nephrolithiasis, presented with weakness/dizziness and a recent fall, s/p R ureteral stent placement with urology on 11/30, with postop CT showing possible perinephric abscess, pending IR drain placement today 12/1. Infectious disease consulted for "perinephric abscess?".     Patient was last febrile at 6am yesterday. Blood and urine cultures no growth. Does have leukocytosis. Currently on vancomycin and zosyn.       Interval History:   Patient resting comfortably when assessed by team. Reports intermittent "hallucinations" yesterday night. Persistent Leukocytosis. T max of 101 F at 12 AM. No growth on cultures from kidney abscess. MARIEL drain with purulent bloody fluid noted.       Past " Medical History:   Diagnosis Date    Hyperlipidemia     Male erectile dysfunction, unspecified        Past Surgical History:   Procedure Laterality Date    COLONOSCOPY N/A 10/2/2024    Procedure: COLONOSCOPY;  Surgeon: Brennan Balderrama MD;  Location: [facility];  Service: Endoscopy;  Laterality: N/A;    CYSTOSCOPY W/ URETERAL STENT PLACEMENT Right 11/30/2024    Procedure: CYSTOSCOPY, WITH URETERAL STENT INSERTION;  Surgeon: Dain Eugene MD;  [facility];  Service: Urology;  Laterality: Right;    DILATION OF URETHRA N/A 11/30/2024    Procedure: DILATION, URETHRA;  Surgeon: Dain Eugene MD;  [facility];  Service: Urology;  Laterality: N/A;    ESOPHAGOGASTRODUODENOSCOPY N/A 10/2/2024    Procedure: EGD (ESOPHAGOGASTRODUODENOSCOPY);  Surgeon: Brennan Balderrama MD;  Location: [facility];  Service: Endoscopy;  Laterality: N/A;    FRACTURE SURGERY      INTRALUMINAL GASTROINTESTINAL TRACT IMAGING VIA CAPSULE N/A 10/22/2024    Procedure: IMAGING PROCEDURE, GI TRACT, INTRALUMINAL, VIA CAPSULE;  Surgeon: Brennan Balderrama MD;  Location: [facility];  Service: Endoscopy;  Laterality: N/A;    LIPOMA RESECTION Left 1979    left knee    QUADRICEPS TENDON REPAIR Left 2012       Review of patient's allergies indicates:  No Known Allergies    Medications:  Medications Prior to Admission   Medication Sig    aspirin 81 MG Chew Take 81 mg by mouth once daily.    diazePAM (VALIUM) 5 MG tablet TAKE 1 TABLET BY MOUTH EVERY 12 HOURS AS NEEDED FOR ANXIETY    meloxicam (MOBIC) 7.5 MG tablet Take 1 tablet by mouth twice daily    pantoprazole (PROTONIX) 40 MG tablet Take 1 tablet (40 mg total) by mouth once daily.    rosuvastatin (CRESTOR) 20 MG tablet Take 1 tablet (20 mg total) by mouth once daily.    sildenafiL (VIAGRA) 100 MG tablet Take 1 tablet (100 mg total) by mouth daily as needed for Erectile Dysfunction.    triamcinolone acetonide 0.1% (KENALOG) 0.1 % cream APPLY  CREAM EXTERNALLY TWICE DAILY  "AS NEEDED FOR  DERMATITIS    zolpidem (AMBIEN) 5 MG Tab Take 1 tablet (5 mg total) by mouth nightly as needed (insomnia).     Antibiotics (From admission, onward)      Start     Stop Route Frequency Ordered    12/01/24 1600  vancomycin 1,250 mg in 0.9% NaCl 250 mL IVPB (admixture device)         -- IV Every 24 hours (non-standard times) 11/30/24 1501    11/30/24 1552  vancomycin - pharmacy to dose  (vancomycin IVPB (PEDS and ADULTS))        Placed in "And" Linked Group    -- IV pharmacy to manage frequency 11/30/24 1452    11/30/24 1515  piperacillin-tazobactam (ZOSYN) 4.5 g in D5W 100 mL IVPB (MB+)         -- IV Every 8 hours (non-standard times) 11/30/24 1413          Antifungals (From admission, onward)      None          Antivirals (From admission, onward)      None             Immunization History   Administered Date(s) Administered    COVID-19 MRNA, LN-S PF (MODERNA HALF 0.25 ML DOSE) 12/06/2021    COVID-19, MRNA, LN-S, PF (MODERNA FULL 0.5 ML DOSE) 02/17/2021, 03/17/2021    COVID-19, MRNA, LN-S, PF (Pfizer) (Purple Cap) 10/28/2022    COVID-19, mRNA, LNP-S, PF (Moderna) Ages 12+ 12/20/2023    COVID-19, mRNA, LNP-S, PF, william-sucrose, 30 mcg/0.3 mL (Pfizer Ages 12+) 11/05/2024    COVID-19, mRNA, LNP-S, bivalent booster, PF (Moderna Omicron)12 + YEARS 10/28/2022    Influenza (FLUAD) - Quadrivalent - Adjuvanted - PF *Preferred* (65+) 12/20/2023    Influenza - Quadrivalent - PF *Preferred* (6 months and older) 10/28/2022    Influenza - Trivalent - Afluria, Fluzone MDV 10/28/2022    Influenza - Trivalent - Fluad - Adjuvanted - PF (65 years and older 11/05/2024    Tdap 11/25/2024       Family History       Problem Relation (Age of Onset)    Heart attacks under age 50 Mother (43), Father (38)    Heart disease Mother, Father          Social History     Socioeconomic History    Marital status:    Tobacco Use    Smoking status: Former     Current packs/day: 0.00     Average packs/day: 1 pack/day for 20.0 years " (20.0 ttl pk-yrs)     Types: Cigarettes     Start date: 1987     Quit date: 2007     Years since quittin.9    Smokeless tobacco: Never   Substance and Sexual Activity    Alcohol use: Yes     Comment: Infrequently    Drug use: Not Currently    Sexual activity: Yes     Partners: Female     Social Drivers of Health     Financial Resource Strain: Low Risk  (2024)    Overall Financial Resource Strain (CARDIA)     Difficulty of Paying Living Expenses: Not very hard   Food Insecurity: No Food Insecurity (2024)    Hunger Vital Sign     Worried About Running Out of Food in the Last Year: Never true     Ran Out of Food in the Last Year: Never true   Transportation Needs: No Transportation Needs (2024)    TRANSPORTATION NEEDS     Transportation : No   Physical Activity: Sufficiently Active (2024)    Exercise Vital Sign     Days of Exercise per Week: 5 days     Minutes of Exercise per Session: 30 min   Stress: No Stress Concern Present (2024)    [place] [employer] - Occupational Stress Questionnaire     Feeling of Stress : Only a little   Housing Stability: Low Risk  (2024)    Housing Stability Vital Sign     Unable to Pay for Housing in the Last Year: No     Homeless in the Last Year: No     Review of Systems   Constitutional:  Positive for fatigue. Negative for chills and fever.   Respiratory:  Negative for cough and shortness of breath.    Cardiovascular:  Negative for chest pain.   Gastrointestinal:  Positive for abdominal pain. Negative for constipation, diarrhea, nausea and vomiting.   Genitourinary:  Negative for dysuria and hematuria.   Musculoskeletal:  Negative for arthralgias and myalgias.   Skin:  Negative for rash.   Neurological:  Positive for weakness. Negative for headaches.     Objective:     Vital Signs (Most Recent):  Temp: 97.6 °F (36.4 °C) (24 1147)  Pulse: 73 (24 1147)  Resp: 18 (24 1002)  BP: 128/69 (24 1147)  SpO2:  98 % (12/02/24 1147) Vital Signs (24h Range):  Temp:  [97.4 °F (36.3 °C)-101 °F (38.3 °C)] 97.6 °F (36.4 °C)  Pulse:  [71-85] 73  Resp:  [16-18] 18  SpO2:  [95 %-99 %] 98 %  BP: (101-128)/(57-69) 128/69     Weight: 63.5 kg (140 lb)  Body mass index is 21.29 kg/m².    Estimated Creatinine Clearance: 47.5 mL/min (based on SCr of 1.3 mg/dL).     Physical Exam  Vitals reviewed.   Constitutional:       General: He is not in acute distress.     Appearance: Normal appearance. He is not ill-appearing.   HENT:      Head: Normocephalic and atraumatic.   Eyes:      Extraocular Movements: Extraocular movements intact.      Conjunctiva/sclera: Conjunctivae normal.   Cardiovascular:      Rate and Rhythm: Normal rate and regular rhythm.      Heart sounds: No murmur heard.  Pulmonary:      Effort: Pulmonary effort is normal. No respiratory distress.      Breath sounds: Normal breath sounds.   Abdominal:      General: Abdomen is flat. Bowel sounds are normal.      Palpations: Abdomen is soft.      Tenderness: There is no abdominal tenderness.      Comments: Right MARIEL drain noted with bloody/purulent drainage    Musculoskeletal:      Cervical back: Normal range of motion.   Skin:     General: Skin is warm and dry.   Neurological:      General: No focal deficit present.      Mental Status: He is alert and oriented to person, place, and time.   Psychiatric:         Mood and Affect: Mood normal.         Behavior: Behavior normal.         Thought Content: Thought content normal.          Significant Labs: All pertinent labs within the past 24 hours have been reviewed.  Recent Lab Results         12/02/24  0550   12/02/24  0011   12/01/24  1627        Albumin 1.7                      ALT 17           Anion Gap 9           Aniso     Slight       AST 24           Baso #   0.03   0.05       Basophil %   0.2   0.2       BILIRUBIN TOTAL 0.7  Comment: For infants and newborns, interpretation of results should be based  on gestational age,  weight and in agreement with clinical  observations.    Premature Infant recommended reference ranges:  Up to 24 hours.............<8.0 mg/dL  Up to 48 hours............<12.0 mg/dL  3-5 days..................<15.0 mg/dL  6-29 days.................<15.0 mg/dL             BUN 13           Calcium 9.3           Chloride 103           CO2 21           Creatinine 1.3           Differential Method   Automated   Automated       eGFR 59.1           Eos #   0.1   0.0       Eos %   0.6   0.1       Glucose 86           Gran # (ANC)   16.8   22.7       Gran %   84.5   87.8       Hematocrit   23.9   23.2       Hemoglobin   7.8   7.6       Hypo     Occasional       Immature Grans (Abs)   0.17  Comment: Mild elevation in immature granulocytes is non specific and   can be seen in a variety of conditions including stress response,   acute inflammation, trauma and pregnancy. Correlation with other   laboratory and clinical findings is essential.     0.20  Comment: Mild elevation in immature granulocytes is non specific and   can be seen in a variety of conditions including stress response,   acute inflammation, trauma and pregnancy. Correlation with other   laboratory and clinical findings is essential.         Immature Granulocytes   0.9   0.8       Lymph #   1.6   1.4       Lymph %   8.0   5.4       Magnesium  1.9           MCH   25.2   26.1       MCHC   32.6   32.8       MCV   77   80       Mono #   1.2   1.5       Mono %   5.8   5.7       MPV   8.8   9.1       nRBC   0   0       Ovalocytes     Occasional       Phosphorus Level 3.4           Platelet Estimate     Increased       Platelet Count   557   555       Poikilocytosis     Slight       Poly     Occasional       Potassium 3.3           PROTEIN TOTAL 7.2           RBC   3.09   2.91       RDW   17.4   17.5       Sodium 133           Teardrop Cells     Occasional       WBC   19.84   25.80               Significant Imaging: I have reviewed all pertinent imaging results/findings  within the past 24 hours.

## 2024-12-02 NOTE — PROGRESS NOTES
Pharmacokinetic Assessment Follow Up: IV Vancomycin    Vancomycin serum concentration assessment(s):    The trough level was drawn correctly and can be used to guide therapy at this time. The measurement is below the desired definitive target range of 10 to 15 mcg/mL.    Will not be aggressive here given ID note saying plan for de-escalating tomorrow based on culture data AND given that today's dose has already been given. Also abscess drained.  This level before 3rd as well, so not at steady state.    Vancomycin Regimen Plan:    Continue regimen to Vancomycin 1250 mg IV every 24 hours with next serum trough concentration measured at 1500 prior to 5th dose on 12/4    Drug levels (last 3 results):  Recent Labs   Lab Result Units 12/02/24  1431   Vancomycin-Trough ug/mL 9.6*       Pharmacy will continue to follow and monitor vancomycin.    Please contact pharmacy for questions regarding this assessment.    Thank you for the consult,   Charlotte Pastor McArdle       Patient brief summary:  Felipe Jiménez is a 68 y.o. male initiated on antimicrobial therapy with IV Vancomycin for treatment of intra-abdominal infection    The patient's current regimen is 1250mg q24h    Drug Allergies:   Review of patient's allergies indicates:  No Known Allergies    Actual Body Weight:   63.5kg    Renal Function:   Estimated Creatinine Clearance: 47.5 mL/min (based on SCr of 1.3 mg/dL).,     Dialysis Method (if applicable):  N/A    CBC (last 72 hours):  Recent Labs   Lab Result Units 11/30/24  0333 11/30/24  0918 12/01/24  0355 12/01/24  0814 12/01/24  1627 12/02/24  0011   WBC K/uL 14.96* 15.18* 15.95* 16.67* 25.80* 19.84*   Hemoglobin g/dL 7.2* 7.6* 6.9* 7.2* 7.6* 7.8*   Hematocrit % 22.6* 23.4* 21.7* 22.1* 23.2* 23.9*   Platelets K/uL 565* 616* 591* 571* 555* 557*   Gran % % 83.5* 81.6* 85.5* 81.7* 87.8* 84.5*   Lymph % % 6.1* 7.8* 5.6* 8.4* 5.4* 8.0*   Mono % % 9.5 9.5 8.0 8.8 5.7 5.8   Eosinophil % % 0.2 0.2 0.3 0.2 0.1 0.6   Basophil  % % 0.1 0.2 0.1 0.2 0.2 0.2   Differential Method  Automated Automated Automated Automated Automated Automated       Metabolic Panel (last 72 hours):  Recent Labs   Lab Result Units 11/30/24  0333 12/01/24  0355 12/02/24  0550   Sodium mmol/L 132* 135* 133*   Potassium mmol/L 3.7 3.6 3.3*   Chloride mmol/L 104 104 103   CO2 mmol/L 21* 22* 21*   Glucose mg/dL 96 102 86   BUN mg/dL 19 16 13   Creatinine mg/dL 1.2 1.3 1.3   Albumin g/dL 1.9* 1.8* 1.7*   Total Bilirubin mg/dL 1.0 0.7 0.7   Alkaline Phosphatase U/L 130 133 138   AST U/L 33 39 24   ALT U/L 23 26 17   Magnesium mg/dL 1.9 2.0 1.9   Phosphorus mg/dL 3.1 3.6 3.4       Vancomycin Administrations:  vancomycin given in the last 96 hours                     vancomycin 1,250 mg in 0.9% NaCl 250 mL IVPB (admixture device) (mg) 1,250 mg New Bag 12/02/24 1603     1,250 mg New Bag 12/01/24 1645    vancomycin 1,500 mg in 0.9% NaCl 250 mL IVPB (admixture device) (mg) 1,500 mg New Bag 11/30/24 2024                    Microbiologic Results:  Microbiology Results (last 7 days)       Procedure Component Value Units Date/Time    AFB Culture & Smear [[id number]]     Order Status: Completed Specimen: Abscess from Kidney, Right     Culture, Anaerobe [[id number]] Collected: 12/01/24 1221    Order Status: Completed Specimen: Abscess from Kidney, Right Updated: 12/02/24 0859     Anaerobic Culture Culture in progress    Aerobic culture [[id number]] Collected: 12/01/24 1221    Order Status: Completed Specimen: Abscess from Kidney, Right Updated: 12/02/24 0719     Aerobic Bacterial Culture No growth    Blood culture [[id number]] Collected: 11/29/24 1644    Order Status: Completed Specimen: Blood from Peripheral, Antecubital, Right Updated: 12/01/24 1812     Blood Culture, Routine No Growth to date      No Growth to date      No Growth to date    Blood culture [[id number]] Collected: 11/29/24 1644    Order Status: Completed Specimen: Blood from Peripheral, Antecubital, Right Updated:  12/01/24 1812     Blood Culture, Routine No Growth to date      No Growth to date      No Growth to date    Urine culture [[id number]] Collected: 11/29/24 1529    Order Status: Completed Specimen: Urine Updated: 11/30/24 1943     Urine Culture, Routine No growth    Narrative:      Specimen Source->Urine

## 2024-12-02 NOTE — ASSESSMENT & PLAN NOTE
- s/p drain placement with IR 12/1/24  - 100 cc seropurulent drain output overnight  - maintain drain

## 2024-12-02 NOTE — PROGRESS NOTES
[facility] Medicine  Progress Note    Patient Name: Felipe Jiménez  MRN: [id number]  Patient Class: IP- Inpatient   Admission Date: 11/29/2024  Length of Stay: 2 days  Attending Physician: Gilmar Cid MD  Primary Care Provider: Sami Asif MD        Subjective:     Principal Problem:Iron deficiency anemia        HPI:  Felipe Moffett is a 69 Yo male with PMH of Iron deficiency anemia, hyperlipidemia presents ED with dizziness, generalized weakness and shortness of breath with exertion for the past 6 months.     AAOX3. c/o generalized weakness and shortness of breath with exertion for the past 6 months - progressively worsening.  reports he had  2 falls recently due to weakness.  denies loss of consciousness.   Followed by GI for LENORA and underwent recent colonoscopy, upper GI as well as video capsule endoscopy.  Reports occasional bright red blood when he wipes but otherwise denies any other abnormal bleeding.  50 lb weight loss over the past year,  initially on purpose as he was over 200 lb but mentions he had unintentional weight loss as well.   had iron-deficiency anemia since childhood was previously on iron supplementation which he discontinued a year ago in favor of dietary changes. Denies any leg swelling. currently not on any anticoagulation. reports   occasional bright red blood when wiping but denies any melena.  on ASA 81 mg daily. admits taking tylenol and meloxicam intermittently for arthritis     EGD 10/2/24                           Normal esophagus.                          - Z-line regular, 40 cm from the incisors.                          - Small hiatal hernia.                          - Gastritis. Biopsied.                          - Normal examined duodenum. Biopsied   Colonoscopy 10/2/24   Non-bleeding internal hemorrhoids.                          - One 3 mm polyp in the descending colon, removed                          with a cold biopsy forceps. Resected and  retrieved.                          - The rectum, sigmoid colon, transverse colon,                          ascending colon, cecum, ileocecal valve and                          appendiceal orifice are normal.                          - The examined portion of the ileum was normal    VCE 10/22 Normal small bowel with no findings to explain anemia. No further GI endoscopy  recommended unless  overt bleeding occurs.     ER course - .  Hemoglobin is 7.0. Transfusion with  1 unit PRBC ordered . leukocytosis of 18 today but denies any infectious symptoms. UA + . UC pending.     Overview/Hospital Course:  11/30 CT head - No acute intracranial pathology.  Specifically no evidence of intracranial hemorrhage or major vascular distribution infarct. Mild generalized cerebral volume loss DVT sonogram -No imaging evidence of deep venous thrombosis in either lower extremity. Hb 7.2  s/p PRBC transfusion . GI no plan for further work up unless overt bleed. Hematology consulted for recurrent iron deficiency anemia/ s/p GI work up and weight loss.  Leucocytosis trended down to 14. fever of 101F. CT abdomen 10/16 - Enlarged edematous right kidney with perinephric inflammatory changes and moderate hydronephrosis consistent with pyelonephritis.. Multiple right intrarenal stones including 3 stones in the pelvis measuring 1.1 cm, 8 mm and 5 mm an 1 cm stone in the upper pole calyx and several other smaller stones. Echo pending . repeat CT abdomen.  Urology eval - s/p Membranous urethral stricture passively dilated with scope, patient had  right ureteral stent placement and Katz placement . CT abdomen today -. Enlarging mixed cystic and solid appearance of inferior pole of the right kidney with invasion into the right psoas muscle, concerning for developing abscess in setting of pyelonephritis. Incompletely characterized without IV contrast. repeat CT Abdomen with IV contrast - enlarged right kidney with multiple hypodense parenchymal  collections, largest collection extending inferiorly and posteriorly with involvement of the right psoas muscle/posterior chest wall. 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 IR evaluation.  started on IV Zosyn and vancomycin  12/1 IR drain placement today. Hb 6.9. BC x2 and UC NGTD. Transfusion with 1 unit of PRBC   12/2 fever of 101F overnight. cultures sent from IR drain pending. drain output 130ml. Hb 7.8 . urology to assess for renal function and discuss nephron-sparing stone treatment vs nephrectomy.            Review of Systems:   Pain scale:   Constitutional:  fever,  chills, headache, vision loss, hearing loss, weight loss, Generalized weakness, falls, loss of smell, loss of taste, poor appetite,  sore throat, weight loss  Respiratory: cough, shortness of breath.   Cardiovascular: chest pain, dizziness, palpitations, orthopnea, swelling of feet, syncope  Gastrointestinal: nausea, vomiting, abdominal pain, diarrhea, black stool,  blood in stool, change in bowel habits, constipation  Genitourinary: hematuria, dysuria, urgency, frequency  Integument/Breast: rash,  pruritis  Hematologic/Lymphatic: easy bruising, lymphadenopathy  Musculoskeletal: arthralgias , myalgias, back pain, neck pain, knee pain  Neurological: confusion, seizures, tremors, slurred speech  Behavioral/Psych:  depression, anxiety, auditory or visual hallucinations     OBJECTIVE:     Physical Exam:  Body mass index is 21.29 kg/m².  Constitutional: Appears  thin built   Head: Normocephalic and atraumatic.   Neck: Normal range of motion. Neck supple.   Cardiovascular: Normal heart rate.  Regular heart rhythm.  Pulmonary/Chest: Effort normal.   Abdominal: No distension.  No tenderness  Musculoskeletal: Normal range of motion. No edema.   Neurological: Alert and oriented to person, place, and time. able to move bilateral upper and lower  "extremities without limitation   Skin: Skin is warm and dry.   Psychiatric: Normal mood and affect. Behavior is normal.       Vital Signs  Temp: 97.6 °F (36.4 °C) (12/02/24 1147)  Pulse: 73 (12/02/24 1147)  Resp: 18 (12/02/24 1002)  BP: 128/69 (12/02/24 1147)  SpO2: 98 % (12/02/24 1147)     24 Hour VS Range    Temp:  [97.4 °F (36.3 °C)-101 °F (38.3 °C)]   Pulse:  [71-86]   Resp:  [16-21]   BP: (101-128)/(55-69)   SpO2:  [94 %-100 %]     Intake/Output Summary (Last 24 hours) at 12/2/2024 1149  Last data filed at 12/2/2024 0455  Gross per 24 hour   Intake --   Output 1470 ml   Net -1470 ml         I/O This Shift:  No intake/output data recorded.    Wt Readings from Last 3 Encounters:   11/30/24 63.5 kg (140 lb)   11/25/24 63.5 kg (140 lb)   10/09/24 67 kg (147 lb 11.3 oz)       I have personally reviewed the vitals and recorded Intake/Output     Laboratory/Diagnostic Data:    CBC/Anemia Labs: Coags:    Recent Labs   Lab 11/30/24  0918 12/01/24  0355 12/01/24  0814 12/01/24  1627 12/02/24  0011   WBC 15.18*   < > 16.67* 25.80* 19.84*   HGB 7.6*   < > 7.2* 7.6* 7.8*   HCT 23.4*   < > 22.1* 23.2* 23.9*   *   < > 571* 555* 557*   MCV 78*   < > 77* 80* 77*   RDW 17.3*   < > 17.3* 17.5* 17.4*   IRON 13*  --   --   --   --    FERRITIN 3,596*  --   --   --   --    RETIC 0.9  --   --   --   --    FOLATE 12.6  --   --   --   --    AIOTRNPT51 1087*  --   --   --   --     < > = values in this interval not displayed.    Recent Labs   Lab 12/01/24  1024   INR 1.4*        Chemistries: ABG:   Recent Labs   Lab 11/30/24  0333 12/01/24  0355 12/02/24  0550   * 135* 133*   K 3.7 3.6 3.3*    104 103   CO2 21* 22* 21*   BUN 19 16 13   CREATININE 1.2 1.3 1.3   CALCIUM 9.5 9.4 9.3   PROT 7.9 7.5 7.2   BILITOT 1.0 0.7 0.7   ALKPHOS 130 133 138   ALT 23 26 17   AST 33 39 24   MG 1.9 2.0 1.9   PHOS 3.1 3.6 3.4    No results for input(s): "PH", "PCO2", "PO2", "HCO3", "POCSATURATED", "BE" in the last 168 hours.     POCT " "Glucose: HbA1c:    No results for input(s): "POCTGLUCOSE" in the last 168 hours. Hemoglobin A1C   Date Value Ref Range Status   06/20/2024 6.1 (H) 4.0 - 5.6 % Final     Comment:     ADA Screening Guidelines:  5.7-6.4%  Consistent with prediabetes  >or=6.5%  Consistent with diabetes    High levels of fetal hemoglobin interfere with the HbA1C  assay. Heterozygous hemoglobin variants (HbS, HgC, etc)do  not significantly interfere with this assay.   However, presence of multiple variants may affect accuracy.     06/01/2023 5.8 (H) 4.0 - 5.6 % Final     Comment:     ADA Screening Guidelines:  5.7-6.4%  Consistent with prediabetes  >or=6.5%  Consistent with diabetes    High levels of fetal hemoglobin interfere with the HbA1C  assay. Heterozygous hemoglobin variants (HbS, HgC, etc)do  not significantly interfere with this assay.   However, presence of multiple variants may affect accuracy.          Cardiac Enzymes: Ejection Fractions:    Recent Labs     11/29/24  1439   TROPONINI <0.006  0.007    EF   Date Value Ref Range Status   11/30/2024 50 % Final          No results for input(s): "COLORU", "APPEARANCEUA", "PHUR", "SPECGRAV", "PROTEINUA", "GLUCUA", "KETONESU", "BILIRUBINUA", "OCCULTUA", "NITRITE", "UROBILINOGEN", "LEUKOCYTESUR", "RBCUA", "WBCUA", "BACTERIA", "SQUAMEPITHEL", "HYALINECASTS" in the last 48 hours.    Invalid input(s): "WRIGHTSUR"      No results found for: "PROCAL", "LACTATE"  BNP (pg/mL)   Date Value   11/29/2024 92     No results found for: "CRP", "SEDRATE"  D-Dimer (mg/L FEU)   Date Value   11/29/2024 2.07 (H)     Ferritin (ng/mL)   Date Value   11/30/2024 3,596 (H)   07/01/2024 424 (H)     LD (U/L)   Date Value   11/30/2024 179     Troponin I (ng/mL)   Date Value   11/29/2024 0.007   11/29/2024 <0.006     No results found for this or any previous visit.  No results found for: "[id number]"    Microbiology labs for the last week  Microbiology Results (last 7 days)       Procedure Component Value " Units Date/Time    Culture, Anaerobe [[id number]] Collected: 12/01/24 1221    Order Status: Completed Specimen: Abscess from Kidney, Right Updated: 12/02/24 0859     Anaerobic Culture Culture in progress    Aerobic culture [[id number]] Collected: 12/01/24 1221    Order Status: Completed Specimen: Abscess from Kidney, Right Updated: 12/02/24 0719     Aerobic Bacterial Culture No growth    Blood culture [[id number]] Collected: 11/29/24 1644    Order Status: Completed Specimen: Blood from Peripheral, Antecubital, Right Updated: 12/01/24 1812     Blood Culture, Routine No Growth to date      No Growth to date      No Growth to date    Blood culture [[id number]] Collected: 11/29/24 1644    Order Status: Completed Specimen: Blood from Peripheral, Antecubital, Right Updated: 12/01/24 1812     Blood Culture, Routine No Growth to date      No Growth to date      No Growth to date    Urine culture [[id number]] Collected: 11/29/24 1529    Order Status: Completed Specimen: Urine Updated: 11/30/24 1943     Urine Culture, Routine No growth    Narrative:      Specimen Source->Urine            Reviewed and noted in plan where applicable- Please see chart for full lab data.    Lines/Drains:       Peripheral IV - Single Lumen 11/29/24 1439 20 G Left Antecubital (Active)   Site Assessment Clean;Dry;Intact;No redness;No swelling 11/29/24 2100   Extremity Assessment Distal to IV No abnormal discoloration;No redness;No swelling;No warmth 11/29/24 2100   Dressing Status Clean;Dry;Intact 11/29/24 2100   Dressing Intervention Integrity maintained 11/29/24 2100   Number of days: 0            Peripheral IV - Single Lumen 11/29/24 1646 20 G Right Antecubital (Active)   Site Assessment Clean;Dry;Intact;No redness;No swelling 11/29/24 2100   Extremity Assessment Distal to IV No abnormal discoloration;No redness;No swelling;No warmth 11/29/24 2100   Line Status Saline locked 11/29/24 2100   Dressing Status Clean;Dry;Intact 11/29/24 2100    Dressing Intervention Integrity maintained 11/29/24 2100   Number of days: 0       Imaging  ECG Results              EKG 12-lead (Final result)        Collection Time Result Time QRS Duration OHS QTC Calculation    11/29/24 13:31:06 11/29/24 13:43:01 84 432                     Final result by Interface, Lab [facility] (11/29/24 13:43:09)                   Narrative:    Test Reason : R53.1,    Vent. Rate :  93 BPM     Atrial Rate :  93 BPM     P-R Int : 128 ms          QRS Dur :  84 ms      QT Int : 348 ms       P-R-T Axes :  68  41  41 degrees    QTcB Int : 432 ms    Normal sinus rhythm  Normal ECG  No previous ECGs available  Confirmed by Dain Barajas ([id number]) on 11/29/2024 1:42:58 PM    Referred By:            Confirmed By: Dain Barajas                                    No results found for this or any previous visit.      IR Abscess Drainage Retroperiotoneal  Narrative: EXAMINATION:  Drainage catheter placement    Procedural Personnel    Attending physician(s): Rosetta    Fellow physician(s): None    Resident physician(s): None    Advanced practice provider(s): None    Pre-procedure diagnosis: Perirenal abscess    Post-procedure diagnosis: Same    Indication: Leukocytosis with fluid collection    Additional clinical history: None    Complications: No immediate complications.    TECHNIQUE:  - Retroperitoneal drainage catheter placement under CT and ultrasound guidance    FINDINGS:  Pre-procedure    Consent: Informed consent for the procedure was obtained and time-out was performed prior to the procedure.    Preparation: The site was prepared and draped using maximal sterile barrier technique including cutaneous antisepsis.    Antibiotic administered: Prophylactic dose within 1 hour of procedure start time or 2 hours for vancomycin or fluoroquinolones    Anesthesia/sedation    The IR procedural team has confirmed the patient ID and re-evaluated the patient and sedation plan confirming it is suitable for the  patient's condition and procedure.    Level of anesthesia/sedation: Minimal sedation (anxiolysis)    Anesthesia/sedation administered by: Independent trained observer under attending supervision with continuous monitoring of the patient's level of consciousness and physiologic status    Total intra-service sedation time (minutes): 15    Drainage catheter placement    The patient was positioned prone.  Initial imaging was performed. Local anesthesia was administered. The fluid collection was accessed using an access needle followed by wire insertion and serial dilation and a drainage catheter was placed. Position of the drainage catheter within the fluid collection was confirmed.    - Initial imaging findings: Perirenal fluid collection    - Drainage catheter placed: All-purpose drainage catheter    - External catheter securement: Non-absorbable suture    - Post-drainage imaging findings: Partial drainage of the fluid collection    Contrast    Contrast agent: None    Contrast volume (mL): 0    Radiation Dose    CT dose length product ( mGy-cm): 165.6    Additional Details    Additional description of procedure: None    Equipment details: None    Specimens removed: Aspirated fluid was sent for analysis.    Estimated blood loss (mL): Less than 10    Standardized report: [email or web address]    Attestation    Signer name: Graeme Sargent    I attest that I was present for the entire procedure. I reviewed the stored images and agree with the report as written.  Impression: Percutaneous placement of a 14 Upper sor[place] drainage catheter into perirenal fluid collection, yielding 90 mL of purulent fluid.    Plan:    Leave drain in place until less than 10 cc of fluid is aspirated daily for 2 days.    ______________________________________________________________________    Electronically signed by: Graeme Sargent  Date:    12/01/2024  Time:    13:53      Labs, Imaging, EKG and Diagnostic results from 12/2/2024 were  reviewed.    Medications:  Medication list was reviewed and changes noted under Assessment/Plan.  No current facility-administered medications on file prior to encounter.     Current Outpatient Medications on File Prior to Encounter   Medication Sig Dispense Refill    aspirin 81 MG Chew Take 81 mg by mouth once daily.      diazePAM (VALIUM) 5 MG tablet TAKE 1 TABLET BY MOUTH EVERY 12 HOURS AS NEEDED FOR ANXIETY 30 tablet 0    meloxicam (MOBIC) 7.5 MG tablet Take 1 tablet by mouth twice daily 60 tablet 0    pantoprazole (PROTONIX) 40 MG tablet Take 1 tablet (40 mg total) by mouth once daily. 90 tablet 3    rosuvastatin (CRESTOR) 20 MG tablet Take 1 tablet (20 mg total) by mouth once daily. 90 tablet 3    sildenafiL (VIAGRA) 100 MG tablet Take 1 tablet (100 mg total) by mouth daily as needed for Erectile Dysfunction. 10 tablet 3    triamcinolone acetonide 0.1% (KENALOG) 0.1 % cream APPLY  CREAM EXTERNALLY TWICE DAILY AS NEEDED FOR  DERMATITIS 45 g 0    zolpidem (AMBIEN) 5 MG Tab Take 1 tablet (5 mg total) by mouth nightly as needed (insomnia). 30 tablet 0     Scheduled Medications:  Current Facility-Administered Medications   Medication Dose Route Frequency    atorvastatin  80 mg Oral Daily    ferrous gluconate  324 mg Oral BID WM    pantoprazole  40 mg Oral Daily    piperacillin-tazobactam (Zosyn) IV (PEDS and ADULTS) (extended infusion is not appropriate)  4.5 g Intravenous Q8H    vancomycin (VANCOCIN) IV (PEDS and ADULTS)  20 mg/kg Intravenous Q24H     PRN:   Current Facility-Administered Medications:     0.9%  NaCl infusion (for blood administration), , Intravenous, Q24H PRN    0.9%  NaCl infusion (for blood administration), , Intravenous, Q24H PRN    acetaminophen, 650 mg, Oral, Q6H PRN    dextrose 10%, 12.5 g, Intravenous, PRN    dextrose 10%, 12.5 g, Intravenous, PRN    dextrose 10%, 25 g, Intravenous, PRN    dextrose 10%, 25 g, Intravenous, PRN    fentaNYL, 25 mcg, Intravenous, Q5 Min PRN    glucagon (human  recombinant), 1 mg, Intramuscular, PRN    glucagon (human recombinant), 1 mg, Intramuscular, PRN    glucose, 16 g, Oral, PRN    glucose, 24 g, Oral, PRN    haloperidol lactate, 0.5 mg, Intravenous, Q10 Min PRN    naloxone, 0.02 mg, Intravenous, PRN    oxyCODONE, 5 mg, Oral, Q6H PRN    sodium chloride 0.9%, 10 mL, Intravenous, Q12H PRN    sodium chloride 0.9%, 10 mL, Intravenous, PRN    Pharmacy to dose Vancomycin consult, , , Once **AND** vancomycin - pharmacy to dose, , Intravenous, pharmacy to manage frequency  Infusions:    lactated ringers   Intravenous Continuous 60 mL/hr at 12/01/24 1404 New Bag at 12/01/24 1404     Estimated Creatinine Clearance: 47.5 mL/min (based on SCr of 1.3 mg/dL).             Assessment/Plan:      * Iron deficiency anemia    Patient's with microcytic anemia.. Hemoglobin downtrending. Etiology likely due to Iron deficiency.  Current CBC reviewed-    Recent Labs   Lab 12/01/24  0814 12/01/24  1627 12/02/24  0011   HGB 7.2* 7.6* 7.8*         Component Value Date/Time    MCV 77 (L) 12/02/2024 0011    RDW 17.4 (H) 12/02/2024 0011    IRON 13 (L) 11/30/2024 0918    FERRITIN 3,596 (H) 11/30/2024 0918    RETIC 0.9 11/30/2024 0918    FOLATE 12.6 11/30/2024 0918    OSAKCTLN21 1087 (H) 11/30/2024 0918     Monitor CBC and transfuse if H/H drops below 7/21.        EGD 10/2/24                           Normal esophagus.                          - Z-line regular, 40 cm from the incisors.                          - Small hiatal hernia.                          - Gastritis. Biopsied.                          - Normal examined duodenum. Biopsied   Colonoscopy 10/2/24   Non-bleeding internal hemorrhoids.                          - One 3 mm polyp in the descending colon, removed                          with a cold biopsy forceps. Resected and retrieved.                          - The rectum, sigmoid colon, transverse colon,                          ascending colon, cecum, ileocecal valve and                           appendiceal orifice are normal.                          - The examined portion of the ileum was normal    VCE 10/22 Normal small bowel with no findings to explain anemia. No further GI endoscopy  recommended unless  overt bleeding occurs.     symptomatic anemia -  Hemoglobin is 7.0. Transfusion with  1 unit PRBC ordered . GI consulted   11/30 Hb 7.6  s/p PRBC transfusion . hematology evaluation. started on ferrous gluconate and Feraheme IV x 2 doses. needs hematolgoy f/u outpatient   12/1Hb 6.9. Transfusion with 1 unit of PRBC     Severe protein-calorie malnutrition  Nutrition consulted. Most recent weight and BMI monitored-     Measurements:  Wt Readings from Last 1 Encounters:   11/30/24 63.5 kg (140 lb)   Body mass index is 21.29 kg/m².    Patient has been screened and assessed by RD.    Malnutrition Type:  Context: chronic illness  Level: severe    Malnutrition Characteristic Summary:  Weight Loss (Malnutrition): greater than 7.5% in 3 months (-8.4% x 3 months)  Energy Intake (Malnutrition): less than or equal to 75% for greater than or equal to 1 month  Subcutaneous Fat (Malnutrition):  (moderate to severe)  Muscle Mass (Malnutrition):  (moderate to severe)    Interventions/Recommendations (treatment strategy):  1.)      Urethral stricture  as above       Perinephric abscess  11/30 CT abdomen today -. Enlarging mixed cystic and solid appearance of inferior pole of the right kidney with invasion into the right psoas muscle, concerning for developing abscess in setting of pyelonephritis. Incompletely characterized without IV contrast. repeat CT Abdomen with IV contrast -enlarged right kidney with multiple hypodense parenchymal collections, largest collection extending inferiorly and posteriorly with involvement of the right psoas muscle/posterior chest wall. 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 .  IR evaluation.  started on IV Zosyn and vancomycin  12/1 IR drain placement today  12/2 . XGP - urology to assess for renal function and discuss nephron-sparing stone treatment vs nephrectomy.     Hydronephrosis of right kidney  CT abdomen 10/16 - Enlarged edematous right kidney with perinephric inflammatory changes and moderate hydronephrosis consistent with pyelonephritis.. Multiple right intrarenal stones including 3 stones in the pelvis measuring 1.1 cm, 8 mm and 5 mm an 1 cm stone in the upper pole calyx and several other smaller stones. Echo pending . repeat CT abdomen.  Urology eval - right ureteral stent placement today      Nephrolithiasis  11/30 CT abdomen 10/16 - Enlarged edematous right kidney with perinephric inflammatory changes and moderate hydronephrosis consistent with pyelonephritis.. Multiple right intrarenal stones including 3 stones in the pelvis measuring 1.1 cm, 8 mm and 5 mm an 1 cm stone in the upper pole calyx and several other smaller stones. Echo pending . repeat CT abdomen.  Urology eval -  Urology eval - s/p Membranous urethral stricture passively dilated with scope, patient had  right ureteral stent placement and Katz placement       Weight loss  Nutrition consulted. Most recent weight and BMI monitored-     Measurements:  Wt Readings from Last 1 Encounters:   11/30/24 63.5 kg (140 lb)   Body mass index is 21.29 kg/m².    Patient has been screened and assessed by RD.    Malnutrition Type:  Context:    Level:      Malnutrition Characteristic Summary:       Interventions/Recommendations (treatment strategy):       11/30 GI no plan for further work up unless overt bleed. Hematology consulted for recurrent iron deficiency anemia/ s/p GI work up and weight loss.      Fever  11/30 Leucocytosis trended down to 14. fever of 101F. started on ceftriaxone.   CT abdomen 10/16 - Enlarged edematous right kidney with perinephric inflammatory changes and moderate hydronephrosis consistent with  pyelonephritis.. Multiple right intrarenal stones including 3 stones in the pelvis measuring 1.1 cm, 8 mm and 5 mm an 1 cm stone in the upper pole calyx and several other smaller stones. Echo pending . repeat CT abdomen. Urology consulted for  right hydronephrosis/ fever   12/1BC x2 and UC NGTD.  12/2 fever of 101F overnight. cultures sent from IR drain pending. drain output 130ml.     Falls frequently  secondary to above. fall precautions. PT/OT consulted   reported 2 falls in the last week. one with forehead trauma. CT head wo contrast ordered   11/30  CT head - No acute intracranial pathology.  Specifically no evidence of intracranial hemorrhage or major vascular distribution infarct. Mild generalized cerebral volume loss     Dizziness  likely from symptomatic anemia. orthostasis + by pulse. encourage oral hydration. monitor after PRBC. echo ordered . telemetry r/o arrhythmia     11/30 orthostatic by pulse. received LR       Positive D dimer  D dimer elevated at 2.07 . DVT sonogram- No imaging evidence of deep venous thrombosis in either lower extremity.       UTI (urinary tract infection)  UA + . UC pending.   12/1  BC x2 and UC NGTD.     Leucocytosis    Patient with leucocytosis   Recent Labs   Lab 12/01/24  0814 12/01/24  1627 12/02/24  0011   WBC 16.67* 25.80* 19.84*     . Afebrile. BCX 2, urine culture pending . likely secondary to sepsis.?    11/30 Leucocytosis trended down to 14. fever of 101F. started on ceftriaxone. Echo pending  12/1 BC x2 and UC NGTD.     Hypercholesterolemia  continue crestor         VTE Risk Mitigation (From admission, onward)           Ordered     IP VTE HIGH RISK PATIENT  Once         11/29/24 1638     Place sequential compression device  Until discontinued         11/29/24 1638                    Discharge Planning   LYNN: 12/3/2024     Code Status: Full Code   Is the patient medically ready for discharge?:     Reason for patient still in hospital (select all that apply):  Treatment  Discharge Plan A: Home with family                  Gilmar Cid MD  Department of Hospital Medicine   [facility]

## 2024-12-02 NOTE — PROGRESS NOTES
Food & Nutrition  Education     Diet Education: Increased Iron Intake for Anemia Education  Time Spent: 10 minutes   Learners: Patient and Caregiver     Handouts provided: Iron Content in Foods from The Nutrition Care Manual     Comments: Went over some foods and their Fe contents. Stated to patient that an appropriate goal of food based iron consumption should be 18 mg/day. Went over Vitamin C consumption and stated to patient that Vitamin C can help increase absorption on iron. Encouraged patient to try to meet needs with food, and not supplements to avoid unsavory GI symptoms. RD's contact information was provided.      Follow-Up: Yes     Please Re-consult as needed.   Thanks!    Maggie Campos, MS, RD, LDN

## 2024-12-02 NOTE — SUBJECTIVE & OBJECTIVE
Interval History: Febrile to 101 at midnight, otherwise non tachycardic, normotensive. Resting comfortably in bed this morning. 1250 CC clear yellow urine in mendez, 100 cc drain output that is sero-purulent. WBC decreasing to 19, from 25.     On pip-tazo, vanc  Objective:     Temp:  [97.4 °F (36.3 °C)-101 °F (38.3 °C)] 97.4 °F (36.3 °C)  Pulse:  [76-86] 78  Resp:  [16-21] 18  SpO2:  [94 %-100 %] 98 %  BP: (103-128)/(55-64) 112/61     Body mass index is 21.29 kg/m².           Drains       Drain  Duration                  Urethral Catheter 11/30/24 1035 1 day         Closed/Suction Drain 12/01/24 1219 Right Back Bulb 14 Fr. <1 day                     Physical Exam  Eyes:      Pupils: Pupils are equal, round, and reactive to light.   Cardiovascular:      Rate and Rhythm: Normal rate.   Pulmonary:      Effort: Pulmonary effort is normal.   Abdominal:      General: Abdomen is flat.   Genitourinary:     Comments: Mendez draining clear yellow urine  Musculoskeletal:      Cervical back: Normal range of motion.   Skin:     General: Skin is warm.   Neurological:      Mental Status: He is alert.           Significant Labs:    BMP:  Recent Labs   Lab 11/29/24  1439 11/30/24  0333 12/01/24  0355    132* 135*   K 3.9 3.7 3.6    104 104   CO2 22* 21* 22*   BUN 19 19 16   CREATININE 1.3 1.2 1.3   CALCIUM 10.2 9.5 9.4       CBC:   Recent Labs   Lab 12/01/24  0814 12/01/24  1627 12/02/24  0011   WBC 16.67* 25.80* 19.84*   HGB 7.2* 7.6* 7.8*   HCT 22.1* 23.2* 23.9*   * 555* 557*       All pertinent labs results from the past 24 hours have been reviewed.    Significant Imaging:  All pertinent imaging results/findings from the past 24 hours have been reviewed.

## 2024-12-02 NOTE — SUBJECTIVE & OBJECTIVE
"Interval History:   Patient resting comfortably when assessed by team. Reports intermittent "hallucinations" yesterday night. Persistent Leukocytosis. T max of 101 F at 12 AM. No growth on cultures from kidney abscess. MARIEL drain with purulent bloody fluid noted.       Past Medical History:   Diagnosis Date    Hyperlipidemia     Male erectile dysfunction, unspecified        Past Surgical History:   Procedure Laterality Date    COLONOSCOPY N/A 10/2/2024    Procedure: COLONOSCOPY;  Surgeon: Brennan Balderrama MD;  Location: [facility];  Service: Endoscopy;  Laterality: N/A;    CYSTOSCOPY W/ URETERAL STENT PLACEMENT Right 11/30/2024    Procedure: CYSTOSCOPY, WITH URETERAL STENT INSERTION;  Surgeon: Dain Eugene MD;  Location: [facility] OR [address] [place] [address];  Service: Urology;  Laterality: Right;    DILATION OF URETHRA N/A 11/30/2024    Procedure: DILATION, URETHRA;  Surgeon: Dain Eugene MD;  Location: [facility] OR [address] [place] [address];  Service: Urology;  Laterality: N/A;    ESOPHAGOGASTRODUODENOSCOPY N/A 10/2/2024    Procedure: EGD (ESOPHAGOGASTRODUODENOSCOPY);  Surgeon: Brennan Balderrama MD;  Location: [facility];  Service: Endoscopy;  Laterality: N/A;    FRACTURE SURGERY      INTRALUMINAL GASTROINTESTINAL TRACT IMAGING VIA CAPSULE N/A 10/22/2024    Procedure: IMAGING PROCEDURE, GI TRACT, INTRALUMINAL, VIA CAPSULE;  Surgeon: Brennan Balderrama MD;  Location: [facility];  Service: Endoscopy;  Laterality: N/A;    LIPOMA RESECTION Left 1979    left knee    QUADRICEPS TENDON REPAIR Left 2012       Review of patient's allergies indicates:  No Known Allergies    Medications:  Medications Prior to Admission   Medication Sig    aspirin 81 MG Chew Take 81 mg by mouth once daily.    diazePAM (VALIUM) 5 MG tablet TAKE 1 TABLET BY MOUTH EVERY 12 HOURS AS NEEDED FOR ANXIETY    meloxicam (MOBIC) 7.5 MG tablet Take 1 tablet by mouth twice daily    pantoprazole (PROTONIX) 40 MG tablet Take 1 tablet (40 mg total) by mouth once daily.    rosuvastatin " "(CRESTOR) 20 MG tablet Take 1 tablet (20 mg total) by mouth once daily.    sildenafiL (VIAGRA) 100 MG tablet Take 1 tablet (100 mg total) by mouth daily as needed for Erectile Dysfunction.    triamcinolone acetonide 0.1% (KENALOG) 0.1 % cream APPLY  CREAM EXTERNALLY TWICE DAILY AS NEEDED FOR  DERMATITIS    zolpidem (AMBIEN) 5 MG Tab Take 1 tablet (5 mg total) by mouth nightly as needed (insomnia).     Antibiotics (From admission, onward)      Start     Stop Route Frequency Ordered    12/01/24 1600  vancomycin 1,250 mg in 0.9% NaCl 250 mL IVPB (admixture device)         -- IV Every 24 hours (non-standard times) 11/30/24 1501    11/30/24 1552  vancomycin - pharmacy to dose  (vancomycin IVPB (PEDS and ADULTS))        Placed in "And" Linked Group    -- IV pharmacy to manage frequency 11/30/24 1452    11/30/24 1515  piperacillin-tazobactam (ZOSYN) 4.5 g in D5W 100 mL IVPB (MB+)         -- IV Every 8 hours (non-standard times) 11/30/24 1413          Antifungals (From admission, onward)      None          Antivirals (From admission, onward)      None             Immunization History   Administered Date(s) Administered    COVID-19 MRNA, LN-S PF (MODERNA HALF 0.25 ML DOSE) 12/06/2021    COVID-19, MRNA, LN-S, PF (MODERNA FULL 0.5 ML DOSE) 02/17/2021, 03/17/2021    COVID-19, MRNA, LN-S, PF (Pfizer) (Purple Cap) 10/28/2022    COVID-19, mRNA, LNP-S, PF (Moderna) Ages 12+ 12/20/2023    COVID-19, mRNA, LNP-S, PF, william-sucrose, 30 mcg/0.3 mL (Pfizer Ages 12+) 11/05/2024    COVID-19, mRNA, LNP-S, bivalent booster, PF (Moderna Omicron)12 + YEARS 10/28/2022    Influenza (FLUAD) - Quadrivalent - Adjuvanted - PF *Preferred* (65+) 12/20/2023    Influenza - Quadrivalent - PF *Preferred* (6 months and older) 10/28/2022    Influenza - Trivalent - Afluria, Fluzone MDV 10/28/2022    Influenza - Trivalent - Fluad - Adjuvanted - PF (65 years and older 11/05/2024    Tdap 11/25/2024       Family History       Problem Relation (Age of Onset)    " Heart attacks under age 50 Mother (43), Father (38)    Heart disease Mother, Father          Social History     Socioeconomic History    Marital status:    Tobacco Use    Smoking status: Former     Current packs/day: 0.00     Average packs/day: 1 pack/day for 20.0 years (20.0 ttl pk-yrs)     Types: Cigarettes     Start date: 1987     Quit date: 2007     Years since quittin.9    Smokeless tobacco: Never   Substance and Sexual Activity    Alcohol use: Yes     Comment: Infrequently    Drug use: Not Currently    Sexual activity: Yes     Partners: Female     Social Drivers of Health     Financial Resource Strain: Low Risk  (2024)    Overall Financial Resource Strain (CARDIA)     Difficulty of Paying Living Expenses: Not very hard   Food Insecurity: No Food Insecurity (2024)    Hunger Vital Sign     Worried About Running Out of Food in the Last Year: Never true     Ran Out of Food in the Last Year: Never true   Transportation Needs: No Transportation Needs (2024)    TRANSPORTATION NEEDS     Transportation : No   Physical Activity: Sufficiently Active (2024)    Exercise Vital Sign     Days of Exercise per Week: 5 days     Minutes of Exercise per Session: 30 min   Stress: No Stress Concern Present (2024)    [employer] [place] [employer] - Occupational Stress Questionnaire     Feeling of Stress : Only a little   Housing Stability: Low Risk  (2024)    Housing Stability Vital Sign     Unable to Pay for Housing in the Last Year: No     Homeless in the Last Year: No     Review of Systems   Constitutional:  Positive for fatigue. Negative for chills and fever.   Respiratory:  Negative for cough and shortness of breath.    Cardiovascular:  Negative for chest pain.   Gastrointestinal:  Positive for abdominal pain. Negative for constipation, diarrhea, nausea and vomiting.   Genitourinary:  Negative for dysuria and hematuria.   Musculoskeletal:  Negative for arthralgias and  myalgias.   Skin:  Negative for rash.   Neurological:  Positive for weakness. Negative for headaches.     Objective:     Vital Signs (Most Recent):  Temp: 97.6 °F (36.4 °C) (12/02/24 1147)  Pulse: 73 (12/02/24 1147)  Resp: 18 (12/02/24 1002)  BP: 128/69 (12/02/24 1147)  SpO2: 98 % (12/02/24 1147) Vital Signs (24h Range):  Temp:  [97.4 °F (36.3 °C)-101 °F (38.3 °C)] 97.6 °F (36.4 °C)  Pulse:  [71-85] 73  Resp:  [16-18] 18  SpO2:  [95 %-99 %] 98 %  BP: (101-128)/(57-69) 128/69     Weight: 63.5 kg (140 lb)  Body mass index is 21.29 kg/m².    Estimated Creatinine Clearance: 47.5 mL/min (based on SCr of 1.3 mg/dL).     Physical Exam  Vitals reviewed.   Constitutional:       General: He is not in acute distress.     Appearance: Normal appearance. He is not ill-appearing.   HENT:      Head: Normocephalic and atraumatic.   Eyes:      Extraocular Movements: Extraocular movements intact.      Conjunctiva/sclera: Conjunctivae normal.   Cardiovascular:      Rate and Rhythm: Normal rate and regular rhythm.      Heart sounds: No murmur heard.  Pulmonary:      Effort: Pulmonary effort is normal. No respiratory distress.      Breath sounds: Normal breath sounds.   Abdominal:      General: Abdomen is flat. Bowel sounds are normal.      Palpations: Abdomen is soft.      Tenderness: There is no abdominal tenderness.      Comments: Right MARIEL drain noted with bloody/purulent drainage    Musculoskeletal:      Cervical back: Normal range of motion.   Skin:     General: Skin is warm and dry.   Neurological:      General: No focal deficit present.      Mental Status: He is alert and oriented to person, place, and time.   Psychiatric:         Mood and Affect: Mood normal.         Behavior: Behavior normal.         Thought Content: Thought content normal.          Significant Labs: All pertinent labs within the past 24 hours have been reviewed.  Recent Lab Results         12/02/24  0550   12/02/24  0011   12/01/24  1627        Albumin 1.7                       ALT 17           Anion Gap 9           Aniso     Slight       AST 24           Baso #   0.03   0.05       Basophil %   0.2   0.2       BILIRUBIN TOTAL 0.7  Comment: For infants and newborns, interpretation of results should be based  on gestational age, weight and in agreement with clinical  observations.    Premature Infant recommended reference ranges:  Up to 24 hours.............<8.0 mg/dL  Up to 48 hours............<12.0 mg/dL  3-5 days..................<15.0 mg/dL  6-29 days.................<15.0 mg/dL             BUN 13           Calcium 9.3           Chloride 103           CO2 21           Creatinine 1.3           Differential Method   Automated   Automated       eGFR 59.1           Eos #   0.1   0.0       Eos %   0.6   0.1       Glucose 86           Gran # (ANC)   16.8   22.7       Gran %   84.5   87.8       Hematocrit   23.9   23.2       Hemoglobin   7.8   7.6       Hypo     Occasional       Immature Grans (Abs)   0.17  Comment: Mild elevation in immature granulocytes is non specific and   can be seen in a variety of conditions including stress response,   acute inflammation, trauma and pregnancy. Correlation with other   laboratory and clinical findings is essential.     0.20  Comment: Mild elevation in immature granulocytes is non specific and   can be seen in a variety of conditions including stress response,   acute inflammation, trauma and pregnancy. Correlation with other   laboratory and clinical findings is essential.         Immature Granulocytes   0.9   0.8       Lymph #   1.6   1.4       Lymph %   8.0   5.4       Magnesium  1.9           MCH   25.2   26.1       MCHC   32.6   32.8       MCV   77   80       Mono #   1.2   1.5       Mono %   5.8   5.7       MPV   8.8   9.1       nRBC   0   0       Ovalocytes     Occasional       Phosphorus Level 3.4           Platelet Estimate     Increased       Platelet Count   557   555       Poikilocytosis     Slight       Poly      Occasional       Potassium 3.3           PROTEIN TOTAL 7.2           RBC   3.09   2.91       RDW   17.4   17.5       Sodium 133           Teardrop Cells     Occasional       WBC   19.84   25.80               Significant Imaging: I have reviewed all pertinent imaging results/findings within the past 24 hours.

## 2024-12-02 NOTE — ASSESSMENT & PLAN NOTE
Nutrition consulted. Most recent weight and BMI monitored-     Measurements:  Wt Readings from Last 1 Encounters:   11/30/24 63.5 kg (140 lb)   Body mass index is 21.29 kg/m².    Patient has been screened and assessed by RD.    Malnutrition Type:  Context: chronic illness  Level: severe    Malnutrition Characteristic Summary:  Weight Loss (Malnutrition): greater than 7.5% in 3 months (-8.4% x 3 months)  Energy Intake (Malnutrition): less than or equal to 75% for greater than or equal to 1 month  Subcutaneous Fat (Malnutrition):  (moderate to severe)  Muscle Mass (Malnutrition):  (moderate to severe)    Interventions/Recommendations (treatment strategy):  1.)

## 2024-12-02 NOTE — ASSESSMENT & PLAN NOTE
Mr. Jiménez is a 68M with PMH of LENORA, HLD, R hydronephrosis and nephrolithiasis, presented with weakness/dizziness and a recent fall, s/p R ureteral stent placement with urology on 11/30, with postop CT showing possible perinephric abscess. S/p IR perirenal drain placement with cultures collected.     Recommendations  Continue vancomycin, with plans for de-escalation tomorrow pending culture data   Will switch Zosyn to Unasyn  Added on AFB culture of abscess fluid due to concern for Actinomyces and other culture data being negative thusfar  Follow up cultures sent from IR drain

## 2024-12-03 DIAGNOSIS — N15.1 PERINEPHRIC ABSCESS: Primary | ICD-10-CM

## 2024-12-03 PROBLEM — E87.1 HYPONATREMIA: Status: ACTIVE | Noted: 2024-12-03

## 2024-12-03 LAB
ALBUMIN SERPL BCP-MCNC: 1.6 G/DL (ref 3.5–5.2)
ALP SERPL-CCNC: 173 U/L (ref 40–150)
ALT SERPL W/O P-5'-P-CCNC: 19 U/L (ref 10–44)
ANION GAP SERPL CALC-SCNC: 7 MMOL/L (ref 8–16)
AST SERPL-CCNC: 34 U/L (ref 10–40)
BASOPHILS # BLD AUTO: 0.02 K/UL (ref 0–0.2)
BASOPHILS NFR BLD: 0.1 % (ref 0–1.9)
BILIRUB SERPL-MCNC: 0.3 MG/DL (ref 0.1–1)
BUN SERPL-MCNC: 14 MG/DL (ref 8–23)
CALCIUM SERPL-MCNC: 9.1 MG/DL (ref 8.7–10.5)
CHLORIDE SERPL-SCNC: 107 MMOL/L (ref 95–110)
CO2 SERPL-SCNC: 21 MMOL/L (ref 23–29)
CREAT SERPL-MCNC: 1 MG/DL (ref 0.5–1.4)
DIFFERENTIAL METHOD BLD: ABNORMAL
EOSINOPHIL # BLD AUTO: 0.2 K/UL (ref 0–0.5)
EOSINOPHIL NFR BLD: 1 % (ref 0–8)
ERYTHROCYTE [DISTWIDTH] IN BLOOD BY AUTOMATED COUNT: 17.9 % (ref 11.5–14.5)
EST. GFR  (NO RACE VARIABLE): >60 ML/MIN/1.73 M^2
GLUCOSE SERPL-MCNC: 140 MG/DL (ref 70–110)
HCT VFR BLD AUTO: 27.4 % (ref 40–54)
HGB BLD-MCNC: 8.6 G/DL (ref 14–18)
IMM GRANULOCYTES # BLD AUTO: 0.08 K/UL (ref 0–0.04)
IMM GRANULOCYTES NFR BLD AUTO: 0.6 % (ref 0–0.5)
LYMPHOCYTES # BLD AUTO: 1 K/UL (ref 1–4.8)
LYMPHOCYTES NFR BLD: 6.9 % (ref 18–48)
MAGNESIUM SERPL-MCNC: 2.1 MG/DL (ref 1.6–2.6)
MCH RBC QN AUTO: 25.1 PG (ref 27–31)
MCHC RBC AUTO-ENTMCNC: 31.4 G/DL (ref 32–36)
MCV RBC AUTO: 80 FL (ref 82–98)
MONOCYTES # BLD AUTO: 0.8 K/UL (ref 0.3–1)
MONOCYTES NFR BLD: 5.7 % (ref 4–15)
MYCOBACTERIUM SPEC QL CULT: NORMAL
NEUTROPHILS # BLD AUTO: 12.4 K/UL (ref 1.8–7.7)
NEUTROPHILS NFR BLD: 85.7 % (ref 38–73)
NRBC BLD-RTO: 0 /100 WBC
PHOSPHATE SERPL-MCNC: 2 MG/DL (ref 2.7–4.5)
PLATELET # BLD AUTO: 643 K/UL (ref 150–450)
PMV BLD AUTO: 8.9 FL (ref 9.2–12.9)
POTASSIUM SERPL-SCNC: 3.6 MMOL/L (ref 3.5–5.1)
PROT SERPL-MCNC: 7.2 G/DL (ref 6–8.4)
RBC # BLD AUTO: 3.43 M/UL (ref 4.6–6.2)
SODIUM SERPL-SCNC: 135 MMOL/L (ref 136–145)
TROPONIN I SERPL DL<=0.01 NG/ML-MCNC: <0.006 NG/ML (ref 0–0.03)
WBC # BLD AUTO: 14.5 K/UL (ref 3.9–12.7)

## 2024-12-03 PROCEDURE — 63600175 PHARM REV CODE 636 W HCPCS: Performed by: STUDENT IN AN ORGANIZED HEALTH CARE EDUCATION/TRAINING PROGRAM

## 2024-12-03 PROCEDURE — 21400001 HC TELEMETRY ROOM

## 2024-12-03 PROCEDURE — 97530 THERAPEUTIC ACTIVITIES: CPT | Mod: CO

## 2024-12-03 PROCEDURE — 99232 SBSQ HOSP IP/OBS MODERATE 35: CPT | Mod: ,,, | Performed by: UROLOGY

## 2024-12-03 PROCEDURE — 25000003 PHARM REV CODE 250: Performed by: STUDENT IN AN ORGANIZED HEALTH CARE EDUCATION/TRAINING PROGRAM

## 2024-12-03 PROCEDURE — 84484 ASSAY OF TROPONIN QUANT: CPT | Performed by: HOSPITALIST

## 2024-12-03 PROCEDURE — 97110 THERAPEUTIC EXERCISES: CPT | Mod: CQ

## 2024-12-03 PROCEDURE — 97535 SELF CARE MNGMENT TRAINING: CPT | Mod: CO

## 2024-12-03 PROCEDURE — 84100 ASSAY OF PHOSPHORUS: CPT | Performed by: HOSPITALIST

## 2024-12-03 PROCEDURE — 25000003 PHARM REV CODE 250: Performed by: HOSPITALIST

## 2024-12-03 PROCEDURE — 25000003 PHARM REV CODE 250: Performed by: INTERNAL MEDICINE

## 2024-12-03 PROCEDURE — 99233 SBSQ HOSP IP/OBS HIGH 50: CPT | Mod: GC,,, | Performed by: INTERNAL MEDICINE

## 2024-12-03 PROCEDURE — 36415 COLL VENOUS BLD VENIPUNCTURE: CPT | Performed by: HOSPITALIST

## 2024-12-03 PROCEDURE — 97116 GAIT TRAINING THERAPY: CPT | Mod: CQ

## 2024-12-03 PROCEDURE — 85025 COMPLETE CBC W/AUTO DIFF WBC: CPT | Performed by: HOSPITALIST

## 2024-12-03 PROCEDURE — 80053 COMPREHEN METABOLIC PANEL: CPT | Performed by: HOSPITALIST

## 2024-12-03 PROCEDURE — 83735 ASSAY OF MAGNESIUM: CPT | Performed by: HOSPITALIST

## 2024-12-03 RX ORDER — SODIUM,POTASSIUM PHOSPHATES 280-250MG
2 POWDER IN PACKET (EA) ORAL
Status: COMPLETED | OUTPATIENT
Start: 2024-12-03 | End: 2024-12-03

## 2024-12-03 RX ORDER — AMOXICILLIN AND CLAVULANATE POTASSIUM 875; 125 MG/1; MG/1
1 TABLET, FILM COATED ORAL EVERY 12 HOURS
Status: DISCONTINUED | OUTPATIENT
Start: 2024-12-03 | End: 2024-12-06 | Stop reason: HOSPADM

## 2024-12-03 RX ORDER — MUPIROCIN 20 MG/G
OINTMENT TOPICAL 2 TIMES DAILY
Status: DISCONTINUED | OUTPATIENT
Start: 2024-12-03 | End: 2024-12-06 | Stop reason: HOSPADM

## 2024-12-03 RX ADMIN — AMPICILLIN SODIUM AND SULBACTAM SODIUM 3 G: 2; 1 INJECTION, POWDER, FOR SOLUTION INTRAMUSCULAR; INTRAVENOUS at 12:12

## 2024-12-03 RX ADMIN — OXYCODONE HYDROCHLORIDE 5 MG: 5 TABLET ORAL at 12:12

## 2024-12-03 RX ADMIN — Medication 300 ML: at 06:12

## 2024-12-03 RX ADMIN — Medication 300 ML: at 10:12

## 2024-12-03 RX ADMIN — Medication 324 MG: at 04:12

## 2024-12-03 RX ADMIN — AMPICILLIN SODIUM AND SULBACTAM SODIUM 3 G: 2; 1 INJECTION, POWDER, FOR SOLUTION INTRAMUSCULAR; INTRAVENOUS at 06:12

## 2024-12-03 RX ADMIN — ACETAMINOPHEN 650 MG: 325 TABLET ORAL at 10:12

## 2024-12-03 RX ADMIN — Medication 300 ML: at 02:12

## 2024-12-03 RX ADMIN — AMOXICILLIN AND CLAVULANATE POTASSIUM 1 TABLET: 875; 125 TABLET, FILM COATED ORAL at 10:12

## 2024-12-03 RX ADMIN — ATORVASTATIN CALCIUM 80 MG: 40 TABLET, FILM COATED ORAL at 09:12

## 2024-12-03 RX ADMIN — Medication 324 MG: at 09:12

## 2024-12-03 RX ADMIN — OXYCODONE HYDROCHLORIDE 5 MG: 5 TABLET ORAL at 06:12

## 2024-12-03 RX ADMIN — POTASSIUM & SODIUM PHOSPHATES POWDER PACK 280-160-250 MG 2 PACKET: 280-160-250 PACK at 12:12

## 2024-12-03 RX ADMIN — PANTOPRAZOLE SODIUM 40 MG: 40 TABLET, DELAYED RELEASE ORAL at 09:12

## 2024-12-03 NOTE — PROGRESS NOTES
"[facility]  Infectious Disease  Progress Note    Patient Name: Felipe Jiménez  MRN: [id number]  Admission Date: 11/29/2024  Length of Stay: 3 days  Attending Physician: Gilmar Cid MD  Primary Care Provider: No primary care provider on file.    Isolation Status: No active isolations  Assessment/Plan:      Renal/  * Perinephric abscess  Mr. Jiménez is a 68M with PMH of LENORA, HLD, R hydronephrosis and nephrolithiasis, presented with weakness/dizziness and a recent fall, s/p R ureteral stent placement with urology on 11/30, with postop CT showing possible perinephric abscess. S/p IR perirenal drain placement with cultures collected.     Cultures showing NGTD, De escalated Abx to Unasyn yesterday.     Recommendations  Discontinued Vancomycin as cultures remain negative   Transition patient from Unasyn to Augmentin to complete a total of 14 day course from the day of drain placement with IR due to source control   Will arrange for CT scan on outpatient basis to assess for abscess resolution   Will arrange for ID follow up with our            Thank you for your consult. I will sign off. Please contact us if you have any additional questions.    Simón Vences, DO  Infectious Disease  [facility] S[facility]    Subjective:     Principal Problem:Perinephric abscess    HPI: Mr. Jiménez is a 68M with PMH of LENORA, HLD, R hydronephrosis and nephrolithiasis, presented with weakness/dizziness and a recent fall, s/p R ureteral stent placement with urology on 11/30, with postop CT showing possible perinephric abscess, pending IR drain placement today 12/1. Infectious disease consulted for "perinephric abscess?".     Patient was last febrile at 6am yesterday. Blood and urine cultures no growth. Does have leukocytosis. Currently on vancomycin and zosyn.       Interval History:   Patient afebrile overnight. HDS. Urology removed mendez, patient passed voiding trial. No reports of dysuria, or hematuria. Denies anymore " hallucinations. Cultures remain negative. Drain with 20 cc of output noted at bedside.       Past Medical History:   Diagnosis Date    Hyperlipidemia     Male erectile dysfunction, unspecified        Past Surgical History:   Procedure Laterality Date    COLONOSCOPY N/A 10/2/2024    Procedure: COLONOSCOPY;  Surgeon: Brennan Balderrama MD;  Location: [facility];  Service: Endoscopy;  Laterality: N/A;    CYSTOSCOPY W/ URETERAL STENT PLACEMENT Right 11/30/2024    Procedure: CYSTOSCOPY, WITH URETERAL STENT INSERTION;  Surgeon: Dain Eugene MD;  Location: [facility] OR [address] [place] [address];  Service: Urology;  Laterality: Right;    DILATION OF URETHRA N/A 11/30/2024    Procedure: DILATION, URETHRA;  Surgeon: Dain Eugene MD;  Location: [facility] OR [address] [place] [address];  Service: Urology;  Laterality: N/A;    ESOPHAGOGASTRODUODENOSCOPY N/A 10/2/2024    Procedure: EGD (ESOPHAGOGASTRODUODENOSCOPY);  Surgeon: Brennan Balderrama MD;  Location: [facility];  Service: Endoscopy;  Laterality: N/A;    FRACTURE SURGERY      INTRALUMINAL GASTROINTESTINAL TRACT IMAGING VIA CAPSULE N/A 10/22/2024    Procedure: IMAGING PROCEDURE, GI TRACT, INTRALUMINAL, VIA CAPSULE;  Surgeon: Brennan Balderrama MD;  Location: [facility];  Service: Endoscopy;  Laterality: N/A;    LIPOMA RESECTION Left 1979    left knee    QUADRICEPS TENDON REPAIR Left 2012       Review of patient's allergies indicates:  No Known Allergies    Medications:  Medications Prior to Admission   Medication Sig    aspirin 81 MG Chew Take 81 mg by mouth once daily.    diazePAM (VALIUM) 5 MG tablet TAKE 1 TABLET BY MOUTH EVERY 12 HOURS AS NEEDED FOR ANXIETY    meloxicam (MOBIC) 7.5 MG tablet Take 1 tablet by mouth twice daily    pantoprazole (PROTONIX) 40 MG tablet Take 1 tablet (40 mg total) by mouth once daily.    rosuvastatin (CRESTOR) 20 MG tablet Take 1 tablet (20 mg total) by mouth once daily.    sildenafiL (VIAGRA) 100 MG tablet Take 1 tablet (100 mg total) by mouth daily as needed for Erectile  "Dysfunction.    triamcinolone acetonide 0.1% (KENALOG) 0.1 % cream APPLY  CREAM EXTERNALLY TWICE DAILY AS NEEDED FOR  DERMATITIS    zolpidem (AMBIEN) 5 MG Tab Take 1 tablet (5 mg total) by mouth nightly as needed (insomnia).     Antibiotics (From admission, onward)      Start     Stop Route Frequency Ordered    12/03/24 1045  mupirocin 2 % ointment  (DECOLONIZATION PROTOCOL ORDERS)         12/08/24 0859 Nasl 2 times daily 12/03/24 0942    12/02/24 1800  ampicillin-sulbactam (UNASYN) 3 g in 0.9% NaCl 100 mL IVPB (MB+)         -- IV Every 6 hours (non-standard times) 12/02/24 1439    12/01/24 1600  vancomycin 1,250 mg in 0.9% NaCl 250 mL IVPB (admixture device)         -- IV Every 24 hours (non-standard times) 11/30/24 1501    11/30/24 1552  vancomycin - pharmacy to dose  (vancomycin IVPB (PEDS and ADULTS))        Placed in "And" Linked Group    -- IV pharmacy to manage frequency 11/30/24 1452          Antifungals (From admission, onward)      None          Antivirals (From admission, onward)      None             Immunization History   Administered Date(s) Administered    COVID-19 MRNA, LN-S PF (MODERNA HALF 0.25 ML DOSE) 12/06/2021    COVID-19, MRNA, LN-S, PF (MODERNA FULL 0.5 ML DOSE) 02/17/2021, 03/17/2021    COVID-19, MRNA, LN-S, PF (Pfizer) (Purple Cap) 10/28/2022    COVID-19, mRNA, LNP-S, PF (Moderna) Ages 12+ 12/20/2023    COVID-19, mRNA, LNP-S, PF, william-sucrose, 30 mcg/0.3 mL (Pfizer Ages 12+) 11/05/2024    COVID-19, mRNA, LNP-S, bivalent booster, PF (Moderna Omicron)12 + YEARS 10/28/2022    Influenza (FLUAD) - Quadrivalent - Adjuvanted - PF *Preferred* (65+) 12/20/2023    Influenza - Quadrivalent - PF *Preferred* (6 months and older) 10/28/2022    Influenza - Trivalent - Afluria, Fluzone MDV 10/28/2022    Influenza - Trivalent - Fluad - Adjuvanted - PF (65 years and older 11/05/2024    Tdap 11/25/2024       Family History       Problem Relation (Age of Onset)    Heart attacks under age 50 Mother (43), Father " (38)    Heart disease Mother, Father          Social History     Socioeconomic History    Marital status:    Tobacco Use    Smoking status: Former     Current packs/day: 0.00     Average packs/day: 1 pack/day for 20.0 years (20.0 ttl pk-yrs)     Types: Cigarettes     Start date: 1987     Quit date: 2007     Years since quittin.9    Smokeless tobacco: Never   Substance and Sexual Activity    Alcohol use: Yes     Comment: Infrequently    Drug use: Not Currently    Sexual activity: Yes     Partners: Female     Social Drivers of Health     Financial Resource Strain: Low Risk  (2024)    Overall Financial Resource Strain (CARDIA)     Difficulty of Paying Living Expenses: Not very hard   Food Insecurity: No Food Insecurity (2024)    Hunger Vital Sign     Worried About Running Out of Food in the Last Year: Never true     Ran Out of Food in the Last Year: Never true   Transportation Needs: No Transportation Needs (2024)    TRANSPORTATION NEEDS     Transportation : No   Physical Activity: Sufficiently Active (2024)    Exercise Vital Sign     Days of Exercise per Week: 5 days     Minutes of Exercise per Session: 30 min   Stress: No Stress Concern Present (2024)    [employer] - Occupational Stress Questionnaire     Feeling of Stress : Only a little   Housing Stability: Low Risk  (2024)    Housing Stability Vital Sign     Unable to Pay for Housing in the Last Year: No     Homeless in the Last Year: No     Review of Systems   Constitutional:  Positive for fatigue. Negative for chills and fever.   Respiratory:  Negative for cough and shortness of breath.    Cardiovascular:  Negative for chest pain.   Gastrointestinal:  Positive for abdominal pain. Negative for constipation, diarrhea, nausea and vomiting.   Genitourinary:  Negative for dysuria and hematuria.   Musculoskeletal:  Negative for arthralgias and myalgias.   Skin:  Negative for rash.    Neurological:  Positive for weakness. Negative for headaches.     Objective:     Vital Signs (Most Recent):  Temp: 98.4 °F (36.9 °C) (12/03/24 1156)  Pulse: 93 (12/03/24 1156)  Resp: 17 (12/03/24 1212)  BP: 122/71 (12/03/24 1156)  SpO2: 98 % (12/03/24 1156) Vital Signs (24h Range):  Temp:  [97 °F (36.1 °C)-98.5 °F (36.9 °C)] 98.4 °F (36.9 °C)  Pulse:  [75-93] 93  Resp:  [16-20] 17  SpO2:  [96 %-99 %] 98 %  BP: (107-125)/(47-71) 122/71     Weight: 63.5 kg (140 lb)  Body mass index is 21.29 kg/m².    Estimated Creatinine Clearance: 61.7 mL/min (based on SCr of 1 mg/dL).     Physical Exam  Vitals reviewed.   Constitutional:       General: He is not in acute distress.     Appearance: Normal appearance. He is not ill-appearing.   HENT:      Head: Normocephalic and atraumatic.   Eyes:      Extraocular Movements: Extraocular movements intact.      Conjunctiva/sclera: Conjunctivae normal.   Cardiovascular:      Rate and Rhythm: Normal rate and regular rhythm.      Heart sounds: No murmur heard.  Pulmonary:      Effort: Pulmonary effort is normal. No respiratory distress.      Breath sounds: Normal breath sounds.   Abdominal:      General: Abdomen is flat. Bowel sounds are normal.      Palpations: Abdomen is soft.      Tenderness: There is no abdominal tenderness.      Comments: Right MARIEL drain noted with bloody/purulent drainage    Musculoskeletal:      Cervical back: Normal range of motion.   Skin:     General: Skin is warm and dry.   Neurological:      General: No focal deficit present.      Mental Status: He is alert and oriented to person, place, and time.   Psychiatric:         Mood and Affect: Mood normal.         Behavior: Behavior normal.         Thought Content: Thought content normal.          Significant Labs: All pertinent labs within the past 24 hours have been reviewed.  Recent Lab Results  (Last 5 results in the past 24 hours)        12/03/24  1058   12/03/24  0224   12/03/24  0058   12/02/24  2131    12/02/24 2056        Albumin   1.6             ALP   173             ALT   19             Anion Gap   7             AST   34             Baso # 0.02               Basophil % 0.1               BILIRUBIN TOTAL   0.3  Comment: For infants and newborns, interpretation of results should be based  on gestational age, weight and in agreement with clinical  observations.    Premature Infant recommended reference ranges:  Up to 24 hours.............<8.0 mg/dL  Up to 48 hours............<12.0 mg/dL  3-5 days..................<15.0 mg/dL  6-29 days.................<15.0 mg/dL               BUN   14             Calcium   9.1             Chloride   107             CO2   21             Creatinine   1.0             Differential Method Automated               eGFR   >60.0             Eos # 0.2               Eos % 1.0               Glucose   140             Gran # (ANC) 12.4               Gran % 85.7               Hematocrit 27.4               Hemoglobin 8.6               Immature Grans (Abs) 0.08  Comment: Mild elevation in immature granulocytes is non specific and   can be seen in a variety of conditions including stress response,   acute inflammation, trauma and pregnancy. Correlation with other   laboratory and clinical findings is essential.                 Immature Granulocytes 0.6               Lymph # 1.0               Lymph % 6.9               Magnesium    2.1             MCH 25.1               MCHC 31.4               MCV 80               Mono # 0.8               Mono % 5.7               MPV 8.9               nRBC 0               Phosphorus Level   2.0             Platelet Count 643               POCT Glucose         156       Potassium   3.6             PROTEIN TOTAL   7.2             RBC 3.43               RDW 17.9               Sodium   135             Troponin I     <0.006  Comment: The reference interval for Troponin I represents the 99th percentile   cutoff   for our facility and is consistent with 3rd generation  assay   performance.     <0.006  Comment: The reference interval for Troponin I represents the 99th percentile   cutoff   for our facility and is consistent with 3rd generation assay   performance.           WBC 14.50                                      Significant Imaging: I have reviewed all pertinent imaging results/findings within the past 24 hours.

## 2024-12-03 NOTE — CARE UPDATE
I have reviewed the chart of Felipe Jiménez who is hospitalized for the following:    Active Hospital Problems    Diagnosis    *Perinephric abscess    Hyponatremia    Urethral stricture    Severe protein-calorie malnutrition    Fever    Weight loss    Nephrolithiasis    Hydronephrosis of right kidney    Iron deficiency anemia    Leucocytosis    UTI (urinary tract infection)    Positive D dimer    Dizziness    Falls frequently    Hypercholesterolemia        Wilda Bruner PA-C  [facility]

## 2024-12-03 NOTE — PROGRESS NOTES
[facility] Medicine  Progress Note    Patient Name: Felipe Jiménez  MRN: [id number]  Patient Class: IP- Inpatient   Admission Date: 11/29/2024  Length of Stay: 3 days  Attending Physician: Gilmar Cid MD  Primary Care Provider: No primary care provider on file.        Subjective:     Principal Problem:Iron deficiency anemia        HPI:  Felipe Moffett is a 67 Yo male with PMH of Iron deficiency anemia, hyperlipidemia presents ED with dizziness, generalized weakness and shortness of breath with exertion for the past 6 months.     AAOX3. c/o generalized weakness and shortness of breath with exertion for the past 6 months - progressively worsening.  reports he had  2 falls recently due to weakness.  denies loss of consciousness.   Followed by GI for LENORA and underwent recent colonoscopy, upper GI as well as video capsule endoscopy.  Reports occasional bright red blood when he wipes but otherwise denies any other abnormal bleeding.  50 lb weight loss over the past year,  initially on purpose as he was over 200 lb but mentions he had unintentional weight loss as well.   had iron-deficiency anemia since childhood was previously on iron supplementation which he discontinued a year ago in favor of dietary changes. Denies any leg swelling. currently not on any anticoagulation. reports   occasional bright red blood when wiping but denies any melena.  on ASA 81 mg daily. admits taking tylenol and meloxicam intermittently for arthritis     EGD 10/2/24                           Normal esophagus.                          - Z-line regular, 40 cm from the incisors.                          - Small hiatal hernia.                          - Gastritis. Biopsied.                          - Normal examined duodenum. Biopsied   Colonoscopy 10/2/24   Non-bleeding internal hemorrhoids.                          - One 3 mm polyp in the descending colon, removed                          with a cold biopsy forceps.  Resected and retrieved.                          - The rectum, sigmoid colon, transverse colon,                          ascending colon, cecum, ileocecal valve and                          appendiceal orifice are normal.                          - The examined portion of the ileum was normal    VCE 10/22 Normal small bowel with no findings to explain anemia. No further GI endoscopy  recommended unless  overt bleeding occurs.     ER course - .  Hemoglobin is 7.0. Transfusion with  1 unit PRBC ordered . leukocytosis of 18 today but denies any infectious symptoms. UA + . UC pending.     Overview/Hospital Course:  11/30 CT head - No acute intracranial pathology.  Specifically no evidence of intracranial hemorrhage or major vascular distribution infarct. Mild generalized cerebral volume loss DVT sonogram -No imaging evidence of deep venous thrombosis in either lower extremity. Hb 7.2  s/p PRBC transfusion . GI no plan for further work up unless overt bleed. Hematology consulted for recurrent iron deficiency anemia/ s/p GI work up and weight loss.  Leucocytosis trended down to 14. fever of 101F. CT abdomen 10/16 - Enlarged edematous right kidney with perinephric inflammatory changes and moderate hydronephrosis consistent with pyelonephritis.. Multiple right intrarenal stones including 3 stones in the pelvis measuring 1.1 cm, 8 mm and 5 mm an 1 cm stone in the upper pole calyx and several other smaller stones. Echo pending . repeat CT abdomen.  Urology eval - s/p Membranous urethral stricture passively dilated with scope, patient had  right ureteral stent placement and Katz placement . CT abdomen today -. Enlarging mixed cystic and solid appearance of inferior pole of the right kidney with invasion into the right psoas muscle, concerning for developing abscess in setting of pyelonephritis. Incompletely characterized without IV contrast. repeat CT Abdomen with IV contrast - enlarged right kidney with multiple hypodense  parenchymal collections, largest collection extending inferiorly and posteriorly with involvement of the right psoas muscle/posterior chest wall. 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 IR evaluation.  started on IV Zosyn and vancomycin  12/1 IR drain placement today. Hb 6.9. BC x2 and UC NGTD. Transfusion with 1 unit of PRBC   12/2 fever of 101F overnight. cultures sent from IR drain pending. drain output 130ml. Hb 7.8 . urology to assess for renal function and discuss nephron-sparing stone treatment vs nephrectomy  12/3 transient chest pain resolved overnight. EKG and troponin negative. P replaced            Review of Systems:   Pain scale:   Constitutional:  fever,  chills, headache, vision loss, hearing loss, weight loss, Generalized weakness, falls, loss of smell, loss of taste, poor appetite,  sore throat, weight loss  Respiratory: cough, shortness of breath.   Cardiovascular: chest pain, dizziness, palpitations, orthopnea, swelling of feet, syncope  Gastrointestinal: nausea, vomiting, abdominal pain, diarrhea, black stool,  blood in stool, change in bowel habits, constipation  Genitourinary: hematuria, dysuria, urgency, frequency  Integument/Breast: rash,  pruritis  Hematologic/Lymphatic: easy bruising, lymphadenopathy  Musculoskeletal: arthralgias , myalgias, back pain, neck pain, knee pain  Neurological: confusion, seizures, tremors, slurred speech  Behavioral/Psych:  depression, anxiety, auditory or visual hallucinations     OBJECTIVE:     Physical Exam:  Body mass index is 21.29 kg/m².  Constitutional: Appears  thin built   Head: Normocephalic and atraumatic.   Neck: Normal range of motion. Neck supple.   Cardiovascular: Normal heart rate.  Regular heart rhythm.  Pulmonary/Chest: Effort normal.   Abdominal: No distension.  No tenderness  Musculoskeletal: Normal range of motion. No edema.    Neurological: Alert and oriented to person, place, and time. able to move bilateral upper and lower extremities without limitation   Skin: Skin is warm and dry.   Psychiatric: Normal mood and affect. Behavior is normal.       Vital Signs  Temp: 98.5 °F (36.9 °C) (12/03/24 0458)  Pulse: 78 (12/03/24 0458)  Resp: 18 (12/03/24 0458)  BP: (!) 107/47 (12/03/24 0458)  SpO2: 96 % (12/03/24 0458)     24 Hour VS Range    Temp:  [97 °F (36.1 °C)-98.5 °F (36.9 °C)]   Pulse:  [71-81]   Resp:  [16-20]   BP: (101-128)/(47-69)   SpO2:  [95 %-99 %]     Intake/Output Summary (Last 24 hours) at 12/3/2024 0708  Last data filed at 12/3/2024 0559  Gross per 24 hour   Intake 600 ml   Output 2775 ml   Net -2175 ml         I/O This Shift:  No intake/output data recorded.    Wt Readings from Last 3 Encounters:   11/30/24 63.5 kg (140 lb)   11/25/24 63.5 kg (140 lb)   10/09/24 67 kg (147 lb 11.3 oz)       I have personally reviewed the vitals and recorded Intake/Output     Laboratory/Diagnostic Data:    CBC/Anemia Labs: Coags:    Recent Labs   Lab 11/30/24  0918 12/01/24  0355 12/01/24  0814 12/01/24  1627 12/02/24  0011   WBC 15.18*   < > 16.67* 25.80* 19.84*   HGB 7.6*   < > 7.2* 7.6* 7.8*   HCT 23.4*   < > 22.1* 23.2* 23.9*   *   < > 571* 555* 557*   MCV 78*   < > 77* 80* 77*   RDW 17.3*   < > 17.3* 17.5* 17.4*   IRON 13*  --   --   --   --    FERRITIN 3,596*  --   --   --   --    RETIC 0.9  --   --   --   --    FOLATE 12.6  --   --   --   --    [id number] 1087*  --   --   --   --     < > = values in this interval not displayed.    Recent Labs   Lab 12/01/24  1024   INR 1.4*        Chemistries: ABG:   Recent Labs   Lab 12/01/24  0355 12/02/24  0550 12/03/24  0224   * 133* 135*   K 3.6 3.3* 3.6    103 107   CO2 22* 21* 21*   BUN 16 13 14   CREATININE 1.3 1.3 1.0   CALCIUM 9.4 9.3 9.1   PROT 7.5 7.2 7.2   BILITOT 0.7 0.7 0.3   ALKPHOS 133 138 173*   ALT 26 17 19   AST 39 24 34   MG 2.0 1.9 2.1   PHOS 3.6 3.4 2.0*    No  "results for input(s): "PH", "PCO2", "PO2", "HCO3", "POCSATURATED", "BE" in the last 168 hours.     POCT Glucose: HbA1c:    Recent Labs   Lab 12/02/24 2056   POCTGLUCOSE 156*    Hemoglobin A1C   Date Value Ref Range Status   06/20/2024 6.1 (H) 4.0 - 5.6 % Final     Comment:     ADA Screening Guidelines:  5.7-6.4%  Consistent with prediabetes  >or=6.5%  Consistent with diabetes    High levels of fetal hemoglobin interfere with the HbA1C  assay. Heterozygous hemoglobin variants (HbS, HgC, etc)do  not significantly interfere with this assay.   However, presence of multiple variants may affect accuracy.     06/01/2023 5.8 (H) 4.0 - 5.6 % Final     Comment:     ADA Screening Guidelines:  5.7-6.4%  Consistent with prediabetes  >or=6.5%  Consistent with diabetes    High levels of fetal hemoglobin interfere with the HbA1C  assay. Heterozygous hemoglobin variants (HbS, HgC, etc)do  not significantly interfere with this assay.   However, presence of multiple variants may affect accuracy.          Cardiac Enzymes: Ejection Fractions:    Recent Labs     12/02/24  1847 12/02/24  2131 12/03/24  0058   TROPONINI <0.006 <0.006 <0.006    EF   Date Value Ref Range Status   11/30/2024 50 % Final          No results for input(s): "COLORU", "APPEARANCEUA", "PHUR", "SPECGRAV", "PROTEINUA", "GLUCUA", "KETONESU", "BILIRUBINUA", "OCCULTUA", "NITRITE", "UROBILINOGEN", "LEUKOCYTESUR", "RBCUA", "WBCUA", "BACTERIA", "SQUAMEPITHEL", "HYALINECASTS" in the last 48 hours.    Invalid input(s): "WRIGHTSUR"      No results found for: "PROCAL", "LACTATE"  BNP (pg/mL)   Date Value   11/29/2024 92     No results found for: "CRP", "SEDRATE"  D-Dimer (mg/L FEU)   Date Value   11/29/2024 2.07 (H)     Ferritin (ng/mL)   Date Value   11/30/2024 3,596 (H)   07/01/2024 424 (H)     LD (U/L)   Date Value   11/30/2024 179     Troponin I (ng/mL)   Date Value   12/03/2024 <0.006   12/02/2024 <0.006   12/02/2024 <0.006   11/29/2024 0.007   11/29/2024 <0.006     No " results found for this or any previous visit.  SARS-CoV2 (COVID-19) Qualitative PCR (no units)   Date Value   12/02/2024 Not Detected       Microbiology labs for the last week  Microbiology Results (last 7 days)       Procedure Component Value Units Date/Time    Blood culture [[id number]] Collected: 11/29/24 1644    Order Status: Completed Specimen: Blood from Peripheral, Antecubital, Right Updated: 12/02/24 1812     Blood Culture, Routine No Growth to date      No Growth to date      No Growth to date      No Growth to date    Blood culture [[id number]] Collected: 11/29/24 1644    Order Status: Completed Specimen: Blood from Peripheral, Antecubital, Right Updated: 12/02/24 1812     Blood Culture, Routine No Growth to date      No Growth to date      No Growth to date      No Growth to date    AFB Culture & Smear [[id number]] Collected: 12/01/24 1221    Order Status: No result Specimen: Kidney, right Updated: 12/02/24 1744    AFB Culture & Smear [[id number]]     Order Status: Completed Specimen: Abscess from Kidney, Right     Culture, Anaerobe [[id number]] Collected: 12/01/24 1221    Order Status: Completed Specimen: Abscess from Kidney, Right Updated: 12/02/24 0859     Anaerobic Culture Culture in progress    Aerobic culture [[id number]] Collected: 12/01/24 1221    Order Status: Completed Specimen: Abscess from Kidney, Right Updated: 12/02/24 0719     Aerobic Bacterial Culture No growth    Urine culture [[id number]] Collected: 11/29/24 1529    Order Status: Completed Specimen: Urine Updated: 11/30/24 1943     Urine Culture, Routine No growth    Narrative:      Specimen Source->Urine            Reviewed and noted in plan where applicable- Please see chart for full lab data.    Lines/Drains:       Peripheral IV - Single Lumen 11/29/24 1439 20 G Left Antecubital (Active)   Site Assessment Clean;Dry;Intact;No redness;No swelling 11/29/24 2100   Extremity Assessment Distal to IV No abnormal discoloration;No redness;No  swelling;No warmth 11/29/24 2100   Dressing Status Clean;Dry;Intact 11/29/24 2100   Dressing Intervention Integrity maintained 11/29/24 2100   Number of days: 0            Peripheral IV - Single Lumen 11/29/24 1646 20 G Right Antecubital (Active)   Site Assessment Clean;Dry;Intact;No redness;No swelling 11/29/24 2100   Extremity Assessment Distal to IV No abnormal discoloration;No redness;No swelling;No warmth 11/29/24 2100   Line Status Saline locked 11/29/24 2100   Dressing Status Clean;Dry;Intact 11/29/24 2100   Dressing Intervention Integrity maintained 11/29/24 2100   Number of days: 0       Imaging  ECG Results              EKG 12-lead (Final result)        Collection Time Result Time QRS Duration OHS QTC Calculation    11/29/24 13:31:06 11/29/24 13:43:01 84 432                     Final result by Interface, Lab In [facility] (11/29/24 13:43:09)                   Narrative:    Test Reason : R53.1,    Vent. Rate :  93 BPM     Atrial Rate :  93 BPM     P-R Int : 128 ms          QRS Dur :  84 ms      QT Int : 348 ms       P-R-T Axes :  68  41  41 degrees    QTcB Int : 432 ms    Normal sinus rhythm  Normal ECG  No previous ECGs available  Confirmed by Dain Barajas ([id number]) on 11/29/2024 1:42:58 PM    Referred By:            Confirmed By: Dain Barajas                                    No results found for this or any previous visit.      IR Abscess Drainage Retroperiotoneal  Narrative: EXAMINATION:  Drainage catheter placement    Procedural Personnel    Attending physician(s): Rosetta    Fellow physician(s): None    Resident physician(s): None    Advanced practice provider(s): None    Pre-procedure diagnosis: Perirenal abscess    Post-procedure diagnosis: Same    Indication: Leukocytosis with fluid collection    Additional clinical history: None    Complications: No immediate complications.    TECHNIQUE:  - Retroperitoneal drainage catheter placement under CT and ultrasound  guidance    FINDINGS:  Pre-procedure    Consent: Informed consent for the procedure was obtained and time-out was performed prior to the procedure.    Preparation: The site was prepared and draped using maximal sterile barrier technique including cutaneous antisepsis.    Antibiotic administered: Prophylactic dose within 1 hour of procedure start time or 2 hours for vancomycin or fluoroquinolones    Anesthesia/sedation    The IR procedural team has confirmed the patient ID and re-evaluated the patient and sedation plan confirming it is suitable for the patient's condition and procedure.    Level of anesthesia/sedation: Minimal sedation (anxiolysis)    Anesthesia/sedation administered by: Independent trained observer under attending supervision with continuous monitoring of the patient's level of consciousness and physiologic status    Total intra-service sedation time (minutes): 15    Drainage catheter placement    The patient was positioned prone.  Initial imaging was performed. Local anesthesia was administered. The fluid collection was accessed using an access needle followed by wire insertion and serial dilation and a drainage catheter was placed. Position of the drainage catheter within the fluid collection was confirmed.    - Initial imaging findings: Perirenal fluid collection    - Drainage catheter placed: All-purpose drainage catheter    - External catheter securement: Non-absorbable suture    - Post-drainage imaging findings: Partial drainage of the fluid collection    Contrast    Contrast agent: None    Contrast volume (mL): 0    Radiation Dose    CT dose length product ( mGy-cm): 165.6    Additional Details    Additional description of procedure: None    Equipment details: None    Specimens removed: Aspirated fluid was sent for analysis.    Estimated blood loss (mL): Less than 10    Standardized report: [email or web address]    Attestation    Signer name: Graeme Sargent I attest that I was present for  the entire procedure. I reviewed the stored images and agree with the report as written.  Impression: Percutaneous placement of a 14 French drainage catheter into perirenal fluid collection, yielding 90 mL of purulent fluid.    Plan:    Leave drain in place until less than 10 cc of fluid is aspirated daily for 2 days.    ______________________________________________________________________    Electronically signed by: Graeme Sargent  Date:    12/01/2024  Time:    13:53      Labs, Imaging, EKG and Diagnostic results from 12/3/2024 were reviewed.    Medications:  Medication list was reviewed and changes noted under Assessment/Plan.  No current facility-administered medications on file prior to encounter.     Current Outpatient Medications on File Prior to Encounter   Medication Sig Dispense Refill    aspirin 81 MG Chew Take 81 mg by mouth once daily.      diazePAM (VALIUM) 5 MG tablet TAKE 1 TABLET BY MOUTH EVERY 12 HOURS AS NEEDED FOR ANXIETY 30 tablet 0    meloxicam (MOBIC) 7.5 MG tablet Take 1 tablet by mouth twice daily 60 tablet 0    pantoprazole (PROTONIX) 40 MG tablet Take 1 tablet (40 mg total) by mouth once daily. 90 tablet 3    rosuvastatin (CRESTOR) 20 MG tablet Take 1 tablet (20 mg total) by mouth once daily. 90 tablet 3    sildenafiL (VIAGRA) 100 MG tablet Take 1 tablet (100 mg total) by mouth daily as needed for Erectile Dysfunction. 10 tablet 3    triamcinolone acetonide 0.1% (KENALOG) 0.1 % cream APPLY  CREAM EXTERNALLY TWICE DAILY AS NEEDED FOR  DERMATITIS 45 g 0    zolpidem (AMBIEN) 5 MG Tab Take 1 tablet (5 mg total) by mouth nightly as needed (insomnia). 30 tablet 0     Scheduled Medications:  Current Facility-Administered Medications   Medication Dose Route Frequency    ampicillin-sulbactam  3 g Intravenous Q6H    atorvastatin  80 mg Oral Daily    electrolytes-dextrose  300 mL Oral Q4H    ferrous gluconate  324 mg Oral BID WM    pantoprazole  40 mg Oral Daily    potassium, sodium phosphates  2  packet Oral QID (WM & HS)    vancomycin (VANCOCIN) IV (PEDS and ADULTS)  20 mg/kg Intravenous Q24H     PRN:   Current Facility-Administered Medications:     0.9%  NaCl infusion (for blood administration), , Intravenous, Q24H PRN    0.9%  NaCl infusion (for blood administration), , Intravenous, Q24H PRN    acetaminophen, 650 mg, Oral, Q6H PRN    dextrose 10%, 12.5 g, Intravenous, PRN    dextrose 10%, 12.5 g, Intravenous, PRN    dextrose 10%, 25 g, Intravenous, PRN    dextrose 10%, 25 g, Intravenous, PRN    fentaNYL, 25 mcg, Intravenous, Q5 Min PRN    glucagon (human recombinant), 1 mg, Intramuscular, PRN    glucagon (human recombinant), 1 mg, Intramuscular, PRN    glucose, 16 g, Oral, PRN    glucose, 24 g, Oral, PRN    haloperidol lactate, 0.5 mg, Intravenous, Q10 Min PRN    naloxone, 0.02 mg, Intravenous, PRN    nitroGLYCERIN, 0.4 mg, Sublingual, Q5 Min PRN    oxyCODONE, 5 mg, Oral, Q6H PRN    sodium chloride 0.9%, 10 mL, Intravenous, Q12H PRN    sodium chloride 0.9%, 10 mL, Intravenous, PRN    Pharmacy to dose Vancomycin consult, , , Once **AND** vancomycin - pharmacy to dose, , Intravenous, pharmacy to manage frequency  Infusions:       Estimated Creatinine Clearance: 61.7 mL/min (based on SCr of 1 mg/dL).             Assessment/Plan:      * Iron deficiency anemia    Patient's with microcytic anemia.. Hemoglobin downtrending. Etiology likely due to Iron deficiency.  Current CBC reviewed-    Recent Labs   Lab 12/01/24  0814 12/01/24  1627 12/02/24  0011   HGB 7.2* 7.6* 7.8*         Component Value Date/Time    MCV 77 (L) 12/02/2024 0011    RDW 17.4 (H) 12/02/2024 0011    IRON 13 (L) 11/30/2024 0918    FERRITIN 3,596 (H) 11/30/2024 0918    RETIC 0.9 11/30/2024 0918    FOLATE 12.6 11/30/2024 0918    [id number] 1087 (H) 11/30/2024 0918     Monitor CBC and transfuse if H/H drops below 7/21.        EGD 10/2/24                           Normal esophagus.                          - Z-line regular, 40 cm from the incisors.                           - Small hiatal hernia.                          - Gastritis. Biopsied.                          - Normal examined duodenum. Biopsied   Colonoscopy 10/2/24   Non-bleeding internal hemorrhoids.                          - One 3 mm polyp in the descending colon, removed                          with a cold biopsy forceps. Resected and retrieved.                          - The rectum, sigmoid colon, transverse colon,                          ascending colon, cecum, ileocecal valve and                          appendiceal orifice are normal.                          - The examined portion of the ileum was normal    VCE 10/22 Normal small bowel with no findings to explain anemia. No further GI endoscopy  recommended unless  overt bleeding occurs.     symptomatic anemia -  Hemoglobin is 7.0. Transfusion with  1 unit PRBC ordered . GI consulted   11/30 Hb 7.6  s/p PRBC transfusion . hematology evaluation. started on ferrous gluconate and Feraheme IV x 2 doses. needs hematolgoy f/u outpatient   12/1Hb 6.9. Transfusion with 1 unit of PRBC     Severe protein-calorie malnutrition  Nutrition consulted. Most recent weight and BMI monitored-     Measurements:  Wt Readings from Last 1 Encounters:   11/30/24 63.5 kg (140 lb)   Body mass index is 21.29 kg/m².    Patient has been screened and assessed by RD.    Malnutrition Type:  Context: chronic illness  Level: severe    Malnutrition Characteristic Summary:  Weight Loss (Malnutrition): greater than 7.5% in 3 months (-8.4% x 3 months)  Energy Intake (Malnutrition): less than or equal to 75% for greater than or equal to 1 month  Subcutaneous Fat (Malnutrition):  (moderate to severe)  Muscle Mass (Malnutrition):  (moderate to severe)    Interventions/Recommendations (treatment strategy):  1.)      Urethral stricture  as above       Perinephric abscess  11/30 CT abdomen today -. Enlarging mixed cystic and solid appearance of inferior pole of the right kidney with  invasion into the right psoas muscle, concerning for developing abscess in setting of pyelonephritis. Incompletely characterized without IV contrast. repeat CT Abdomen with IV contrast -enlarged right kidney with multiple hypodense parenchymal collections, largest collection extending inferiorly and posteriorly with involvement of the right psoas muscle/posterior chest wall. 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 . IR evaluation.  started on IV Zosyn and vancomycin  12/1 IR drain placement today  12/2 . XGP - urology to assess for renal function and discuss nephron-sparing stone treatment vs nephrectomy.     Hydronephrosis of right kidney  CT abdomen 10/16 - Enlarged edematous right kidney with perinephric inflammatory changes and moderate hydronephrosis consistent with pyelonephritis.. Multiple right intrarenal stones including 3 stones in the pelvis measuring 1.1 cm, 8 mm and 5 mm an 1 cm stone in the upper pole calyx and several other smaller stones. Echo pending . repeat CT abdomen.  Urology eval - right ureteral stent placement today      Nephrolithiasis  11/30 CT abdomen 10/16 - Enlarged edematous right kidney with perinephric inflammatory changes and moderate hydronephrosis consistent with pyelonephritis.. Multiple right intrarenal stones including 3 stones in the pelvis measuring 1.1 cm, 8 mm and 5 mm an 1 cm stone in the upper pole calyx and several other smaller stones. Echo pending . repeat CT abdomen.  Urology eval -  Urology eval - s/p Membranous urethral stricture passively dilated with scope, patient had  right ureteral stent placement and Katz placement       Weight loss  Nutrition consulted. Most recent weight and BMI monitored-     Measurements:  Wt Readings from Last 1 Encounters:   11/30/24 63.5 kg (140 lb)   Body mass index is 21.29 kg/m².    Patient has been screened and assessed by  RD.    Malnutrition Type:  Context:    Level:      Malnutrition Characteristic Summary:       Interventions/Recommendations (treatment strategy):       11/30 GI no plan for further work up unless overt bleed. Hematology consulted for recurrent iron deficiency anemia/ s/p GI work up and weight loss.      Fever  11/30 Leucocytosis trended down to 14. fever of 101F. started on ceftriaxone.   CT abdomen 10/16 - Enlarged edematous right kidney with perinephric inflammatory changes and moderate hydronephrosis consistent with pyelonephritis.. Multiple right intrarenal stones including 3 stones in the pelvis measuring 1.1 cm, 8 mm and 5 mm an 1 cm stone in the upper pole calyx and several other smaller stones. Echo pending . repeat CT abdomen. Urology consulted for  right hydronephrosis/ fever   12/1BC x2 and UC NGTD.  12/2 fever of 101F overnight. cultures sent from IR drain pending. drain output 130ml.     Falls frequently  secondary to above. fall precautions. PT/OT consulted   reported 2 falls in the last week. one with forehead trauma. CT head wo contrast ordered   11/30  CT head - No acute intracranial pathology.  Specifically no evidence of intracranial hemorrhage or major vascular distribution infarct. Mild generalized cerebral volume loss     Dizziness  likely from symptomatic anemia. orthostasis + by pulse. encourage oral hydration. monitor after PRBC. echo ordered . telemetry r/o arrhythmia     11/30 orthostatic by pulse. received LR       Positive D dimer  D dimer elevated at 2.07 . DVT sonogram- No imaging evidence of deep venous thrombosis in either lower extremity.       UTI (urinary tract infection)  UA + . UC pending.   12/1  BC x2 and UC NGTD.     Leucocytosis    Patient with leucocytosis   Recent Labs   Lab 12/01/24  0814 12/01/24  1627 12/02/24  0011   WBC 16.67* 25.80* 19.84*     . Afebrile. BCX 2, urine culture pending . likely secondary to sepsis.?    11/30 Leucocytosis trended down to 14. fever of  101F. started on ceftriaxone. Echo pending  12/1 BC x2 and UC NGTD.     Hypercholesterolemia  continue crestor         VTE Risk Mitigation (From admission, onward)           Ordered     IP VTE HIGH RISK PATIENT  Once         11/29/24 1638     Place sequential compression device  Until discontinued         11/29/24 1638                    Discharge Planning   LYNN: 12/3/2024     Code Status: Full Code   Is the patient medically ready for discharge?:     Reason for patient still in hospital (select all that apply): Treatment  Discharge Plan A: Home with family                  Gilmar Cid MD  Department of Hospital Medicine   [facility]

## 2024-12-03 NOTE — SUBJECTIVE & OBJECTIVE
Interval History: NAOE, afebrile for last 24 hours, VSS, Cr downtrending wbc pending, abscess drain continuing to put out purulent fluid, mendez removed on rounds this morning will follow up void trial       Objective:     Temp:  [97 °F (36.1 °C)-98.5 °F (36.9 °C)] 98.5 °F (36.9 °C)  Pulse:  [71-81] 78  Resp:  [16-20] 18  SpO2:  [95 %-99 %] 96 %  BP: (101-128)/(47-69) 107/47     Body mass index is 21.29 kg/m².           Drains       Drain  Duration                  Urethral Catheter 11/30/24 1035 2 days         Closed/Suction Drain 12/01/24 1219 Right Back Bulb 14 Fr. 1 day                     Physical Exam  Eyes:      Pupils: Pupils are equal, round, and reactive to light.   Cardiovascular:      Rate and Rhythm: Normal rate.   Pulmonary:      Effort: Pulmonary effort is normal.   Abdominal:      General: Abdomen is flat.      Comments: Drain with red purulent output    Genitourinary:     Comments: Mendez draining clear yellow urine now removed   Musculoskeletal:      Cervical back: Normal range of motion.   Skin:     General: Skin is warm.   Neurological:      Mental Status: He is alert.           Significant Labs:    BMP:  Recent Labs   Lab 12/01/24  0355 12/02/24  0550 12/03/24  0224   * 133* 135*   K 3.6 3.3* 3.6    103 107   CO2 22* 21* 21*   BUN 16 13 14   CREATININE 1.3 1.3 1.0   CALCIUM 9.4 9.3 9.1       CBC:   Recent Labs   Lab 12/01/24  0814 12/01/24  1627 12/02/24  0011   WBC 16.67* 25.80* 19.84*   HGB 7.2* 7.6* 7.8*   HCT 22.1* 23.2* 23.9*   * 555* 557*       All pertinent labs results from the past 24 hours have been reviewed.    Significant Imaging:  All pertinent imaging results/findings from the past 24 hours have been reviewed.

## 2024-12-03 NOTE — PT/OT/SLP PROGRESS
Physical Therapy Treatment    Patient Name:  Felipe Jiménez   MRN:  [id number]    Recommendations:     Discharge Recommendations: Low Intensity Therapy  Discharge Equipment Recommendations: shower chair  Barriers to discharge: None    Assessment:     Felipe Jiménez is a 68 y.o. male admitted with a medical diagnosis of Perinephric abscess.  He presents with the following impairments/functional limitations: weakness, impaired endurance, impaired self care skills, impaired cardiopulmonary response to activity . Patient showed good participation and mobility today, as noted by transfer ability and walking range.    Rehab Prognosis: Good; patient would benefit from acute skilled PT services to address these deficits and reach maximum level of function.    Recent Surgery: Procedure(s) (LRB):  CYSTOSCOPY, WITH URETERAL STENT INSERTION (Right)  DILATION, URETHRA (N/A) 3 Days Post-Op    Plan:     During this hospitalization, patient to be seen 3 x/week to address the identified rehab impairments via gait training, therapeutic activities, therapeutic exercises, neuromuscular re-education and progress toward the following goals:    Plan of Care Expires:  12/31/24    Subjective     Chief Complaint: none stated  Patient/Family Comments/goals: to go home soon  Pain/Comfort:  Pain Rating 1: 0/10  Pain Rating Post-Intervention 1: 0/10      Objective:     Communicated with NSG prior to session.  Patient found HOB elevated with telemetry, MARIEL drain upon PT entry to room.     General Precautions: Standard, fall  Orthopedic Precautions: N/A  Braces: N/A  Respiratory Status: Room air     Functional Mobility:  Bed Mobility:     Rolling Left:  modified independence  Scooting: modified independence  Supine to Sit: modified independence  Transfers:     Sit to Stand:  stand by assistance with no AD  Gait: 200 ft and 300 ft with HHA with CGA only, with cues to correct downward gaze.      AM-PAC 6 CLICK MOBILITY  Turning over in bed (including  adjusting bedclothes, sheets and blankets)?: 4  Sitting down on and standing up from a chair with arms (e.g., wheelchair, bedside commode, etc.): 4  Moving from lying on back to sitting on the side of the bed?: 4  Moving to and from a bed to a chair (including a wheelchair)?: 4  Need to walk in hospital room?: 3  Climbing 3-5 steps with a railing?: 2  Basic Mobility Total Score: 21       Treatment & Education:  Donned a second gown. There ex in standing with RW for support: Mini Squats, HIP FLEX AND HEEL/toe raises 2x10 reps B LE. Educated on postural awareness.    Patient left sitting edge of bed with all lines intact, call button in reach, and daughter present.    GOALS:   Multidisciplinary Problems       Physical Therapy Goals          Problem: Physical Therapy    Goal Priority Disciplines Outcome Interventions   Physical Therapy Goal     PT, PT/OT Progressing    Description: Goals to be met by: 24     Patient will increase functional independence with mobility by performin. Sit to stand transfer with Modified [place]  2. Bed to chair transfer with Modified [place] using LRAD  3. Gait  x 150 feet with Modified [place] using LRAD.   4. Lower extremity exercise program x30 reps per handout, with independence                         Time Tracking:     PT Received On: 24  PT Start Time: 1124     PT Stop Time: 1152  PT Total Time (min): 28 min     Billable Minutes: Gait Training 18 and Therapeutic Exercise 10    Treatment Type: Treatment  PT/PTA: PTA     Number of PTA visits since last PT visit: 1     2024

## 2024-12-03 NOTE — ASSESSMENT & PLAN NOTE
- Significant right renal stone burden with hydronephrosis and concern for infected urine along with fever and leukocytosis  - S/p class A right ureteral stent placement on 11/30

## 2024-12-03 NOTE — ASSESSMENT & PLAN NOTE
- Blood cultures NGTD  - Urine culture in process  - Follow up cultures, tailor antibiotics to cultures  - Appreciate ID assistance, recommend two weeks of culture appropriate antibiotics from urological standpoint

## 2024-12-03 NOTE — PROGRESS NOTES
Pharmacokinetic Assessment Follow Up: IV Vancomycin    Therapy with vancomycin complete and/or consult discontinued by provider. Pharmacy will sign off, please re-consult as needed.    Gila Fajardo, BenitoD

## 2024-12-03 NOTE — PLAN OF CARE
Plan of care reviewed with the patient . Understanding verbalized.No acute distress observed.Safety Maintained.Patient's EKG Report and Troponin level reviewed by MD . Patient status stable and discussed with MD during night shift . No new Orders at placed .  Bed at lowest position.Call bell at reach.Bed side table at reach.Instructed Patient to call for assistance whenever required.       Problem: Adult Inpatient Plan of Care  Goal: Plan of Care Review  Outcome: Progressing  Goal: Patient-Specific Goal (Individualized)  Outcome: Progressing  Goal: Absence of Hospital-Acquired Illness or Injury  Outcome: Progressing

## 2024-12-03 NOTE — PT/OT/SLP PROGRESS
Occupational Therapy   Treatment    Name: Felipe Jiménez  MRN: [id number]  Admitting Diagnosis:  Perinephric abscess  3 Days Post-Op    Recommendations:     Discharge Recommendations: No Therapy Indicated  Discharge Equipment Recommendations:  none  Barriers to discharge:       Assessment:     Felipe Jiménez is a 68 y.o. male with a medical diagnosis of Perinephric abscess.  He presents with the following performance deficits affecting function: weakness, impaired endurance, impaired functional mobility, gait instability.     Rehab Prognosis:  Good; patient would benefit from acute skilled OT services to address these deficits and reach maximum level of function.       Plan:     Patient to be seen 3 x/week to address the above listed problems via self-care/home management, therapeutic activities, therapeutic exercises  Plan of Care Expires: 01/01/25  Plan of Care Reviewed with: patient    Subjective     Patient/Family Comments/goals: to improve function  Pain/Comfort:  Pain Rating 1: 0/10  Pain Rating Post-Intervention 1: 0/10    Objective:     Communicated with: RN prior to session.  Patient found HOB elevated with telemetry, peripheral IV upon OT entry to room.  A client care conference was completed by the OTR and the RICE prior to treatment by the RICE to discuss the patient's POC and current status.    General Precautions: Standard, fall    Orthopedic Precautions:N/A  Braces: N/A  Respiratory Status: Room air     Occupational Performance:     Bed Mobility:    Patient completed Supine to Sit with supervision  Patient completed Sit to Supine with supervision     Functional Mobility/Transfers:  Patient completed Sit <> Stand Transfer with supervision  with  no assistive device   Functional Mobility: pt ambulating ~12ft x 2 trials with SBA using no AD. No LOB or SOB noted.     Activities of Daily Living:  Grooming: supervision standing at sink for oral hygiene and facial care   Upper Body Dressing: supervision to don back  gown as a robe      [facility] 6 Click ADL: 22    Treatment & Education:  Pt educated on OT POC and frequency during hospital stay.   Pt educated on proper hand placement and techniques for RW mgmt to improve safety awareness.   Pt educated on importance of OOB activity to improve function and activity tolerance.  Addressed all patient questions/concerns within RICE scope of practice.     Patient left HOB elevated with all lines intact, call button in reach, and RN notified    GOALS:   Multidisciplinary Problems       Occupational Therapy Goals          Problem: Occupational Therapy    Goal Priority Disciplines Outcome Interventions   Occupational Therapy Goal     OT, PT/OT Progressing    Description: Goals to be met by: 01/01/2025     Patient will increase functional independence with ADLs by performing:    UE Dressing with [place].  LE Dressing with Modified [place].  Grooming while standing at sink with Modified [place].  Toileting from toilet with Modified [place] for hygiene and clothing management.   Supine to sit with Modified [place].  Step transfer with Modified [place]  Toilet transfer to toilet with Modified [place].                         Time Tracking:     OT Date of Treatment: 12/03/24  OT Start Time: 1015  OT Stop Time: 1038  OT Total Time (min): 23 min    Billable Minutes:Self Care/Home Management 10  Therapeutic Activity 13    OT/TRISTA: TRISTA     Number of TRISTA visits since last OT visit: 1    12/3/2024

## 2024-12-03 NOTE — SUBJECTIVE & OBJECTIVE
Interval History:   Patient afebrile overnight. HDS. Urology removed mendez, patient passed voiding trial. No reports of dysuria, or hematuria. Denies anymore hallucinations. Cultures remain negative. Drain with 20 cc of output noted at bedside.       Past Medical History:   Diagnosis Date    Hyperlipidemia     Male erectile dysfunction, unspecified        Past Surgical History:   Procedure Laterality Date    COLONOSCOPY N/A 10/2/2024    Procedure: COLONOSCOPY;  Surgeon: Brennan Balderrama MD;  Location: [facility];  Service: Endoscopy;  Laterality: N/A;    CYSTOSCOPY W/ URETERAL STENT PLACEMENT Right 11/30/2024    Procedure: CYSTOSCOPY, WITH URETERAL STENT INSERTION;  Surgeon: Dain Eugene MD;  [facility];  Service: Urology;  Laterality: Right;    DILATION OF URETHRA N/A 11/30/2024    Procedure: DILATION, URETHRA;  Surgeon: Dain Eugene MD;  Location: [facility] OR [address] [place] [address];  Service: Urology;  Laterality: N/A;    ESOPHAGOGASTRODUODENOSCOPY N/A 10/2/2024    Procedure: EGD (ESOPHAGOGASTRODUODENOSCOPY);  Surgeon: Brennan Balderrama MD;  Location: [facility];  Service: Endoscopy;  Laterality: N/A;    FRACTURE SURGERY      INTRALUMINAL GASTROINTESTINAL TRACT IMAGING VIA CAPSULE N/A 10/22/2024    Procedure: IMAGING PROCEDURE, GI TRACT, INTRALUMINAL, VIA CAPSULE;  Surgeon: Brennan Balderrama MD;  Location: [facility];  Service: Endoscopy;  Laterality: N/A;    LIPOMA RESECTION Left 1979    left knee    QUADRICEPS TENDON REPAIR Left 2012       Review of patient's allergies indicates:  No Known Allergies    Medications:  Medications Prior to Admission   Medication Sig    aspirin 81 MG Chew Take 81 mg by mouth once daily.    diazePAM (VALIUM) 5 MG tablet TAKE 1 TABLET BY MOUTH EVERY 12 HOURS AS NEEDED FOR ANXIETY    meloxicam (MOBIC) 7.5 MG tablet Take 1 tablet by mouth twice daily    pantoprazole (PROTONIX) 40 MG tablet Take 1 tablet (40 mg total) by mouth once daily.    rosuvastatin (CRESTOR) 20 MG tablet  "Take 1 tablet (20 mg total) by mouth once daily.    sildenafiL (VIAGRA) 100 MG tablet Take 1 tablet (100 mg total) by mouth daily as needed for Erectile Dysfunction.    triamcinolone acetonide 0.1% (KENALOG) 0.1 % cream APPLY  CREAM EXTERNALLY TWICE DAILY AS NEEDED FOR  DERMATITIS    zolpidem (AMBIEN) 5 MG Tab Take 1 tablet (5 mg total) by mouth nightly as needed (insomnia).     Antibiotics (From admission, onward)      Start     Stop Route Frequency Ordered    12/03/24 1045  mupirocin 2 % ointment  (DECOLONIZATION PROTOCOL ORDERS)         12/08/24 0859 Nasl 2 times daily 12/03/24 0942    12/02/24 1800  ampicillin-sulbactam (UNASYN) 3 g in 0.9% NaCl 100 mL IVPB (MB+)         -- IV Every 6 hours (non-standard times) 12/02/24 1439    12/01/24 1600  vancomycin 1,250 mg in 0.9% NaCl 250 mL IVPB (admixture device)         -- IV Every 24 hours (non-standard times) 11/30/24 1501    11/30/24 1552  vancomycin - pharmacy to dose  (vancomycin IVPB (PEDS and ADULTS))        Placed in "And" Linked Group    -- IV pharmacy to manage frequency 11/30/24 1452          Antifungals (From admission, onward)      None          Antivirals (From admission, onward)      None             Immunization History   Administered Date(s) Administered    COVID-19 MRNA, LN-S PF (MODERNA HALF 0.25 ML DOSE) 12/06/2021    COVID-19, MRNA, LN-S, PF (MODERNA FULL 0.5 ML DOSE) 02/17/2021, 03/17/2021    COVID-19, MRNA, LN-S, PF (Pfizer) (Purple Cap) 10/28/2022    COVID-19, mRNA, LNP-S, PF (Moderna) Ages 12+ 12/20/2023    COVID-19, mRNA, LNP-S, PF, william-sucrose, 30 mcg/0.3 mL (Pfizer Ages 12+) 11/05/2024    COVID-19, mRNA, LNP-S, bivalent booster, PF (Moderna Omicron)12 + YEARS 10/28/2022    Influenza (FLUAD) - Quadrivalent - Adjuvanted - PF *Preferred* (65+) 12/20/2023    Influenza - Quadrivalent - PF *Preferred* (6 months and older) 10/28/2022    Influenza - Trivalent - Afluria, Fluzone MDV 10/28/2022    Influenza - Trivalent - Fluad - Adjuvanted - PF (65 " years and older 2024    Tdap 2024       Family History       Problem Relation (Age of Onset)    Heart attacks under age 50 Mother (43), Father (38)    Heart disease Mother, Father          Social History     Socioeconomic History    Marital status:    Tobacco Use    Smoking status: Former     Current packs/day: 0.00     Average packs/day: 1 pack/day for 20.0 years (20.0 ttl pk-yrs)     Types: Cigarettes     Start date: 1987     Quit date: 2007     Years since quittin.9    Smokeless tobacco: Never   Substance and Sexual Activity    Alcohol use: Yes     Comment: Infrequently    Drug use: Not Currently    Sexual activity: Yes     Partners: Female     Social Drivers of Health     Financial Resource Strain: Low Risk  (2024)    Overall Financial Resource Strain (CARDIA)     Difficulty of Paying Living Expenses: Not very hard   Food Insecurity: No Food Insecurity (2024)    Hunger Vital Sign     Worried About Running Out of Food in the Last Year: Never true     Ran Out of Food in the Last Year: Never true   Transportation Needs: No Transportation Needs (2024)    TRANSPORTATION NEEDS     Transportation : No   Physical Activity: Sufficiently Active (2024)    Exercise Vital Sign     Days of Exercise per Week: 5 days     Minutes of Exercise per Session: 30 min   Stress: No Stress Concern Present (2024)    [employer] - Occupational Stress Questionnaire     Feeling of Stress : Only a little   Housing Stability: Low Risk  (2024)    Housing Stability Vital Sign     Unable to Pay for Housing in the Last Year: No     Homeless in the Last Year: No     Review of Systems   Constitutional:  Positive for fatigue. Negative for chills and fever.   Respiratory:  Negative for cough and shortness of breath.    Cardiovascular:  Negative for chest pain.   Gastrointestinal:  Positive for abdominal pain. Negative for constipation, diarrhea, nausea and  vomiting.   Genitourinary:  Negative for dysuria and hematuria.   Musculoskeletal:  Negative for arthralgias and myalgias.   Skin:  Negative for rash.   Neurological:  Positive for weakness. Negative for headaches.     Objective:     Vital Signs (Most Recent):  Temp: 98.4 °F (36.9 °C) (12/03/24 1156)  Pulse: 93 (12/03/24 1156)  Resp: 17 (12/03/24 1212)  BP: 122/71 (12/03/24 1156)  SpO2: 98 % (12/03/24 1156) Vital Signs (24h Range):  Temp:  [97 °F (36.1 °C)-98.5 °F (36.9 °C)] 98.4 °F (36.9 °C)  Pulse:  [75-93] 93  Resp:  [16-20] 17  SpO2:  [96 %-99 %] 98 %  BP: (107-125)/(47-71) 122/71     Weight: 63.5 kg (140 lb)  Body mass index is 21.29 kg/m².    Estimated Creatinine Clearance: 61.7 mL/min (based on SCr of 1 mg/dL).     Physical Exam  Vitals reviewed.   Constitutional:       General: He is not in acute distress.     Appearance: Normal appearance. He is not ill-appearing.   HENT:      Head: Normocephalic and atraumatic.   Eyes:      Extraocular Movements: Extraocular movements intact.      Conjunctiva/sclera: Conjunctivae normal.   Cardiovascular:      Rate and Rhythm: Normal rate and regular rhythm.      Heart sounds: No murmur heard.  Pulmonary:      Effort: Pulmonary effort is normal. No respiratory distress.      Breath sounds: Normal breath sounds.   Abdominal:      General: Abdomen is flat. Bowel sounds are normal.      Palpations: Abdomen is soft.      Tenderness: There is no abdominal tenderness.      Comments: Right MARIEL drain noted with bloody/purulent drainage    Musculoskeletal:      Cervical back: Normal range of motion.   Skin:     General: Skin is warm and dry.   Neurological:      General: No focal deficit present.      Mental Status: He is alert and oriented to person, place, and time.   Psychiatric:         Mood and Affect: Mood normal.         Behavior: Behavior normal.         Thought Content: Thought content normal.          Significant Labs: All pertinent labs within the past 24 hours have been  reviewed.  Recent Lab Results  (Last 5 results in the past 24 hours)        12/03/24  1058   12/03/24  0224   12/03/24  0058   12/02/24  2131   12/02/24  2056        Albumin   1.6             ALP   173             ALT   19             Anion Gap   7             AST   34             Baso # 0.02               Basophil % 0.1               BILIRUBIN TOTAL   0.3  Comment: For infants and newborns, interpretation of results should be based  on gestational age, weight and in agreement with clinical  observations.    Premature Infant recommended reference ranges:  Up to 24 hours.............<8.0 mg/dL  Up to 48 hours............<12.0 mg/dL  3-5 days..................<15.0 mg/dL  6-29 days.................<15.0 mg/dL               BUN   14             Calcium   9.1             Chloride   107             CO2   21             Creatinine   1.0             Differential Method Automated               eGFR   >60.0             Eos # 0.2               Eos % 1.0               Glucose   140             Gran # (ANC) 12.4               Gran % 85.7               Hematocrit 27.4               Hemoglobin 8.6               Immature Grans (Abs) 0.08  Comment: Mild elevation in immature granulocytes is non specific and   can be seen in a variety of conditions including stress response,   acute inflammation, trauma and pregnancy. Correlation with other   laboratory and clinical findings is essential.                 Immature Granulocytes 0.6               Lymph # 1.0               Lymph % 6.9               Magnesium    2.1             MCH 25.1               MCHC 31.4               MCV 80               Mono # 0.8               Mono % 5.7               MPV 8.9               nRBC 0               Phosphorus Level   2.0             Platelet Count 643               POCT Glucose         156       Potassium   3.6             PROTEIN TOTAL   7.2             RBC 3.43               RDW 17.9               Sodium   135             Troponin I      <0.006  Comment: The reference interval for Troponin I represents the 99th percentile   cutoff   for our facility and is consistent with 3rd generation assay   performance.     <0.006  Comment: The reference interval for Troponin I represents the 99th percentile   cutoff   for our facility and is consistent with 3rd generation assay   performance.           WBC 14.50                                      Significant Imaging: I have reviewed all pertinent imaging results/findings within the past 24 hours.

## 2024-12-03 NOTE — PROGRESS NOTES
[facility]  Urology  Progress Note    Patient Name: Felipe Jiménez  MRN: [id number]  Admission Date: 11/29/2024  Hospital Length of Stay: 3 days  Code Status: Full Code   Attending Provider: Gilmar Cid MD   Primary Care Physician: No primary care provider on file.    Subjective:     HPI:  Felipe Jiménez is a 68 y.o.M with history of iron deficiency anemia who has had shortness of breath with exertion who presented to the [facility] ED overnight due to weakness, dizziness, and a fall earlier this week. He was found to be anemic on presentation with hemoglobin 7.0. He was transfused 1 unit with follow up hemoglobin 7.2. Urology consulted for right hydronephrosis and fever.     On assessment, he is AFVSS, however he has been febrile to 101.1 this morning and intermittently tachycardic to 124. WBC 15 from 18, Hgb 7.2, Cr 1.2 (baseline). UA nitrite negative with many bacteria, >52 RBCs, >100 WBCs. Blood cultures NGTD, urine culture pending. He denies nausea or vomiting. Denies issues voiding, dysuria, frequency, or urgency.  CT chest abdomen pelvis form 10/16/2024 obtained for workup of anemia/weight loss showed multiple right renal stones including approximately 2-2.5 cm of stone burden in the renal pelvis with moderate to severe hydronephrosis. No ureteral stones bilaterally. Last PSA 06/2024 2.1.     Interval History: NAOE, afebrile for last 24 hours, VSS, Cr downtrending wbc pending, abscess drain continuing to put out purulent fluid, mendez removed on rounds this morning will follow up void trial       Objective:     Temp:  [97 °F (36.1 °C)-98.5 °F (36.9 °C)] 98.5 °F (36.9 °C)  Pulse:  [71-81] 78  Resp:  [16-20] 18  SpO2:  [95 %-99 %] 96 %  BP: (101-128)/(47-69) 107/47     Body mass index is 21.29 kg/m².           Drains       Drain  Duration                  Urethral Catheter 11/30/24 1035 2 days         Closed/Suction Drain 12/01/24 1219 Right Back Bulb 14 Fr. 1 day                     Physical Exam  Eyes:       Pupils: Pupils are equal, round, and reactive to light.   Cardiovascular:      Rate and Rhythm: Normal rate.   Pulmonary:      Effort: Pulmonary effort is normal.   Abdominal:      General: Abdomen is flat.      Comments: Drain with red purulent output    Genitourinary:     Comments: Mendez draining clear yellow urine now removed   Musculoskeletal:      Cervical back: Normal range of motion.   Skin:     General: Skin is warm.   Neurological:      Mental Status: He is alert.           Significant Labs:    BMP:  Recent Labs   Lab 12/01/24  0355 12/02/24  0550 12/03/24  0224   * 133* 135*   K 3.6 3.3* 3.6    103 107   CO2 22* 21* 21*   BUN 16 13 14   CREATININE 1.3 1.3 1.0   CALCIUM 9.4 9.3 9.1       CBC:   Recent Labs   Lab 12/01/24  0814 12/01/24  1627 12/02/24  0011   WBC 16.67* 25.80* 19.84*   HGB 7.2* 7.6* 7.8*   HCT 22.1* 23.2* 23.9*   * 555* 557*       All pertinent labs results from the past 24 hours have been reviewed.    Significant Imaging:  All pertinent imaging results/findings from the past 24 hours have been reviewed.                  Assessment/Plan:     * Iron deficiency anemia  - Transfuse PRN  - Do not suspect urologic source of anemia at this time  - Hgb stable at 7.8    Urethral stricture  - Needed passive dilation with the cystoscope during procedure on 11/30/24.   - mendez removed today, will follow up discharge     Perinephric abscess  - s/p drain placement with IR 12/1/24  - seropurulent drain output  - maintain drain    Hydronephrosis of right kidney  - Will order outpatient Mag-3 scan    Nephrolithiasis  - Significant right renal stone burden with hydronephrosis and concern for infected urine along with fever and leukocytosis  - S/p class A right ureteral stent placement on 11/30    Fever  - Blood cultures NGTD  - Urine culture in process  - Follow up cultures, tailor antibiotics to cultures  - Appreciate ID assistance, recommend two weeks of culture appropriate antibiotics  from urological standpoint       Urology to sign off today pending patient passes void trial. Please notify urology team when patient is able to void. Will arrange follow up with Dr. Eugene and [facility] scan outpatient. Please feel free to reach out with any questions or concerns.       VTE Risk Mitigation (From admission, onward)           Ordered     IP VTE HIGH RISK PATIENT  Once         11/29/24 1638     Place sequential compression device  Until discontinued         11/29/24 1638                    Elbert Melo MD  Urology  [facility] S[facility]

## 2024-12-03 NOTE — ASSESSMENT & PLAN NOTE
- Needed passive dilation with the cystoscope during procedure on 11/30/24.   - mendez removed today, will follow up discharge

## 2024-12-03 NOTE — ASSESSMENT & PLAN NOTE
Mr. Jiménez is a 68M with PMH of LENORA, HLD, R hydronephrosis and nephrolithiasis, presented with weakness/dizziness and a recent fall, s/p R ureteral stent placement with urology on 11/30, with postop CT showing possible perinephric abscess. S/p IR perirenal drain placement with cultures collected.     Cultures showing NGTD, De escalated Abx to Unasyn yesterday.     Recommendations  Discontinued Vancomycin as cultures remain negative   Transition patient from Unasyn to Augmentin to complete a total of 14 day course from the day of drain placement with IR due to source control   Will arrange for CT scan on outpatient basis to assess for abscess resolution   Will arrange for ID follow up with our

## 2024-12-03 NOTE — NURSING
Received report on Patient . Assessed patient , Vital signs obtained , are stable , and have been  documented .  Discussed Patient situation with MD .  . EKG order Placed by MD per report from day shift . Will inform regarding Troponin report once received .  Patient calm , awake alert and oriented . No observable distress at this time . Monitoring .

## 2024-12-04 ENCOUNTER — TELEPHONE (OUTPATIENT)
Dept: UROLOGY | Facility: CLINIC | Age: 69
End: 2024-12-04
Payer: MEDICARE

## 2024-12-04 PROBLEM — K92.1 MELENA: Status: ACTIVE | Noted: 2024-12-04

## 2024-12-04 LAB
ALBUMIN SERPL BCP-MCNC: 1.7 G/DL (ref 3.5–5.2)
ALP SERPL-CCNC: 171 U/L (ref 40–150)
ALT SERPL W/O P-5'-P-CCNC: 18 U/L (ref 10–44)
ANION GAP SERPL CALC-SCNC: 8 MMOL/L (ref 8–16)
AST SERPL-CCNC: 46 U/L (ref 10–40)
BACTERIA BLD CULT: NORMAL
BACTERIA BLD CULT: NORMAL
BACTERIA SPEC AEROBE CULT: NO GROWTH
BASOPHILS # BLD AUTO: 0.02 K/UL (ref 0–0.2)
BASOPHILS # BLD AUTO: 0.02 K/UL (ref 0–0.2)
BASOPHILS NFR BLD: 0.1 % (ref 0–1.9)
BASOPHILS NFR BLD: 0.1 % (ref 0–1.9)
BILIRUB SERPL-MCNC: 0.5 MG/DL (ref 0.1–1)
BUN SERPL-MCNC: 12 MG/DL (ref 8–23)
CALCIUM SERPL-MCNC: 9.7 MG/DL (ref 8.7–10.5)
CHLORIDE SERPL-SCNC: 106 MMOL/L (ref 95–110)
CO2 SERPL-SCNC: 23 MMOL/L (ref 23–29)
COPPER SERPL-MCNC: 1889 UG/L (ref 665–1480)
CREAT SERPL-MCNC: 1.1 MG/DL (ref 0.5–1.4)
DIFFERENTIAL METHOD BLD: ABNORMAL
DIFFERENTIAL METHOD BLD: ABNORMAL
EOSINOPHIL # BLD AUTO: 0.2 K/UL (ref 0–0.5)
EOSINOPHIL # BLD AUTO: 0.4 K/UL (ref 0–0.5)
EOSINOPHIL NFR BLD: 1.4 % (ref 0–8)
EOSINOPHIL NFR BLD: 2.4 % (ref 0–8)
ERYTHROCYTE [DISTWIDTH] IN BLOOD BY AUTOMATED COUNT: 18.6 % (ref 11.5–14.5)
ERYTHROCYTE [DISTWIDTH] IN BLOOD BY AUTOMATED COUNT: 18.7 % (ref 11.5–14.5)
EST. GFR  (NO RACE VARIABLE): >60 ML/MIN/1.73 M^2
GLUCOSE SERPL-MCNC: 94 MG/DL (ref 70–110)
HCT VFR BLD AUTO: 25.9 % (ref 40–54)
HCT VFR BLD AUTO: 27 % (ref 40–54)
HGB BLD-MCNC: 8.3 G/DL (ref 14–18)
HGB BLD-MCNC: 8.5 G/DL (ref 14–18)
IMM GRANULOCYTES # BLD AUTO: 0.07 K/UL (ref 0–0.04)
IMM GRANULOCYTES # BLD AUTO: 0.08 K/UL (ref 0–0.04)
IMM GRANULOCYTES NFR BLD AUTO: 0.5 % (ref 0–0.5)
IMM GRANULOCYTES NFR BLD AUTO: 0.6 % (ref 0–0.5)
LYMPHOCYTES # BLD AUTO: 1 K/UL (ref 1–4.8)
LYMPHOCYTES # BLD AUTO: 1.2 K/UL (ref 1–4.8)
LYMPHOCYTES NFR BLD: 7.2 % (ref 18–48)
LYMPHOCYTES NFR BLD: 8.2 % (ref 18–48)
MAGNESIUM SERPL-MCNC: 2.1 MG/DL (ref 1.6–2.6)
MCH RBC QN AUTO: 25.2 PG (ref 27–31)
MCH RBC QN AUTO: 26.2 PG (ref 27–31)
MCHC RBC AUTO-ENTMCNC: 30.7 G/DL (ref 32–36)
MCHC RBC AUTO-ENTMCNC: 32.8 G/DL (ref 32–36)
MCV RBC AUTO: 80 FL (ref 82–98)
MCV RBC AUTO: 82 FL (ref 82–98)
MONOCYTES # BLD AUTO: 0.9 K/UL (ref 0.3–1)
MONOCYTES # BLD AUTO: 1 K/UL (ref 0.3–1)
MONOCYTES NFR BLD: 6.2 % (ref 4–15)
MONOCYTES NFR BLD: 6.7 % (ref 4–15)
NEUTROPHILS # BLD AUTO: 12 K/UL (ref 1.8–7.7)
NEUTROPHILS # BLD AUTO: 12.6 K/UL (ref 1.8–7.7)
NEUTROPHILS NFR BLD: 83 % (ref 38–73)
NEUTROPHILS NFR BLD: 83.6 % (ref 38–73)
NRBC BLD-RTO: 0 /100 WBC
NRBC BLD-RTO: 0 /100 WBC
PHOSPHATE SERPL-MCNC: 4.1 MG/DL (ref 2.7–4.5)
PLATELET # BLD AUTO: 629 K/UL (ref 150–450)
PLATELET # BLD AUTO: 637 K/UL (ref 150–450)
PMV BLD AUTO: 8.6 FL (ref 9.2–12.9)
PMV BLD AUTO: 9.2 FL (ref 9.2–12.9)
POCT GLUCOSE: 102 MG/DL (ref 70–110)
POCT GLUCOSE: 114 MG/DL (ref 70–110)
POTASSIUM SERPL-SCNC: 3.9 MMOL/L (ref 3.5–5.1)
PROT SERPL-MCNC: 7.6 G/DL (ref 6–8.4)
RBC # BLD AUTO: 3.25 M/UL (ref 4.6–6.2)
RBC # BLD AUTO: 3.3 M/UL (ref 4.6–6.2)
SODIUM SERPL-SCNC: 137 MMOL/L (ref 136–145)
WBC # BLD AUTO: 14.46 K/UL (ref 3.9–12.7)
WBC # BLD AUTO: 15.11 K/UL (ref 3.9–12.7)
ZINC SERPL-MCNC: 75 UG/DL (ref 60–130)

## 2024-12-04 PROCEDURE — 85025 COMPLETE CBC W/AUTO DIFF WBC: CPT | Performed by: HOSPITALIST

## 2024-12-04 PROCEDURE — 36415 COLL VENOUS BLD VENIPUNCTURE: CPT | Performed by: HOSPITALIST

## 2024-12-04 PROCEDURE — 25000003 PHARM REV CODE 250: Performed by: HOSPITALIST

## 2024-12-04 PROCEDURE — 83735 ASSAY OF MAGNESIUM: CPT | Performed by: HOSPITALIST

## 2024-12-04 PROCEDURE — 63600175 PHARM REV CODE 636 W HCPCS: Performed by: HOSPITALIST

## 2024-12-04 PROCEDURE — 25000003 PHARM REV CODE 250: Performed by: STUDENT IN AN ORGANIZED HEALTH CARE EDUCATION/TRAINING PROGRAM

## 2024-12-04 PROCEDURE — 25000003 PHARM REV CODE 250

## 2024-12-04 PROCEDURE — 21400001 HC TELEMETRY ROOM

## 2024-12-04 PROCEDURE — 84100 ASSAY OF PHOSPHORUS: CPT | Performed by: HOSPITALIST

## 2024-12-04 PROCEDURE — 80053 COMPREHEN METABOLIC PANEL: CPT | Performed by: HOSPITALIST

## 2024-12-04 RX ORDER — AMOXICILLIN AND CLAVULANATE POTASSIUM 875; 125 MG/1; MG/1
1 TABLET, FILM COATED ORAL EVERY 12 HOURS
Qty: 24 TABLET | Refills: 0 | Status: SHIPPED | OUTPATIENT
Start: 2024-12-04 | End: 2024-12-18

## 2024-12-04 RX ORDER — MUPIROCIN 20 MG/G
OINTMENT TOPICAL 2 TIMES DAILY
Qty: 22 G | Refills: 0 | Status: SHIPPED | OUTPATIENT
Start: 2024-12-04 | End: 2024-12-20

## 2024-12-04 RX ORDER — AMOXICILLIN 250 MG
2 CAPSULE ORAL DAILY
Qty: 14 TABLET | Refills: 0 | Status: SHIPPED | OUTPATIENT
Start: 2024-12-04 | End: 2024-12-13

## 2024-12-04 RX ORDER — ACETAMINOPHEN 325 MG/1
650 TABLET ORAL EVERY 8 HOURS PRN
Qty: 21 TABLET | Refills: 0 | Status: SHIPPED | OUTPATIENT
Start: 2024-12-04 | End: 2024-12-11

## 2024-12-04 RX ORDER — PANTOPRAZOLE SODIUM 40 MG/10ML
40 INJECTION, POWDER, LYOPHILIZED, FOR SOLUTION INTRAVENOUS EVERY 12 HOURS
Status: DISCONTINUED | OUTPATIENT
Start: 2024-12-04 | End: 2024-12-06 | Stop reason: HOSPADM

## 2024-12-04 RX ORDER — OXYCODONE HYDROCHLORIDE 5 MG/1
5 TABLET ORAL EVERY 12 HOURS PRN
Qty: 10 TABLET | Refills: 0 | Status: SHIPPED | OUTPATIENT
Start: 2024-12-04 | End: 2024-12-11

## 2024-12-04 RX ORDER — FERROUS GLUCONATE 324(37.5)
324 TABLET ORAL 2 TIMES DAILY WITH MEALS
Qty: 60 TABLET | Refills: 11 | Status: SHIPPED | OUTPATIENT
Start: 2024-12-04 | End: 2025-12-04

## 2024-12-04 RX ADMIN — Medication 300 ML: at 02:12

## 2024-12-04 RX ADMIN — Medication 324 MG: at 08:12

## 2024-12-04 RX ADMIN — MUPIROCIN: 20 OINTMENT TOPICAL at 08:12

## 2024-12-04 RX ADMIN — ATORVASTATIN CALCIUM 80 MG: 40 TABLET, FILM COATED ORAL at 08:12

## 2024-12-04 RX ADMIN — Medication 300 ML: at 06:12

## 2024-12-04 RX ADMIN — Medication 300 ML: at 08:12

## 2024-12-04 RX ADMIN — PANTOPRAZOLE SODIUM 40 MG: 40 INJECTION, POWDER, LYOPHILIZED, FOR SOLUTION INTRAVENOUS at 08:12

## 2024-12-04 RX ADMIN — Medication 324 MG: at 04:12

## 2024-12-04 RX ADMIN — AMOXICILLIN AND CLAVULANATE POTASSIUM 1 TABLET: 875; 125 TABLET, FILM COATED ORAL at 08:12

## 2024-12-04 RX ADMIN — MUPIROCIN: 20 OINTMENT TOPICAL at 11:12

## 2024-12-04 RX ADMIN — Medication 300 ML: at 10:12

## 2024-12-04 RX ADMIN — PANTOPRAZOLE SODIUM 40 MG: 40 TABLET, DELAYED RELEASE ORAL at 08:12

## 2024-12-04 NOTE — PROGRESS NOTES
[facility]  Urology  Progress Note    Patient Name: Felipe Jiménez  MRN: [id number]  Admission Date: 11/29/2024  Hospital Length of Stay: 4 days  Code Status: Full Code   Attending Provider: Gilmar Cid MD   Primary Care Physician: No primary care provider on file.    Subjective:     HPI:  Felipe Jiménez is a 68 y.o.M with history of iron deficiency anemia who has had shortness of breath with exertion who presented to the [facility] ED overnight due to weakness, dizziness, and a fall earlier this week. He was found to be anemic on presentation with hemoglobin 7.0. He was transfused 1 unit with follow up hemoglobin 7.2. Urology consulted for right hydronephrosis and fever.     On assessment, he is AFVSS, however he has been febrile to 101.1 this morning and intermittently tachycardic to 124. WBC 15 from 18, Hgb 7.2, Cr 1.2 (baseline). UA nitrite negative with many bacteria, >52 RBCs, >100 WBCs. Blood cultures NGTD, urine culture pending. He denies nausea or vomiting. Denies issues voiding, dysuria, frequency, or urgency.  CT chest abdomen pelvis form 10/16/2024 obtained for workup of anemia/weight loss showed multiple right renal stones including approximately 2-2.5 cm of stone burden in the renal pelvis with moderate to severe hydronephrosis. No ureteral stones bilaterally. Last PSA 06/2024 2.1.     Interval History: No acute events overnight. Hemodynamically stable. IR drain placed in right perinephric on 12/1. He has been afebrile for over 24 hours.  It continues to have purulent drainage. The patient reports pain at entry site.       Objective:     Temp:  [97.6 °F (36.4 °C)-98.3 °F (36.8 °C)] 98 °F (36.7 °C)  Pulse:  [69-94] 94  Resp:  [17-18] 18  SpO2:  [91 %-100 %] 100 %  BP: (109-133)/(60-70) 122/60     Body mass index is 21.29 kg/m².    Date 12/04/24 0700 - 12/05/24 0659   Shift 0095-0557 7783-5838 2710-0401 24 Hour Total   INTAKE   P.O. 225   225   Shift Total(mL/kg) 225(3.5)   225(3.5)   OUTPUT    Urine(mL/kg/hr) 280(0.6)   280   Drains 68   68   Shift Total(mL/kg) 348(5.5)   348(5.5)   Weight (kg) 63.5 63.5 63.5 63.5          Drains       Drain  Duration                  Urethral Catheter 11/30/24 1035 2 days         Closed/Suction Drain 12/01/24 1219 Right Back Bulb 14 Fr. 1 day                     Physical Exam  Eyes:      Pupils: Pupils are equal, round, and reactive to light.   Cardiovascular:      Rate and Rhythm: Normal rate.   Pulmonary:      Effort: Pulmonary effort is normal.   Abdominal:      General: Abdomen is flat.      Comments: Drain with red purulent output    Musculoskeletal:      Cervical back: Normal range of motion.   Skin:     General: Skin is warm.   Neurological:      Mental Status: He is alert.           Significant Labs:    BMP:  Recent Labs   Lab 12/02/24  0550 12/03/24  0224 12/04/24  0243   * 135* 137   K 3.3* 3.6 3.9    107 106   CO2 21* 21* 23   BUN 13 14 12   CREATININE 1.3 1.0 1.1   CALCIUM 9.3 9.1 9.7       CBC:   Recent Labs   Lab 12/02/24  0011 12/03/24  1058 12/04/24  0243   WBC 19.84* 14.50* 14.46*   HGB 7.8* 8.6* 8.3*   HCT 23.9* 27.4* 27.0*   * 643* 629*       All pertinent labs results from the past 24 hours have been reviewed.    Significant Imaging:  All pertinent imaging results/findings from the past 24 hours have been reviewed.                Review of Systems    Assessment/Plan:     * Perinephric abscess  - s/p drain placement with IR 12/1/24  - seropurulent drain output  - maintain drain    Urethral stricture  - Needed passive dilation with the cystoscope during procedure on 11/30/24.   - Patient passed voiding trial     Hydronephrosis of right kidney  - Will order outpatient Mag-3 scan    Nephrolithiasis  - Significant right renal stone burden with hydronephrosis and concern for infected urine along with fever and leukocytosis  - S/p class A right ureteral stent placement on 11/30    Fever  - Blood cultures NGTD  - Urine culture no  growth  - Follow up cultures, tailor antibiotics to cultures  - Infectious disease recommended two weeks of Augmentin     Iron deficiency anemia  - Transfuse PRN  - Do not suspect urologic source of anemia at this time  - Hemoglobin remaining stable         VTE Risk Mitigation (From admission, onward)           Ordered     IP VTE HIGH RISK PATIENT  Once         11/29/24 1638     Place sequential compression device  Until discontinued         11/29/24 1638                    Simone Castro MD  Urology  [facility] S[facility]

## 2024-12-04 NOTE — PLAN OF CARE
[facility]      HOME HEALTH ORDERS  FACE TO FACE ENCOUNTER    Patient Name: Felipe Jiménez  YOB: 1955    PCP: No primary care provider on file.   PCP Address: No primary physician on file.  PCP Phone Number: None  PCP Fax: None    Encounter Date: 11/29/24    Admit to Home Health    Diagnoses:  Active Hospital Problems    Diagnosis  POA    *Perinephric abscess [N15.1]  Yes    Hyponatremia [E87.1]  No    Urethral stricture [N35.919]  Yes    Severe protein-calorie malnutrition [E43]  Yes    Fever [R50.9]  Yes    Weight loss [R63.4]  Unknown    Nephrolithiasis [N20.0]  Yes    Hydronephrosis of right kidney [N13.30]  Yes    Iron deficiency anemia [D50.9]  Yes    Leucocytosis [D72.829]  Yes    UTI (urinary tract infection) [N39.0]  Yes    Positive D dimer [R79.89]  Unknown    Dizziness [R42]  Unknown    Falls frequently [R29.6]  Not Applicable    Hypercholesterolemia [E78.00]  Yes      Resolved Hospital Problems   No resolved problems to display.       Follow Up Appointments:  Future Appointments   Date Time Provider Department Center   12/19/2024  1:00 PM [facility] 500 LB LIMIT [facility]   1/15/2025  8:00 AM Dain Eugene MD [facility] UROLOG Celestine Mejia       Allergies:Review of patient's allergies indicates:  No Known Allergies    Medications: Review discharge medications with patient and family and provide education.    Current Facility-Administered Medications   Medication Dose Route Frequency Provider Last Rate Last Admin    0.9%  NaCl infusion (for blood administration)   Intravenous Q24H PRN Farhan Melvin PA-C        0.9%  NaCl infusion (for blood administration)   Intravenous Q24H PRN Gilmar Cid MD        acetaminophen tablet 650 mg  650 mg Oral Q6H PRN Matthew Busby MD   650 mg at 12/03/24 2204    amoxicillin-clavulanate 875-125mg per tablet 1 tablet  1 tablet Oral Q12H Maryanne Regalado DO   1 tablet at 12/04/24 0840    atorvastatin tablet 80 mg  80 mg Oral Daily  Gilmar Cid MD   80 mg at 12/04/24 0840    dextrose 10% bolus 125 mL 125 mL  12.5 g Intravenous PRGilmar Cordova MD        dextrose 10% bolus 125 mL 125 mL  12.5 g Intravenous PRHoward Calzada MD        dextrose 10% bolus 250 mL 250 mL  25 g Intravenous PRN Gilmar Cid MD        dextrose 10% bolus 250 mL 250 mL  25 g Intravenous PRN Howard Monroy MD        electrolytes-dextrose (Pedialyte) oral solution 300 mL  300 mL Oral Q4H Gilmar Cid MD   300 mL at 12/04/24 1000    ferrous gluconate tablet 324 mg  324 mg Oral BID WM Gilmar Cid MD   324 mg at 12/04/24 0840    glucagon (human recombinant) injection 1 mg  1 mg Intramuscular PRGilmar Cordova MD        glucagon (human recombinant) injection 1 mg  1 mg Intramuscular PRHoward Calzada MD        glucose chewable tablet 16 g  16 g Oral PRN Gilmar Cid MD        glucose chewable tablet 24 g  24 g Oral PRN Gilmar Cid MD        haloperidol lactate injection 0.5 mg  0.5 mg Intravenous Q10 Min PRHoward Calzada MD        mupirocin 2 % ointment   Nasal BID Wilda Bruner PA-C   Given at 12/04/24 1101    naloxone 0.4 mg/mL injection 0.02 mg  0.02 mg Intravenous PRN Gilmar Cid MD        nitroGLYCERIN SL tablet 0.4 mg  0.4 mg Sublingual Q5 Min PRGilmar Cordova MD        oxyCODONE immediate release tablet 5 mg  5 mg Oral Q6H PRN Gilmar Cid MD   5 mg at 12/03/24 1817    pantoprazole EC tablet 40 mg  40 mg Oral Daily Gilmar Cid MD   40 mg at 12/04/24 0840    sodium chloride 0.9% flush 10 mL  10 mL Intravenous Q12H PRN Gilmar Cid MD        sodium chloride 0.9% flush 10 mL  10 mL Intravenous PRN Howard Monroy MD            Medication List        START taking these medications      acetaminophen 325 MG tablet  Commonly known as: TYLENOL  Take 2 tablets (650 mg total) by mouth every 8 (eight) hours as needed.     amoxicillin-clavulanate 875-125mg 875-125 mg per  tablet  Commonly known as: AUGMENTIN  Take 1 tablet by mouth every 12 (twelve) hours. for 12 days     ferrous gluconate 324 mg (37.5 mg iron) Tab tablet  Take 1 tablet (324 mg total) by mouth 2 (two) times daily with meals.     mupirocin 2 % ointment  Commonly known as: BACTROBAN  by Nasal route 2 (two) times daily. for 4 days     oxyCODONE 5 MG immediate release tablet  Commonly known as: ROXICODONE  Take 1 tablet (5 mg total) by mouth every 12 (twelve) hours as needed.     senna-docusate 8.6-50 mg 8.6-50 mg per tablet  Commonly known as: PERICOLACE  Take 2 tablets by mouth once daily. for 7 days            CONTINUE taking these medications      pantoprazole 40 MG tablet  Commonly known as: PROTONIX  Take 1 tablet (40 mg total) by mouth once daily.     rosuvastatin 20 MG tablet  Commonly known as: CRESTOR  Take 1 tablet (20 mg total) by mouth once daily.            STOP taking these medications      aspirin 81 MG Chew     diazePAM 5 MG tablet  Commonly known as: VALIUM     meloxicam 7.5 MG tablet  Commonly known as: MOBIC     sildenafiL 100 MG tablet  Commonly known as: VIAGRA     triamcinolone acetonide 0.1% 0.1 % cream  Commonly known as: KENALOG     zolpidem 5 MG Tab  Commonly known as: AMBIEN                I have seen and examined this patient within the last 30 days. My clinical findings that support the need for the home health skilled services and home bound status are the following:no   Weakness/numbness causing balance and gait disturbance due to Weakness/Debility making it taxing to leave home.     Diet:   regular diet        Referrals/ Consults  Physical Therapy to evaluate and treat. Evaluate for home safety and equipment needs; Establish/upgrade home exercise program. Perform / instruct on therapeutic exercises, gait training, transfer training, and Range of Motion.  Occupational Therapy to evaluate and treat. Evaluate home environment for safety and equipment needs. Perform/Instruct on transfers, ADL  training, ROM, and therapeutic exercises.  Aide to provide assistance with personal care, ADLs, and vital signs.    Activities:   activity as tolerated    Nursing:   Agency to admit patient within 24 hours of hospital discharge unless specified on physician order or at patient request    SN to complete comprehensive assessment including routine vital signs. Instruct on disease process and s/s of complications to report to MD. Review/verify medication list sent home with the patient at time of discharge  and instruct patient/caregiver as needed. Frequency may be adjusted depending on start of care date.     Skilled nurse to perform up to 3 visits PRN for symptoms related to diagnosis    Notify MD if SBP > 160 or < 90; DBP > 90 or < 50; HR > 120 or < 50; Temp > 101; O2 < 88%; Other:   [phone]    Ok to schedule additional visits based on staff availability and patient request on consecutive days within the home health episode.    When multiple disciplines ordered:    Start of Care occurs on Sunday - Wednesday schedule remaining discipline evaluations as ordered on separate consecutive days following the start of care.    Thursday SOC -schedule subsequent evaluations Friday and Monday the following week.     Friday - Saturday SOC - schedule subsequent discipline evaluations on consecutive days starting Monday of the following week.    For all post-discharge communication and subsequent orders please contact patient's primary care physician. If unable to reach primary care physician or do not receive response within 30 minutes, please contact  [phone] clinical staff order clarification    Miscellaneous     right perinephric drain care - montior output     Routine Skin for Bedridden Patients: Instruct patient/caregiver to apply moisture barrier cream to all skin folds and wet areas in perineal area daily and after baths and all bowel movements.    Home Health Aide:  Nursing every 48 hours, Physical Therapy  every 48 hours, Occupational Therapy every 48 hours, and Home Health Aide every 48 hours    Wound Care Orders  no    I certify that this patient is confined to his home and needs intermittent skilled nursing care, physical therapy, and occupational therapy.

## 2024-12-04 NOTE — PLAN OF CARE
Met with pt & spouse to review discharge recommendation of home health and the family is agreeable to plan.    Patient/family provided list of facilities in-network with patient's payor plan. Providers that are owned, operated, or affiliated with [facility] are included on the list.     Notified that referral sent to below listed facilities from in-network list based on proximity to home/family support:   [facility]  2.[facility]   3.[facility]  4.[facility]   5. [facility]     Patient/family instructed to identify preference.    Preferred Facility: (if more than 1, listed in order of descending preference)  1.[facility]    If an additional preferred facility not listed above is identified, additional referral to be sent. If above facilities unable to accept, will send additional referrals to in-network providers.      Austin Broderick LMSW

## 2024-12-04 NOTE — ASSESSMENT & PLAN NOTE
- Needed passive dilation with the cystoscope during procedure on 11/30/24.   - Patient passed voiding trial

## 2024-12-04 NOTE — ASSESSMENT & PLAN NOTE
- Transfuse PRN  - Do not suspect urologic source of anemia at this time  - Hemoglobin remaining stable

## 2024-12-04 NOTE — PLAN OF CARE
12/04/24 1700   Post-Acute Status   Post-Acute Authorization Home Health   Home Health Status Referrals Sent   Patient choice form signed by patient/caregiver List with quality metrics by geographic area provided;List from System Post-Acute Care   Discharge Plan   Discharge Plan A Home Health   Discharge Plan B Home with family     Home Health orders sent via [employer] to [facility]. LYNN: 12/05/24.    Discharge Plan A and Plan B have been determined by review of patient's clinical status, future medical and therapeutic needs, and coverage/benefits for post-acute care in coordination with multidisciplinary team members.     Austin Broderick LMSW

## 2024-12-04 NOTE — ASSESSMENT & PLAN NOTE
- Blood cultures NGTD  - Urine culture no growth  - Follow up cultures, tailor antibiotics to cultures  - Infectious disease recommended two weeks of Augmentin

## 2024-12-04 NOTE — TELEPHONE ENCOUNTER
Spoke with patient.  Scheduled hospital f/u.  Advised to expect a call from nuclear medicine to schedule a renogram.  Patient verbalized understanding.

## 2024-12-04 NOTE — DISCHARGE INSTRUCTIONS
.Our goal at [facility] is to always give you outstanding care and exceptional service. You may receive a survey by mail, text or e-mail in the next 7-10 days from Jose Martin Laura and our leadership team asking about the care you received with us. The survey should only take 5-10 minutes to complete and is very important to us.     Your feedback provides us with a way to recognize our staff who work tirelessly to provide the best care! Also, your responses help us learn how to improve when your experience was below our aspiration of excellence. We WILL use your feedback to continue making improvements to help us provide the highest quality care. We keep your personal information and feedback confidential. We appreciate your time completing this survey and can't wait to hear from you!!!     We want you to leave us today feeling like you can DEFINITELY recommend us to others! We look forward to your continued care with us! Thanks so much for choosing [facility] for your healthcare needs!

## 2024-12-04 NOTE — PROGRESS NOTES
[facility] Medicine  Progress Note    Patient Name: Felipe Jiménez  MRN: [id number]  Patient Class: IP- Inpatient   Admission Date: 11/29/2024  Length of Stay: 4 days  Attending Physician: Gilmar Cid MD  Primary Care Provider: No primary care provider on file.        Subjective:     Principal Problem:Perinephric abscess        HPI:  Felipe Moffett is a 69 Yo male with PMH of Iron deficiency anemia, hyperlipidemia presents ED with dizziness, generalized weakness and shortness of breath with exertion for the past 6 months.     AAOX3. c/o generalized weakness and shortness of breath with exertion for the past 6 months - progressively worsening.  reports he had  2 falls recently due to weakness.  denies loss of consciousness.   Followed by GI for LENORA and underwent recent colonoscopy, upper GI as well as video capsule endoscopy.  Reports occasional bright red blood when he wipes but otherwise denies any other abnormal bleeding.  50 lb weight loss over the past year,  initially on purpose as he was over 200 lb but mentions he had unintentional weight loss as well.   had iron-deficiency anemia since childhood was previously on iron supplementation which he discontinued a year ago in favor of dietary changes. Denies any leg swelling. currently not on any anticoagulation. reports   occasional bright red blood when wiping but denies any melena.  on ASA 81 mg daily. admits taking tylenol and meloxicam intermittently for arthritis     EGD 10/2/24                           Normal esophagus.                          - Z-line regular, 40 cm from the incisors.                          - Small hiatal hernia.                          - Gastritis. Biopsied.                          - Normal examined duodenum. Biopsied   Colonoscopy 10/2/24   Non-bleeding internal hemorrhoids.                          - One 3 mm polyp in the descending colon, removed                          with a cold biopsy forceps.  Resected and retrieved.                          - The rectum, sigmoid colon, transverse colon,                          ascending colon, cecum, ileocecal valve and                          appendiceal orifice are normal.                          - The examined portion of the ileum was normal    VCE 10/22 Normal small bowel with no findings to explain anemia. No further GI endoscopy  recommended unless  overt bleeding occurs.     ER course - .  Hemoglobin is 7.0. Transfusion with  1 unit PRBC ordered . leukocytosis of 18 today but denies any infectious symptoms. UA + . UC pending.     Overview/Hospital Course:  11/30 CT head - No acute intracranial pathology.  Specifically no evidence of intracranial hemorrhage or major vascular distribution infarct. Mild generalized cerebral volume loss DVT sonogram -No imaging evidence of deep venous thrombosis in either lower extremity. Hb 7.2  s/p PRBC transfusion . GI no plan for further work up unless overt bleed. Hematology consulted for recurrent iron deficiency anemia/ s/p GI work up and weight loss.  Leucocytosis trended down to 14. fever of 101F. CT abdomen 10/16 - Enlarged edematous right kidney with perinephric inflammatory changes and moderate hydronephrosis consistent with pyelonephritis.. Multiple right intrarenal stones including 3 stones in the pelvis measuring 1.1 cm, 8 mm and 5 mm an 1 cm stone in the upper pole calyx and several other smaller stones. Echo pending . repeat CT abdomen.  Urology eval - s/p Membranous urethral stricture passively dilated with scope, patient had  right ureteral stent placement and Katz placement . CT abdomen today -. Enlarging mixed cystic and solid appearance of inferior pole of the right kidney with invasion into the right psoas muscle, concerning for developing abscess in setting of pyelonephritis. Incompletely characterized without IV contrast. repeat CT Abdomen with IV contrast - enlarged right kidney with multiple hypodense  parenchymal collections, largest collection extending inferiorly and posteriorly with involvement of the right psoas muscle/posterior chest wall. 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 IR evaluation.  started on IV Zosyn and vancomycin  12/1 IR drain placement today. Hb 6.9. BC x2 and UC NGTD. Transfusion with 1 unit of PRBC   12/2 fever of 101F overnight. cultures sent from IR drain pending. drain output 130ml. Hb 7.8 . urology to assess for renal function and discuss nephron-sparing stone treatment vs nephrectomy  12/3 transient chest pain resolved overnight. EKG and troponin negative. P replaced.  mendez removed for voiding trial. able to void  12/4 ID f/u - Discontinued Vancomycin as cultures remain negative. Transitioned  from Unasyn to Augmentin to complete a total of 14 day course from the day of drain placement with IR due to source control. needs CT scan on outpatient basis to assess for abscess resolution . drain output 75ml. Urology follow up for drain removal. 1 episode of melena this PM. protonix 40mg IV q 12h. GI consulted            Review of Systems:   Pain scale:   Constitutional:  fever,  chills, headache, vision loss, hearing loss, weight loss, Generalized weakness, falls, loss of smell, loss of taste, poor appetite,  sore throat, weight loss  Respiratory: cough, shortness of breath.   Cardiovascular: chest pain, dizziness, palpitations, orthopnea, swelling of feet, syncope  Gastrointestinal: nausea, vomiting, abdominal pain, diarrhea, black stool,  blood in stool, change in bowel habits, constipation  Genitourinary: hematuria, dysuria, urgency, frequency  Integument/Breast: rash,  pruritis  Hematologic/Lymphatic: easy bruising, lymphadenopathy  Musculoskeletal: arthralgias , myalgias, back pain, neck pain, knee pain  Neurological: confusion, seizures, tremors, slurred speech  Behavioral/Psych:   depression, anxiety, auditory or visual hallucinations     OBJECTIVE:     Physical Exam:  Body mass index is 21.29 kg/m².  Constitutional: Appears  thin built   Head: Normocephalic and atraumatic.   Neck: Normal range of motion. Neck supple.   Cardiovascular: Normal heart rate.  Regular heart rhythm.  Pulmonary/Chest: Effort normal.   Abdominal: No distension.  No tenderness  Musculoskeletal: Normal range of motion. No edema. drain right low back with red purulent output   Neurological: Alert and oriented to person, place, and time. able to move bilateral upper and lower extremities without limitation.   Skin: Skin is warm and dry.   Psychiatric: Normal mood and affect. Behavior is normal.       Vital Signs  Temp: 98 °F (36.7 °C) (12/04/24 1607)  Pulse: 94 (12/04/24 1607)  Resp: 18 (12/04/24 1607)  BP: 122/60 (12/04/24 1607)  SpO2: 100 % (12/04/24 1607)     24 Hour VS Range    Temp:  [97.6 °F (36.4 °C)-98.3 °F (36.8 °C)]   Pulse:  [69-94]   Resp:  [18]   BP: (109-133)/(60-70)   SpO2:  [91 %-100 %]     Intake/Output Summary (Last 24 hours) at 12/4/2024 1907  Last data filed at 12/4/2024 1803  Gross per 24 hour   Intake 950 ml   Output 358 ml   Net 592 ml         I/O This Shift:  No intake/output data recorded.    Wt Readings from Last 3 Encounters:   11/30/24 63.5 kg (140 lb)   11/25/24 63.5 kg (140 lb)   10/09/24 67 kg (147 lb 11.3 oz)       I have personally reviewed the vitals and recorded Intake/Output     Laboratory/Diagnostic Data:    CBC/Anemia Labs: Coags:    Recent Labs   Lab 11/30/24  0918 12/01/24  0355 12/02/24  0011 12/03/24  1058 12/04/24  0243   WBC 15.18*   < > 19.84* 14.50* 14.46*   HGB 7.6*   < > 7.8* 8.6* 8.3*   HCT 23.4*   < > 23.9* 27.4* 27.0*   *   < > 557* 643* 629*   MCV 78*   < > 77* 80* 82   RDW 17.3*   < > 17.4* 17.9* 18.7*   IRON 13*  --   --   --   --    FERRITIN 3,596*  --   --   --   --    RETIC 0.9  --   --   --   --    FOLATE 12.6  --   --   --   --    [id number] 1087*  --    "--   --   --     < > = values in this interval not displayed.    Recent Labs   Lab 12/01/24  1024   INR 1.4*        Chemistries: ABG:   Recent Labs   Lab 12/02/24  0550 12/03/24  0224 12/04/24  0243   * 135* 137   K 3.3* 3.6 3.9    107 106   CO2 21* 21* 23   BUN 13 14 12   CREATININE 1.3 1.0 1.1   CALCIUM 9.3 9.1 9.7   PROT 7.2 7.2 7.6   BILITOT 0.7 0.3 0.5   ALKPHOS 138 173* 171*   ALT 17 19 18   AST 24 34 46*   MG 1.9 2.1 2.1   PHOS 3.4 2.0* 4.1    No results for input(s): "PH", "PCO2", "PO2", "HCO3", "POCSATURATED", "BE" in the last 168 hours.     POCT Glucose: HbA1c:    Recent Labs   Lab 12/02/24 2056 12/04/24  0740   POCTGLUCOSE 156* 102    Hemoglobin A1C   Date Value Ref Range Status   06/20/2024 6.1 (H) 4.0 - 5.6 % Final     Comment:     ADA Screening Guidelines:  5.7-6.4%  Consistent with prediabetes  >or=6.5%  Consistent with diabetes    High levels of fetal hemoglobin interfere with the HbA1C  assay. Heterozygous hemoglobin variants (HbS, HgC, etc)do  not significantly interfere with this assay.   However, presence of multiple variants may affect accuracy.     06/01/2023 5.8 (H) 4.0 - 5.6 % Final     Comment:     ADA Screening Guidelines:  5.7-6.4%  Consistent with prediabetes  >or=6.5%  Consistent with diabetes    High levels of fetal hemoglobin interfere with the HbA1C  assay. Heterozygous hemoglobin variants (HbS, HgC, etc)do  not significantly interfere with this assay.   However, presence of multiple variants may affect accuracy.          Cardiac Enzymes: Ejection Fractions:    Recent Labs     12/02/24  1847 12/02/24  2131 12/03/24  0058   TROPONINI <0.006 <0.006 <0.006    EF   Date Value Ref Range Status   11/30/2024 50 % Final          No results for input(s): "COLORU", "APPEARANCEUA", "PHUR", "SPECGRAV", "PROTEINUA", "GLUCUA", "KETONESU", "BILIRUBINUA", "OCCULTUA", "NITRITE", "UROBILINOGEN", "LEUKOCYTESUR", "RBCUA", "WBCUA", "BACTERIA", "SQUAMEPITHEL", "HYALINECASTS" in the last 48 " "hours.    Invalid input(s): "WRIGHTSUR"      No results found for: "PROCAL", "LACTATE"  BNP (pg/mL)   Date Value   11/29/2024 92     No results found for: "CRP", "SEDRATE"  D-Dimer (mg/L FEU)   Date Value   11/29/2024 2.07 (H)     Ferritin (ng/mL)   Date Value   11/30/2024 3,596 (H)   07/01/2024 424 (H)     LD (U/L)   Date Value   11/30/2024 179     Troponin I (ng/mL)   Date Value   12/03/2024 <0.006   12/02/2024 <0.006   12/02/2024 <0.006   11/29/2024 0.007   11/29/2024 <0.006     No results found for this or any previous visit.  SARS-CoV2 (COVID-19) Qualitative PCR (no units)   Date Value   12/02/2024 Not Detected       Microbiology labs for the last week  Microbiology Results (last 7 days)       Procedure Component Value Units Date/Time    Blood culture [[id number]] Collected: 11/29/24 1644    Order Status: Completed Specimen: Blood from Peripheral, Antecubital, Right Updated: 12/04/24 1812     Blood Culture, Routine No growth after 5 days.    Blood culture [[id number]] Collected: 11/29/24 1644    Order Status: Completed Specimen: Blood from Peripheral, Antecubital, Right Updated: 12/04/24 1812     Blood Culture, Routine No growth after 5 days.    Culture, Anaerobe [[id number]] Collected: 12/01/24 1221    Order Status: Completed Specimen: Abscess from Kidney, Right Updated: 12/04/24 1059     Anaerobic Culture Culture in progress    Aerobic culture [[id number]] Collected: 12/01/24 1221    Order Status: Completed Specimen: Abscess from Kidney, Right Updated: 12/04/24 1046     Aerobic Bacterial Culture No growth    AFB Culture & Smear [[id number]] Collected: 12/01/24 1221    Order Status: Completed Specimen: Kidney, right Updated: 12/03/24 2127     AFB Culture & Smear Culture in progress    Narrative:      add on test afb culture and smear per farheen heller order#   [id number]   12/02/2024  17:45     AFB Culture & Smear [[id number]]     Order Status: Completed Specimen: Abscess from Kidney, Right     Urine " culture [[id number]] Collected: 11/29/24 1529    Order Status: Completed Specimen: Urine Updated: 11/30/24 1943     Urine Culture, Routine No growth    Narrative:      Specimen Source->Urine            Reviewed and noted in plan where applicable- Please see chart for full lab data.    Lines/Drains:       Peripheral IV - Single Lumen 11/29/24 1439 20 G Left Antecubital (Active)   Site Assessment Clean;Dry;Intact;No redness;No swelling 11/29/24 2100   Extremity Assessment Distal to IV No abnormal discoloration;No redness;No swelling;No warmth 11/29/24 2100   Dressing Status Clean;Dry;Intact 11/29/24 2100   Dressing Intervention Integrity maintained 11/29/24 2100   Number of days: 0            Peripheral IV - Single Lumen 11/29/24 1646 20 G Right Antecubital (Active)   Site Assessment Clean;Dry;Intact;No redness;No swelling 11/29/24 2100   Extremity Assessment Distal to IV No abnormal discoloration;No redness;No swelling;No warmth 11/29/24 2100   Line Status Saline locked 11/29/24 2100   Dressing Status Clean;Dry;Intact 11/29/24 2100   Dressing Intervention Integrity maintained 11/29/24 2100   Number of days: 0       Imaging  ECG Results              EKG 12-lead (Final result)        Collection Time Result Time QRS Duration OHS QTC Calculation    11/29/24 13:31:06 11/29/24 13:43:01 84 432                     Final result by Interface, Lab In [facility] (11/29/24 13:43:09)                   Narrative:    Test Reason : R53.1,    Vent. Rate :  93 BPM     Atrial Rate :  93 BPM     P-R Int : 128 ms          QRS Dur :  84 ms      QT Int : 348 ms       P-R-T Axes :  68  41  41 degrees    QTcB Int : 432 ms    Normal sinus rhythm  Normal ECG  No previous ECGs available  Confirmed by Dain Barajas ([id number]) on 11/29/2024 1:42:58 PM    Referred By:            Confirmed By: Dain Barajas                                    No results found for this or any previous visit.      IR Abscess Drainage Retroperiotoneal  Narrative:  EXAMINATION:  Drainage catheter placement    Procedural Personnel    Attending physician(s): Rosetta    Fellow physician(s): None    Resident physician(s): None    Advanced practice provider(s): None    Pre-procedure diagnosis: Perirenal abscess    Post-procedure diagnosis: Same    Indication: Leukocytosis with fluid collection    Additional clinical history: None    Complications: No immediate complications.    TECHNIQUE:  - Retroperitoneal drainage catheter placement under CT and ultrasound guidance    FINDINGS:  Pre-procedure    Consent: Informed consent for the procedure was obtained and time-out was performed prior to the procedure.    Preparation: The site was prepared and draped using maximal sterile barrier technique including cutaneous antisepsis.    Antibiotic administered: Prophylactic dose within 1 hour of procedure start time or 2 hours for vancomycin or fluoroquinolones    Anesthesia/sedation    The IR procedural team has confirmed the patient ID and re-evaluated the patient and sedation plan confirming it is suitable for the patient's condition and procedure.    Level of anesthesia/sedation: Minimal sedation (anxiolysis)    Anesthesia/sedation administered by: Independent trained observer under attending supervision with continuous monitoring of the patient's level of consciousness and physiologic status    Total intra-service sedation time (minutes): 15    Drainage catheter placement    The patient was positioned prone.  Initial imaging was performed. Local anesthesia was administered. The fluid collection was accessed using an access needle followed by wire insertion and serial dilation and a drainage catheter was placed. Position of the drainage catheter within the fluid collection was confirmed.    - Initial imaging findings: Perirenal fluid collection    - Drainage catheter placed: All-purpose drainage catheter    - External catheter securement: Non-absorbable suture    - Post-drainage imaging  findings: Partial drainage of the fluid collection    Contrast    Contrast agent: None    Contrast volume (mL): 0    Radiation Dose    CT dose length product ( mGy-cm): 165.6    Additional Details    Additional description of procedure: None    Equipment details: None    Specimens removed: Aspirated fluid was sent for analysis.    Estimated blood loss (mL): Less than 10    Standardized report: [email or web address]    Attestation    Signer name: Graeme Sargent    I attest that I was present for the entire procedure. I reviewed the stored images and agree with the report as written.  Impression: Percutaneous placement of a 14 French drainage catheter into perirenal fluid collection, yielding 90 mL of purulent fluid.    Plan:    Leave drain in place until less than 10 cc of fluid is aspirated daily for 2 days.    ______________________________________________________________________    Electronically signed by: Graeme Sargent  Date:    12/01/2024  Time:    13:53      Labs, Imaging, EKG and Diagnostic results from 12/4/2024 were reviewed.    Medications:  Medication list was reviewed and changes noted under Assessment/Plan.  No current facility-administered medications on file prior to encounter.     Current Outpatient Medications on File Prior to Encounter   Medication Sig Dispense Refill    pantoprazole (PROTONIX) 40 MG tablet Take 1 tablet (40 mg total) by mouth once daily. 90 tablet 3    rosuvastatin (CRESTOR) 20 MG tablet Take 1 tablet (20 mg total) by mouth once daily. 90 tablet 3    [DISCONTINUED] aspirin 81 MG Chew Take 81 mg by mouth once daily.      [DISCONTINUED] diazePAM (VALIUM) 5 MG tablet TAKE 1 TABLET BY MOUTH EVERY 12 HOURS AS NEEDED FOR ANXIETY 30 tablet 0    [DISCONTINUED] meloxicam (MOBIC) 7.5 MG tablet Take 1 tablet by mouth twice daily 60 tablet 0    [DISCONTINUED] sildenafiL (VIAGRA) 100 MG tablet Take 1 tablet (100 mg total) by mouth daily as needed for Erectile Dysfunction. 10 tablet 3     [DISCONTINUED] triamcinolone acetonide 0.1% (KENALOG) 0.1 % cream APPLY  CREAM EXTERNALLY TWICE DAILY AS NEEDED FOR  DERMATITIS 45 g 0    [DISCONTINUED] zolpidem (AMBIEN) 5 MG Tab Take 1 tablet (5 mg total) by mouth nightly as needed (insomnia). 30 tablet 0     Scheduled Medications:  Current Facility-Administered Medications   Medication Dose Route Frequency    amoxicillin-clavulanate 875-125mg  1 tablet Oral Q12H    atorvastatin  80 mg Oral Daily    electrolytes-dextrose  300 mL Oral Q4H    ferrous gluconate  324 mg Oral BID WM    mupirocin   Nasal BID    pantoprazole  40 mg Intravenous Q12H     PRN:   Current Facility-Administered Medications:     0.9%  NaCl infusion (for blood administration), , Intravenous, Q24H PRN    0.9%  NaCl infusion (for blood administration), , Intravenous, Q24H PRN    acetaminophen, 650 mg, Oral, Q6H PRN    dextrose 10%, 12.5 g, Intravenous, PRN    dextrose 10%, 12.5 g, Intravenous, PRN    dextrose 10%, 25 g, Intravenous, PRN    dextrose 10%, 25 g, Intravenous, PRN    glucagon (human recombinant), 1 mg, Intramuscular, PRN    glucagon (human recombinant), 1 mg, Intramuscular, PRN    glucose, 16 g, Oral, PRN    glucose, 24 g, Oral, PRN    haloperidol lactate, 0.5 mg, Intravenous, Q10 Min PRN    naloxone, 0.02 mg, Intravenous, PRN    nitroGLYCERIN, 0.4 mg, Sublingual, Q5 Min PRN    oxyCODONE, 5 mg, Oral, Q6H PRN    sodium chloride 0.9%, 10 mL, Intravenous, Q12H PRN    sodium chloride 0.9%, 10 mL, Intravenous, PRN  Infusions:       Estimated Creatinine Clearance: 56.1 mL/min (based on SCr of 1.1 mg/dL).             Assessment/Plan:      * Perinephric abscess  11/30 CT abdomen today -. Enlarging mixed cystic and solid appearance of inferior pole of the right kidney with invasion into the right psoas muscle, concerning for developing abscess in setting of pyelonephritis. Incompletely characterized without IV contrast. repeat CT Abdomen with IV contrast -enlarged right kidney with multiple  hypodense parenchymal collections, largest collection extending inferiorly and posteriorly with involvement of the right psoas muscle/posterior chest wall. 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 . IR evaluation.  started on IV Zosyn and vancomycin  12/1 IR drain placement today  12/2 . XGP - urology to assess for renal function and discuss nephron-sparing stone treatment vs nephrectomy.   12/4 ID f/u - Discontinued Vancomycin as cultures remain negative. Transitioned  from Unasyn to Augmentin to complete a total of 14 day course from the day of drain placement with IR due to source control. needs CT scan on outpatient basis to assess for abscess resolution . drain output 75ml. discharge with ID and Urology follow up        Melena  12/4 1 episode of melena this PM. protonix 40mg IV q 12h. GI consulted          Severe protein-calorie malnutrition  Nutrition consulted. Most recent weight and BMI monitored-     Measurements:  Wt Readings from Last 1 Encounters:   11/30/24 63.5 kg (140 lb)   Body mass index is 21.29 kg/m².    Patient has been screened and assessed by RD.    Malnutrition Type:  Context: chronic illness  Level: severe    Malnutrition Characteristic Summary:  Weight Loss (Malnutrition): greater than 7.5% in 3 months (-8.4% x 3 months)  Energy Intake (Malnutrition): less than or equal to 75% for greater than or equal to 1 month  Subcutaneous Fat (Malnutrition):  (moderate to severe)  Muscle Mass (Malnutrition):  (moderate to severe)    Interventions/Recommendations (treatment strategy):  1.)      Urethral stricture  as above       Hydronephrosis of right kidney  CT abdomen 10/16 - Enlarged edematous right kidney with perinephric inflammatory changes and moderate hydronephrosis consistent with pyelonephritis.. Multiple right intrarenal stones including 3 stones in the pelvis measuring 1.1 cm, 8 mm and 5  mm an 1 cm stone in the upper pole calyx and several other smaller stones. Echo pending . repeat CT abdomen.  Urology eval - right ureteral stent placement today      Nephrolithiasis  11/30 CT abdomen 10/16 - Enlarged edematous right kidney with perinephric inflammatory changes and moderate hydronephrosis consistent with pyelonephritis.. Multiple right intrarenal stones including 3 stones in the pelvis measuring 1.1 cm, 8 mm and 5 mm an 1 cm stone in the upper pole calyx and several other smaller stones. Echo pending . repeat CT abdomen.  Urology eval -  Urology eval - s/p Membranous urethral stricture passively dilated with scope, patient had  right ureteral stent placement and Katz placement       Weight loss  Nutrition consulted. Most recent weight and BMI monitored-     Measurements:  Wt Readings from Last 1 Encounters:   11/30/24 63.5 kg (140 lb)   Body mass index is 21.29 kg/m².    Patient has been screened and assessed by RD.    Malnutrition Type:  Context:    Level:      Malnutrition Characteristic Summary:       Interventions/Recommendations (treatment strategy):       11/30 GI no plan for further work up unless overt bleed. Hematology consulted for recurrent iron deficiency anemia/ s/p GI work up and weight loss.      Fever  11/30 Leucocytosis trended down to 14. fever of 101F. started on ceftriaxone.   CT abdomen 10/16 - Enlarged edematous right kidney with perinephric inflammatory changes and moderate hydronephrosis consistent with pyelonephritis.. Multiple right intrarenal stones including 3 stones in the pelvis measuring 1.1 cm, 8 mm and 5 mm an 1 cm stone in the upper pole calyx and several other smaller stones. Echo pending . repeat CT abdomen. Urology consulted for  right hydronephrosis/ fever   12/1BC x2 and UC NGTD.  12/2 fever of 101F overnight. cultures sent from IR drain pending. drain output 130ml.     Falls frequently  secondary to above. fall precautions. PT/OT consulted   reported 2 falls  in the last week. one with forehead trauma. CT head wo contrast ordered   11/30  CT head - No acute intracranial pathology.  Specifically no evidence of intracranial hemorrhage or major vascular distribution infarct. Mild generalized cerebral volume loss     Dizziness  likely from symptomatic anemia. orthostasis + by pulse. encourage oral hydration. monitor after PRBC. echo ordered . telemetry r/o arrhythmia     11/30 orthostatic by pulse. received LR       Positive D dimer  D dimer elevated at 2.07 . DVT sonogram- No imaging evidence of deep venous thrombosis in either lower extremity.       UTI (urinary tract infection)  UA + . UC pending.   12/1  BC x2 and UC NGTD.   12/4 ID f/u - Discontinued Vancomycin as cultures remain negative. Transitioned  from Unasyn to Augmentin to complete a total of 14 day course from the day of drain placement with IR due to source control. needs CT scan on outpatient basis to assess for abscess resolution . drain output 75ml. discharge with ID and Urology follow up        Leucocytosis    Patient with leucocytosis   Recent Labs   Lab 12/02/24  0011 12/03/24  1058 12/04/24  0243   WBC 19.84* 14.50* 14.46*     . Afebrile. BCX 2, urine culture pending . likely secondary to sepsis.?    11/30 Leucocytosis trended down to 14. fever of 101F. started on ceftriaxone. Echo pending  12/1 BC x2 and UC NGTD.     Iron deficiency anemia    Patient's with microcytic anemia.. Hemoglobin downtrending. Etiology likely due to Iron deficiency.  Current CBC reviewed-    Recent Labs   Lab 12/02/24  0011 12/03/24  1058 12/04/24  0243   HGB 7.8* 8.6* 8.3*         Component Value Date/Time    MCV 82 12/04/2024 0243    RDW 18.7 (H) 12/04/2024 0243    IRON 13 (L) 11/30/2024 0918    FERRITIN 3,596 (H) 11/30/2024 0918    RETIC 0.9 11/30/2024 0918    FOLATE 12.6 11/30/2024 0918    [id number] 1087 (H) 11/30/2024 0918     Monitor CBC and transfuse if H/H drops below 7/21.        EGD 10/2/24                            Normal esophagus.                          - Z-line regular, 40 cm from the incisors.                          - Small hiatal hernia.                          - Gastritis. Biopsied.                          - Normal examined duodenum. Biopsied   Colonoscopy 10/2/24   Non-bleeding internal hemorrhoids.                          - One 3 mm polyp in the descending colon, removed                          with a cold biopsy forceps. Resected and retrieved.                          - The rectum, sigmoid colon, transverse colon,                          ascending colon, cecum, ileocecal valve and                          appendiceal orifice are normal.                          - The examined portion of the ileum was normal    VCE 10/22 Normal small bowel with no findings to explain anemia. No further GI endoscopy  recommended unless  overt bleeding occurs.     symptomatic anemia -  Hemoglobin is 7.0. Transfusion with  1 unit PRBC ordered . GI consulted   11/30 Hb 7.6  s/p PRBC transfusion . hematology evaluation. started on ferrous gluconate and Feraheme IV x 2 doses. needs hematolgoy f/u outpatient   12/1Hb 6.9. Transfusion with 1 unit of PRBC     Hypercholesterolemia  continue crestor         VTE Risk Mitigation (From admission, onward)           Ordered     IP VTE HIGH RISK PATIENT  Once         11/29/24 1638     Place sequential compression device  Until discontinued         11/29/24 1638                    Discharge Planning   LYNN: 12/5/2024     Code Status: Full Code   Is the patient medically ready for discharge?:     Reason for patient still in hospital (select all that apply): Treatment  Discharge Plan A: Home Health                  Gilmar Cid MD  Department of Hospital Medicine   [facility]

## 2024-12-04 NOTE — DISCHARGE SUMMARY
[facility] Medicine  Discharge Summary      Patient Name: Felipe Jiménez  MRN: [id number]  BRITTANY: [id number]  Patient Class: IP- Inpatient  Admission Date: 11/29/2024  Hospital Length of Stay: 4 days  Discharge Date and Time:  12/04/2024 7:02 AM  Attending Physician: Gilmar Cdi MD   Discharging Provider: Gilmar Cid MD  Primary Care Provider: No primary care provider on file.  Hospital Medicine Team: [facility] Gilmar Cid MD  Primary Care Team: [facility]    HPI:   Felipe Moffett is a 67 Yo male with PMH of Iron deficiency anemia, hyperlipidemia presents ED with dizziness, generalized weakness and shortness of breath with exertion for the past 6 months.     AAOX3. c/o generalized weakness and shortness of breath with exertion for the past 6 months - progressively worsening.  reports he had  2 falls recently due to weakness.  denies loss of consciousness.   Followed by GI for LENORA and underwent recent colonoscopy, upper GI as well as video capsule endoscopy.  Reports occasional bright red blood when he wipes but otherwise denies any other abnormal bleeding.  50 lb weight loss over the past year,  initially on purpose as he was over 200 lb but mentions he had unintentional weight loss as well.   had iron-deficiency anemia since childhood was previously on iron supplementation which he discontinued a year ago in favor of dietary changes. Denies any leg swelling. currently not on any anticoagulation. reports   occasional bright red blood when wiping but denies any melena.  on ASA 81 mg daily. admits taking tylenol and meloxicam intermittently for arthritis     EGD 10/2/24                           Normal esophagus.                          - Z-line regular, 40 cm from the incisors.                          - Small hiatal hernia.                          - Gastritis. Biopsied.                          - Normal examined duodenum. Biopsied   Colonoscopy 10/2/24   Non-bleeding  internal hemorrhoids.                          - One 3 mm polyp in the descending colon, removed                          with a cold biopsy forceps. Resected and retrieved.                          - The rectum, sigmoid colon, transverse colon,                          ascending colon, cecum, ileocecal valve and                          appendiceal orifice are normal.                          - The examined portion of the ileum was normal    VCE 10/22 Normal small bowel with no findings to explain anemia. No further GI endoscopy  recommended unless  overt bleeding occurs.     ER course - .  Hemoglobin is 7.0. Transfusion with  1 unit PRBC ordered . leukocytosis of 18 today but denies any infectious symptoms. UA + . UC pending.     Procedure(s) (LRB):  CYSTOSCOPY, WITH URETERAL STENT INSERTION (Right)  DILATION, URETHRA (N/A)      Hospital Course:   11/30 CT head - No acute intracranial pathology.  Specifically no evidence of intracranial hemorrhage or major vascular distribution infarct. Mild generalized cerebral volume loss DVT sonogram -No imaging evidence of deep venous thrombosis in either lower extremity. Hb 7.2  s/p PRBC transfusion . GI no plan for further work up unless overt bleed. Hematology consulted for recurrent iron deficiency anemia/ s/p GI work up and weight loss.  Leucocytosis trended down to 14. fever of 101F. CT abdomen 10/16 - Enlarged edematous right kidney with perinephric inflammatory changes and moderate hydronephrosis consistent with pyelonephritis.. Multiple right intrarenal stones including 3 stones in the pelvis measuring 1.1 cm, 8 mm and 5 mm an 1 cm stone in the upper pole calyx and several other smaller stones. Echo pending . repeat CT abdomen.  Urology eval - s/p Membranous urethral stricture passively dilated with scope, patient had  right ureteral stent placement and Katz placement . CT abdomen today -. Enlarging mixed cystic and solid appearance of inferior pole of the right  kidney with invasion into the right psoas muscle, concerning for developing abscess in setting of pyelonephritis. Incompletely characterized without IV contrast. repeat CT Abdomen with IV contrast - enlarged right kidney with multiple hypodense parenchymal collections, largest collection extending inferiorly and posteriorly with involvement of the right psoas muscle/posterior chest wall. 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 IR evaluation.  started on IV Zosyn and vancomycin  12/1 IR drain placement today. Hb 6.9. BC x2 and UC NGTD. Transfusion with 1 unit of PRBC   12/2 fever of 101F overnight. cultures sent from IR drain pending. drain output 130ml. Hb 7.8 . urology to assess for renal function and discuss nephron-sparing stone treatment vs nephrectomy  12/3 transient chest pain resolved overnight. EKG and troponin negative. P replaced.  mendez removed for voiding trial. able to void  12/4 ID f/u - Discontinued Vancomycin as cultures remain negative. Transitioned  from Unasyn to Augmentin to complete a total of 14 day course from the day of drain placement with IR due to source control. needs CT scan on outpatient basis to assess for abscess resolution . drain output 75ml. discharge with ID and Urology follow up         Goals of Care Treatment Preferences:  Code Status: Full Code      SDOH Screening:  The patient was screened for utility difficulties, food insecurity, transport difficulties, housing insecurity, and interpersonal safety and there were no concerns identified this admission.     Consults:   Consults (From admission, onward)          Status Ordering Provider     Inpatient consult to Interventional Radiology  Once        Provider:  (Not yet assigned)    Completed EMILY SCHWARTZ     Inpatient consult to PICC team (ABI)  Once        Provider:  (Not yet assigned)    Completed EMILY SCHWARTZ      Inpatient consult to Interventional Radiology  Once        Provider:  (Not yet assigned)    Completed KLESEY SARMIENTO     Inpatient consult to Infectious Diseases  Once        Provider:  (Not yet assigned)    Completed EMILY SCHWARTZ     Inpatient consult to Urology  Once        Provider:  (Not yet assigned)    Completed EMILY SCHWARTZ     Inpatient consult to Registered Dietitian/Nutritionist  Once        Provider:  (Not yet assigned)    Completed EMILY SCHWARTZ     Inpatient consult to Hematology/Oncology  Once        Provider:  (Not yet assigned)    Completed EMILY SCHWARTZ                  Assessment/Plan:      * Perinephric abscess  11/30 CT abdomen today -. Enlarging mixed cystic and solid appearance of inferior pole of the right kidney with invasion into the right psoas muscle, concerning for developing abscess in setting of pyelonephritis. Incompletely characterized without IV contrast. repeat CT Abdomen with IV contrast -enlarged right kidney with multiple hypodense parenchymal collections, largest collection extending inferiorly and posteriorly with involvement of the right psoas muscle/posterior chest wall. 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 . IR evaluation.  started on IV Zosyn and vancomycin  12/1 IR drain placement today  12/2 . XGP - urology to assess for renal function and discuss nephron-sparing stone treatment vs nephrectomy.   12/4 ID f/u - Discontinued Vancomycin as cultures remain negative. Transitioned  from Unasyn to Augmentin to complete a total of 14 day course from the day of drain placement with IR due to source control. needs CT scan on outpatient basis to assess for abscess resolution . drain output 75ml. discharge with ID and Urology follow up         Severe protein-calorie malnutrition  Nutrition consulted. Most recent weight and BMI monitored-       Measurements:      Wt Readings from Last 1 Encounters:   11/30/24 63.5 kg (140 lb)   Body mass index is 21.29 kg/m².     Patient has been screened and assessed by RD.     Malnutrition Type:  Context: chronic illness  Level: severe     Malnutrition Characteristic Summary:  Weight Loss (Malnutrition): greater than 7.5% in 3 months (-8.4% x 3 months)  Energy Intake (Malnutrition): less than or equal to 75% for greater than or equal to 1 month  Subcutaneous Fat (Malnutrition):  (moderate to severe)  Muscle Mass (Malnutrition):  (moderate to severe)     Interventions/Recommendations (treatment strategy):  1.)        Urethral stricture  as above         Hydronephrosis of right kidney  CT abdomen 10/16 - Enlarged edematous right kidney with perinephric inflammatory changes and moderate hydronephrosis consistent with pyelonephritis.. Multiple right intrarenal stones including 3 stones in the pelvis measuring 1.1 cm, 8 mm and 5 mm an 1 cm stone in the upper pole calyx and several other smaller stones. Echo pending . repeat CT abdomen.  Urology eval - right ureteral stent placement today        Nephrolithiasis  11/30 CT abdomen 10/16 - Enlarged edematous right kidney with perinephric inflammatory changes and moderate hydronephrosis consistent with pyelonephritis.. Multiple right intrarenal stones including 3 stones in the pelvis measuring 1.1 cm, 8 mm and 5 mm an 1 cm stone in the upper pole calyx and several other smaller stones. Echo pending . repeat CT abdomen.  Urology eval -  Urology eval - s/p Membranous urethral stricture passively dilated with scope, patient had  right ureteral stent placement and Katz placement         Weight loss  Nutrition consulted. Most recent weight and BMI monitored-      Measurements:      Wt Readings from Last 1 Encounters:   11/30/24 63.5 kg (140 lb)   Body mass index is 21.29 kg/m².     Patient has been screened and assessed by RD.     Malnutrition Type:  Context:    Level:        Malnutrition Characteristic Summary:        Interventions/Recommendations (treatment strategy):        11/30 GI no plan for further work up unless overt bleed. Hematology consulted for recurrent iron deficiency anemia/ s/p GI work up and weight loss.        Fever  11/30 Leucocytosis trended down to 14. fever of 101F. started on ceftriaxone.   CT abdomen 10/16 - Enlarged edematous right kidney with perinephric inflammatory changes and moderate hydronephrosis consistent with pyelonephritis.. Multiple right intrarenal stones including 3 stones in the pelvis measuring 1.1 cm, 8 mm and 5 mm an 1 cm stone in the upper pole calyx and several other smaller stones. Echo pending . repeat CT abdomen. Urology consulted for  right hydronephrosis/ fever   12/1BC x2 and UC NGTD.  12/2 fever of 101F overnight. cultures sent from IR drain pending. drain output 130ml.      Falls frequently  secondary to above. fall precautions. PT/OT consulted   reported 2 falls in the last week. one with forehead trauma. CT head wo contrast ordered   11/30  CT head - No acute intracranial pathology.  Specifically no evidence of intracranial hemorrhage or major vascular distribution infarct. Mild generalized cerebral volume loss      Dizziness  likely from symptomatic anemia. orthostasis + by pulse. encourage oral hydration. monitor after PRBC. echo ordered . telemetry r/o arrhythmia      11/30 orthostatic by pulse. received LR         Positive D dimer  D dimer elevated at 2.07 . DVT sonogram- No imaging evidence of deep venous thrombosis in either lower extremity.         UTI (urinary tract infection)  UA + . UC pending.   12/1  BC x2 and UC NGTD.   12/4 ID f/u - Discontinued Vancomycin as cultures remain negative. Transitioned  from Unasyn to Augmentin to complete a total of 14 day course from the day of drain placement with IR due to source control. needs CT scan on outpatient basis to assess for abscess resolution . drain output 75ml. discharge  with ID and Urology follow up         Leucocytosis     Patient with leucocytosis         Recent Labs   Lab 12/02/24  0011 12/03/24  1058 12/04/24  0243   WBC 19.84* 14.50* 14.46*     . Afebrile. BCX 2, urine culture pending . likely secondary to sepsis.?    11/30 Leucocytosis trended down to 14. fever of 101F. started on ceftriaxone. Echo pending  12/1 BC x2 and UC NGTD.      Iron deficiency anemia     Patient's with microcytic anemia.. Hemoglobin downtrending. Etiology likely due to Iron deficiency.  Current CBC reviewed-          Recent Labs   Lab 12/02/24  0011 12/03/24  1058 12/04/24  0243   HGB 7.8* 8.6* 8.3*                Component Value Date/Time     MCV 82 12/04/2024 0243     RDW 18.7 (H) 12/04/2024 0243     IRON 13 (L) 11/30/2024 0918     FERRITIN 3,596 (H) 11/30/2024 0918     RETIC 0.9 11/30/2024 0918     FOLATE 12.6 11/30/2024 0918     [id number] 1087 (H) 11/30/2024 0918      Monitor CBC and transfuse if H/H drops below 7/21.         EGD 10/2/24                           Normal esophagus.                          - Z-line regular, 40 cm from the incisors.                          - Small hiatal hernia.                          - Gastritis. Biopsied.                          - Normal examined duodenum. Biopsied   Colonoscopy 10/2/24   Non-bleeding internal hemorrhoids.                          - One 3 mm polyp in the descending colon, removed                          with a cold biopsy forceps. Resected and retrieved.                          - The rectum, sigmoid colon, transverse colon,                          ascending colon, cecum, ileocecal valve and                          appendiceal orifice are normal.                          - The examined portion of the ileum was normal    VCE 10/22 Normal small bowel with no findings to explain anemia. No further GI endoscopy  recommended unless  overt bleeding occurs.      symptomatic anemia -  Hemoglobin is 7.0. Transfusion with  1 unit PRBC ordered . GI  consulted   11/30 Hb 7.6  s/p PRBC transfusion . hematology evaluation. started on ferrous gluconate and Feraheme IV x 2 doses. needs hematolgoy f/u outpatient   12/1Hb 6.9. Transfusion with 1 unit of PRBC      Hypercholesterolemia  continue crestor      Final Active Diagnoses:    Diagnosis Date Noted POA    PRINCIPAL PROBLEM:  Perinephric abscess [N15.1] 11/30/2024 Yes    Hyponatremia [E87.1] 12/03/2024 No    Urethral stricture [N35.919] 12/01/2024 Yes    Severe protein-calorie malnutrition [E43] 12/01/2024 Yes    Fever [R50.9] 11/30/2024 Yes    Weight loss [R63.4] 11/30/2024 Unknown    Nephrolithiasis [N20.0] 11/30/2024 Yes    Hydronephrosis of right kidney [N13.30] 11/30/2024 Yes    Iron deficiency anemia [D50.9] 11/29/2024 Yes    Leucocytosis [D72.829] 11/29/2024 Yes    UTI (urinary tract infection) [N39.0] 11/29/2024 Yes    Positive D dimer [R79.89] 11/29/2024 Unknown    Dizziness [R42] 11/29/2024 Unknown    Falls frequently [R29.6] 11/29/2024 Not Applicable    Hypercholesterolemia [E78.00] 06/01/2023 Yes      Problems Resolved During this Admission:       Discharged Condition: fair    Disposition: home health    Follow Up:    Patient Instructions:      NM Renogram With Lasix   Standing Status: Future Standing Exp. Date: 12/03/25     Order Specific Question Answer Comments   May the Radiologist modify the order per protocol to meet the clinical needs of the patient? Yes        Significant Diagnostic Studies: Labs: BMP:   Recent Labs   Lab 12/03/24 0224 12/04/24  0243   * 94   * 137   K 3.6 3.9    106   CO2 21* 23   BUN 14 12   CREATININE 1.0 1.1   CALCIUM 9.1 9.7   MG 2.1 2.1   , CMP   Recent Labs   Lab 12/03/24 0224 12/04/24  0243   * 137   K 3.6 3.9    106   CO2 21* 23   * 94   BUN 14 12   CREATININE 1.0 1.1   CALCIUM 9.1 9.7   PROT 7.2 7.6   ALBUMIN 1.6* 1.7*   BILITOT 0.3 0.5   ALKPHOS 173* 171*   AST 34 46*   ALT 19 18   ANIONGAP 7* 8   , CBC   Recent Labs   Lab  "12/03/24  1058 12/04/24  0243   WBC 14.50* 14.46*   HGB 8.6* 8.3*   HCT 27.4* 27.0*   * 629*   , INR   Lab Results   Component Value Date    INR 1.4 (H) 12/01/2024   , Lipid Panel   Lab Results   Component Value Date    CHOL 110 (L) 06/20/2024    HDL 41 06/20/2024    LDLCALC 59.4 (L) 06/20/2024    TRIG 48 06/20/2024    CHOLHDL 37.3 06/20/2024   , Troponin   Recent Labs   Lab 12/02/24  1847 12/02/24  2131 12/03/24  0058   TROPONINI <0.006 <0.006 <0.006   , A1C:   Recent Labs   Lab 06/20/24  1146   HGBA1C 6.1*   , and All labs within the past 24 hours have been reviewed  Microbiology: Blood Culture   Lab Results   Component Value Date    LABBLOO No Growth to date 11/29/2024    LABBLOO No Growth to date 11/29/2024    LABBLOO No Growth to date 11/29/2024    LABBLOO No Growth to date 11/29/2024    LABBLOO No Growth to date 11/29/2024    LABBLOO No Growth to date 11/29/2024    LABBLOO No Growth to date 11/29/2024    LABBLOO No Growth to date 11/29/2024    LABBLOO No Growth to date 11/29/2024    LABBLOO No Growth to date 11/29/2024   , Sputum Culture No results found for: "GSRESP", "RESPIRATORYC", and Urine Culture    Lab Results   Component Value Date    LABURIN No growth 11/29/2024     IR Abscess Drainage Retroperiotoneal  Narrative: EXAMINATION:  Drainage catheter placement    Procedural Personnel    Attending physician(s): Rosetta    Fellow physician(s): None    Resident physician(s): None    Advanced practice provider(s): None    Pre-procedure diagnosis: Perirenal abscess    Post-procedure diagnosis: Same    Indication: Leukocytosis with fluid collection    Additional clinical history: None    Complications: No immediate complications.    TECHNIQUE:  - Retroperitoneal drainage catheter placement under CT and ultrasound guidance    FINDINGS:  Pre-procedure    Consent: Informed consent for the procedure was obtained and time-out was performed prior to the procedure.    Preparation: The site was prepared and draped " using maximal sterile barrier technique including cutaneous antisepsis.    Antibiotic administered: Prophylactic dose within 1 hour of procedure start time or 2 hours for vancomycin or fluoroquinolones    Anesthesia/sedation    The IR procedural team has confirmed the patient ID and re-evaluated the patient and sedation plan confirming it is suitable for the patient's condition and procedure.    Level of anesthesia/sedation: Minimal sedation (anxiolysis)    Anesthesia/sedation administered by: Independent trained observer under attending supervision with continuous monitoring of the patient's level of consciousness and physiologic status    Total intra-service sedation time (minutes): 15    Drainage catheter placement    The patient was positioned prone.  Initial imaging was performed. Local anesthesia was administered. The fluid collection was accessed using an access needle followed by wire insertion and serial dilation and a drainage catheter was placed. Position of the drainage catheter within the fluid collection was confirmed.    - Initial imaging findings: Perirenal fluid collection    - Drainage catheter placed: All-purpose drainage catheter    - External catheter securement: Non-absorbable suture    - Post-drainage imaging findings: Partial drainage of the fluid collection    Contrast    Contrast agent: None    Contrast volume (mL): 0    Radiation Dose    CT dose length product ( mGy-cm): 165.6    Additional Details    Additional description of procedure: None    Equipment details: None    Specimens removed: Aspirated fluid was sent for analysis.    Estimated blood loss (mL): Less than 10    Standardized report: [email or web address]    Attestation    Signer name: Graeme Sargent    I attest that I was present for the entire procedure. I reviewed the stored images and agree with the report as written.  Impression: Percutaneous placement of a 14 Frisian drainage catheter into perirenal fluid collection,  yielding 90 mL of purulent fluid.    Plan:    Leave drain in place until less than 10 cc of fluid is aspirated daily for 2 days.    ______________________________________________________________________    Electronically signed by: Graeme Sargent  Date:    12/01/2024  Time:    13:53       Pending Diagnostic Studies:       Procedure Component Value Units Date/Time    Copper, serum [[id number]] Collected: 11/30/24 0918    Order Status: Sent Lab Status: In process Updated: 11/30/24 0936    Specimen: Blood     Zinc [[id number]] Collected: 11/30/24 0918    Order Status: Sent Lab Status: In process Updated: 11/30/24 0936    Specimen: Blood            Medications:  {DISCHARGE MEDS OHS:60521}    Indwelling Lines/Drains at time of discharge:   Lines/Drains/Airways       Drain  Duration                  Closed/Suction Drain 12/01/24 1219 Right Back Bulb 14 Fr. 2 days                   40 minutes of time spent on discharge, including examining the patient, providing discharge instructions, arranging   follow up and documentation        Gilmar Cid MD  Attending Staff Physician  [facility]  pager- [phone]

## 2024-12-04 NOTE — SUBJECTIVE & OBJECTIVE
Interval History: No acute events overnight. Hemodynamically stable. IR drain placed in right perinephric on 12/1. He has been afebrile for over 24 hours.  It continues to have purulent drainage. The patient reports pain at entry site.       Objective:     Temp:  [97.6 °F (36.4 °C)-98.3 °F (36.8 °C)] 98 °F (36.7 °C)  Pulse:  [69-94] 94  Resp:  [17-18] 18  SpO2:  [91 %-100 %] 100 %  BP: (109-133)/(60-70) 122/60     Body mass index is 21.29 kg/m².    Date 12/04/24 0700 - 12/05/24 0659   Shift 8395-0858 1788-1261 9457-0624 24 Hour Total   INTAKE   P.O. 225   225   Shift Total(mL/kg) 225(3.5)   225(3.5)   OUTPUT   Urine(mL/kg/hr) 280(0.6)   280   Drains 68   68   Shift Total(mL/kg) 348(5.5)   348(5.5)   Weight (kg) 63.5 63.5 63.5 63.5          Drains       Drain  Duration                  Urethral Catheter 11/30/24 1035 2 days         Closed/Suction Drain 12/01/24 1219 Right Back Bulb 14 Fr. 1 day                     Physical Exam  Eyes:      Pupils: Pupils are equal, round, and reactive to light.   Cardiovascular:      Rate and Rhythm: Normal rate.   Pulmonary:      Effort: Pulmonary effort is normal.   Abdominal:      General: Abdomen is flat.      Comments: Drain with red purulent output    Musculoskeletal:      Cervical back: Normal range of motion.   Skin:     General: Skin is warm.   Neurological:      Mental Status: He is alert.           Significant Labs:    BMP:  Recent Labs   Lab 12/02/24  0550 12/03/24  0224 12/04/24  0243   * 135* 137   K 3.3* 3.6 3.9    107 106   CO2 21* 21* 23   BUN 13 14 12   CREATININE 1.3 1.0 1.1   CALCIUM 9.3 9.1 9.7       CBC:   Recent Labs   Lab 12/02/24  0011 12/03/24  1058 12/04/24  0243   WBC 19.84* 14.50* 14.46*   HGB 7.8* 8.6* 8.3*   HCT 23.9* 27.4* 27.0*   * 643* 629*       All pertinent labs results from the past 24 hours have been reviewed.    Significant Imaging:  All pertinent imaging results/findings from the past 24 hours have been  reviewed.                Review of Systems

## 2024-12-05 ENCOUNTER — EPISODE CHANGES (OUTPATIENT)
Dept: CARDIOLOGY | Facility: CLINIC | Age: 69
End: 2024-12-05

## 2024-12-05 PROBLEM — E43 SEVERE PROTEIN-CALORIE MALNUTRITION: Chronic | Status: ACTIVE | Noted: 2024-12-01

## 2024-12-05 LAB
ALBUMIN SERPL BCP-MCNC: 1.8 G/DL (ref 3.5–5.2)
ALP SERPL-CCNC: 186 U/L (ref 40–150)
ALT SERPL W/O P-5'-P-CCNC: 27 U/L (ref 10–44)
ANION GAP SERPL CALC-SCNC: 7 MMOL/L (ref 8–16)
AST SERPL-CCNC: 51 U/L (ref 10–40)
BASOPHILS # BLD AUTO: 0.03 K/UL (ref 0–0.2)
BASOPHILS NFR BLD: 0.2 % (ref 0–1.9)
BILIRUB SERPL-MCNC: 0.4 MG/DL (ref 0.1–1)
BUN SERPL-MCNC: 13 MG/DL (ref 8–23)
CALCIUM SERPL-MCNC: 9.5 MG/DL (ref 8.7–10.5)
CHLORIDE SERPL-SCNC: 107 MMOL/L (ref 95–110)
CO2 SERPL-SCNC: 23 MMOL/L (ref 23–29)
CREAT SERPL-MCNC: 1 MG/DL (ref 0.5–1.4)
DIFFERENTIAL METHOD BLD: ABNORMAL
EOSINOPHIL # BLD AUTO: 0.3 K/UL (ref 0–0.5)
EOSINOPHIL NFR BLD: 2.3 % (ref 0–8)
ERYTHROCYTE [DISTWIDTH] IN BLOOD BY AUTOMATED COUNT: 18.9 % (ref 11.5–14.5)
EST. GFR  (NO RACE VARIABLE): >60 ML/MIN/1.73 M^2
GLUCOSE SERPL-MCNC: 104 MG/DL (ref 70–110)
HCT VFR BLD AUTO: 25.9 % (ref 40–54)
HGB BLD-MCNC: 8.2 G/DL (ref 14–18)
IMM GRANULOCYTES # BLD AUTO: 0.07 K/UL (ref 0–0.04)
IMM GRANULOCYTES NFR BLD AUTO: 0.5 % (ref 0–0.5)
LYMPHOCYTES # BLD AUTO: 1 K/UL (ref 1–4.8)
LYMPHOCYTES NFR BLD: 8.2 % (ref 18–48)
MAGNESIUM SERPL-MCNC: 2.1 MG/DL (ref 1.6–2.6)
MCH RBC QN AUTO: 25.6 PG (ref 27–31)
MCHC RBC AUTO-ENTMCNC: 31.7 G/DL (ref 32–36)
MCV RBC AUTO: 81 FL (ref 82–98)
MONOCYTES # BLD AUTO: 0.9 K/UL (ref 0.3–1)
MONOCYTES NFR BLD: 7.2 % (ref 4–15)
NEUTROPHILS # BLD AUTO: 10.4 K/UL (ref 1.8–7.7)
NEUTROPHILS NFR BLD: 81.6 % (ref 38–73)
NRBC BLD-RTO: 0 /100 WBC
PHOSPHATE SERPL-MCNC: 3.6 MG/DL (ref 2.7–4.5)
PLATELET # BLD AUTO: 626 K/UL (ref 150–450)
PMV BLD AUTO: 8.5 FL (ref 9.2–12.9)
POCT GLUCOSE: 103 MG/DL (ref 70–110)
POCT GLUCOSE: 112 MG/DL (ref 70–110)
POCT GLUCOSE: 99 MG/DL (ref 70–110)
POTASSIUM SERPL-SCNC: 3.7 MMOL/L (ref 3.5–5.1)
PROT SERPL-MCNC: 7.7 G/DL (ref 6–8.4)
RBC # BLD AUTO: 3.2 M/UL (ref 4.6–6.2)
SODIUM SERPL-SCNC: 137 MMOL/L (ref 136–145)
WBC # BLD AUTO: 12.75 K/UL (ref 3.9–12.7)

## 2024-12-05 PROCEDURE — 21400001 HC TELEMETRY ROOM

## 2024-12-05 PROCEDURE — 63600175 PHARM REV CODE 636 W HCPCS: Performed by: HOSPITALIST

## 2024-12-05 PROCEDURE — 97535 SELF CARE MNGMENT TRAINING: CPT

## 2024-12-05 PROCEDURE — 36415 COLL VENOUS BLD VENIPUNCTURE: CPT | Performed by: HOSPITALIST

## 2024-12-05 PROCEDURE — 80053 COMPREHEN METABOLIC PANEL: CPT | Performed by: HOSPITALIST

## 2024-12-05 PROCEDURE — 99223 1ST HOSP IP/OBS HIGH 75: CPT | Mod: GC,,, | Performed by: STUDENT IN AN ORGANIZED HEALTH CARE EDUCATION/TRAINING PROGRAM

## 2024-12-05 PROCEDURE — 85025 COMPLETE CBC W/AUTO DIFF WBC: CPT | Performed by: HOSPITALIST

## 2024-12-05 PROCEDURE — 25000003 PHARM REV CODE 250: Performed by: HOSPITALIST

## 2024-12-05 PROCEDURE — 25000003 PHARM REV CODE 250: Performed by: STUDENT IN AN ORGANIZED HEALTH CARE EDUCATION/TRAINING PROGRAM

## 2024-12-05 PROCEDURE — 97110 THERAPEUTIC EXERCISES: CPT

## 2024-12-05 PROCEDURE — 84100 ASSAY OF PHOSPHORUS: CPT | Performed by: HOSPITALIST

## 2024-12-05 PROCEDURE — 83735 ASSAY OF MAGNESIUM: CPT | Performed by: HOSPITALIST

## 2024-12-05 RX ADMIN — PANTOPRAZOLE SODIUM 40 MG: 40 INJECTION, POWDER, LYOPHILIZED, FOR SOLUTION INTRAVENOUS at 08:12

## 2024-12-05 RX ADMIN — Medication 300 ML: at 02:12

## 2024-12-05 RX ADMIN — MUPIROCIN: 20 OINTMENT TOPICAL at 08:12

## 2024-12-05 RX ADMIN — PANTOPRAZOLE SODIUM 40 MG: 40 INJECTION, POWDER, LYOPHILIZED, FOR SOLUTION INTRAVENOUS at 09:12

## 2024-12-05 RX ADMIN — AMOXICILLIN AND CLAVULANATE POTASSIUM 1 TABLET: 875; 125 TABLET, FILM COATED ORAL at 09:12

## 2024-12-05 RX ADMIN — Medication 300 ML: at 06:12

## 2024-12-05 RX ADMIN — Medication 324 MG: at 04:12

## 2024-12-05 RX ADMIN — Medication 300 ML: at 10:12

## 2024-12-05 RX ADMIN — Medication 324 MG: at 08:12

## 2024-12-05 RX ADMIN — ATORVASTATIN CALCIUM 80 MG: 40 TABLET, FILM COATED ORAL at 08:12

## 2024-12-05 RX ADMIN — AMOXICILLIN AND CLAVULANATE POTASSIUM 1 TABLET: 875; 125 TABLET, FILM COATED ORAL at 08:12

## 2024-12-05 NOTE — PLAN OF CARE
Problem: Electrolyte Imbalance  Goal: Electrolyte Balance  Outcome: Progressing     Problem: Pain Acute  Goal: Optimal Pain Control and Function  Outcome: Progressing     Problem: Infection  Goal: Absence of Infection Signs and Symptoms  Outcome: Progressing       Pt didn't have a bowel movement on tonight. Slept comfortable throughout the night. MARIEL drain right lower back with some swelling c/o a little pain to site. Declined offered pain medications. Informed of GI consult. Informed of H&H on last night. Plan of care continued. Wife remained at bedside. No concerns voiced.

## 2024-12-05 NOTE — CONSULTS
[facility]  Gastroenterology  Consult Note    Patient Name: Felipe Jiménez  MRN: [id number]  Admission Date: 11/29/2024  Hospital Length of Stay: 5 days  Code Status: Full Code   Attending Provider: Aren Granger DO   Consulting Provider: Nancy Jones MD  Primary Care Physician: No primary care provider on file.  Principal Problem:Perinephric abscess    Inpatient consult to Gastroenterology  Consult performed by: Nancy Jones MD  Consult ordered by: Gilmar Cid MD        Subjective:     HPI:  Felipe Jiménez is a 68 y.o. male Iron deficiency anemia, hyperlipidemia who presented with dizziness, generalized weakness and shortness of breath with exertion for the past 6 months. On CT abdomen 10/16, noted to have moderate hydronephrosis consistent with pyleonephritis with have multiple right intrarenal stones. S/p R ureteral stent placement with urology on 11/30. Then noted to have perinephric abscess on Post-op CT. He is s/p drain placement with IR on 12/1. Currently on Augmentin to complete a total of 14 day course from the day of drain placement with IR due to source control. GI consulted for reported melena.    Today, he reports having 3 episodes of dark, tarry stools over the past few days. He says that on Tuesday 12/3, he had 2 large watery BMs that were dark black and green in color. He reports having another small dark BM last night. He reports feeling constipated. He denies any abdominal pain, nausea or vomiting. He says he has previously had a few episodes of dark stools prior to this admission and had one episode with bright red blood in the past. He does report taking Aspirin daily at home. He denies any prior abdominal surgeries. He denies any family history of gastrointestinal cancers. He was started on oral ferrous gluconate on 11/30. He is unclear if he has taken iron supplementation in the past.     He was previously followed by GI for LENORA and underwent recent colonoscopy, upper GI as  well as video capsule endoscopy. Results below:  - VCE 10/22/24: Normal small bowel with no findings to explain anemia. No further GI endoscopy at this time unless overt bleeding occurs.   - Upper GI endoscopy 10/2/24: Normal esophagus, small hiatal hernia, gastritis - biopsied. Path: Antral mucosal with marked erosive inactive chronic inflammation and regenratie epithelial changes No H pylori organisms seen.   - Colonoscopy 10/2/24: non-bleeding internal hemorrhoids. One 3 mm polyp in descending colon removed. The rectum, sigmoid colon, transverse ascending colon, cecum, ileocecal valve and appendiceal orifice are normal.      Past Medical History:   Diagnosis Date    Hyperlipidemia     Male erectile dysfunction, unspecified        Past Surgical History:   Procedure Laterality Date    COLONOSCOPY N/A 10/2/2024    Procedure: COLONOSCOPY;  Surgeon: Brennan Balderrama MD;  Location: [facility];  Service: Endoscopy;  Laterality: N/A;    CYSTOSCOPY W/ URETERAL STENT PLACEMENT Right 11/30/2024    Procedure: CYSTOSCOPY, WITH URETERAL STENT INSERTION;  Surgeon: Dain Eugene MD;  Location: [facility] OR [address] [place] [address];  Service: Urology;  Laterality: Right;    DILATION OF URETHRA N/A 11/30/2024    Procedure: DILATION, URETHRA;  Surgeon: Dain Eugene MD;  Location: [facility] OR [address] [place] [address];  Service: Urology;  Laterality: N/A;    ESOPHAGOGASTRODUODENOSCOPY N/A 10/2/2024    Procedure: EGD (ESOPHAGOGASTRODUODENOSCOPY);  Surgeon: Brennan Balderrama MD;  Location: [facility];  Service: Endoscopy;  Laterality: N/A;    FRACTURE SURGERY      INTRALUMINAL GASTROINTESTINAL TRACT IMAGING VIA CAPSULE N/A 10/22/2024    Procedure: IMAGING PROCEDURE, GI TRACT, INTRALUMINAL, VIA CAPSULE;  Surgeon: Brennan Balderrama MD;  Location: [facility];  Service: Endoscopy;  Laterality: N/A;    LIPOMA RESECTION Left 1979    left knee    QUADRICEPS TENDON REPAIR Left 2012       Review of patient's allergies indicates:  No Known Allergies  Family History        Problem Relation (Age of Onset)    Heart attacks under age 50 Mother (43), Father (38)    Heart disease Mother, Father          Tobacco Use    Smoking status: Former     Current packs/day: 0.00     Average packs/day: 1 pack/day for 20.0 years (20.0 ttl pk-yrs)     Types: Cigarettes     Start date: 1987     Quit date: 2007     Years since quittin.9    Smokeless tobacco: Never   Substance and Sexual Activity    Alcohol use: Yes     Comment: Infrequently    Drug use: Not Currently    Sexual activity: Yes     Partners: Female     Review of Systems   Respiratory:  Negative for shortness of breath.    Gastrointestinal:  Positive for blood in stool (dark tarry stool) and constipation. Negative for abdominal pain, nausea and vomiting.   Neurological:  Negative for dizziness, weakness and light-headedness.     Objective:     Vital Signs (Most Recent):  Temp: 98 °F (36.7 °C) (24 0738)  Pulse: 73 (24 0738)  Resp: 18 (24 0425)  BP: 113/69 (24 0738)  SpO2: 96 % (24 0738) Vital Signs (24h Range):  Temp:  [98 °F (36.7 °C)-98.3 °F (36.8 °C)] 98 °F (36.7 °C)  Pulse:  [73-99] 73  Resp:  [18] 18  SpO2:  [91 %-100 %] 96 %  BP: (113-144)/(60-72) 113/69     Weight: 63.5 kg (140 lb) (24 1421)  Body mass index is 21.29 kg/m².      Intake/Output Summary (Last 24 hours) at 2024 0823  Last data filed at 2024 0603  Gross per 24 hour   Intake 2030 ml   Output 1160 ml   Net 870 ml       Lines/Drains/Airways       Drain  Duration                  Closed/Suction Drain 24 1219 Right Back Bulb 14 Fr. 3 days              Peripheral Intravenous Line  Duration                  Peripheral IV - Single Lumen 24 1200 20 G Posterior;Right Forearm 3 days                     Physical Exam  Constitutional:       General: He is not in acute distress.     Appearance: He is not ill-appearing or diaphoretic.   HENT:      Head: Normocephalic and atraumatic.      Nose: Nose normal.       Mouth/Throat:      Mouth: Mucous membranes are moist.   Eyes:      Extraocular Movements: Extraocular movements intact.   Cardiovascular:      Rate and Rhythm: Normal rate and regular rhythm.      Heart sounds: No murmur heard.  Pulmonary:      Effort: Pulmonary effort is normal. No respiratory distress.      Breath sounds: No wheezing or rhonchi.   Abdominal:      General: Abdomen is flat. There is no distension.      Tenderness: There is no abdominal tenderness.      Comments: Right lower back drain in place with serosanguinous fluid output. Tender near drain site.    Musculoskeletal:         General: Normal range of motion.      Cervical back: Normal range of motion.      Right lower leg: No edema.      Left lower leg: No edema.   Skin:     General: Skin is warm.      Capillary Refill: Capillary refill takes less than 2 seconds.      Coloration: Skin is not jaundiced.   Neurological:      General: No focal deficit present.      Mental Status: He is alert.          Significant Labs:  CBC:   Recent Labs   Lab 12/04/24  0243 12/04/24  1917 12/05/24  0508   WBC 14.46* 15.11* 12.75*   HGB 8.3* 8.5* 8.2*   HCT 27.0* 25.9* 25.9*   * 637* 626*     All pertinent lab results from the last 24 hours have been reviewed.    Significant Imaging:  Imaging results within the past 24 hours have been reviewed.  Assessment/Plan:     GI  Melena  Recent upper GI, colonoscopy and VCE in October. Hgb fairly stable around 8 over past few days, but has been downtrending overall since June. However, he was started on oral iron on 11/30, which could also cause dark stools.     - Continue to monitor for any signs of GI bleeding  - Monitor Hgb q12hrs  - Transfuse to keep Hgb >7, plts >50  - If becomes hemodynamically unstable with associated large volume hematochezia, recommend STAT CTA and IR embolization if positive.        Thank you for your consult. I will sign off. Please contact us if you have any additional questions.    Nancy  MD Robert  Gastroenterology  [facility]

## 2024-12-05 NOTE — PLAN OF CARE
Continue to monitor output from MARIEL drain, documented in flow sheets. Pt expressed concerns about the continued swelling and pain on and around MARIEL site. Primary attending and Urology team were made aware of concerns. Pt also reported at end of shift that he was having small dark tarry mucous like stool. Picture was taken and provider made aware of that as well. Otherwise no other major concerns during day shift.   Problem: Pain Acute  Goal: Optimal Pain Control and Function  Outcome: Progressing  Intervention: Develop Pain Management Plan  Flowsheets (Taken 12/4/2024 1900)  Pain Management Interventions:   care clustered   diversional activity provided   medication offered but refused   pain management plan reviewed with patient/caregiver   pillow support provided   position adjusted   quiet environment facilitated   relaxation techniques promoted  Intervention: Prevent or Manage Pain  Flowsheets (Taken 12/4/2024 1900)  Sleep/Rest Enhancement:   noise level reduced   regular sleep/rest pattern promoted   relaxation techniques promoted  Sensory Stimulation Regulation:   care clustered   quiet environment promoted  Bowel Elimination Promotion:   adequate fluid intake promoted   ambulation promoted   privacy promoted  Medication Review/Management: medications reviewed  Intervention: Optimize Psychosocial Wellbeing  Flowsheets (Taken 12/4/2024 1900)  Supportive Measures:   active listening utilized   positive reinforcement provided   relaxation techniques promoted   self-care encouraged   goal-setting facilitated   verbalization of feelings encouraged  Diversional Activities:   smartphone   television     Problem: Electrolyte Imbalance  Goal: Electrolyte Balance  Outcome: Progressing  Intervention: Monitor and Manage Electrolyte Imbalance  Flowsheets (Taken 12/4/2024 1900)  Fluid/Electrolyte Management: oral rehydration therapy initiated

## 2024-12-05 NOTE — ASSESSMENT & PLAN NOTE
Recent upper GI, colonoscopy and VCE in October. Hgb fairly stable around 8 over past few days, but has been downtrending overall since June. However, he was started on oral iron on 11/30, which could also cause dark stools.     - Continue to monitor for any signs of GI bleeding  - Monitor Hgb q12hrs  - Transfuse to keep Hgb >7, plts >50  - If becomes hemodynamically unstable with associated large volume hematochezia, recommend STAT CTA and IR embolization if positive.

## 2024-12-05 NOTE — PROGRESS NOTES
Magdaleno juan [facility] Medicine  Progress Note    Patient Name: Felipe Jiménez  MRN: [id number]  Patient Class: IP- Inpatient   Admission Date: 11/29/2024  Length of Stay: 5 days  Attending Physician: Aren Granger DO  Primary Care Provider: No primary care provider on file.        Subjective     Principal Problem:Perinephric abscess        HPI:  Felipe Moffett is a 67 Yo male with PMH of Iron deficiency anemia, hyperlipidemia presents ED with dizziness, generalized weakness and shortness of breath with exertion for the past 6 months.     AAOX3. c/o generalized weakness and shortness of breath with exertion for the past 6 months - progressively worsening.  reports he had  2 falls recently due to weakness.  denies loss of consciousness.   Followed by GI for LENORA and underwent recent colonoscopy, upper GI as well as video capsule endoscopy.  Reports occasional bright red blood when he wipes but otherwise denies any other abnormal bleeding.  50 lb weight loss over the past year,  initially on purpose as he was over 200 lb but mentions he had unintentional weight loss as well.   had iron-deficiency anemia since childhood was previously on iron supplementation which he discontinued a year ago in favor of dietary changes. Denies any leg swelling. currently not on any anticoagulation. reports   occasional bright red blood when wiping but denies any melena.  on ASA 81 mg daily. admits taking tylenol and meloxicam intermittently for arthritis     EGD 10/2/24                           Normal esophagus.                          - Z-line regular, 40 cm from the incisors.                          - Small hiatal hernia.                          - Gastritis. Biopsied.                          - Normal examined duodenum. Biopsied   Colonoscopy 10/2/24   Non-bleeding internal hemorrhoids.                          - One 3 mm polyp in the descending colon, removed                          with a cold biopsy forceps. Resected  and retrieved.                          - The rectum, sigmoid colon, transverse colon,                          ascending colon, cecum, ileocecal valve and                          appendiceal orifice are normal.                          - The examined portion of the ileum was normal    VCE 10/22 Normal small bowel with no findings to explain anemia. No further GI endoscopy  recommended unless  overt bleeding occurs.     ER course - .  Hemoglobin is 7.0. Transfusion with  1 unit PRBC ordered . leukocytosis of 18 today but denies any infectious symptoms. UA + . UC pending.     Overview/Hospital Course:  Admitted with weakness/dizziness and after a recent fall. Workup including CTH negative. Workup was significant for Hb 7.2, s/p pRBC transfusion. Hematology consulted, recommended IV iron/treatment for LENORA. CT A/P was obtained which noted - Enlarged edematous right kidney with perinephric inflammatory changes and moderate hydronephrosis consistent with pyelonephritis.. Multiple right intrarenal stones including 3 stones in the pelvis measuring 1.1 cm, 8 mm and 5 mm an 1 cm stone in the upper pole calyx and several other smaller stones. Status post R sided ureteral stent by urology on 11/30. Complicated by perinephric abscess which was visualized on repeat CT. Started on broad spectrum abx. IR consulted, s/p perirenal drain placement on 12/1 with plan for 14 days Augmentin from day of drain placement. CT scan on outpatient basis to assess for abscess resolution. Urology follow up for drain removal, or during admission if drain output minimal. Episodes of dark stool reported, GI consulted, likely in the setting of iron supplementation. In addition, he has had an extensive recent endoscopic eval including an EGD, Colonoscopy and VCE.             Interval History: Seen this AM at bedside.  No acute events reported overnight. Endorsing discomfort near drain. Otherwise, denies any fevers, chills, abdominal pain    Review of  Systems  Objective:     Vital Signs (Most Recent):  Temp: 98 °F (36.7 °C) (12/05/24 0738)  Pulse: 74 (12/05/24 1043)  Resp: 18 (12/05/24 0425)  BP: 113/69 (12/05/24 0738)  SpO2: 96 % (12/05/24 0738) Vital Signs (24h Range):  Temp:  [98 °F (36.7 °C)-98.3 °F (36.8 °C)] 98 °F (36.7 °C)  Pulse:  [73-99] 74  Resp:  [18] 18  SpO2:  [91 %-100 %] 96 %  BP: (113-144)/(60-72) 113/69     Weight: 63.5 kg (140 lb)  Body mass index is 21.29 kg/m².    Intake/Output Summary (Last 24 hours) at 12/5/2024 1140  Last data filed at 12/5/2024 0800  Gross per 24 hour   Intake 1805 ml   Output 880 ml   Net 925 ml         Physical Exam  Vitals reviewed.   Constitutional:       General: He is not in acute distress.     Appearance: Normal appearance. He is not ill-appearing.   HENT:      Head: Normocephalic and atraumatic.   Eyes:      Extraocular Movements: Extraocular movements intact.      Conjunctiva/sclera: Conjunctivae normal.   Cardiovascular:      Rate and Rhythm: Normal rate and regular rhythm.      Heart sounds: No murmur heard.  Pulmonary:      Effort: Pulmonary effort is normal. No respiratory distress.      Breath sounds: Normal breath sounds.   Abdominal:      General: Abdomen is flat. Bowel sounds are normal.      Palpations: Abdomen is soft.      Tenderness: There is no abdominal tenderness.      Comments: Right MARIEL drain noted with bloody/purulent drainage    Musculoskeletal:      Cervical back: Normal range of motion.   Skin:     General: Skin is warm and dry.   Neurological:      General: No focal deficit present.      Mental Status: He is alert and oriented to person, place, and time.   Psychiatric:         Mood and Affect: Mood normal.         Behavior: Behavior normal.         Thought Content: Thought content normal.             Significant Labs: All pertinent labs within the past 24 hours have been reviewed.    Significant Imaging: I have reviewed all pertinent imaging results/findings within the past 24  hours.    Assessment and Plan     * Perinephric abscess  11/30 CT abdomen -. Enlarging mixed cystic and solid appearance of inferior pole of the right kidney with invasion into the right psoas muscle, concerning for developing abscess in setting of pyelonephritis. Incompletely characterized without IV contrast. repeat CT Abdomen with IV contrast -enlarged right kidney with multiple hypodense parenchymal collections, largest collection extending inferiorly and posteriorly with involvement of the right psoas muscle/posterior chest wall. 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 . IR evaluation.  started on IV Zosyn and vancomycin  Plan:  -s/p perirenal drain placement by IR  -Transitioned  from Unasyn to Augmentin to complete a total of 14 day course from the day of drain placement with IR due to source control. needs CT scan on outpatient basis to assess for abscess resolution. Discharge with ID and Urology follow up   -contact Urology before discharge to assess if drain can be removed   -contacted RN for accurate output measurements       Iron deficiency anemia    Patient's with microcytic anemia.. Hemoglobin downtrending. Etiology likely due to Iron deficiency.  Current CBC reviewed-    Recent Labs   Lab 12/02/24  0011 12/03/24  1058 12/04/24  0243   HGB 7.8* 8.6* 8.3*         Component Value Date/Time    MCV 82 12/04/2024 0243    RDW 18.7 (H) 12/04/2024 0243    IRON 13 (L) 11/30/2024 0918    FERRITIN 3,596 (H) 11/30/2024 0918    RETIC 0.9 11/30/2024 0918    FOLATE 12.6 11/30/2024 0918    [id number] 1087 (H) 11/30/2024 0918     Monitor CBC and transfuse if H/H drops below 7/21.        EGD 10/2/24                           Normal esophagus.                          - Z-line regular, 40 cm from the incisors.                          - Small hiatal hernia.                          - Gastritis. Biopsied.                           - Normal examined duodenum. Biopsied   Colonoscopy 10/2/24   Non-bleeding internal hemorrhoids.                          - One 3 mm polyp in the descending colon, removed                          with a cold biopsy forceps. Resected and retrieved.                          - The rectum, sigmoid colon, transverse colon,                          ascending colon, cecum, ileocecal valve and                          appendiceal orifice are normal.                          - The examined portion of the ileum was normal    VCE 10/22 Normal small bowel with no findings to explain anemia. No further GI endoscopy  recommended unless  overt bleeding occurs.     symptomatic anemia -  Hemoglobin is 7.0. Transfusion with  1 unit PRBC ordered . GI consulted   11/30 Hb 7.6  s/p PRBC transfusion . hematology evaluation. started on ferrous gluconate and Feraheme IV x 2 doses. needs hematolgoy f/u outpatient   12/1Hb 6.9. Transfusion with 1 unit of PRBC     Continue iron supplementation    Melena  12/4 1 episode of melena this PM. protonix 40mg IV q 12h. GI consulted          Severe protein-calorie malnutrition  Nutrition consulted. Most recent weight and BMI monitored-     Measurements:  Wt Readings from Last 1 Encounters:   11/30/24 63.5 kg (140 lb)   Body mass index is 21.29 kg/m².    Patient has been screened and assessed by RD.    Malnutrition Type:  Context: chronic illness  Level: severe    Malnutrition Characteristic Summary:  Weight Loss (Malnutrition): greater than 7.5% in 3 months (-8.4% x 3 months)  Energy Intake (Malnutrition): less than or equal to 75% for greater than or equal to 1 month  Subcutaneous Fat (Malnutrition):  (moderate to severe)  Muscle Mass (Malnutrition):  (moderate to severe)    Interventions/Recommendations (treatment strategy):  1.)      Urethral stricture  as above       Hydronephrosis of right kidney  CT abdomen 10/16 - Enlarged edematous right kidney with perinephric inflammatory changes  and moderate hydronephrosis consistent with pyelonephritis.. Multiple right intrarenal stones including 3 stones in the pelvis measuring 1.1 cm, 8 mm and 5 mm an 1 cm stone in the upper pole calyx and several other smaller stones. Echo pending . repeat CT abdomen.  Urology eval - right ureteral stent placement today      Nephrolithiasis  11/30 CT abdomen 10/16 - Enlarged edematous right kidney with perinephric inflammatory changes and moderate hydronephrosis consistent with pyelonephritis.. Multiple right intrarenal stones including 3 stones in the pelvis measuring 1.1 cm, 8 mm and 5 mm an 1 cm stone in the upper pole calyx and several other smaller stones. Echo pending . repeat CT abdomen.  Urology eval -  Urology eval - s/p Membranous urethral stricture passively dilated with scope, patient had  right ureteral stent placement and Katz placement       Weight loss  Nutrition consulted. Most recent weight and BMI monitored-     Measurements:  Wt Readings from Last 1 Encounters:   11/30/24 63.5 kg (140 lb)   Body mass index is 21.29 kg/m².    Patient has been screened and assessed by RD.    Malnutrition Type:  Context:    Level:      Malnutrition Characteristic Summary:       Interventions/Recommendations (treatment strategy):       11/30 GI no plan for further work up unless overt bleed. Hematology consulted for recurrent iron deficiency anemia/ s/p GI work up and weight loss.      Fever  11/30 Leucocytosis trended down to 14. fever of 101F. started on ceftriaxone.   CT abdomen 10/16 - Enlarged edematous right kidney with perinephric inflammatory changes and moderate hydronephrosis consistent with pyelonephritis.. Multiple right intrarenal stones including 3 stones in the pelvis measuring 1.1 cm, 8 mm and 5 mm an 1 cm stone in the upper pole calyx and several other smaller stones. Echo pending . repeat CT abdomen. Urology consulted for  right hydronephrosis/ fever   12/1BC x2 and UC NGTD.  12/2 fever of 101F  overnight. cultures sent from IR drain pending. drain output 130ml.     Falls frequently  secondary to above. fall precautions. PT/OT consulted   reported 2 falls in the last week. one with forehead trauma. CT head wo contrast ordered   11/30  CT head - No acute intracranial pathology.  Specifically no evidence of intracranial hemorrhage or major vascular distribution infarct. Mild generalized cerebral volume loss     Dizziness  likely from symptomatic anemia. orthostasis + by pulse. encourage oral hydration. monitor after PRBC. echo ordered . telemetry r/o arrhythmia     11/30 orthostatic by pulse. received LR       Positive D dimer  D dimer elevated at 2.07 . DVT sonogram- No imaging evidence of deep venous thrombosis in either lower extremity.       UTI (urinary tract infection)  UA + . UC pending.   12/1  BC x2 and UC NGTD.   12/4 ID f/u - Discontinued Vancomycin as cultures remain negative. Transitioned  from Unasyn to Augmentin to complete a total of 14 day course from the day of drain placement with IR due to source control. needs CT scan on outpatient basis to assess for abscess resolution . drain output 75ml. discharge with ID and Urology follow up        Leucocytosis    Patient with leucocytosis   Recent Labs   Lab 12/04/24  0243 12/04/24  1917 12/05/24  0508   WBC 14.46* 15.11* 12.75*     . Afebrile. BCX 2, urine culture pending . likely secondary to sepsis.?    11/30 Leucocytosis trended down to 14. fever of 101F. started on ceftriaxone. Echo pending  12/1 BC x2 and UC NGTD.     Hypercholesterolemia  continue crestor         VTE Risk Mitigation (From admission, onward)           Ordered     IP VTE HIGH RISK PATIENT  Once         11/29/24 1638     Place sequential compression device  Until discontinued         11/29/24 1638                    Discharge Planning   LYNN: 12/6/2024     Code Status: Full Code   Medical Readiness for Discharge Date:   Discharge Plan A: Home Health                Please place  Justification for DME        Aren Granger DO  Department of Hospital Medicine   [facility]

## 2024-12-05 NOTE — SUBJECTIVE & OBJECTIVE
Past Medical History:   Diagnosis Date    Hyperlipidemia     Male erectile dysfunction, unspecified        Past Surgical History:   Procedure Laterality Date    COLONOSCOPY N/A 10/2/2024    Procedure: COLONOSCOPY;  Surgeon: Brennan Balderrama MD;  Location: [facility];  Service: Endoscopy;  Laterality: N/A;    CYSTOSCOPY W/ URETERAL STENT PLACEMENT Right 2024    Procedure: CYSTOSCOPY, WITH URETERAL STENT INSERTION;  Surgeon: Dain Eugene MD;  [facility];  Service: Urology;  Laterality: Right;    DILATION OF URETHRA N/A 2024    Procedure: DILATION, URETHRA;  Surgeon: Dain Eugene MD;  Location: [facility];  Service: Urology;  Laterality: N/A;    ESOPHAGOGASTRODUODENOSCOPY N/A 10/2/2024    Procedure: EGD (ESOPHAGOGASTRODUODENOSCOPY);  Surgeon: Brennan Balderrama MD;  Location: [facility];  Service: Endoscopy;  Laterality: N/A;    FRACTURE SURGERY      INTRALUMINAL GASTROINTESTINAL TRACT IMAGING VIA CAPSULE N/A 10/22/2024    Procedure: IMAGING PROCEDURE, GI TRACT, INTRALUMINAL, VIA CAPSULE;  Surgeon: Brennan Balderrama MD;  Location: [facility];  Service: Endoscopy;  Laterality: N/A;    LIPOMA RESECTION Left     left knee    QUADRICEPS TENDON REPAIR Left        Review of patient's allergies indicates:  No Known Allergies  Family History       Problem Relation (Age of Onset)    Heart attacks under age 50 Mother (43), Father (38)    Heart disease Mother, Father          Tobacco Use    Smoking status: Former     Current packs/day: 0.00     Average packs/day: 1 pack/day for 20.0 years (20.0 ttl pk-yrs)     Types: Cigarettes     Start date: 1987     Quit date: 2007     Years since quittin.9    Smokeless tobacco: Never   Substance and Sexual Activity    Alcohol use: Yes     Comment: Infrequently    Drug use: Not Currently    Sexual activity: Yes     Partners: Female     Review of Systems   Respiratory:  Negative for shortness of breath.    Gastrointestinal:   Positive for blood in stool (dark tarry stool) and constipation. Negative for abdominal pain, nausea and vomiting.   Neurological:  Negative for dizziness, weakness and light-headedness.     Objective:     Vital Signs (Most Recent):  Temp: 98 °F (36.7 °C) (12/05/24 0738)  Pulse: 73 (12/05/24 0738)  Resp: 18 (12/05/24 0425)  BP: 113/69 (12/05/24 0738)  SpO2: 96 % (12/05/24 0738) Vital Signs (24h Range):  Temp:  [98 °F (36.7 °C)-98.3 °F (36.8 °C)] 98 °F (36.7 °C)  Pulse:  [73-99] 73  Resp:  [18] 18  SpO2:  [91 %-100 %] 96 %  BP: (113-144)/(60-72) 113/69     Weight: 63.5 kg (140 lb) (11/30/24 1421)  Body mass index is 21.29 kg/m².      Intake/Output Summary (Last 24 hours) at 12/5/2024 0823  Last data filed at 12/5/2024 0603  Gross per 24 hour   Intake 2030 ml   Output 1160 ml   Net 870 ml       Lines/Drains/Airways       Drain  Duration                  Closed/Suction Drain 12/01/24 1219 Right Back Bulb 14 Fr. 3 days              Peripheral Intravenous Line  Duration                  Peripheral IV - Single Lumen 12/01/24 1200 20 G Posterior;Right Forearm 3 days                     Physical Exam  Constitutional:       General: He is not in acute distress.     Appearance: He is not ill-appearing or diaphoretic.   HENT:      Head: Normocephalic and atraumatic.      Nose: Nose normal.      Mouth/Throat:      Mouth: Mucous membranes are moist.   Eyes:      Extraocular Movements: Extraocular movements intact.   Cardiovascular:      Rate and Rhythm: Normal rate and regular rhythm.      Heart sounds: No murmur heard.  Pulmonary:      Effort: Pulmonary effort is normal. No respiratory distress.      Breath sounds: No wheezing or rhonchi.   Abdominal:      General: Abdomen is flat. There is no distension.      Tenderness: There is no abdominal tenderness.      Comments: Right lower back drain in place with serosanguinous fluid output. Tender near drain site.    Musculoskeletal:         General: Normal range of motion.       Cervical back: Normal range of motion.      Right lower leg: No edema.      Left lower leg: No edema.   Skin:     General: Skin is warm.      Capillary Refill: Capillary refill takes less than 2 seconds.      Coloration: Skin is not jaundiced.   Neurological:      General: No focal deficit present.      Mental Status: He is alert.          Significant Labs:  CBC:   Recent Labs   Lab 12/04/24  0243 12/04/24  1917 12/05/24  0508   WBC 14.46* 15.11* 12.75*   HGB 8.3* 8.5* 8.2*   HCT 27.0* 25.9* 25.9*   * 637* 626*     All pertinent lab results from the last 24 hours have been reviewed.    Significant Imaging:  Imaging results within the past 24 hours have been reviewed.

## 2024-12-05 NOTE — SUBJECTIVE & OBJECTIVE
Interval History: Seen this AM at bedside.  No acute events reported overnight. Endorsing discomfort near drain. Otherwise, denies any fevers, chills, abdominal pain    Review of Systems  Objective:     Vital Signs (Most Recent):  Temp: 98 °F (36.7 °C) (12/05/24 0738)  Pulse: 74 (12/05/24 1043)  Resp: 18 (12/05/24 0425)  BP: 113/69 (12/05/24 0738)  SpO2: 96 % (12/05/24 0738) Vital Signs (24h Range):  Temp:  [98 °F (36.7 °C)-98.3 °F (36.8 °C)] 98 °F (36.7 °C)  Pulse:  [73-99] 74  Resp:  [18] 18  SpO2:  [91 %-100 %] 96 %  BP: (113-144)/(60-72) 113/69     Weight: 63.5 kg (140 lb)  Body mass index is 21.29 kg/m².    Intake/Output Summary (Last 24 hours) at 12/5/2024 1140  Last data filed at 12/5/2024 0800  Gross per 24 hour   Intake 1805 ml   Output 880 ml   Net 925 ml         Physical Exam  Vitals reviewed.   Constitutional:       General: He is not in acute distress.     Appearance: Normal appearance. He is not ill-appearing.   HENT:      Head: Normocephalic and atraumatic.   Eyes:      Extraocular Movements: Extraocular movements intact.      Conjunctiva/sclera: Conjunctivae normal.   Cardiovascular:      Rate and Rhythm: Normal rate and regular rhythm.      Heart sounds: No murmur heard.  Pulmonary:      Effort: Pulmonary effort is normal. No respiratory distress.      Breath sounds: Normal breath sounds.   Abdominal:      General: Abdomen is flat. Bowel sounds are normal.      Palpations: Abdomen is soft.      Tenderness: There is no abdominal tenderness.      Comments: Right MARIEL drain noted with bloody/purulent drainage    Musculoskeletal:      Cervical back: Normal range of motion.   Skin:     General: Skin is warm and dry.   Neurological:      General: No focal deficit present.      Mental Status: He is alert and oriented to person, place, and time.   Psychiatric:         Mood and Affect: Mood normal.         Behavior: Behavior normal.         Thought Content: Thought content normal.             Significant Labs:  All pertinent labs within the past 24 hours have been reviewed.    Significant Imaging: I have reviewed all pertinent imaging results/findings within the past 24 hours.

## 2024-12-05 NOTE — PT/OT/SLP PROGRESS
"Occupational Therapy   Treatment    Name: Felipe Jiménez  MRN: [id number]  Admitting Diagnosis:  Perinephric abscess  5 Days Post-Op    Recommendations:     Discharge Recommendations: Low Intensity Therapy  Discharge Equipment Recommendations:  shower chair  Barriers to discharge:  None    Assessment:     Felipe Jiménez is a 68 y.o. male with a medical diagnosis of Perinephric abscess.  He presents with performance deficits affecting function are weakness, impaired endurance, impaired cardiopulmonary response to activity, impaired self care skills. Pt presents in bed, preparing to get up for morning ADL routine, agreeable to tx with OT. Pt ambulates >< sinkside for grooming, then engaged in core exercises EOB to build activity tolerance, balance, and functional reach and strength. He tolerates well, remains limited by low endurance, decreased self care skills, and general weakness/deconditioning. Given remaining deficits as compared to highly active baseline, pt is a good candidate for low intensity tx at the post acute level to address deficits impacting safety and IND with daily task in and around the home.      Rehab Prognosis:  Good; patient would benefit from acute skilled OT services to address these deficits and reach maximum level of function.       Plan:     Patient to be seen 3 x/week to address the above listed problems via self-care/home management, therapeutic activities, therapeutic exercises, neuromuscular re-education  Plan of Care Expires: 01/01/25  Plan of Care Reviewed with: patient, spouse    Subjective     Chief Complaint: "GI keeps asking for stuff, two people came and asked the same exact questions, and now I am stuck here til tomorrow".   Patient/Family Comments/goals: To go home  Pain/Comfort:  Pain Rating 1: 0/10  Pain Rating Post-Intervention 1: 0/10    Objective:     Communicated with: Hema prior to session.  Patient found HOB elevated with MARIEL drain, telemetry upon OT entry to room.    General " Precautions: Standard, fall    Orthopedic Precautions:N/A  Braces: N/A  Respiratory Status: Room air     Occupational Performance:     Bed Mobility:    Patient completed Scooting/Bridging with modified independence  Patient completed Supine to Sit with modified independence     Functional Mobility/Transfers:  Patient completed Sit <> Stand Transfer with supervision  with  no assistive device   Functional Mobility: Pt ambulates in room/restroom ~30ft in total with SUP, gait belt donned, no AD necessary. No lob, low endurance and small steps noted.     Activities of Daily Living:  Grooming: modified independence standing sinkside  Upper Body Dressing: setup to don gown to back before mobilizing      [facility] 6 Click ADL: 23    Treatment & Education:  Pt competes anterior, and posterior trunk excursions to facilitate control, balance, midline awareness, and core strength for greater IND and tolerance to seated necessary for OOB participation in Adls and engagement with environment.      Pt educated on purpose and performance of hollow body core: [id number] holds: 10s hold, 10s flutter kicks, and 10s scissor kicks, 10x each side oblique twists from seated edge of bed with trunk reclined to activate increased core musculature for improved bed mobility and functional balance.      -Education on energy conservation and task modification to maximize safety and (I) during ADLs and mobility  -Education on importance of OOB activity to improve overall activity tolerance and promote recovery    Pt had no further questions & when asked whether there were any concerns pt reported none.     Patient left sitting edge of bed with all lines intact, call button in reach, nsg notified, and spouse present    GOALS:   Multidisciplinary Problems       Occupational Therapy Goals          Problem: Occupational Therapy    Goal Priority Disciplines Outcome Interventions   Occupational Therapy Goal     OT, PT/OT Progressing    Description: Goals to  be met by: 01/01/2025     Patient will increase functional independence with ADLs by performing:    UE Dressing with [place].  LE Dressing with Modified [place].  Grooming while standing at sink with Modified [place].  Toileting from toilet with Modified [place] for hygiene and clothing management.   Supine to sit with Modified [place].  Step transfer with Modified [place]  Toilet transfer to toilet with Modified [place].                         Time Tracking:     OT Date of Treatment: 12/05/24  OT Start Time: 1157  OT Stop Time: 1223  OT Total Time (min): 26 min    Billable Minutes:Self Care/Home Management 11  Therapeutic Exercise 15    OT/TRISTA: OT     Number of [facility] visits since last OT visit: 1    12/5/2024

## 2024-12-05 NOTE — PROGRESS NOTES
"Pt saved a stool sample for testing but there is no order. He also insisted I take a photo of his feces and the color of the water in the toilet so "the Drs could see what he was talking about". Photo taken as he requested and added to the chart. I notified the Dr of pts actions he said no sample was necessary at this time, sample discarded.  "

## 2024-12-05 NOTE — HPI
Felipe Jiménez is a 68 y.o. male Iron deficiency anemia, hyperlipidemia who presented with dizziness, generalized weakness and shortness of breath with exertion for the past 6 months. On CT abdomen 10/16, noted to have moderate hydronephrosis consistent with pyleonephritis with have multiple right intrarenal stones. S/p R ureteral stent placement with urology on 11/30. Then noted to have perinephric abscess on Post-op CT. He is s/p drain placement with IR on 12/1. Currently on Augmentin to complete a total of 14 day course from the day of drain placement with IR due to source control. GI consulted for reported melena.    Today, he reports having 3 episodes of dark, tarry stools over the past few days. He says that on Tuesday 12/3, he had 2 large watery BMs that were dark black and green in color. He reports having another small dark BM last night. He reports feeling constipated. He denies any abdominal pain, nausea or vomiting. He says he has previously had a few episodes of dark stools prior to this admission and had one episode with bright red blood in the past. He does report taking Aspirin daily at home. He denies any prior abdominal surgeries. He denies any family history of gastrointestinal cancers. He was started on oral ferrous gluconate on 11/30. He is unclear if he has taken iron supplementation in the past.     He was previously followed by GI for LENORA and underwent recent colonoscopy, upper GI as well as video capsule endoscopy. Results below:  - VCE 10/22/24: Normal small bowel with no findings to explain anemia. No further GI endoscopy at this time unless overt bleeding occurs.   - Upper GI endoscopy 10/2/24: Normal esophagus, small hiatal hernia, gastritis - biopsied. Path: Antral mucosal with marked erosive inactive chronic inflammation and regenratie epithelial changes No H pylori organisms seen.   - Colonoscopy 10/2/24: non-bleeding internal hemorrhoids. One 3 mm polyp in descending colon removed. The  rectum, sigmoid colon, transverse ascending colon, cecum, ileocecal valve and appendiceal orifice are normal.

## 2024-12-06 ENCOUNTER — TELEPHONE (OUTPATIENT)
Dept: UROLOGY | Facility: CLINIC | Age: 69
End: 2024-12-06
Payer: MEDICARE

## 2024-12-06 ENCOUNTER — TELEPHONE (OUTPATIENT)
Dept: INTERNAL MEDICINE | Facility: CLINIC | Age: 69
End: 2024-12-06
Payer: MEDICARE

## 2024-12-06 VITALS
BODY MASS INDEX: 21.22 KG/M2 | DIASTOLIC BLOOD PRESSURE: 70 MMHG | OXYGEN SATURATION: 100 % | HEART RATE: 87 BPM | TEMPERATURE: 98 F | RESPIRATION RATE: 18 BRPM | HEIGHT: 68 IN | WEIGHT: 140 LBS | SYSTOLIC BLOOD PRESSURE: 138 MMHG

## 2024-12-06 LAB
ALBUMIN SERPL BCP-MCNC: 1.9 G/DL (ref 3.5–5.2)
ALP SERPL-CCNC: 185 U/L (ref 40–150)
ALT SERPL W/O P-5'-P-CCNC: 32 U/L (ref 10–44)
ANION GAP SERPL CALC-SCNC: 10 MMOL/L (ref 8–16)
AST SERPL-CCNC: 50 U/L (ref 10–40)
BASOPHILS # BLD AUTO: 0.03 K/UL (ref 0–0.2)
BASOPHILS NFR BLD: 0.3 % (ref 0–1.9)
BILIRUB SERPL-MCNC: 0.4 MG/DL (ref 0.1–1)
BUN SERPL-MCNC: 15 MG/DL (ref 8–23)
CALCIUM SERPL-MCNC: 9.4 MG/DL (ref 8.7–10.5)
CHLORIDE SERPL-SCNC: 108 MMOL/L (ref 95–110)
CO2 SERPL-SCNC: 20 MMOL/L (ref 23–29)
CREAT SERPL-MCNC: 1.1 MG/DL (ref 0.5–1.4)
DIFFERENTIAL METHOD BLD: ABNORMAL
EOSINOPHIL # BLD AUTO: 0.2 K/UL (ref 0–0.5)
EOSINOPHIL NFR BLD: 2.1 % (ref 0–8)
ERYTHROCYTE [DISTWIDTH] IN BLOOD BY AUTOMATED COUNT: 19.6 % (ref 11.5–14.5)
EST. GFR  (NO RACE VARIABLE): >60 ML/MIN/1.73 M^2
GLUCOSE SERPL-MCNC: 112 MG/DL (ref 70–110)
HCT VFR BLD AUTO: 28.5 % (ref 40–54)
HGB BLD-MCNC: 8.9 G/DL (ref 14–18)
IMM GRANULOCYTES # BLD AUTO: 0.06 K/UL (ref 0–0.04)
IMM GRANULOCYTES NFR BLD AUTO: 0.5 % (ref 0–0.5)
LYMPHOCYTES # BLD AUTO: 1.4 K/UL (ref 1–4.8)
LYMPHOCYTES NFR BLD: 12.1 % (ref 18–48)
MAGNESIUM SERPL-MCNC: 2.2 MG/DL (ref 1.6–2.6)
MCH RBC QN AUTO: 25.5 PG (ref 27–31)
MCHC RBC AUTO-ENTMCNC: 31.2 G/DL (ref 32–36)
MCV RBC AUTO: 82 FL (ref 82–98)
MONOCYTES # BLD AUTO: 0.8 K/UL (ref 0.3–1)
MONOCYTES NFR BLD: 7.2 % (ref 4–15)
NEUTROPHILS # BLD AUTO: 8.7 K/UL (ref 1.8–7.7)
NEUTROPHILS NFR BLD: 77.8 % (ref 38–73)
NRBC BLD-RTO: 0 /100 WBC
PHOSPHATE SERPL-MCNC: 3.5 MG/DL (ref 2.7–4.5)
PLATELET # BLD AUTO: 517 K/UL (ref 150–450)
PMV BLD AUTO: 9.8 FL (ref 9.2–12.9)
POTASSIUM SERPL-SCNC: 3.6 MMOL/L (ref 3.5–5.1)
PROT SERPL-MCNC: 8 G/DL (ref 6–8.4)
RBC # BLD AUTO: 3.49 M/UL (ref 4.6–6.2)
SODIUM SERPL-SCNC: 138 MMOL/L (ref 136–145)
WBC # BLD AUTO: 11.2 K/UL (ref 3.9–12.7)

## 2024-12-06 PROCEDURE — 25000003 PHARM REV CODE 250: Performed by: HOSPITALIST

## 2024-12-06 PROCEDURE — 63600175 PHARM REV CODE 636 W HCPCS: Performed by: HOSPITALIST

## 2024-12-06 PROCEDURE — 80053 COMPREHEN METABOLIC PANEL: CPT | Performed by: HOSPITALIST

## 2024-12-06 PROCEDURE — 36415 COLL VENOUS BLD VENIPUNCTURE: CPT | Performed by: HOSPITALIST

## 2024-12-06 PROCEDURE — 25000003 PHARM REV CODE 250: Performed by: STUDENT IN AN ORGANIZED HEALTH CARE EDUCATION/TRAINING PROGRAM

## 2024-12-06 PROCEDURE — 85025 COMPLETE CBC W/AUTO DIFF WBC: CPT | Performed by: HOSPITALIST

## 2024-12-06 PROCEDURE — 84100 ASSAY OF PHOSPHORUS: CPT | Performed by: HOSPITALIST

## 2024-12-06 PROCEDURE — 83735 ASSAY OF MAGNESIUM: CPT | Performed by: HOSPITALIST

## 2024-12-06 RX ADMIN — ATORVASTATIN CALCIUM 80 MG: 40 TABLET, FILM COATED ORAL at 08:12

## 2024-12-06 RX ADMIN — Medication 300 ML: at 09:12

## 2024-12-06 RX ADMIN — AMOXICILLIN AND CLAVULANATE POTASSIUM 1 TABLET: 875; 125 TABLET, FILM COATED ORAL at 08:12

## 2024-12-06 RX ADMIN — PANTOPRAZOLE SODIUM 40 MG: 40 INJECTION, POWDER, LYOPHILIZED, FOR SOLUTION INTRAVENOUS at 08:12

## 2024-12-06 RX ADMIN — Medication 324 MG: at 08:12

## 2024-12-06 RX ADMIN — MUPIROCIN: 20 OINTMENT TOPICAL at 09:12

## 2024-12-06 NOTE — NURSING
.AVS virtually reviewed with patient in its entirety with emphasis on diet, medications, follow-up appointments and reasons to return to the ED or contact the [facility] On Call Nurse Care Line. Patient also encouraged to utilize their patient portal. Ease and convenience of use reiterated. Education complete and patient voiced understanding. All questions answered. Discharge teaching complete.

## 2024-12-06 NOTE — PLAN OF CARE
Problem: Adult Inpatient Plan of Care  Goal: Plan of Care Review  Outcome: Adequate for Care Transition  Goal: Patient-Specific Goal (Individualized)  Outcome: Adequate for Care Transition  Goal: Absence of Hospital-Acquired Illness or Injury  Outcome: Adequate for Care Transition  Goal: Optimal Comfort and Wellbeing  Outcome: Adequate for Care Transition  Goal: Readiness for Transition of Care  Outcome: Adequate for Care Transition     Problem: Infection  Goal: Absence of Infection Signs and Symptoms  Outcome: Adequate for Care Transition     Problem: Pain Acute  Goal: Optimal Pain Control and Function  Outcome: Adequate for Care Transition     Problem: Electrolyte Imbalance  Goal: Electrolyte Balance  Outcome: Adequate for Care Transition

## 2024-12-06 NOTE — TELEPHONE ENCOUNTER
I returned his call.    He is in hospital.  He says years ago-  he spoke to you on phone and  He was never able to see you as pcp because  You were not accepting new patients.    He is being discharged from hospital and not happy  With his pcp and hoping you can Make an exception.  He is already booked for hospital follow with  dr escobar for next week. I urged him to keep that  And said I would only call back if you feel you can  Take him on as new patient.    I explained you were about to retire and only work 3 days a week and book up 4-5 months in advance so I  Don't expect that you can take him as a patient  But if you feel you can , I will call back.

## 2024-12-06 NOTE — PROGRESS NOTES
Pt completed his virtual discharge he has no further questions. Wheelchair requested to asst pt down stairs to the lobby.   IV access removed as well as tele. Pt was educated on the care of and how to empty and replace the bulb drain.he verbalized his understanding.

## 2024-12-06 NOTE — PROGRESS NOTES
Pt is discharging this afternoon, discharge orders and plan of care reviewed with pt he verbalized his understanding. He has been up and to the BR independently, continent MARIEL drain is in place to suction, with serosanguineous  drainage dressing to the insertion site is clean dry and intact. Small area of swelling remains to the tube insertion site. Pt denies pain but states his side is a little sore but not painful. VSS no SOB noted.

## 2024-12-06 NOTE — PLAN OF CARE
Problem: Pain Acute  Goal: Optimal Pain Control and Function  Outcome: Progressing     Problem: Electrolyte Imbalance  Goal: Electrolyte Balance  Outcome: Progressing       Pt slept throughout the night. Declined pain offered pain medicine early in the shift. Said flank pain has gotten better. Wife at bedside no concerns voiced. Pt said he is ready to go home. Plan of care continued.

## 2024-12-06 NOTE — PLAN OF CARE
Problem: Adult Inpatient Plan of Care  Goal: Patient-Specific Goal (Individualized)  Outcome: Progressing     Problem: Adult Inpatient Plan of Care  Goal: Absence of Hospital-Acquired Illness or Injury  Outcome: Progressing     Problem: Adult Inpatient Plan of Care  Goal: Optimal Comfort and Wellbeing  Outcome: Progressing     Problem: Infection  Goal: Absence of Infection Signs and Symptoms  Outcome: Progressing     Problem: Electrolyte Imbalance  Goal: Electrolyte Balance  Outcome: Progressing     Problem: Pain Acute  Goal: Optimal Pain Control and Function  Outcome: Progressing   Pt is AAO X4, he has been up to the BR independently, MARIEL drain to the right flank is patent and draining small amount of serosanguineous fluid to bulb. Pt denies pain. Wife is at the bedside,

## 2024-12-06 NOTE — TELEPHONE ENCOUNTER
----- Message from Perri sent at 12/6/2024  1:14 PM CST -----  Appointment Request    Name of Caller:TARAH Ellie  Symptoms or reason for appointment:Hosp follow up  Best Call Back Number:  pt's phone  [phone]  Additional Information: PT is adamant that he had a 12/1 appointment with Dr. Gaines that he missed due to being inpatient. No appointment is found in [facility]. Per Pt's request, he would like to schedule a hosp follow up appointment.  Ellie does see that he is not accepting new patients, this request is per Felipe Jiménez.

## 2024-12-06 NOTE — DISCHARGE SUMMARY
[facility] Medicine  Discharge Summary      Patient Name: Felipe Jiménez  MRN: [id number]  BRITTANY: [id number]  Patient Class: IP- Inpatient  Admission Date: 11/29/2024  Hospital Length of Stay: 6 days  Discharge Date and Time:  12/06/2024 3:08 PM  Attending Physician: Spencer Hamlin MD   Discharging Provider: Spencer Hamlin MD  Primary Care Provider: No primary care provider on file.  Hospital Medicine Team: [facility] MED R Spencer Hamlin MD  Primary Care Team: [facility] R    HPI:   Felipe Moffett is a 67 Yo male with PMH of Iron deficiency anemia, hyperlipidemia presents ED with dizziness, generalized weakness and shortness of breath with exertion for the past 6 months.     AAOX3. c/o generalized weakness and shortness of breath with exertion for the past 6 months - progressively worsening.  reports he had  2 falls recently due to weakness.  denies loss of consciousness.   Followed by GI for LENORA and underwent recent colonoscopy, upper GI as well as video capsule endoscopy.  Reports occasional bright red blood when he wipes but otherwise denies any other abnormal bleeding.  50 lb weight loss over the past year,  initially on purpose as he was over 200 lb but mentions he had unintentional weight loss as well.   had iron-deficiency anemia since childhood was previously on iron supplementation which he discontinued a year ago in favor of dietary changes. Denies any leg swelling. currently not on any anticoagulation. reports   occasional bright red blood when wiping but denies any melena.  on ASA 81 mg daily. admits taking tylenol and meloxicam intermittently for arthritis     EGD 10/2/24                           Normal esophagus.                          - Z-line regular, 40 cm from the incisors.                          - Small hiatal hernia.                          - Gastritis. Biopsied.                          - Normal examined duodenum. Biopsied   Colonoscopy 10/2/24   Non-bleeding internal  hemorrhoids.                          - One 3 mm polyp in the descending colon, removed                          with a cold biopsy forceps. Resected and retrieved.                          - The rectum, sigmoid colon, transverse colon,                          ascending colon, cecum, ileocecal valve and                          appendiceal orifice are normal.                          - The examined portion of the ileum was normal    VCE 10/22 Normal small bowel with no findings to explain anemia. No further GI endoscopy  recommended unless  overt bleeding occurs.     ER course - .  Hemoglobin is 7.0. Transfusion with  1 unit PRBC ordered . leukocytosis of 18 today but denies any infectious symptoms. UA + . UC pending.     Procedure(s) (LRB):  CYSTOSCOPY, WITH URETERAL STENT INSERTION (Right)  DILATION, URETHRA (N/A)      Hospital Course:   Admitted with weakness/dizziness and after a recent fall. Workup including CTH negative. Workup was significant for Hb 7.2, s/p pRBC transfusion. Hematology consulted, recommended IV iron/treatment for LENORA. CT A/P was obtained which noted - Enlarged edematous right kidney with perinephric inflammatory changes and moderate hydronephrosis consistent with pyelonephritis.. Multiple right intrarenal stones including 3 stones in the pelvis measuring 1.1 cm, 8 mm and 5 mm an 1 cm stone in the upper pole calyx and several other smaller stones. Status post R sided ureteral stent by urology on 11/30. Complicated by perinephric abscess which was visualized on repeat CT. Started on broad spectrum abx. IR consulted, s/p perirenal drain placement on 12/1 with plan for 14 days Augmentin from day of drain placement. CT scan on outpatient basis to assess for abscess resolution. Urology follow up for drain removal, or during admission if drain output minimal. Episodes of dark stool reported, GI consulted, likely in the setting of iron supplementation. In addition, he has had an extensive recent  endoscopic eval including an EGD, Colonoscopy and VCE.     Pt discharged with PCP, ID, Urology follow up and CT on 12/19. Pt advised to monitor drain perinephric abscess drain output in a daily log for Urology evaluation on when to pull drain. Advised on Augmentin until 12/15.  PT/OT ordered. Instructions provided to pt and his spouse at bedside who voiced understanding.      Goals of Care Treatment Preferences:  Code Status: Full Code      SDOH Screening:  The patient was screened for utility difficulties, food insecurity, transport difficulties, housing insecurity, and interpersonal safety and there were no concerns identified this admission.     Consults:   Consults (From admission, onward)          Status Ordering Provider     Inpatient consult to Gastroenterology  Once        Provider:  (Not yet assigned)    Completed EMILY SCHWARTZ.     Inpatient consult to Interventional Radiology  Once        Provider:  (Not yet assigned)    Completed EMILY SCHWARTZ.     Inpatient consult to PICC team ([facility])  Once        Provider:  (Not yet assigned)    Completed EMILY SCHWARTZ.     Inpatient consult to Interventional Radiology  Once        Provider:  (Not yet assigned)    Completed KELSEY SARMIENTO     Inpatient consult to Infectious Diseases  Once        Provider:  (Not yet assigned)    Completed EMILY SCHWARTZ.     Inpatient consult to Urology  Once        Provider:  (Not yet assigned)    Completed EMILY SCHWARTZ.     Inpatient consult to Registered Dietitian/Nutritionist  Once        Provider:  (Not yet assigned)    Completed EMILY SCHWARTZ.     Inpatient consult to Hematology/Oncology  Once        Provider:  (Not yet assigned)    Completed EMILY SCHWARTZ.          Interval History: JOHN, pt feeling well today. Plans to settle in at home for his birthday.    Review of Systems  Objective:     Vital Signs (Most Recent):  Temp: 97.9 °F (36.6 °C) (12/06/24 1118)  Pulse: 87 (12/06/24 1118)  Resp: 18  (12/06/24 1118)  BP: 138/70 (12/06/24 1118)  SpO2: 100 % (12/06/24 1118) Vital Signs (24h Range):  Temp:  [97.6 °F (36.4 °C)-98.5 °F (36.9 °C)] 97.9 °F (36.6 °C)  Pulse:  [] 87  Resp:  [18-19] 18  SpO2:  [97 %-100 %] 100 %  BP: (104-143)/(60-77) 138/70     Weight: 63.5 kg (140 lb)  Body mass index is 21.29 kg/m².    Intake/Output Summary (Last 24 hours) at 12/6/2024 1505  Last data filed at 12/6/2024 0901  Gross per 24 hour   Intake 500 ml   Output 15 ml   Net 485 ml         Physical Exam  Vitals and nursing note reviewed.   HENT:      Head: Normocephalic and atraumatic.   Cardiovascular:      Rate and Rhythm: Normal rate and regular rhythm.      Pulses: Normal pulses.   Pulmonary:      Effort: Pulmonary effort is normal. No respiratory distress.      Breath sounds: No wheezing or rales.   Abdominal:      Palpations: Abdomen is soft.      Tenderness: There is no abdominal tenderness. There is no guarding.   Genitourinary:     Comments: R CVA drain in place with dressing C/D/I  Musculoskeletal:      Right lower leg: No edema.      Left lower leg: No edema.   Skin:     General: Skin is warm and dry.   Neurological:      General: No focal deficit present.      Mental Status: He is alert and oriented to person, place, and time.   Psychiatric:         Mood and Affect: Mood normal.             Significant Labs: All pertinent labs within the past 24 hours have been reviewed.    Significant Imaging: I have reviewed all pertinent imaging results/findings within the past 24 hours.    * Perinephric abscess  11/30 CT abdomen -. Enlarging mixed cystic and solid appearance of inferior pole of the right kidney with invasion into the right psoas muscle, concerning for developing abscess in setting of pyelonephritis. Incompletely characterized without IV contrast. repeat CT Abdomen with IV contrast -enlarged right kidney with multiple hypodense parenchymal collections, largest collection extending inferiorly and posteriorly  with involvement of the right psoas muscle/posterior chest wall. 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 . IR evaluation.  started on IV Zosyn and vancomycin  Plan:  -s/p perirenal drain placement by IR  -Transitioned  from Unasyn to Augmentin to complete a total of 14 day course from the day of drain placement with IR due to source control. needs CT scan on outpatient basis to assess for abscess resolution. Discharge with ID and Urology follow up   -contact Urology before discharge to assess if drain can be removed   -contacted RN for accurate output measurements       Melena  12/4 1 episode of melena this PM. protonix 40mg IV q 12h. GI consulted          Severe protein-calorie malnutrition  Nutrition consulted. Most recent weight and BMI monitored-     Measurements:  Wt Readings from Last 1 Encounters:   11/30/24 63.5 kg (140 lb)   Body mass index is 21.29 kg/m².    Patient has been screened and assessed by RD.    Malnutrition Type:  Context: chronic illness  Level: severe    Malnutrition Characteristic Summary:  Weight Loss (Malnutrition): greater than 7.5% in 3 months (-8.4% x 3 months)  Energy Intake (Malnutrition): less than or equal to 75% for greater than or equal to 1 month  Subcutaneous Fat (Malnutrition):  (moderate to severe)  Muscle Mass (Malnutrition):  (moderate to severe)    Interventions/Recommendations (treatment strategy):  1.)      Urethral stricture  as above       Hydronephrosis of right kidney  CT abdomen 10/16 - Enlarged edematous right kidney with perinephric inflammatory changes and moderate hydronephrosis consistent with pyelonephritis.. Multiple right intrarenal stones including 3 stones in the pelvis measuring 1.1 cm, 8 mm and 5 mm an 1 cm stone in the upper pole calyx and several other smaller stones. Echo pending . repeat CT abdomen.  Urology eval - right ureteral stent placement  today      Nephrolithiasis  11/30 CT abdomen 10/16 - Enlarged edematous right kidney with perinephric inflammatory changes and moderate hydronephrosis consistent with pyelonephritis.. Multiple right intrarenal stones including 3 stones in the pelvis measuring 1.1 cm, 8 mm and 5 mm an 1 cm stone in the upper pole calyx and several other smaller stones. Echo pending . repeat CT abdomen.  Urology eval -  Urology eval - s/p Membranous urethral stricture passively dilated with scope, patient had  right ureteral stent placement and Katz placement       Weight loss  Nutrition consulted. Most recent weight and BMI monitored-     Measurements:  Wt Readings from Last 1 Encounters:   11/30/24 63.5 kg (140 lb)   Body mass index is 21.29 kg/m².    Patient has been screened and assessed by RD.    Malnutrition Type:  Context:    Level:      Malnutrition Characteristic Summary:       Interventions/Recommendations (treatment strategy):       11/30 GI no plan for further work up unless overt bleed. Hematology consulted for recurrent iron deficiency anemia/ s/p GI work up and weight loss.      Fever  11/30 Leucocytosis trended down to 14. fever of 101F. started on ceftriaxone.   CT abdomen 10/16 - Enlarged edematous right kidney with perinephric inflammatory changes and moderate hydronephrosis consistent with pyelonephritis.. Multiple right intrarenal stones including 3 stones in the pelvis measuring 1.1 cm, 8 mm and 5 mm an 1 cm stone in the upper pole calyx and several other smaller stones. Echo pending . repeat CT abdomen. Urology consulted for  right hydronephrosis/ fever   12/1BC x2 and UC NGTD.  12/2 fever of 101F overnight. cultures sent from IR drain pending. drain output 130ml.     Falls frequently  secondary to above. fall precautions. PT/OT consulted   reported 2 falls in the last week. one with forehead trauma. CT head wo contrast ordered   11/30  CT head - No acute intracranial pathology.  Specifically no evidence of  intracranial hemorrhage or major vascular distribution infarct. Mild generalized cerebral volume loss     Dizziness  likely from symptomatic anemia. orthostasis + by pulse. encourage oral hydration. monitor after PRBC. echo ordered . telemetry r/o arrhythmia     11/30 orthostatic by pulse. received LR       Positive D dimer  D dimer elevated at 2.07 . DVT sonogram- No imaging evidence of deep venous thrombosis in either lower extremity.       UTI (urinary tract infection)  UA + . UC pending.   12/1  BC x2 and UC NGTD.   12/4 ID f/u - Discontinued Vancomycin as cultures remain negative. Transitioned  from Unasyn to Augmentin to complete a total of 14 day course from the day of drain placement with IR due to source control. needs CT scan on outpatient basis to assess for abscess resolution . drain output 75ml. discharge with ID and Urology follow up        Leucocytosis    Patient with leucocytosis   Recent Labs   Lab 12/04/24  0243 12/04/24  1917 12/05/24  0508   WBC 14.46* 15.11* 12.75*     . Afebrile. BCX 2, urine culture pending . likely secondary to sepsis.?    11/30 Leucocytosis trended down to 14. fever of 101F. started on ceftriaxone. Echo pending  12/1 BC x2 and UC NGTD.     Iron deficiency anemia    Patient's with microcytic anemia.. Hemoglobin downtrending. Etiology likely due to Iron deficiency.  Current CBC reviewed-    Recent Labs   Lab 12/02/24  0011 12/03/24  1058 12/04/24 0243   HGB 7.8* 8.6* 8.3*         Component Value Date/Time    MCV 82 12/04/2024 0243    RDW 18.7 (H) 12/04/2024 0243    IRON 13 (L) 11/30/2024 0918    FERRITIN 3,596 (H) 11/30/2024 0918    RETIC 0.9 11/30/2024 0918    FOLATE 12.6 11/30/2024 0918    VGTFXUAB86 1087 (H) 11/30/2024 0918     Monitor CBC and transfuse if H/H drops below 7/21.        EGD 10/2/24                           Normal esophagus.                          - Z-line regular, 40 cm from the incisors.                          - Small hiatal hernia.                           - Gastritis. Biopsied.                          - Normal examined duodenum. Biopsied   Colonoscopy 10/2/24   Non-bleeding internal hemorrhoids.                          - One 3 mm polyp in the descending colon, removed                          with a cold biopsy forceps. Resected and retrieved.                          - The rectum, sigmoid colon, transverse colon,                          ascending colon, cecum, ileocecal valve and                          appendiceal orifice are normal.                          - The examined portion of the ileum was normal    VCE 10/22 Normal small bowel with no findings to explain anemia. No further GI endoscopy  recommended unless  overt bleeding occurs.     symptomatic anemia -  Hemoglobin is 7.0. Transfusion with  1 unit PRBC ordered . GI consulted   11/30 Hb 7.6  s/p PRBC transfusion . hematology evaluation. started on ferrous gluconate and Feraheme IV x 2 doses. needs hematolgoy f/u outpatient   12/1Hb 6.9. Transfusion with 1 unit of PRBC     Continue iron supplementation    Hypercholesterolemia  continue crestor         Final Active Diagnoses:    Diagnosis Date Noted POA    PRINCIPAL PROBLEM:  Perinephric abscess [N15.1] 11/30/2024 Yes    Melena [K92.1] 12/04/2024 Yes    Hyponatremia [E87.1] 12/03/2024 No    Urethral stricture [N35.919] 12/01/2024 Yes    Severe protein-calorie malnutrition [E43] 12/01/2024 Yes     Chronic    Fever [R50.9] 11/30/2024 Yes    Weight loss [R63.4] 11/30/2024 Yes    Nephrolithiasis [N20.0] 11/30/2024 Yes    Hydronephrosis of right kidney [N13.30] 11/30/2024 Yes    Iron deficiency anemia [D50.9] 11/29/2024 Yes    Leucocytosis [D72.829] 11/29/2024 Yes    UTI (urinary tract infection) [N39.0] 11/29/2024 Yes    Positive D dimer [R79.89] 11/29/2024 Unknown    Dizziness [R42] 11/29/2024 Unknown    Falls frequently [R29.6] 11/29/2024 Not Applicable    Hypercholesterolemia [E78.00] 06/01/2023 Yes      Problems Resolved During this Admission:  "      Discharged Condition: good    Disposition: Home or Self Care    Follow Up:   Contact information for follow-up providers       Sami Asif MD Follow up.    Specialty: Family Medicine  Contact information:  [address]  [facility] [address]  [phone]                       Contact information for after-discharge care       Home Medical Care       [facility] .    Service: Home Health Services  Contact information:  [address]  [facility] [address]  [phone]                                 Patient Instructions:      BATH/SHOWER CHAIR FOR HOME USE     Order Specific Question Answer Comments   Height: 5' 8" (1.727 m)    Weight: 63.5 kg (140 lb)    Does patient have medical equipment at home? none    Length of need (1-99 months): 99    Type: With back      NM Renogram With Lasix   Standing Status: Future Standing Exp. Date: 12/03/25     Order Specific Question Answer Comments   May the Radiologist modify the order per protocol to meet the clinical needs of the patient? Yes      Ambulatory referral/consult to Urology   Standing Status: Future   Referral Priority: Routine Referral Type: Consultation   Referral Reason: Specialty Services Required   Requested Specialty: Urology   Number of Visits Requested: 1     Ambulatory referral/consult to Infectious Disease   Standing Status: Future   Referral Priority: Routine Referral Type: Consultation   Referral Reason: Specialty Services Required   Requested Specialty: Infectious Diseases   Number of Visits Requested: 1     Ambulatory referral/consult to Hematology / Oncology   Standing Status: Future   Referral Priority: Routine Referral Type: Consultation   Referral Reason: Specialty Services Required   Requested Specialty: Hematology and Oncology   Number of Visits Requested: 1     Ambulatory referral/consult to Internal Medicine   Standing Status: Future   Referral Priority: Routine Referral Type: " Consultation   Referral Reason: Specialty Services Required   Referred to Provider: IVELISSE MATHEW Requested Specialty: Internal Medicine   Number of Visits Requested: 1     Notify your health care provider if you experience any of the following:  temperature >100.4     Notify your health care provider if you experience any of the following:  redness, tenderness, or signs of infection (pain, swelling, redness, odor or green/yellow discharge around incision site)     Activity as tolerated       Significant Diagnostic Studies: N/A    Pending Diagnostic Studies:       None           Medications:  Reconciled Home Medications:      Medication List        START taking these medications      acetaminophen 325 MG tablet  Commonly known as: TYLENOL  Take 2 tablets (650 mg total) by mouth every 8 (eight) hours as needed.     amoxicillin-clavulanate 875-125mg 875-125 mg per tablet  Commonly known as: AUGMENTIN  Take 1 tablet by mouth every 12 (twelve) hours. for 12 days     ferrous gluconate 324 mg (37.5 mg iron) Tab tablet  Take 1 tablet (324 mg total) by mouth 2 (two) times daily with meals.     mupirocin 2 % ointment  Commonly known as: BACTROBAN  Apply by Nasal route 2 (two) times daily. for 4 days     oxyCODONE 5 MG immediate release tablet  Commonly known as: ROXICODONE  Take 1 tablet (5 mg total) by mouth every 12 (twelve) hours as needed.     STOOL SOFTENER-LAXATIVE 8.6-50 mg per tablet  Generic drug: senna-docusate 8.6-50 mg  Take 2 tablets by mouth once daily. for 7 days            CONTINUE taking these medications      pantoprazole 40 MG tablet  Commonly known as: PROTONIX  Take 1 tablet (40 mg total) by mouth once daily.     rosuvastatin 20 MG tablet  Commonly known as: CRESTOR  Take 1 tablet (20 mg total) by mouth once daily.            STOP taking these medications      aspirin 81 MG Chew     diazePAM 5 MG tablet  Commonly known as: VALIUM     meloxicam 7.5 MG tablet  Commonly known as: MOBIC     sildenafiL 100  MG tablet  Commonly known as: VIAGRA     triamcinolone acetonide 0.1% 0.1 % cream  Commonly known as: KENALOG     zolpidem 5 MG Tab  Commonly known as: AMBIEN              Indwelling Lines/Drains at time of discharge:   Lines/Drains/Airways       Drain  Duration                  Closed/Suction Drain 12/01/24 1219 Right Back Bulb 14 Fr. 5 days                    Time spent on the discharge of patient: 32 minutes         Spencer Hamlin MD  Department of Hospital Medicine  [facility] Surg

## 2024-12-06 NOTE — TELEPHONE ENCOUNTER
Returned call to patient's daughter, Johanna, who's questioning if her father can have imaging for his neph tubes prior to discharge.  Advised Johanna to reach out to the IP nurse with these specific questions.

## 2024-12-06 NOTE — TREATMENT PLAN
GI Treatment Plan      Hb remained stable, at his recent baseline. His dark stools are likely attributed to his oral iron.       GI will sign off.     Rosy Martinez MD  Gastroenterology and Hepatology Fellow, PGY-4

## 2024-12-06 NOTE — PLAN OF CARE
APPOINTMENT:    Patient Appointment(s) scheduled with Shakir Mckay  Friday Dec 13, 2024 10:15 AM

## 2024-12-06 NOTE — PLAN OF CARE
[facility]  Discharge Final Note    Primary Care Provider: No primary care provider on file.  Pt would like Dr. Guanako Gaines as his PCP.  Pt has been scheduled with Shakir Mckay for upcoming appointment.      Expected Discharge Date: 12/6/2024    Pt discharged home with [facility].  Pt's wife and daughter will provide transportation home.  Pt cleared from case management perspective.      Discharge Plan A and Plan B have been determined by review of patient's clinical status, future medical and therapeutic needs, and coverage/benefits for post-acute care in coordination with multidisciplinary team members.  Future Appointments   Date Time Provider Department Center   12/13/2024 10:15 AM Shakir Mckay MD [facility] IM [facility]   12/19/2024  1:00 PM [facility] OIC-CT1 500 LB LIMIT [facility] IC [facility]   1/10/2025 10:00 AM [facility] NM4 MG2 400LB LIMIT [facility] NUCLEAR [facility]   1/14/2025  7:30 AM Dain Eugene MD [facility] UROLOG [facility]   1/15/2025 10:00 AM Courtney Vásquez MD [facility] ID [facility]       Final Discharge Note (most recent)       Final Note - 12/06/24 1549          Final Note    Assessment Type Final Discharge Note     Anticipated Discharge Disposition Home-Health Care [facility]     Hospital Resources/Appts/Education Provided Provided patient/caregiver with written discharge plan information;Post-Acute resouces added to AVS        Post-Acute Status    Post-Acute Authorization Home Health     Home Health Status Set-up Complete/Auth obtained     Discharge Delays None known at this time                     Important Message from Medicare  Important Message from Medicare regarding Discharge Appeal Rights: Explained to patient/caregiver, Given to patient/caregiver, Signed/date by patient/caregiver     Date IMM was signed: 12/05/24  Time IMM was signed: 0916     Follow-up providers       Sami Asif MD   Specialty: Family Medicine    [address]  [facility] [address]   Phone:  [phone]       Next Steps: Follow up              After-discharge care                Home Medical Care       *[facility]   Service: Home Health Services    [address] [facility] [address]  [facility] [address]   Phone: [phone]                             Ellie Urena LMSW  Part-Time-  [facility]  Ext. [phone]

## 2024-12-06 NOTE — SUBJECTIVE & OBJECTIVE
Interval History: beto LOPEZ feeling well today. Plans to settle in at home for his birthday.    Review of Systems  Objective:     Vital Signs (Most Recent):  Temp: 97.9 °F (36.6 °C) (12/06/24 1118)  Pulse: 87 (12/06/24 1118)  Resp: 18 (12/06/24 1118)  BP: 138/70 (12/06/24 1118)  SpO2: 100 % (12/06/24 1118) Vital Signs (24h Range):  Temp:  [97.6 °F (36.4 °C)-98.5 °F (36.9 °C)] 97.9 °F (36.6 °C)  Pulse:  [] 87  Resp:  [18-19] 18  SpO2:  [97 %-100 %] 100 %  BP: (104-143)/(60-77) 138/70     Weight: 63.5 kg (140 lb)  Body mass index is 21.29 kg/m².    Intake/Output Summary (Last 24 hours) at 12/6/2024 1505  Last data filed at 12/6/2024 0901  Gross per 24 hour   Intake 500 ml   Output 15 ml   Net 485 ml         Physical Exam  Vitals and nursing note reviewed.   HENT:      Head: Normocephalic and atraumatic.   Cardiovascular:      Rate and Rhythm: Normal rate and regular rhythm.      Pulses: Normal pulses.   Pulmonary:      Effort: Pulmonary effort is normal. No respiratory distress.      Breath sounds: No wheezing or rales.   Abdominal:      Palpations: Abdomen is soft.      Tenderness: There is no abdominal tenderness. There is no guarding.   Genitourinary:     Comments: R CVA drain in place with dressing C/D/I  Musculoskeletal:      Right lower leg: No edema.      Left lower leg: No edema.   Skin:     General: Skin is warm and dry.   Neurological:      General: No focal deficit present.      Mental Status: He is alert and oriented to person, place, and time.   Psychiatric:         Mood and Affect: Mood normal.             Significant Labs: All pertinent labs within the past 24 hours have been reviewed.    Significant Imaging: I have reviewed all pertinent imaging results/findings within the past 24 hours.

## 2024-12-06 NOTE — PLAN OF CARE
[facility] - [facility]      HOME HEALTH ORDERS  FACE TO FACE ENCOUNTER    Patient Name: Felipe Jiménez  YOB: 1955    PCP: No primary care provider on file.   PCP Address: No primary physician on file.  PCP Phone Number: None  PCP Fax: None    Encounter Date: 11/29/24    Admit to Home Health    Diagnoses:  Active Hospital Problems    Diagnosis  POA    *Perinephric abscess [N15.1]  Yes    Melena [K92.1]  Yes    Hyponatremia [E87.1]  No    Urethral stricture [N35.919]  Yes    Severe protein-calorie malnutrition [E43]  Yes     Chronic    Fever [R50.9]  Yes    Weight loss [R63.4]  Yes    Nephrolithiasis [N20.0]  Yes    Hydronephrosis of right kidney [N13.30]  Yes    Iron deficiency anemia [D50.9]  Yes    Leucocytosis [D72.829]  Yes    UTI (urinary tract infection) [N39.0]  Yes    Positive D dimer [R79.89]  Unknown    Dizziness [R42]  Unknown    Falls frequently [R29.6]  Not Applicable    Hypercholesterolemia [E78.00]  Yes      Resolved Hospital Problems   No resolved problems to display.       Follow Up Appointments:  Future Appointments   Date Time Provider Department Center   12/19/2024  1:00 PM [facility] OIC-CT1 500 LB LIMIT [facility] CTS [facility]   1/10/2025 10:00 AM [facility] NM4 MG2 400LB LIMIT [facility] NUCLEAR [facility]   1/14/2025  7:30 AM Dain Eugene MD [facility] UROLOG Cel[facility] Mejia   1/15/2025  8:00 AM Dain Eugene MD [facility] UROLOG Celestine Mejia   1/15/2025 10:00 AM Courtney Vásquez MD [facility] ID [facility]       Allergies:Review of patient's allergies indicates:  No Known Allergies    Medications: Review discharge medications with patient and family and provide education.    Current Facility-Administered Medications   Medication Dose Route Frequency Provider Last Rate Last Admin    0.9%  NaCl infusion (for blood administration)   Intravenous Q24H PRN Farhan Melvin PA-C        0.9%  NaCl infusion (for blood administration)   Intravenous Q24H PRN Gilmar Cid MD        acetaminophen tablet 650 mg   650 mg Oral Q6H PRN Matthew Busby MD   650 mg at 12/03/24 2204    amoxicillin-clavulanate 875-125mg per tablet 1 tablet  1 tablet Oral Q12H Maryanne RegaladoDO   1 tablet at 12/05/24 2122    atorvastatin tablet 80 mg  80 mg Oral Daily Gilmar Cid MD   80 mg at 12/05/24 0805    dextrose 10% bolus 125 mL 125 mL  12.5 g Intravenous PRN Gilmar Cid MD        dextrose 10% bolus 125 mL 125 mL  12.5 g Intravenous PRN Howard Monroy MD        dextrose 10% bolus 250 mL 250 mL  25 g Intravenous PRN Gilmar Cid MD        dextrose 10% bolus 250 mL 250 mL  25 g Intravenous PRN Howard Monroy MD        electrolytes-dextrose (Pedialyte) oral solution 300 mL  300 mL Oral Q4H Gilmar Cid MD   300 mL at 12/05/24 1400    ferrous gluconate tablet 324 mg  324 mg Oral BID WM Gilmar Cid MD   324 mg at 12/05/24 1608    glucagon (human recombinant) injection 1 mg  1 mg Intramuscular PRN Gilmar Cid MD        glucagon (human recombinant) injection 1 mg  1 mg Intramuscular PRN Howard Monroy MD        glucose chewable tablet 16 g  16 g Oral PRN Gilmar Cid MD        glucose chewable tablet 24 g  24 g Oral PRN Gilmar Cid MD        haloperidol lactate injection 0.5 mg  0.5 mg Intravenous Q10 Min PRHoward Calzada MD        mupirocin 2 % ointment   Nasal BID Wilda Bruner PA-C   Given at 12/05/24 0804    naloxone 0.4 mg/mL injection 0.02 mg  0.02 mg Intravenous PRN Gilmar Cid MD        nitroGLYCERIN SL tablet 0.4 mg  0.4 mg Sublingual Q5 Min PRGilmar Cordova MD        oxyCODONE immediate release tablet 5 mg  5 mg Oral Q6H PRN Gilmar Cid MD   5 mg at 12/03/24 1817    pantoprazole injection 40 mg  40 mg Intravenous Q12H Gilamr Cid MD   40 mg at 12/05/24 2125    sodium chloride 0.9% flush 10 mL  10 mL Intravenous Q12H PRN Gilmar Cid MD        sodium chloride 0.9% flush 10 mL  10 mL Intravenous PRN Howard Monroy MD             Medication List        START taking these medications      acetaminophen 325 MG tablet  Commonly known as: TYLENOL  Take 2 tablets (650 mg total) by mouth every 8 (eight) hours as needed.     amoxicillin-clavulanate 875-125mg 875-125 mg per tablet  Commonly known as: AUGMENTIN  Take 1 tablet by mouth every 12 (twelve) hours. for 12 days     ferrous gluconate 324 mg (37.5 mg iron) Tab tablet  Take 1 tablet (324 mg total) by mouth 2 (two) times daily with meals.     mupirocin 2 % ointment  Commonly known as: BACTROBAN  Apply by Nasal route 2 (two) times daily. for 4 days     oxyCODONE 5 MG immediate release tablet  Commonly known as: ROXICODONE  Take 1 tablet (5 mg total) by mouth every 12 (twelve) hours as needed.     senna-docusate 8.6-50 mg 8.6-50 mg per tablet  Commonly known as: PERICOLACE  Take 2 tablets by mouth once daily. for 7 days            CONTINUE taking these medications      pantoprazole 40 MG tablet  Commonly known as: PROTONIX  Take 1 tablet (40 mg total) by mouth once daily.     rosuvastatin 20 MG tablet  Commonly known as: CRESTOR  Take 1 tablet (20 mg total) by mouth once daily.            STOP taking these medications      aspirin 81 MG Chew     diazePAM 5 MG tablet  Commonly known as: VALIUM     meloxicam 7.5 MG tablet  Commonly known as: MOBIC     sildenafiL 100 MG tablet  Commonly known as: VIAGRA     triamcinolone acetonide 0.1% 0.1 % cream  Commonly known as: KENALOG     zolpidem 5 MG Tab  Commonly known as: AMBIEN                I have seen and examined this patient within the last 30 days. My clinical findings that support the need for the home health skilled services and home bound status are the following:no   Weakness/numbness causing balance and gait disturbance due to Infection making it taxing to leave home.     Diet:   regular diet      Referrals/ Consults  Physical Therapy to evaluate and treat. Evaluate for home safety and equipment needs; Establish/upgrade home exercise  program. Perform / instruct on therapeutic exercises, gait training, transfer training, and Range of Motion.  Occupational Therapy to evaluate and treat. Evaluate home environment for safety and equipment needs. Perform/Instruct on transfers, ADL training, ROM, and therapeutic exercises.  Aide to provide assistance with personal care, ADLs, and vital signs.    Activities:   activity as tolerated    Nursing:   Agency to admit patient within 24 hours of hospital discharge unless specified on physician order or at patient request    SN to complete comprehensive assessment including routine vital signs. Instruct on disease process and s/s of complications to report to MD. Review/verify medication list sent home with the patient at time of discharge  and instruct patient/caregiver as needed. Frequency may be adjusted depending on start of care date.     Skilled nurse to perform up to 3 visits PRN for symptoms related to diagnosis    Notify MD if SBP > 160 or < 90; DBP > 90 or < 50; HR > 120 or < 50; Temp > 101; O2 < 88%    Ok to schedule additional visits based on staff availability and patient request on consecutive days within the home health episode.    When multiple disciplines ordered:    Start of Care occurs on Sunday - Wednesday schedule remaining discipline evaluations as ordered on separate consecutive days following the start of care.    Thursday SOC -schedule subsequent evaluations Friday and Monday the following week.     Friday - Saturday SOC - schedule subsequent discipline evaluations on consecutive days starting Monday of the following week.    For all post-discharge communication and subsequent orders please contact patient's primary care physician.         Home Health Aide:  Nursing Three times weekly, Physical Therapy Three times weekly, Occupational Therapy Three times weekly, and Home Health Aide Three times weekly    Wound Care Orders  no    I certify that this patient is confined to his home and  needs intermittent skilled nursing care, physical therapy, and occupational therapy.

## 2024-12-06 NOTE — PLAN OF CARE
ANABELLA met with pt and wife to discuss discharge plan.  Pt asked about follow up appointment with Dr. Guanako Gaines. SW advised no appointment on file for Dr. Gaines.  SW spoke to scheduling department to assist with getting patient an appointment with Dr. Gaines; request was made to have someone from Dr. Gaines office contact patient regarding a follow up appointment.  Pt has agreed to keep follow up PCP appointment on file for 12/13.      Pt accepted by [facility] and will be seen on Monday 12/9.      Discharge Plan A and Plan B have been determined by review of patient's clinical status, future medical and therapeutic needs, and coverage/benefits for post-acute care in coordination with multidisciplinary team members.     12/06/24 1254   Post-Acute Status   Post-Acute Authorization [facility] Status Set-up Complete/Auth obtained  ([facility] will see patient on Monday.)   Hospital Resources/Appts/Education Provided Provided patient/caregiver with written discharge plan information   Discharge Delays None known at this time   Discharge Plan   Discharge Plan A Home Health   Discharge Plan B Home with family       Ellie Urena LMSW  Part-Time-  [facility]  Ext. [phone]

## 2024-12-09 LAB — BACTERIA SPEC ANAEROBE CULT: NORMAL

## 2024-12-10 ENCOUNTER — PATIENT OUTREACH (OUTPATIENT)
Dept: ADMINISTRATIVE | Facility: CLINIC | Age: 69
End: 2024-12-10
Payer: MEDICARE

## 2024-12-10 NOTE — PROGRESS NOTES
C3 nurse attempted to contact Felipe Jiménez for a TCC post hospital discharge follow up call. The patient is unable to conduct the call @ this time. The patient requested a callback.    The patient has a scheduled HOSFU appointment with Shakir Mckay @ 12/13/24 @ 7789.

## 2024-12-11 NOTE — PROGRESS NOTES
C3 nurse spoke with Felipe Jiménez for a TCC post hospital discharge follow up call. The patient has a scheduled HOS appointment with Shakir Mckay @ 12/13/24 @ 0933.

## 2024-12-13 ENCOUNTER — OFFICE VISIT (OUTPATIENT)
Dept: INTERNAL MEDICINE | Facility: CLINIC | Age: 69
End: 2024-12-13
Payer: MEDICARE

## 2024-12-13 VITALS — BODY MASS INDEX: 20.32 KG/M2 | WEIGHT: 134.06 LBS | HEIGHT: 68 IN | OXYGEN SATURATION: 98 % | HEART RATE: 92 BPM

## 2024-12-13 DIAGNOSIS — N15.1 PERINEPHRIC ABSCESS: Primary | ICD-10-CM

## 2024-12-13 DIAGNOSIS — Z09 HOSPITAL DISCHARGE FOLLOW-UP: ICD-10-CM

## 2024-12-13 PROCEDURE — 99999 PR PBB SHADOW E&M-EST. PATIENT-LVL III: CPT | Mod: PBBFAC,GC,,

## 2024-12-13 NOTE — PROGRESS NOTES
Subjective     Chief Complaint: Hospital f/u    History of Present Illness:  Mr. Felipe Jiménez is a 69 y.o. male with PMH of Iron deficiency anemia, hyperlipidemia who presents to the clinic as a hospital f/u. Patient was hospitalized from 11/29 - 12/06 after being admitted for dizziness and weakness. Patient was found to have a perinephric abscess s/p perc drain placement on 12/1 with 14 day course of Augmentin. Outpatient CT scheduled for 12/19 with urology f/u as well. He is doing well, denies any fevers, chills, is c/o some pain with urination however his stream is strong and he does not note any blood. Denies any falls, chest pain, sob, since his discharge from the hospital. Reports minimal drain output in the last 2 days, no longer sees pus draining. Drain is non-ttp. He sees HH.     Review of Systems   All other systems reviewed and are negative.      PAST HISTORY:     Past Medical History:   Diagnosis Date    Hyperlipidemia     Male erectile dysfunction, unspecified        Past Surgical History:   Procedure Laterality Date    COLONOSCOPY N/A 10/2/2024    Procedure: COLONOSCOPY;  Surgeon: Brennan Balderrama MD;  Location: [facility];  Service: Endoscopy;  Laterality: N/A;    CYSTOSCOPY W/ URETERAL STENT PLACEMENT Right 11/30/2024    Procedure: CYSTOSCOPY, WITH URETERAL STENT INSERTION;  Surgeon: Dani Eugene MD;  Location: [facility] OR [address] [place] [address];  Service: Urology;  Laterality: Right;    DILATION OF URETHRA N/A 11/30/2024    Procedure: DILATION, URETHRA;  Surgeon: Dain Eugene MD;  Location: [facility] OR [address] [place] [address];  Service: Urology;  Laterality: N/A;    ESOPHAGOGASTRODUODENOSCOPY N/A 10/2/2024    Procedure: EGD (ESOPHAGOGASTRODUODENOSCOPY);  Surgeon: Brennan Balderrama MD;  Location: [facility];  Service: Endoscopy;  Laterality: N/A;    FRACTURE SURGERY      INTRALUMINAL GASTROINTESTINAL TRACT IMAGING VIA CAPSULE N/A 10/22/2024    Procedure: IMAGING PROCEDURE, GI TRACT, INTRALUMINAL, VIA CAPSULE;  Surgeon:  Brennan Balderrama MD;  Location: [facility];  Service: Endoscopy;  Laterality: N/A;    LIPOMA RESECTION Left     left knee    QUADRICEPS TENDON REPAIR Left        Family History   Problem Relation Name Age of Onset    Heart disease Mother      Heart attacks under age 50 Mother  43         2/2 heart attack.    Heart disease Father      Heart attacks under age 50 Father  38         2/2 heart attack.       Social History     Tobacco Use    Smoking status: Former     Current packs/day: 0.00     Average packs/day: 1 pack/day for 20.0 years (20.0 ttl pk-yrs)     Types: Cigarettes     Start date: 1987     Quit date: 2007     Years since quittin.9    Smokeless tobacco: Never   Substance Use Topics    Alcohol use: Yes     Comment: Infrequently    Drug use: Not Currently       MEDICATIONS & ALLERGIES:     Current Outpatient Medications on File Prior to Visit   Medication Sig    amoxicillin-clavulanate 875-125mg (AUGMENTIN) 875-125 mg per tablet Take 1 tablet by mouth every 12 (twelve) hours. for 12 days    ferrous gluconate 324 mg (37.5 mg iron) Tab tablet Take 1 tablet (324 mg total) by mouth 2 (two) times daily with meals.    mupirocin (BACTROBAN) 2 % ointment Apply by Nasal route 2 (two) times daily. for 4 days (Patient not taking: Reported on 2024)    pantoprazole (PROTONIX) 40 MG tablet Take 1 tablet (40 mg total) by mouth once daily.    rosuvastatin (CRESTOR) 20 MG tablet Take 1 tablet (20 mg total) by mouth once daily.    senna-docusate 8.6-50 mg (PERICOLACE) 8.6-50 mg per tablet Take 2 tablets by mouth once daily. for 7 days (Patient taking differently: Take 2 tablets by mouth daily as needed.)     No current facility-administered medications on file prior to visit.       Review of patient's allergies indicates:  No Known Allergies    OBJECTIVE:     Vital Signs:  Vitals:    24 1006   BP: (P) 120/70   BP Location: Right arm   Patient Position:  "Sitting   Pulse: 92   SpO2: 98%   Weight: 60.8 kg (134 lb 0.6 oz)   Height: 5' 8" (1.727 m)       Body mass index is 20.38 kg/m².     Physical Exam  Vitals and nursing note reviewed.   Constitutional:       Appearance: Normal appearance.   HENT:      Head: Normocephalic and atraumatic.   Eyes:      General: No scleral icterus.  Cardiovascular:      Rate and Rhythm: Normal rate and regular rhythm.      Heart sounds: No murmur heard.     No friction rub. No gallop.   Pulmonary:      Effort: Pulmonary effort is normal.      Breath sounds: Normal breath sounds. No wheezing, rhonchi or rales.   Abdominal:      Comments: Drain in place posteriorly on the R - non TTP, non-erythematous. Minimal drain output, serosanguinous fluid collection. Please see Media tab.     Musculoskeletal:      Right lower leg: No edema.      Left lower leg: No edema.   Skin:     General: Skin is warm.   Neurological:      Mental Status: He is alert and oriented to person, place, and time.         Health Maintenance Due   Topic Date Due    RSV Vaccine (Age 60+ and Pregnant patients) (1 - Risk 60-74 years 1-dose series) Never done         ASSESSMENT & PLAN:   Mr. Felipe Jiménez is a 69 y.o. male here for hospital f/u regarding his perinephric abscess    Hospital discharge follow-up  -- Patient is clinically stable, adhering to his Augmentin. LIZA 12/18, patient counseled to finish his course of antibiotics.  -- Patient lacks a PCP, he will f/u with me in 1 mo for a more comprehensive w/u of his chronic conditions and health maintenance.      Perinephric abscess  -- Cont Augmentin to 12/18  -- F/u urology and repeat CT A/P on 12/19.   -- Patient not showing signs of a progressing infection, minimal drain output.           RTC in 1 mo    Discussed with Dr. Melendez - staff attestation to follow      Shakir Mckay DO   Internal Medicine, PGY-1  [facility]  [address], [place] [id number] [phone]    "

## 2024-12-19 ENCOUNTER — HOSPITAL ENCOUNTER (OUTPATIENT)
Dept: RADIOLOGY | Facility: HOSPITAL | Age: 69
Discharge: HOME | End: 2024-12-19
Payer: MEDICARE

## 2024-12-19 DIAGNOSIS — N15.1 PERINEPHRIC ABSCESS: ICD-10-CM

## 2024-12-19 PROCEDURE — 25500020 PHARM REV CODE 255: Performed by: STUDENT IN AN ORGANIZED HEALTH CARE EDUCATION/TRAINING PROGRAM

## 2024-12-19 PROCEDURE — 74177 CT ABD & PELVIS W/CONTRAST: CPT | Mod: TC

## 2024-12-19 PROCEDURE — 74177 CT ABD & PELVIS W/CONTRAST: CPT | Mod: 26,,, | Performed by: RADIOLOGY

## 2024-12-19 RX ADMIN — IOHEXOL 75 ML: 350 INJECTION, SOLUTION INTRAVENOUS at 02:12

## 2024-12-19 RX ADMIN — DIATRIZOATE MEGLUMINE AND DIATRIZOATE SODIUM 30 ML: 660; 100 LIQUID ORAL; RECTAL at 02:12

## 2024-12-23 ENCOUNTER — TELEPHONE (OUTPATIENT)
Dept: INTERNAL MEDICINE | Facility: CLINIC | Age: 69
End: 2024-12-23
Payer: MEDICARE

## 2024-12-23 ENCOUNTER — TELEPHONE (OUTPATIENT)
Dept: UROLOGY | Facility: CLINIC | Age: 69
End: 2024-12-23
Payer: MEDICARE

## 2024-12-23 NOTE — TELEPHONE ENCOUNTER
----- Message from Juliana sent at 12/23/2024  2:05 PM CST -----  Contact: [phone]/ Jennie/daughter  [id number]    Type: Test Results    What test was performed? Ct    Who ordered the test? Areef    When and where were the test performed? [facility]    Would you like a call back and or thru [facility]: call back    Comments:Please call daughter to discuss results

## 2025-01-02 ENCOUNTER — PATIENT MESSAGE (OUTPATIENT)
Dept: INTERNAL MEDICINE | Facility: CLINIC | Age: 70
End: 2025-01-02
Payer: MEDICARE

## 2025-01-08 ENCOUNTER — TELEPHONE (OUTPATIENT)
Dept: INTERNAL MEDICINE | Facility: CLINIC | Age: 70
End: 2025-01-08
Payer: MEDICARE

## 2025-01-08 NOTE — TELEPHONE ENCOUNTER
----- Message from Darlin sent at 1/7/2025 10:30 AM CST -----  Contact: [phone] pt  [email or web address]     Patient is calling for Medical Advice regarding:Medication     Patient wants a call back or thru [facility]:call back     Comments:Pt states his medication almost gone and would like a call back from nurse regarding getting medication     Please advise patient replies from provider may take up to 48 hours.

## 2025-01-08 NOTE — TELEPHONE ENCOUNTER
----- Message from Gloria sent at 1/8/2025  8:56 AM CST -----  Contact: [phone]  Patient is returning a phone call.  Who left a message for the patient: Zenobia  Does patient know what this is regarding:    Would you like a call back, or a response through your [facility] portal?:   call back   Comments:      Pt missed a call

## 2025-01-08 NOTE — TELEPHONE ENCOUNTER
Pt needs Iron pills and gluconate prescribed to him.     ----- Message from Gloria sent at 1/8/2025  8:56 AM CST -----  Contact: [phone]  Patient is returning a phone call.  Who left a message for the patient: Zenobia  Does patient know what this is regarding:    Would you like a call back, or a response through your [facility] portal?:   call back   Comments:      Pt missed a call

## 2025-01-10 ENCOUNTER — TELEPHONE (OUTPATIENT)
Dept: UROLOGY | Facility: CLINIC | Age: 70
End: 2025-01-10
Payer: MEDICARE

## 2025-01-10 ENCOUNTER — HOSPITAL ENCOUNTER (OUTPATIENT)
Dept: RADIOLOGY | Facility: HOSPITAL | Age: 70
Discharge: HOME | End: 2025-01-10
Payer: MEDICARE

## 2025-01-10 DIAGNOSIS — N20.0 NEPHROLITHIASIS: ICD-10-CM

## 2025-01-10 PROCEDURE — A9562 TC99M MERTIATIDE: HCPCS

## 2025-01-10 PROCEDURE — 78708 K FLOW/FUNCT IMAGE W/DRUG: CPT | Mod: 26,,, | Performed by: NUCLEAR MEDICINE

## 2025-01-10 PROCEDURE — 63600175 PHARM REV CODE 636 W HCPCS

## 2025-01-10 PROCEDURE — 78708 K FLOW/FUNCT IMAGE W/DRUG: CPT | Mod: TC

## 2025-01-10 RX ORDER — BETIATIDE 1 MG/1
5 INJECTION, POWDER, LYOPHILIZED, FOR SOLUTION INTRAVENOUS
Status: COMPLETED | OUTPATIENT
Start: 2025-01-10 | End: 2025-01-10

## 2025-01-10 RX ORDER — FUROSEMIDE 10 MG/ML
40 INJECTION INTRAMUSCULAR; INTRAVENOUS ONCE
Status: COMPLETED | OUTPATIENT
Start: 2025-01-10 | End: 2025-01-10

## 2025-01-10 RX ADMIN — FUROSEMIDE 40 MG: 10 INJECTION, SOLUTION INTRAMUSCULAR; INTRAVENOUS at 10:01

## 2025-01-10 RX ADMIN — TECHNESCAN TC 99M MERTIATIDE 5.45 MILLICURIE: 1 INJECTION, POWDER, LYOPHILIZED, FOR SOLUTION INTRAVENOUS at 10:01

## 2025-01-13 NOTE — PROGRESS NOTES
Subjective:       Patient ID: Felipe Jiménez is a 69 y.o. male.    Chief Complaint:  XGP kidney, hospital f/u      History of Present Illness  HPI  Patient is a 69 y.o. male who is established to our clinic and was initially referred by [facility] medicine Dr. Cid for evaluation of kidney stones.  Patient was seen on November 30th long explanation for at which time he had presented the day prior to back with weakness, dizziness and fall.  Patient found to be anemic and was transfused prior to Urology consultation.  Urology was consulted for right hydronephrosis in the setting of an obstructing UPJ stone as well as fever.  You and I urgently lives in the operating arrange for a cystoscopy and right ureteral stent placement on 11/30/2024.    Postoperatively, the patient continued to spike fever session that postoperative phone confirmed a low enough the abscess for which he underwent an IR drain placement on 12/01/2024.    He returns today to review results of CT scan as well as a mag 3 renal scan.     Mag 3 renal scan from 1/10/25 was independently reviewed today and reveals 87% left 13% right renal function.    CT scan of the abdomen and pelvis from 12/19/24 was independently reviewed today and reveals perinephric drain in position with resolution of abscess, stent in good position without hydronephrosis.    Most recent serum Cr was 1.1, eGFR >60 on 12/6/24.       Review of Systems  Review of Systems   Constitutional:  Negative for chills, fever and unexpected weight change.   Gastrointestinal:  Negative for nausea and vomiting.   Genitourinary:  Positive for frequency and urgency. Negative for dysuria and hematuria.     All other systems reviewed and negative except pertinent positives noted in HPI.       Objective:     Physical Exam  Constitutional:       General: He is not in acute distress.     Appearance: He is well-developed.   HENT:      Head: Normocephalic and atraumatic.   Eyes:      General: No scleral  icterus.  Neck:      Trachea: No tracheal deviation.   Pulmonary:      Effort: Pulmonary effort is normal. No respiratory distress.   Neurological:      Mental Status: He is alert and oriented to person, place, and time.   Psychiatric:         Behavior: Behavior normal.         Thought Content: Thought content normal.         Judgment: Judgment normal.       Lab Review  Lab Results   Component Value Date    COLORU Orange (A) 11/29/2024    SPECGRAV 1.020 11/29/2024    PHUR 6.0 11/29/2024    NITRITE Negative 11/29/2024    KETONESU Negative 11/29/2024    UROBILINOGEN 4 (A) 01/28/2004         Assessment:        1. Perinephric abscess    2. Hydronephrosis of right kidney    3. Kidney stones    4. XGP (xanthogranulomatous pyelonephritis)            Plan:     Perinephric abscess    Hydronephrosis of right kidney    Kidney stones    XGP (xanthogranulomatous pyelonephritis)      -removed drain  -maintain stent  --I have explained the risk, benefits, and alternatives of the procedure in detail.  The risks including but not limited to bleeding, injury to adjacent structures including the spleen, liver, lung, pancreas, colon and intestines, blood vessels in the abdomen including the renal artery or renal vein, or need for conversion to open nephrectomy were explained to the patient in depth. The patient voices understanding and all questions have been answered. The patient would like to think about a possible right robotic nephrectomy, possible open  -he did not want to commit to a treatment plan.  -we discussed stone management as an alternative, but given the lack of kidney function, severe infection, coupled with the relatively high complexity of the stone burden (and therefore increased chance of residual stones after a PCNL), I strongly recommended that the patient consider nephrectomy.    -we also discussed that his ureteral stent has been in place for 6 weeks and would at the least need to be changed at the 3 month claudine.    He voiced his understanding.  He will notify us of his treatment decision after considering options.     -urine culture today.       The patient was seen and examined with the resident physician.  All questions were answered.  The plan was discussed with the patient and I concur with the resident physician's documentation.

## 2025-01-14 ENCOUNTER — OFFICE VISIT (OUTPATIENT)
Dept: UROLOGY | Facility: CLINIC | Age: 70
End: 2025-01-14
Payer: MEDICARE

## 2025-01-14 VITALS
WEIGHT: 145.31 LBS | HEART RATE: 76 BPM | BODY MASS INDEX: 22.02 KG/M2 | HEIGHT: 68 IN | SYSTOLIC BLOOD PRESSURE: 135 MMHG | DIASTOLIC BLOOD PRESSURE: 82 MMHG

## 2025-01-14 DIAGNOSIS — N13.30 HYDRONEPHROSIS OF RIGHT KIDNEY: ICD-10-CM

## 2025-01-14 DIAGNOSIS — N11.8 XGP (XANTHOGRANULOMATOUS PYELONEPHRITIS): ICD-10-CM

## 2025-01-14 DIAGNOSIS — N15.1 PERINEPHRIC ABSCESS: Primary | ICD-10-CM

## 2025-01-14 DIAGNOSIS — N20.0 NEPHROLITHIASIS: ICD-10-CM

## 2025-01-14 DIAGNOSIS — N20.0 KIDNEY STONES: ICD-10-CM

## 2025-01-14 LAB
BACTERIA #/AREA URNS AUTO: ABNORMAL /HPF
BILIRUB UR QL STRIP: NEGATIVE
CAOX CRY UR QL COMP ASSIST: ABNORMAL
CLARITY UR REFRACT.AUTO: ABNORMAL
COLOR UR AUTO: YELLOW
GLUCOSE UR QL STRIP: NEGATIVE
HGB UR QL STRIP: ABNORMAL
HYALINE CASTS UR QL AUTO: 0 /LPF
KETONES UR QL STRIP: NEGATIVE
LEUKOCYTE ESTERASE UR QL STRIP: ABNORMAL
MICROSCOPIC COMMENT: ABNORMAL
NITRITE UR QL STRIP: NEGATIVE
PH UR STRIP: 7 [PH] (ref 5–8)
PROT UR QL STRIP: ABNORMAL
RBC #/AREA URNS AUTO: >100 /HPF (ref 0–4)
SP GR UR STRIP: 1.01 (ref 1–1.03)
URN SPEC COLLECT METH UR: ABNORMAL
WBC #/AREA URNS AUTO: 62 /HPF (ref 0–5)

## 2025-01-14 PROCEDURE — 81001 URINALYSIS AUTO W/SCOPE: CPT | Performed by: UROLOGY

## 2025-01-14 PROCEDURE — 1159F MED LIST DOCD IN RCRD: CPT | Mod: CPTII,S$GLB,, | Performed by: UROLOGY

## 2025-01-14 PROCEDURE — 1126F AMNT PAIN NOTED NONE PRSNT: CPT | Mod: CPTII,S$GLB,, | Performed by: UROLOGY

## 2025-01-14 PROCEDURE — 87086 URINE CULTURE/COLONY COUNT: CPT | Performed by: UROLOGY

## 2025-01-14 PROCEDURE — 3008F BODY MASS INDEX DOCD: CPT | Mod: CPTII,S$GLB,, | Performed by: UROLOGY

## 2025-01-14 PROCEDURE — 3079F DIAST BP 80-89 MM HG: CPT | Mod: CPTII,S$GLB,, | Performed by: UROLOGY

## 2025-01-14 PROCEDURE — 1160F RVW MEDS BY RX/DR IN RCRD: CPT | Mod: CPTII,S$GLB,, | Performed by: UROLOGY

## 2025-01-14 PROCEDURE — 99215 OFFICE O/P EST HI 40 MIN: CPT | Mod: S$GLB,,, | Performed by: UROLOGY

## 2025-01-14 PROCEDURE — 99999 PR PBB SHADOW E&M-EST. PATIENT-LVL III: CPT | Mod: PBBFAC,,, | Performed by: UROLOGY

## 2025-01-14 PROCEDURE — 3075F SYST BP GE 130 - 139MM HG: CPT | Mod: CPTII,S$GLB,, | Performed by: UROLOGY

## 2025-01-15 ENCOUNTER — OFFICE VISIT (OUTPATIENT)
Dept: INFECTIOUS DISEASES | Facility: CLINIC | Age: 70
End: 2025-01-15
Payer: MEDICARE

## 2025-01-15 VITALS
WEIGHT: 142.19 LBS | DIASTOLIC BLOOD PRESSURE: 81 MMHG | BODY MASS INDEX: 21.55 KG/M2 | HEART RATE: 90 BPM | HEIGHT: 68 IN | TEMPERATURE: 98 F | SYSTOLIC BLOOD PRESSURE: 151 MMHG

## 2025-01-15 DIAGNOSIS — N20.0 NEPHROLITHIASIS: ICD-10-CM

## 2025-01-15 DIAGNOSIS — N15.1 PERINEPHRIC ABSCESS: Primary | ICD-10-CM

## 2025-01-15 LAB
BACTERIA UR CULT: NO GROWTH
BACTERIA UR CULT: NO GROWTH

## 2025-01-15 PROCEDURE — 1160F RVW MEDS BY RX/DR IN RCRD: CPT | Mod: CPTII,S$GLB,, | Performed by: INTERNAL MEDICINE

## 2025-01-15 PROCEDURE — 3008F BODY MASS INDEX DOCD: CPT | Mod: CPTII,S$GLB,, | Performed by: INTERNAL MEDICINE

## 2025-01-15 PROCEDURE — 99999 PR PBB SHADOW E&M-EST. PATIENT-LVL III: CPT | Mod: PBBFAC,,, | Performed by: INTERNAL MEDICINE

## 2025-01-15 PROCEDURE — 99214 OFFICE O/P EST MOD 30 MIN: CPT | Mod: S$GLB,,, | Performed by: INTERNAL MEDICINE

## 2025-01-15 PROCEDURE — 1126F AMNT PAIN NOTED NONE PRSNT: CPT | Mod: CPTII,S$GLB,, | Performed by: INTERNAL MEDICINE

## 2025-01-15 PROCEDURE — 1159F MED LIST DOCD IN RCRD: CPT | Mod: CPTII,S$GLB,, | Performed by: INTERNAL MEDICINE

## 2025-01-15 PROCEDURE — 3288F FALL RISK ASSESSMENT DOCD: CPT | Mod: CPTII,S$GLB,, | Performed by: INTERNAL MEDICINE

## 2025-01-15 PROCEDURE — 3079F DIAST BP 80-89 MM HG: CPT | Mod: CPTII,S$GLB,, | Performed by: INTERNAL MEDICINE

## 2025-01-15 PROCEDURE — 3077F SYST BP >= 140 MM HG: CPT | Mod: CPTII,S$GLB,, | Performed by: INTERNAL MEDICINE

## 2025-01-15 PROCEDURE — 1100F PTFALLS ASSESS-DOCD GE2>/YR: CPT | Mod: CPTII,S$GLB,, | Performed by: INTERNAL MEDICINE

## 2025-01-15 NOTE — PROGRESS NOTES
Infectious Disease Clinic  [facility]    Subjective:      Patient ID:Cesilia Jiménez is a 69 y.o. male       Chief Complaint:   Chief Complaint   Patient presents with    Hospital Follow Up       History of Present Illness    A 69 y.o. male patient with LENORA, right hydronephrosis, nephrolithiasis, and xanthogranulomatous pyelonephritis  who is seen for hospital follow up. Mr. Jiménez was admitted to the hospital in December 2024 at which time imaging showed changes concerning for a perinephric abscess. He had ureteral stent placement as well as IR abscess drain placement on 11/30/24. Unfortunately, culture remained no growth. His antimicrobial therapy was optimized from Zosyn to Unasyn and he was discharged on a course of Augmentin which he completed on or around 12/18. He is here for follow up today and feels well. He denied lower urinary tract symptoms today but reported frequency and urgency during Urology visit on 1/14. He has repeat urinalysis with urine culture.     Review of Systems   HENT:  Positive for tinnitus.    Genitourinary:  Positive for urgency.   Psychiatric/Behavioral:  The patient has insomnia.    All other systems reviewed and are negative.      Objective:     Physical Exam  Vitals and nursing note reviewed.   Constitutional:       Appearance: He is well-developed.   HENT:      Head: Normocephalic and atraumatic.   Eyes:      General: No scleral icterus.        Right eye: No discharge.         Left eye: No discharge.      Conjunctiva/sclera: Conjunctivae normal.   Pulmonary:      Effort: Pulmonary effort is normal.   Musculoskeletal:         General: Normal range of motion.   Skin:     General: Skin is warm and dry.   Neurological:      Mental Status: He is alert and oriented to person, place, and time.   Psychiatric:         Behavior: Behavior normal.         Thought Content: Thought content normal.         Judgment: Judgment normal.         Assessment:       ICD-10-CM ICD-9-CM   1.  Perinephric abscess  N15.1 590.2   2. Nephrolithiasis  N20.0 592.0       Plan:   I have reviewed clinic notes, laboratory, imaging tests, and pathology from the referring provider and other providers, as indicated for this visit, with my interpretation as documented below.     Patient with perinephric abscess s/p IR drain placement. CT imaging performed on 12/19 showed resolution of the abscess with xanthogranulomatous pyelonephritis. He had drain removal with Urology on 1/14 and repeat urinalysis which shows pyuria.   Will follow up urine culture results to assess need for further antimicrobial therapy.   If urine culture is positive, then will plan treat with antibiotics for 2 weeks and repeat course at time of stent exchange  RTC as needed.     The total time for evaluation and management services performed on 1/15/25 was 30 minutes

## 2025-01-16 ENCOUNTER — PATIENT MESSAGE (OUTPATIENT)
Dept: INTERNAL MEDICINE | Facility: CLINIC | Age: 70
End: 2025-01-16
Payer: MEDICARE

## 2025-01-16 DIAGNOSIS — D50.9 IRON DEFICIENCY ANEMIA, UNSPECIFIED IRON DEFICIENCY ANEMIA TYPE: Primary | ICD-10-CM

## 2025-01-16 RX ORDER — FERROUS GLUCONATE 324(37.5)
324 TABLET ORAL 2 TIMES DAILY WITH MEALS
Qty: 60 TABLET | Refills: 11 | Status: SHIPPED | OUTPATIENT
Start: 2025-01-16 | End: 2025-01-16

## 2025-01-24 ENCOUNTER — TELEPHONE (OUTPATIENT)
Dept: INTERNAL MEDICINE | Facility: CLINIC | Age: 70
End: 2025-01-24
Payer: MEDICARE

## 2025-01-24 DIAGNOSIS — E78.00 HYPERCHOLESTEROLEMIA: ICD-10-CM

## 2025-01-24 NOTE — TELEPHONE ENCOUNTER
----- Message from Alyssa sent at 1/21/2025  1:26 PM CST -----  Contact: [phone]  Requesting an RX refill or new RX.    Is this a refill or new RX: refill    RX name and strength (copy/paste from chart):  rosuvastatin (CRESTOR) 20 MG tablet    Is this a 30 day or 90 day RX:     Pharmacy name and phone # (copy/paste from chart):   [facility] [place], [place] - [address]  [address] [facility] [address]  Phone: [phone] Fax: [phone]

## 2025-01-27 ENCOUNTER — LAB VISIT (OUTPATIENT)
Dept: LAB | Facility: HOSPITAL | Age: 70
End: 2025-01-27
Payer: MEDICARE

## 2025-01-27 ENCOUNTER — OFFICE VISIT (OUTPATIENT)
Dept: INTERNAL MEDICINE | Facility: CLINIC | Age: 70
End: 2025-01-27
Payer: MEDICARE

## 2025-01-27 VITALS
DIASTOLIC BLOOD PRESSURE: 60 MMHG | BODY MASS INDEX: 22.45 KG/M2 | OXYGEN SATURATION: 99 % | SYSTOLIC BLOOD PRESSURE: 110 MMHG | HEIGHT: 68 IN | HEART RATE: 75 BPM | WEIGHT: 148.13 LBS

## 2025-01-27 DIAGNOSIS — Z00.00 HEALTHCARE MAINTENANCE: ICD-10-CM

## 2025-01-27 DIAGNOSIS — R73.03 PREDIABETES: ICD-10-CM

## 2025-01-27 DIAGNOSIS — Z76.89 ESTABLISHING CARE WITH NEW DOCTOR, ENCOUNTER FOR: Primary | ICD-10-CM

## 2025-01-27 DIAGNOSIS — M76.31 ILIOTIBIAL BAND SYNDROME OF RIGHT SIDE: ICD-10-CM

## 2025-01-27 DIAGNOSIS — Z76.89 ESTABLISHING CARE WITH NEW DOCTOR, ENCOUNTER FOR: ICD-10-CM

## 2025-01-27 DIAGNOSIS — E78.00 HYPERCHOLESTEROLEMIA: ICD-10-CM

## 2025-01-27 DIAGNOSIS — D50.9 IRON DEFICIENCY ANEMIA, UNSPECIFIED IRON DEFICIENCY ANEMIA TYPE: ICD-10-CM

## 2025-01-27 LAB
ALBUMIN SERPL BCP-MCNC: 3.8 G/DL (ref 3.5–5.2)
ALP SERPL-CCNC: 67 U/L (ref 40–150)
ALT SERPL W/O P-5'-P-CCNC: 15 U/L (ref 10–44)
ANION GAP SERPL CALC-SCNC: 7 MMOL/L (ref 8–16)
AST SERPL-CCNC: 18 U/L (ref 10–40)
BASOPHILS # BLD AUTO: 0.01 K/UL (ref 0–0.2)
BASOPHILS NFR BLD: 0.1 % (ref 0–1.9)
BILIRUB SERPL-MCNC: 0.3 MG/DL (ref 0.1–1)
BUN SERPL-MCNC: 20 MG/DL (ref 8–23)
CALCIUM SERPL-MCNC: 10.1 MG/DL (ref 8.7–10.5)
CHLORIDE SERPL-SCNC: 109 MMOL/L (ref 95–110)
CO2 SERPL-SCNC: 24 MMOL/L (ref 23–29)
CREAT SERPL-MCNC: 1.1 MG/DL (ref 0.5–1.4)
DIFFERENTIAL METHOD BLD: ABNORMAL
EOSINOPHIL # BLD AUTO: 0.1 K/UL (ref 0–0.5)
EOSINOPHIL NFR BLD: 0.8 % (ref 0–8)
ERYTHROCYTE [DISTWIDTH] IN BLOOD BY AUTOMATED COUNT: 23.4 % (ref 11.5–14.5)
EST. GFR  (NO RACE VARIABLE): >60 ML/MIN/1.73 M^2
ESTIMATED AVG GLUCOSE: 94 MG/DL (ref 68–131)
GLUCOSE SERPL-MCNC: 89 MG/DL (ref 70–110)
HBA1C MFR BLD: 4.9 % (ref 4–5.6)
HCT VFR BLD AUTO: 40 % (ref 40–54)
HGB BLD-MCNC: 12.3 G/DL (ref 14–18)
IMM GRANULOCYTES # BLD AUTO: 0.02 K/UL (ref 0–0.04)
IMM GRANULOCYTES NFR BLD AUTO: 0.3 % (ref 0–0.5)
LYMPHOCYTES # BLD AUTO: 1.4 K/UL (ref 1–4.8)
LYMPHOCYTES NFR BLD: 18.5 % (ref 18–48)
MCH RBC QN AUTO: 28.9 PG (ref 27–31)
MCHC RBC AUTO-ENTMCNC: 30.8 G/DL (ref 32–36)
MCV RBC AUTO: 94 FL (ref 82–98)
MONOCYTES # BLD AUTO: 0.4 K/UL (ref 0.3–1)
MONOCYTES NFR BLD: 5.6 % (ref 4–15)
NEUTROPHILS # BLD AUTO: 5.8 K/UL (ref 1.8–7.7)
NEUTROPHILS NFR BLD: 74.7 % (ref 38–73)
NRBC BLD-RTO: 0 /100 WBC
PLATELET # BLD AUTO: 310 K/UL (ref 150–450)
PMV BLD AUTO: 9.8 FL (ref 9.2–12.9)
POTASSIUM SERPL-SCNC: 4 MMOL/L (ref 3.5–5.1)
PROT SERPL-MCNC: 8.8 G/DL (ref 6–8.4)
RBC # BLD AUTO: 4.25 M/UL (ref 4.6–6.2)
SODIUM SERPL-SCNC: 140 MMOL/L (ref 136–145)
WBC # BLD AUTO: 7.79 K/UL (ref 3.9–12.7)

## 2025-01-27 PROCEDURE — 3074F SYST BP LT 130 MM HG: CPT | Mod: CPTII,GE,S$GLB,

## 2025-01-27 PROCEDURE — 3078F DIAST BP <80 MM HG: CPT | Mod: CPTII,GE,S$GLB,

## 2025-01-27 PROCEDURE — 3008F BODY MASS INDEX DOCD: CPT | Mod: CPTII,GE,S$GLB,

## 2025-01-27 PROCEDURE — 1126F AMNT PAIN NOTED NONE PRSNT: CPT | Mod: CPTII,GE,S$GLB,

## 2025-01-27 PROCEDURE — 85025 COMPLETE CBC W/AUTO DIFF WBC: CPT

## 2025-01-27 PROCEDURE — 1101F PT FALLS ASSESS-DOCD LE1/YR: CPT | Mod: CPTII,GE,S$GLB,

## 2025-01-27 PROCEDURE — 83036 HEMOGLOBIN GLYCOSYLATED A1C: CPT

## 2025-01-27 PROCEDURE — 3288F FALL RISK ASSESSMENT DOCD: CPT | Mod: CPTII,GE,S$GLB,

## 2025-01-27 PROCEDURE — 36415 COLL VENOUS BLD VENIPUNCTURE: CPT

## 2025-01-27 PROCEDURE — 99999 PR PBB SHADOW E&M-EST. PATIENT-LVL III: CPT | Mod: PBBFAC,GC,,

## 2025-01-27 PROCEDURE — 99213 OFFICE O/P EST LOW 20 MIN: CPT | Mod: GE,S$GLB,,

## 2025-01-27 PROCEDURE — 1159F MED LIST DOCD IN RCRD: CPT | Mod: CPTII,GE,S$GLB,

## 2025-01-27 PROCEDURE — 80053 COMPREHEN METABOLIC PANEL: CPT

## 2025-01-27 PROCEDURE — 3044F HG A1C LEVEL LT 7.0%: CPT | Mod: CPTII,GE,S$GLB,

## 2025-01-27 NOTE — PROGRESS NOTES
Subjective     Chief Complaint: Establish care visit    History of Present Illness:  Mr. Felipe Jiménez is a 69 y.o. male w/ LENORA, HLD who presents to establish care with me. He is c/o R hip pain that began approximately 3 months prior, he says it originally presented at once a week and has now progressed to 2-3 times weekly. It is worse when laying on his back, it somewhat improves when laying on his L side and tylenol provides relief. 1 week ago he said the pain began to radiate in a shock like pattern down to his R knee. He also c/o R groin pain that began during his most recent hospitalization, says it is worse with crossing his R leg, dully/achy, both pain are described as 3/10 on the pain scale.     LENORA  Patient adherent with iron supplementation - no bloody stool or urine noted. No constipation reported.     HLD  He is adherent with his crestor, denies myalgias. He is attempting to exercise, however his recent hospitalization has deconditioned him and it is taking him longer to re-acclimate.     Patient is a former smoker and quit > 15 years ago.         Review of Systems   All other systems reviewed and are negative.      PAST HISTORY:     Past Medical History:   Diagnosis Date    Hyperlipidemia     Male erectile dysfunction, unspecified        Past Surgical History:   Procedure Laterality Date    COLONOSCOPY N/A 10/2/2024    Procedure: COLONOSCOPY;  Surgeon: Brennan Balderrama MD;  Location: [facility];  Service: Endoscopy;  Laterality: N/A;    CYSTOSCOPY W/ URETERAL STENT PLACEMENT Right 11/30/2024    Procedure: CYSTOSCOPY, WITH URETERAL STENT INSERTION;  Surgeon: Dain Eugene MD;  Location: [address];  Service: Urology;  Laterality: Right;    DILATION OF URETHRA N/A 11/30/2024    Procedure: DILATION, URETHRA;  Surgeon: Dain Eugene MD;  Location: [facility] OR [address] [place] [address];  Service: Urology;  Laterality: N/A;    ESOPHAGOGASTRODUODENOSCOPY N/A 10/2/2024    Procedure: EGD  "(ESOPHAGOGASTRODUODENOSCOPY);  Surgeon: Brennan Balderrama MD;  [facility];  Service: Endoscopy;  Laterality: N/A;    FRACTURE SURGERY      INTRALUMINAL GASTROINTESTINAL TRACT IMAGING VIA CAPSULE N/A 10/22/2024    Procedure: IMAGING PROCEDURE, GI TRACT, INTRALUMINAL, VIA CAPSULE;  Surgeon: Brennan Balderrama MD;  [facility];  Service: Endoscopy;  Laterality: N/A;    LIPOMA RESECTION Left     left knee    QUADRICEPS TENDON REPAIR Left        Family History   Problem Relation Name Age of Onset    Heart disease Mother      Heart attacks under age 50 Mother  43         2/2 heart attack.    Heart disease Father      Heart attacks under age 50 Father  38         2/2 heart attack.       Social History     Tobacco Use    Smoking status: Former     Current packs/day: 0.00     Average packs/day: 1 pack/day for 20.0 years (20.0 ttl pk-yrs)     Types: Cigarettes     Start date: 1987     Quit date: 2007     Years since quittin.0    Smokeless tobacco: Never   Substance Use Topics    Alcohol use: Yes     Comment: Infrequently    Drug use: Not Currently       MEDICATIONS & ALLERGIES:     Current Outpatient Medications on File Prior to Visit   Medication Sig    pantoprazole (PROTONIX) 40 MG tablet Take 1 tablet (40 mg total) by mouth once daily.    rosuvastatin (CRESTOR) 20 MG tablet Take 1 tablet (20 mg total) by mouth once daily.     No current facility-administered medications on file prior to visit.       Review of patient's allergies indicates:  No Known Allergies    OBJECTIVE:     Vital Signs:  Vitals:    25 1040   BP: 110/60   BP Location: Right arm   Patient Position: Sitting   Pulse: 75   SpO2: 99%   Weight: 67.2 kg (148 lb 2.4 oz)   Height: 5' 8" (1.727 m)       Body mass index is 22.53 kg/m².     Physical Exam  Vitals and nursing note reviewed.   Constitutional:       General: He is not in acute distress.     Appearance: Normal " appearance. He is not ill-appearing.   HENT:      Head: Normocephalic and atraumatic.   Eyes:      General: No scleral icterus.  Cardiovascular:      Rate and Rhythm: Normal rate and regular rhythm.   Pulmonary:      Effort: Pulmonary effort is normal.      Breath sounds: Normal breath sounds. No wheezing, rhonchi or rales.   Abdominal:      Tenderness: There is no abdominal tenderness.   Musculoskeletal:         General: Tenderness (R sided iliotibial band tenderness) present. Normal range of motion.      Right lower leg: No edema.      Left lower leg: No edema.      Comments: B/L SLR (-)   Neurological:      Mental Status: He is alert and oriented to person, place, and time.   Psychiatric:         Behavior: Behavior normal.         Health Maintenance Due   Topic Date Due    RSV Vaccine (Age 60+ and Pregnant patients) (1 - Risk 60-74 years 1-dose series) Never done         ASSESSMENT & PLAN:   Mr. Felipe Jiménez is a 69 y.o. male w/ LENORA and HLD    Establishing care with new doctor, encounter for  -     HEMOGLOBIN A1C; Future; Expected date: 01/27/2025  -     CBC W/ AUTO DIFFERENTIAL; Future; Expected date: 01/27/2025  -     COMPREHENSIVE METABOLIC PANEL; Future; Expected date: 01/27/2025  -     Patient pre-diabetic, will reassess this visit. Patient was also found to have an elevated ALP on his last CMP, will reassess to r/o hepatotoxicity 2/2 statin.     Iron deficiency anemia, unspecified iron deficiency anemia type        - Continue Iron supplementation  Hypercholesterolemia        - Continue Crestor  Iliotibial band syndrome of right side  -     Ambulatory Referral/Consult to Physical/Occupational Therapy; Future; Expected date: 02/03/2025  - Patient with iliotibial band syndrome, possibly due to deconditioning from recent hospitalization. Will send to PT/OT, in the event this does not subside symptoms I will image his R hip.     Prediabetes  -     HEMOGLOBIN A1C; Future; Expected date: 01/27/2025  -     CBC W/  AUTO DIFFERENTIAL; Future; Expected date: 01/27/2025  Healthcare Maintenance       -      Patient states he recevied the Pneumonia vaccine and Shingrix at his former PCP in [place]. He will contact the PCP and inform of this, if he did not receive the vaccines he is open to getting them.          RTC in 4 weeks    Discussed with Dr. Schuler - staff attestation to follow      Shakir Mckay DO   Internal Medicine, PGY-1  [facility]  [address] [place] [id number]  [phone]

## 2025-01-31 ENCOUNTER — EXTERNAL HOME HEALTH (OUTPATIENT)
Dept: HOME HEALTH SERVICES | Facility: HOSPITAL | Age: 70
End: 2025-01-31
Payer: MEDICARE

## 2025-01-31 ENCOUNTER — PATIENT MESSAGE (OUTPATIENT)
Dept: UROLOGY | Facility: CLINIC | Age: 70
End: 2025-01-31
Payer: MEDICARE

## 2025-01-31 ENCOUNTER — TELEPHONE (OUTPATIENT)
Dept: INTERNAL MEDICINE | Facility: CLINIC | Age: 70
End: 2025-01-31
Payer: MEDICARE

## 2025-01-31 DIAGNOSIS — R77.9 ELEVATED SERUM PROTEIN LEVEL: Primary | ICD-10-CM

## 2025-01-31 NOTE — TELEPHONE ENCOUNTER
Spoke to patient on the phone regarding his elevated total protein (TP) on most recent CMP. Explained to patient I would like to repeat testing to ensure this was not a transient rise in serum protein, of note patient has had elevated TP that has resolved with repeat testing.

## 2025-02-03 ENCOUNTER — NURSE TRIAGE (OUTPATIENT)
Dept: ADMINISTRATIVE | Facility: CLINIC | Age: 70
End: 2025-02-03
Payer: MEDICARE

## 2025-02-03 ENCOUNTER — TELEPHONE (OUTPATIENT)
Dept: UROLOGY | Facility: CLINIC | Age: 70
End: 2025-02-03
Payer: MEDICARE

## 2025-02-04 ENCOUNTER — CLINICAL SUPPORT (OUTPATIENT)
Dept: REHABILITATION | Facility: HOSPITAL | Age: 70
End: 2025-02-04
Payer: MEDICARE

## 2025-02-04 DIAGNOSIS — N28.9 NON-FUNCTIONING KIDNEY: ICD-10-CM

## 2025-02-04 DIAGNOSIS — M76.31 ILIOTIBIAL BAND SYNDROME OF RIGHT SIDE: ICD-10-CM

## 2025-02-04 DIAGNOSIS — N11.8 XGP (XANTHOGRANULOMATOUS PYELONEPHRITIS): Primary | ICD-10-CM

## 2025-02-04 PROCEDURE — 97110 THERAPEUTIC EXERCISES: CPT | Mod: PN

## 2025-02-04 PROCEDURE — 97162 PT EVAL MOD COMPLEX 30 MIN: CPT | Mod: PN

## 2025-02-04 NOTE — PATIENT INSTRUCTIONS
Piriformis stretch with trunk flexion - 2'  Piriformis stretch with knee depression  - 2'  Garo stretch with and without strap - 3'  Long sitting hamsting stretch - 2

## 2025-02-04 NOTE — PROGRESS NOTES
Outpatient Rehab    Physical Therapy Evaluation    Patient Name: Felipe Jiménez  MRN: [id number]  YOB: 1955  Today's Date: 2/4/2025    Therapy Diagnosis:   Encounter Diagnosis   Name Primary?    Iliotibial band syndrome of right side      Physician: Ada Schuler MD        Physician Orders: PT Eval and Treat   Medical Diagnosis from Referral: iliotibial band pain right   Evaluation Date: 2/4/2025  Insurance Authorization Period Expiration: 1/27/25  Plan of Care Certification:  2/4/2025 to 4/22/25   Progress Note Due: 3/6/2025  Visit # / Visits authorized: 1/ 1  Visits Remaining - 0  PTA visit #: 0/6    Time In: 1100  Time Out: 1200  Total Appointment Time : 60 minutes  Total Billable Time: 60          Subjective   History of Present Illness  Felipe is a 69 y.o. male who reports to physical therapy with a chief concern of Right hip / IT band pain. According to the patient's chart, Felipe has a past medical history of Hyperlipidemia and Male erectile dysfunction, unspecified. Felipe has a past surgical history that includes Lipoma resection (Left, 1979); Quadriceps tendon repair (Left, 2012); Fracture surgery; Esophagogastroduodenoscopy (N/A, 10/2/2024); Colonoscopy (N/A, 10/2/2024); Intraluminal gastrointestinal tract imaging via capsule (N/A, 10/22/2024); Cystoscopy w/ ureteral stent placement (Right, 11/30/2024); and Dilation of urethra (N/A, 11/30/2024).    The patient reports a medical diagnosis of M76.31 (ICD-10-CM) - Iliotibial band syndrome of right side. The patient has experienced this issue since 12/30/24.   Diagnostic tests related to this condition: None.        History of Present Condition/Illness: Patient reports insidious onset of pain / having issues/discomfort. Mostly effects at night when trying to sleep. Effected with tossing and turing while sleep.     Pain     Patient reports a current pain level of 0/10. Pain at best is reported as 0/10. Pain at worst is reported as 4/10.    Location: right hip pain associated with movement  Clinical Progression (since onset): Stable  Pain Qualities: Aching, Sharp  Pain-Relieving Factors: Medications - over-the-counter  Pain-Aggravating Factors: Movement         Living Arrangements  Living Situation  Housing: Home with care/services  Support Systems: Spouse/significant other    Home Setup  Type of Structure: House  Number of Levels in Home: One level        Employment  Employment Status: Retired          Past Medical History/Physical Systems Review:   Felipe Jiménez  has a past medical history of Hyperlipidemia and Male erectile dysfunction, unspecified.    Felipe Jiménez  has a past surgical history that includes Lipoma resection (Left, 1979); Quadriceps tendon repair (Left, 2012); Fracture surgery; Esophagogastroduodenoscopy (N/A, 10/2/2024); Colonoscopy (N/A, 10/2/2024); Intraluminal gastrointestinal tract imaging via capsule (N/A, 10/22/2024); Cystoscopy w/ ureteral stent placement (Right, 11/30/2024); and Dilation of urethra (N/A, 11/30/2024).    Felipe has a current medication list which includes the following prescription(s): rosuvastatin.    Review of patient's allergies indicates:  No Known Allergies     Objective   Posture                 Bilaterally, hips are: Flexed       Lower Extremity Sensation  General Lumbar/Lower Extremity Sensation  Impaired: Right and Left                      Hip Range of Motion   Right Hip   Active (deg) Passive (deg) Pain   Flexion 70 90     Extension         ABduction 25 45     ADduction 30 30     External Rotation 90/90 60 50     External Rotation Prone         Internal Rotation 90/90 20 30     Internal Rotation Prone                  Knee Range of Motion   Right Knee   Active (deg) Passive (deg) Pain   Flexion 120       Extension             Left Knee   Active (deg) Passive (deg) Pain   Flexion 120       Extension                            Hip Strength - Planes of Motion   Right Strength Right Pain Left Strength  Left  Pain   Flexion (L2) 3+   4     Extension 3+   4     ABduction 3+   4     ADduction 3+   4     Internal Rotation 3+   4     External Rotation 3+   4             Lumbar/Pelvic Girdle Special Tests  Pelvic Girdle / Sacrum Tests  Positive: Right FADIR  Negative: Right LUISITO         Hip Special Tests  Intra-Articular/Impingement Tests  Positive: Right FADIR  Negative: Right LUISITO and Right Scour              Fall Risk  Functional mobility test results suggest the patient is not: At Risk for Falls  Sit to Stand Testing      The patient completed 13 repetitions of a sit to stand transfer in 30 seconds.           Gait Analysis  Base of Support: Normal    Right Side Walking Observations  Decreased: Step Length                 Intake Outcome Measure for FOTO Survey    Therapist reviewed FOTO scores for Felipe Jiménez on 2/4/2025.   FOTO report - see Media section or FOTO account episode details.     Intake Score: 73%    Treatment:                                     Patient's spiritual, cultural, and educational needs considered and patient agreeable to plan of care and goals.     Assessment & Plan   Assessment  Felipe presents with a condition of Moderate complexity.   Presentation of Symptoms: Stable  Will Comorbidities Impact Care: No       Impairments: Abnormal gait, Abnormal or restricted range of motion    Prognosis: Fair  Assessment Details: Patient sitting in a lana position where right lower extremity  is crossed. Patient reports that the pain moves and sometimes experiences shooting pain. Patient will benefit from skilled therapy     Plan  From a physical therapy perspective, the patient would benefit from: Skilled Rehab Services    Planned therapy interventions include: Therapeutic exercise, Therapeutic activities, Neuromuscular re-education, and Manual therapy.            Visit Frequency: 2 times Per Week for 12 Weeks.                     Goals:   Active       LTG       Pt. to demonstrate increased right hip  flexor flexibility via +15 degrees displayed during ely's test to improve ease with partial squatting         Start:  02/04/25    Expected End:  04/29/25            Pt. to demonstrate increased right  hamstring flexibility via +10 degrees displayed during  90/90 hamstring flexibility testst to improve ease with stair descent.       Start:  02/04/25    Expected End:  04/29/25               STG       Pt. to report decreased right hip pain </ = 2/10 at worst to increase tolerance for stair negotiation         Start:  02/04/25    Expected End:  04/29/25             Pt. to demonstrate increased right hip flexor flexibility via +10 degrees displayed during ely's test to improve ease with partial squatting        Start:  02/04/25    Expected End:  04/29/25                Easton Franco, PT

## 2025-02-04 NOTE — ANESTHESIA PAT ROS NOTE
02/04/2025  Felipe Jiménez is a 69 y.o., male.      Pre-op Assessment          Review of Systems           Anesthesia Assessment: Preoperative EQUATION    Planned Procedure: Procedure(s) (LRB):  DV5 ROBOTIC NEPHRECTOMY (Right)  Nephrectomy (Right)  Requested Anesthesia Type:General/Regional  Surgeon: Dain Eugene MD  Service: Urology  Known or anticipated Date of Surgery:2/27/2025    Surgeon notes: reviewed    Electronic QUestionnaire Assessment completed via nurse interview with patient.        Triage considerations:     Previous anesthesia records:GETA and No problems  11/30/2024 Cysto with stent  Airway:  Mallampati: II   Mouth Opening: Normal  Neck ROM: Normal ROM    Intubation:     Induction:  Rapid sequence induction    Intubated:  Postinduction    Mask Ventilation:  Not attempted    Attempted By:  CRNA    Method of Intubation:  Video laryngoscopy    Blade:  Nelson 3    Laryngeal View Grade: Grade I - full view of cords      Difficult Airway Encountered?: No      Complications:  None    Airway Device:  Oral endotracheal tube    Airway Device Size:  7.5    Style/Cuff Inflation:  Cuffed    Inflation Amount (mL):  5    Tube secured:  22    Secured at:  The lips    Placement Verified By:  Capnometry and Revisualization with laryngoscopy    Complicating Factors:  None    Findings Post-Intubation:  BS equal bilateral and atraumatic/condition of teeth unchanged    Last PCP note: within 3 months , within [facility]   Subspecialty notes: Gastroenterology, Infectious Disease, Urology    Other important co-morbidities: HLD and Xanthogranulomatous pyelonephritis       Tests already available:  Available tests,  within 3 months , within [facility] .   1/27/2025 CMP, CBC, A1c (4.9)  12/2/2024 EKG  11/30/2024 TTE (EF 50-55%)            Optimization:  Anesthesia Preop Clinic Assessment  Indicated.    Medical Opinion  Indicated.       Plan:    Testing:  T&S   Pre-anesthesia  visit       Visit focus: concerns in complex and/or prolonged anesthesia, position other than supine     Consultation:IM Perioperative Hospitalist     Patient  has previously scheduled Medical Appointment: 2/17/2025    Navigation: Tests Scheduled.              Consults scheduled.             Results will be tracked by Preop Clinic.

## 2025-02-06 ENCOUNTER — CLINICAL SUPPORT (OUTPATIENT)
Dept: REHABILITATION | Facility: HOSPITAL | Age: 70
End: 2025-02-06
Payer: MEDICARE

## 2025-02-06 DIAGNOSIS — M76.31 IT BAND SYNDROME, RIGHT: Primary | ICD-10-CM

## 2025-02-06 PROCEDURE — 97110 THERAPEUTIC EXERCISES: CPT | Mod: PN,CQ

## 2025-02-06 NOTE — PROGRESS NOTES
Outpatient Rehab    Physical Therapy Visit    Patient Name: Felipe Jiménez  MRN: [id number]  YOB: 1955  Today's Date: 2/6/2025    Therapy Diagnosis:   Encounter Diagnosis   Name Primary?    It band syndrome, right [M76.31] Yes     Physician: Ada Schuler MD    Physician Orders: PT Eval and Treat   Medical Diagnosis from Referral: iliotibial band pain right   Evaluation Date: 2/4/2025  Insurance Authorization Period Expiration: 1/27/25  Plan of Care Certification:  2/4/2025 to 4/22/25   Progress Note Due: 3/6/2025  Visit # / Visits authorized: 1/ 12  Visits Remaining - 10  PTA visit #: 1/6    Time In: 10:00    Time Out: 11:00   Total Time: 60 min    Total Billable Time: 30 min         Subjective   No pain today.  Pain reported as 0/10.      Objective            Treatment:  Therapeutic Exercise  Therapeutic Exercise Activity 1: Bike - 10'  Therapeutic Exercise Activity 2: LTR 3x10  Therapeutic Exercise Activity 3: Bridges 3x10  Therapeutic Exercise Activity 4: SLR 3x10  Therapeutic Exercise Activity 5: Sidelying hip abduction 3x10 ea  Therapeutic Exercise Activity 6: IT band stretch sidelying - 3' (only 1.5' tolerated)    Patient's spiritual, cultural, and educational needs considered and patient agreeable to plan of care and goals.     Assessment & Plan   Assessment: No pain reported on arrival. Tactile cues used to improve sidelying hip abduction and reduce hip flexor activation. Sidelying IT band stretch stopped due to numbness increasing in lower leg. Felipe had no adverse effects w/ exercises and will continue to benefit from skilled therapy.  Evaluation/Treatment Tolerance: Patient tolerated treatment well        Plan:      Goals:   Active       LTG       Pt. to demonstrate increased right hip flexor flexibility via +15 degrees displayed during ely's test to improve ease with partial squatting   (Progressing)       Start:  02/04/25    Expected End:  04/29/25            Pt. to demonstrate increased  right  hamstring flexibility via +10 degrees displayed during  90/90 hamstring flexibility testst to improve ease with stair descent. (Progressing)       Start:  02/04/25    Expected End:  04/29/25               STG       Pt. to report decreased right hip pain </ = 2/10 at worst to increase tolerance for stair negotiation   (Progressing)       Start:  02/04/25    Expected End:  04/29/25             Pt. to demonstrate increased right hip flexor flexibility via +10 degrees displayed during ely's test to improve ease with partial squatting  (Progressing)       Start:  02/04/25    Expected End:  04/29/25                Spencer Ayers, PTA

## 2025-02-11 ENCOUNTER — CLINICAL SUPPORT (OUTPATIENT)
Dept: REHABILITATION | Facility: HOSPITAL | Age: 70
End: 2025-02-11
Payer: MEDICARE

## 2025-02-11 ENCOUNTER — TELEPHONE (OUTPATIENT)
Dept: INTERNAL MEDICINE | Facility: CLINIC | Age: 70
End: 2025-02-11
Payer: MEDICARE

## 2025-02-11 DIAGNOSIS — M76.31 IT BAND SYNDROME, RIGHT: Primary | ICD-10-CM

## 2025-02-11 PROBLEM — E87.1 HYPONATREMIA: Status: RESOLVED | Noted: 2024-12-03 | Resolved: 2025-02-11

## 2025-02-11 PROCEDURE — 97110 THERAPEUTIC EXERCISES: CPT | Mod: PN,CQ

## 2025-02-11 NOTE — ASSESSMENT & PLAN NOTE
Component      Latest Ref Rng 1/27/2025   RBC      4.60 - 6.20 M/uL 4.25 (L)    Hemoglobin      14.0 - 18.0 g/dL 12.3 (L)    Hematocrit      40.0 - 54.0 % 40.0       Legend:  (L) Low

## 2025-02-11 NOTE — PROGRESS NOTES
Outpatient Rehab    Physical Therapy Visit    Patient Name: Felipe Jiménez  MRN: [id number]  YOB: 1955  Today's Date: 2/11/2025    Therapy Diagnosis:   Encounter Diagnosis   Name Primary?    It band syndrome, right [M76.31] Yes     Physician: Ada Schuler MD    Physician Orders: PT Eval and Treat   Medical Diagnosis from Referral: iliotibial band pain right   Evaluation Date: 2/4/2025  Insurance Authorization Period Expiration: 1/27/25  Plan of Care Certification:  2/4/2025 to 4/22/25   Progress Note Due: 3/6/2025  Visit # / Visits authorized: 2/ 12  Visits Remaining - 9  PTA visit #: 2/6     Time In: 1100    Time Out: 1145   Total Time: 45    Total Billable Time: 45    FOTO:  Intake Score:  %  Survey Score 1:  %  Survey Score 2:  %         Subjective   Patient reports having no pain, feeling pretty good..  Pain reported as 0/10.      Objective            Treatment:  Therapeutic Exercise  Therapeutic Exercise Activity 1: Bike - 10'  Therapeutic Exercise Activity 2: LTR 3x10  Therapeutic Exercise Activity 3: Bridges 3x10  Therapeutic Exercise Activity 4: SLR 3x10  Therapeutic Exercise Activity 5: Sidelying hip abduction 3x10 ea  Therapeutic Exercise Activity 6: IT band stretch sidelying - 3'    Assessment & Plan   Assessment: Patient continues to require cueing for side lying stretch and side lying hip abduction to reduce hip flexor activation.  Patient completed his therapy as noted above with no increase in symptoms prior to leaving the clinic.       Patient will continue to benefit from skilled outpatient physical therapy to address the deficits listed in the problem list box on initial evaluation, provide pt/family education and to maximize pt's level of independence in the home and community environment.     Patient's spiritual, cultural, and educational needs considered and patient agreeable to plan of care and goals.           Plan:      Goals:   Active       LTG       Pt. to demonstrate  increased right hip flexor flexibility via +15 degrees displayed during ely's test to improve ease with partial squatting   (Progressing)       Start:  02/04/25    Expected End:  04/29/25            Pt. to demonstrate increased right  hamstring flexibility via +10 degrees displayed during  90/90 hamstring flexibility testst to improve ease with stair descent. (Progressing)       Start:  02/04/25    Expected End:  04/29/25               STG       Pt. to report decreased right hip pain </ = 2/10 at worst to increase tolerance for stair negotiation   (Progressing)       Start:  02/04/25    Expected End:  04/29/25             Pt. to demonstrate increased right hip flexor flexibility via +10 degrees displayed during ely's test to improve ease with partial squatting  (Progressing)       Start:  02/04/25    Expected End:  04/29/25                Driss Finnegan, PTA

## 2025-02-11 NOTE — TELEPHONE ENCOUNTER
----- Message from Maria Tda sent at 2/11/2025  1:32 PM CST -----  Contact: [phone]  [email or web address]     Patient is calling for Medical Advice regarding:Symptoms: Urine Symptoms, Stools - Unusual Color  Outcome: Schedule a same-day appointment or talk to a nurse or provider within 1 hour.  Reason: Caller denied all higher acuity questions    The caller accepted this outcome.  How long has patient had these symptoms:today     Pharmacy name and phone#:  [id number] - [place], [place] - [address]  [address] [facility] [address]  Phone: [phone] Fax: [phone]      Patient wants a call back or thru [facility]:call back     Comments:    Please advise patient replies from provider may take up to 48 hours.

## 2025-02-13 ENCOUNTER — TELEPHONE (OUTPATIENT)
Dept: UROLOGY | Facility: CLINIC | Age: 70
End: 2025-02-13
Payer: MEDICARE

## 2025-02-13 ENCOUNTER — CLINICAL SUPPORT (OUTPATIENT)
Dept: REHABILITATION | Facility: HOSPITAL | Age: 70
End: 2025-02-13
Payer: MEDICARE

## 2025-02-13 DIAGNOSIS — M76.31 IT BAND SYNDROME, RIGHT: Primary | ICD-10-CM

## 2025-02-13 PROCEDURE — 97110 THERAPEUTIC EXERCISES: CPT | Mod: PN

## 2025-02-13 NOTE — PROGRESS NOTES
Outpatient Rehab    Physical Therapy Visit    Patient Name: Felipe Jiménez  MRN: [id number]  YOB: 1955  Today's Date: 2/13/2025    Therapy Diagnosis: No diagnosis found.  Physician: Ada Schuler MD    Intake Score:  %  Survey Score 1:  %  Survey Score 2:  %         Subjective             Objective            Treatment:       Assessment & Plan   Assessment:         Patient will continue to benefit from skilled outpatient physical therapy to address the deficits listed in the problem list box on initial evaluation, provide pt/family education and to maximize pt's level of independence in the home and community environment.     Patient's spiritual, cultural, and educational needs considered and patient agreeable to plan of care and goals.           Plan:      Goals:   Active       LTG       Pt. to demonstrate increased right hip flexor flexibility via +15 degrees displayed during ely's test to improve ease with partial squatting   (Progressing)       Start:  02/04/25    Expected End:  04/29/25            Pt. to demonstrate increased right  hamstring flexibility via +10 degrees displayed during  90/90 hamstring flexibility testst to improve ease with stair descent. (Progressing)       Start:  02/04/25    Expected End:  04/29/25               STG       Pt. to report decreased right hip pain </ = 2/10 at worst to increase tolerance for stair negotiation   (Progressing)       Start:  02/04/25    Expected End:  04/29/25             Pt. to demonstrate increased right hip flexor flexibility via +10 degrees displayed during ely's test to improve ease with partial squatting  (Progressing)       Start:  02/04/25    Expected End:  04/29/25                Spencer Ayers PTA

## 2025-02-13 NOTE — PROGRESS NOTES
Outpatient Rehab    Physical Therapy Progress Note    Patient Name: Felpie Jiménez  MRN: [id number]  YOB: 1955  Today's Date: 2/13/2025    Therapy Diagnosis: No diagnosis found.  Physician: Ada Schuler MD    Physician Orders: PT Eval and Treat   Medical Diagnosis from Referral: iliotibial band pain right   Evaluation Date: 2/4/2025  Insurance Authorization Period Expiration: 1/27/25  Plan of Care Certification:  2/4/2025 to 4/22/25   Progress Note Due: 3/6/2025  Visit # / Visits authorized: 3/ 12  Visits Remaining - 9  PTA visit #: 0/6                Time In: 1100    Time Out: 1200  Total Time: 60    Total Billable Time: 60    FOTO:  Intake Score:  %  Survey Score 1:  %  Survey Score 2:  %         Subjective   Pt continues to report no gain.  Pain reported as 0/10.      Objective       Hip Range of Motion   Right Hip   Active (deg) Passive (deg) Pain   Flexion 75 90     Extension         ABduction 30 45     ADduction 30 30     External Rotation 90/90 60 50     External Rotation Prone         Internal Rotation 90/90 25 30     Internal Rotation Prone                  Knee Range of Motion   Right Knee   Active (deg) Passive (deg) Pain   Flexion 120       Extension             Left Knee   Active (deg) Passive (deg) Pain   Flexion 120       Extension                        Treatment:  Therapeutic Exercise  Therapeutic Exercise Activity 1: Bike - 10'  Therapeutic Exercise Activity 2: wobble board PF/DF - 30 reps  Therapeutic Exercise Activity 6: IT band stretch sidelying - 3'              Assessment & Plan   Assessment: Pt currently presents to [facility] with reports of decreased pain in the right hip with activity. Pt is requesting d/c from therapy secondary to additional health conditions that need to be addressed. Therfore patient will be discharged from therapy.  Evaluation/Treatment Tolerance: Patient tolerated treatment well    Patient will continue to benefit from skilled outpatient physical therapy to  address the deficits listed in the problem list box on initial evaluation, provide pt/family education and to maximize pt's level of independence in the home and community environment.     Patient's spiritual, cultural, and educational needs considered and patient agreeable to plan of care and goals.           Plan: Discharge from therapy services    Goals:   Resolved       LTG       Pt. to demonstrate increased right hip flexor flexibility via +15 degrees displayed during ely's test to improve ease with partial squatting   (Met)       Start:  02/04/25    Expected End:  04/29/25    Resolved:  02/13/25         Pt. to demonstrate increased right  hamstring flexibility via +10 degrees displayed during  90/90 hamstring flexibility testst to improve ease with stair descent. (Met)       Start:  02/04/25    Expected End:  04/29/25    Resolved:  02/13/25            STG       Pt. to report decreased right hip pain </ = 2/10 at worst to increase tolerance for stair negotiation   (Met)       Start:  02/04/25    Expected End:  04/29/25    Resolved:  02/13/25          Pt. to demonstrate increased right hip flexor flexibility via +10 degrees displayed during ely's test to improve ease with partial squatting  (Met)       Start:  02/04/25    Expected End:  04/29/25    Resolved:  02/13/25             Easton Franco, PT

## 2025-02-14 PROBLEM — R50.9 FEVER: Status: RESOLVED | Noted: 2024-11-30 | Resolved: 2025-02-14

## 2025-02-16 NOTE — PROGRESS NOTES
Urology [facility]    HPI:  Felipe Jiménez is a 69 y.o. male with history of xanthogranulomatous pyelonephritis of the right kidney. The patient underwent urgent cystoscopy with right ureteral placement on 11/30/24. The patient was found to have a local abscess, for which he underwent IR drain placement on 12/1/24.   Culture remained no growth. His antimicrobial therapy was optimized from Zosyn to Unasyn and he was discharged on a course of Augmentin which he completed on or around 12/18. Drain was removed in office on 1/14.    MAG3 scan obtained on 1/10/25 showed 87% Left and 13% right differential function.      The patient endorses recent dysuria. Denies recent hematuria, fevers, chills, flank pain, or abdominal pain.     ROS:  Neg except per HPI    Past Medical History:   Diagnosis Date    Fever 11/30/2024    Hyperlipidemia     Male erectile dysfunction, unspecified        Past Surgical History:   Procedure Laterality Date    COLONOSCOPY N/A 10/2/2024    Procedure: COLONOSCOPY;  Surgeon: Brennan Balderrama MD;  Location: [facility];  Service: Endoscopy;  Laterality: N/A;    CYSTOSCOPY W/ URETERAL STENT PLACEMENT Right 11/30/2024    Procedure: CYSTOSCOPY, WITH URETERAL STENT INSERTION;  Surgeon: Dain Eugene MD;  Location: [facility] OR [address] [place] [address];  Service: Urology;  Laterality: Right;    DILATION OF URETHRA N/A 11/30/2024    Procedure: DILATION, URETHRA;  Surgeon: Dain Eugene MD;  Location: [facility] OR [address] [place] [address];  Service: Urology;  Laterality: N/A;    ESOPHAGOGASTRODUODENOSCOPY N/A 10/2/2024    Procedure: EGD (ESOPHAGOGASTRODUODENOSCOPY);  Surgeon: Brennan Balderrama MD;  Location: [facility];  Service: Endoscopy;  Laterality: N/A;    FRACTURE SURGERY      INTRALUMINAL GASTROINTESTINAL TRACT IMAGING VIA CAPSULE N/A 10/22/2024    Procedure: IMAGING PROCEDURE, GI TRACT, INTRALUMINAL, VIA CAPSULE;  Surgeon: Brennan Balderrama MD;  Location: [facility];  Service: Endoscopy;  Laterality: N/A;    LIPOMA RESECTION Left  1979    left knee    QUADRICEPS TENDON REPAIR Left 2012       Social History     Socioeconomic History    Marital status:    Tobacco Use    Smoking status: Former     Current packs/day: 0.00     Average packs/day: 1 pack/day for 20.0 years (20.0 ttl pk-yrs)     Types: Cigarettes     Start date: 1987     Quit date: 2007     Years since quittin.1    Smokeless tobacco: Never   Substance and Sexual Activity    Alcohol use: Yes     Comment: Infrequently    Drug use: Not Currently    Sexual activity: Yes     Partners: Female     Social Drivers of Health     Financial Resource Strain: Low Risk  (2024)    Overall Financial Resource Strain (CARDIA)     Difficulty of Paying Living Expenses: Not very hard   Food Insecurity: No Food Insecurity (2024)    Hunger Vital Sign     Worried About Running Out of Food in the Last Year: Never true     Ran Out of Food in the Last Year: Never true   Transportation Needs: No Transportation Needs (2024)    TRANSPORTATION NEEDS     Transportation : No   Physical Activity: Sufficiently Active (2024)    Exercise Vital Sign     Days of Exercise per Week: 5 days     Minutes of Exercise per Session: 30 min   Stress: No Stress Concern Present (2024)    [place] [employer] - Occupational Stress Questionnaire     Feeling of Stress : Only a little   Housing Stability: Unknown (2024)    Housing Stability Vital Sign     Unable to Pay for Housing in the Last Year: No     Homeless in the Last Year: No       Family History   Problem Relation Name Age of Onset    Heart disease Mother      Heart attacks under age 50 Mother  43         2/2 heart attack.    Heart disease Father      Heart attacks under age 50 Father  38         2/2 heart attack.       Review of patient's allergies indicates:  No Known Allergies    Medications Ordered Prior to Encounter[1]    Anticoagulation:  ASA 81 mg    Physical  "Exam:  Estimated body mass index is 22.53 kg/m² as calculated from the following:    Height as of 1/27/25: 5' 8" (1.727 m).    Weight as of 1/27/25: 67.2 kg (148 lb 2.4 oz).    General: No acute distress, well developed. AAOx3  Head: Normocephalic, Atraumatic  Eyes: Extra-occular movements intact, No discharge  Neck: supple, symmetrical, trachea midline  Lungs: normal respiratory effort, no respiratory distress, no wheezes  CV: regular rate, 2+ pulses  Abdomen: soft, non-tender, non-distended, no organomegaly, no abdominal scars. Right flank drain incision well healed.  MSK: no edema, no deformities, normal ROM  Skin: skin color, texture, turgor normal.  Neurologic: no focal deficits, sensation intact    Labs:    Urine dipstick today - positive for leukocytes, negative for all other components.    Lab Results   Component Value Date    WBC 7.79 01/27/2025    HGB 12.3 (L) 01/27/2025    HCT 40.0 01/27/2025    MCV 94 01/27/2025     01/27/2025     BMP  Lab Results   Component Value Date     01/27/2025    K 4.0 01/27/2025     01/27/2025    CO2 24 01/27/2025    BUN 20 01/27/2025    CREATININE 1.1 01/27/2025    CALCIUM 10.1 01/27/2025    ANIONGAP 7 (L) 01/27/2025    EGFRNORACEVR >60.0 01/27/2025       Imaging:  Mag 3 Renal Scan 1/10/2025: 87% left, 13% right sided renal function    CT A/P 12/19/2024:  perinephric drain in position with resolution of abscess, stent in good position without hydronephrosis    Assessment: Felipe Jiménez is a 69 y.o. male with right-sided xanthogranulomatous pyelonephritis.    Plan:     1. To OR for 2/27/2025 for robotic-assisted right nephrectomy.  2. Consents signed   3. I have explained the risk, benefits, and alternatives of the procedure in detail. The patient voices understanding and all questions have been answered. The patient agrees to proceed as planned.   4. Pre-operative clearance appointment today, will follow up on results.  5. Patient evaluated by " hematology/oncology for acute blood loss anemia on chronic iron deficiency anemia. Blood consent signed.  6. Will follow up on labs.   7. Urine culture, will follow up on results. Patient symptomatic with dysuria. With consideration to history of XGP and current dysuria, will treat with Bactrim BID until day of surgery.  8. Discussed stopping ASA 81 mg 7 days before surgery    Tabitha Mackenzie DO         [1]   Current Outpatient Medications on File Prior to Visit   Medication Sig Dispense Refill    rosuvastatin (CRESTOR) 20 MG tablet Take 1 tablet (20 mg total) by mouth once daily. 90 tablet 3     No current facility-administered medications on file prior to visit.

## 2025-02-16 NOTE — H&P (VIEW-ONLY)
Urology [facility]    HPI:  Felipe Jiménez is a 69 y.o. male with history of xanthogranulomatous pyelonephritis of the right kidney. The patient underwent urgent cystoscopy with right ureteral placement on 11/30/24. The patient was found to have a local abscess, for which he underwent IR drain placement on 12/1/24.   Culture remained no growth. His antimicrobial therapy was optimized from Zosyn to Unasyn and he was discharged on a course of Augmentin which he completed on or around 12/18. Drain was removed in office on 1/14.    MAG3 scan obtained on 1/10/25 showed 87% Left and 13% right differential function.      The patient endorses recent dysuria. Denies recent hematuria, fevers, chills, flank pain, or abdominal pain.     ROS:  Neg except per HPI    Past Medical History:   Diagnosis Date    Fever 11/30/2024    Hyperlipidemia     Male erectile dysfunction, unspecified        Past Surgical History:   Procedure Laterality Date    COLONOSCOPY N/A 10/2/2024    Procedure: COLONOSCOPY;  Surgeon: Brennan Balderrama MD;  Location: [facility];  Service: Endoscopy;  Laterality: N/A;    CYSTOSCOPY W/ URETERAL STENT PLACEMENT Right 11/30/2024    Procedure: CYSTOSCOPY, WITH URETERAL STENT INSERTION;  Surgeon: Dain Eugene MD;  Location: [facility] OR [address] [place] [address];  Service: Urology;  Laterality: Right;    DILATION OF URETHRA N/A 11/30/2024    Procedure: DILATION, URETHRA;  Surgeon: Dain Eugene MD;  Location: [facility] OR [address] [place] [address];  Service: Urology;  Laterality: N/A;    ESOPHAGOGASTRODUODENOSCOPY N/A 10/2/2024    Procedure: EGD (ESOPHAGOGASTRODUODENOSCOPY);  Surgeon: Brennan Balderrama MD;  Location: [facility];  Service: Endoscopy;  Laterality: N/A;    FRACTURE SURGERY      INTRALUMINAL GASTROINTESTINAL TRACT IMAGING VIA CAPSULE N/A 10/22/2024    Procedure: IMAGING PROCEDURE, GI TRACT, INTRALUMINAL, VIA CAPSULE;  Surgeon: Brennan Balderrama MD;  Location: [facility];  Service: Endoscopy;  Laterality: N/A;    LIPOMA RESECTION Left  1979    left knee    QUADRICEPS TENDON REPAIR Left 2012       Social History     Socioeconomic History    Marital status:    Tobacco Use    Smoking status: Former     Current packs/day: 0.00     Average packs/day: 1 pack/day for 20.0 years (20.0 ttl pk-yrs)     Types: Cigarettes     Start date: 1987     Quit date: 2007     Years since quittin.1    Smokeless tobacco: Never   Substance and Sexual Activity    Alcohol use: Yes     Comment: Infrequently    Drug use: Not Currently    Sexual activity: Yes     Partners: Female     Social Drivers of Health     Financial Resource Strain: Low Risk  (2024)    Overall Financial Resource Strain (CARDIA)     Difficulty of Paying Living Expenses: Not very hard   Food Insecurity: No Food Insecurity (2024)    Hunger Vital Sign     Worried About Running Out of Food in the Last Year: Never true     Ran Out of Food in the Last Year: Never true   Transportation Needs: No Transportation Needs (2024)    TRANSPORTATION NEEDS     Transportation : No   Physical Activity: Sufficiently Active (2024)    Exercise Vital Sign     Days of Exercise per Week: 5 days     Minutes of Exercise per Session: 30 min   Stress: No Stress Concern Present (2024)    [employer]izen of [place] [employer] - Occupational Stress Questionnaire     Feeling of Stress : Only a little   Housing Stability: Unknown (2024)    Housing Stability Vital Sign     Unable to Pay for Housing in the Last Year: No     Homeless in the Last Year: No       Family History   Problem Relation Name Age of Onset    Heart disease Mother      Heart attacks under age 50 Mother  43         2/2 heart attack.    Heart disease Father      Heart attacks under age 50 Father  38         2/2 heart attack.       Review of patient's allergies indicates:  No Known Allergies    Medications Ordered Prior to Encounter[1]    Anticoagulation:  ASA 81 mg    Physical  "Exam:  Estimated body mass index is 22.53 kg/m² as calculated from the following:    Height as of 1/27/25: 5' 8" (1.727 m).    Weight as of 1/27/25: 67.2 kg (148 lb 2.4 oz).    General: No acute distress, well developed. AAOx3  Head: Normocephalic, Atraumatic  Eyes: Extra-occular movements intact, No discharge  Neck: supple, symmetrical, trachea midline  Lungs: normal respiratory effort, no respiratory distress, no wheezes  CV: regular rate, 2+ pulses  Abdomen: soft, non-tender, non-distended, no organomegaly, no abdominal scars. Right flank drain incision well healed.  MSK: no edema, no deformities, normal ROM  Skin: skin color, texture, turgor normal.  Neurologic: no focal deficits, sensation intact    Labs:    Urine dipstick today - positive for leukocytes, negative for all other components.    Lab Results   Component Value Date    WBC 7.79 01/27/2025    HGB 12.3 (L) 01/27/2025    HCT 40.0 01/27/2025    MCV 94 01/27/2025     01/27/2025     BMP  Lab Results   Component Value Date     01/27/2025    K 4.0 01/27/2025     01/27/2025    CO2 24 01/27/2025    BUN 20 01/27/2025    CREATININE 1.1 01/27/2025    CALCIUM 10.1 01/27/2025    ANIONGAP 7 (L) 01/27/2025    EGFRNORACEVR >60.0 01/27/2025       Imaging:  Mag 3 Renal Scan 1/10/2025: 87% left, 13% right sided renal function    CT A/P 12/19/2024:  perinephric drain in position with resolution of abscess, stent in good position without hydronephrosis    Assessment: Felipe Jiménez is a 69 y.o. male with right-sided xanthogranulomatous pyelonephritis.    Plan:     1. To OR for 2/27/2025 for robotic-assisted right nephrectomy.  2. Consents signed   3. I have explained the risk, benefits, and alternatives of the procedure in detail. The patient voices understanding and all questions have been answered. The patient agrees to proceed as planned.   4. Pre-operative clearance appointment today, will follow up on results.  5. Patient evaluated by " hematology/oncology for acute blood loss anemia on chronic iron deficiency anemia. Blood consent signed.  6. Will follow up on labs.   7. Urine culture, will follow up on results. Patient symptomatic with dysuria. With consideration to history of XGP and current dysuria, will treat with Bactrim BID until day of surgery.  8. Discussed stopping ASA 81 mg 7 days before surgery    Tabitha Mackenzie DO         [1]   Current Outpatient Medications on File Prior to Visit   Medication Sig Dispense Refill    rosuvastatin (CRESTOR) 20 MG tablet Take 1 tablet (20 mg total) by mouth once daily. 90 tablet 3     No current facility-administered medications on file prior to visit.

## 2025-02-17 ENCOUNTER — OFFICE VISIT (OUTPATIENT)
Dept: HEMATOLOGY/ONCOLOGY | Facility: CLINIC | Age: 70
End: 2025-02-17
Payer: MEDICARE

## 2025-02-17 ENCOUNTER — LAB VISIT (OUTPATIENT)
Dept: LAB | Facility: HOSPITAL | Age: 70
End: 2025-02-17
Attending: ANESTHESIOLOGY
Payer: MEDICARE

## 2025-02-17 ENCOUNTER — OFFICE VISIT (OUTPATIENT)
Dept: UROLOGY | Facility: CLINIC | Age: 70
End: 2025-02-17
Payer: MEDICARE

## 2025-02-17 ENCOUNTER — OFFICE VISIT (OUTPATIENT)
Dept: INTERNAL MEDICINE | Facility: CLINIC | Age: 70
End: 2025-02-17
Payer: MEDICARE

## 2025-02-17 VITALS
HEIGHT: 68 IN | SYSTOLIC BLOOD PRESSURE: 124 MMHG | OXYGEN SATURATION: 97 % | HEART RATE: 82 BPM | BODY MASS INDEX: 23.74 KG/M2 | DIASTOLIC BLOOD PRESSURE: 71 MMHG | WEIGHT: 156.63 LBS | TEMPERATURE: 98 F

## 2025-02-17 VITALS
OXYGEN SATURATION: 99 % | TEMPERATURE: 98 F | WEIGHT: 155.63 LBS | BODY MASS INDEX: 23.59 KG/M2 | SYSTOLIC BLOOD PRESSURE: 128 MMHG | DIASTOLIC BLOOD PRESSURE: 65 MMHG | HEART RATE: 78 BPM | HEIGHT: 68 IN

## 2025-02-17 DIAGNOSIS — Z92.89 H/O ECHOCARDIOGRAM: ICD-10-CM

## 2025-02-17 DIAGNOSIS — Z01.818 PREOPERATIVE TESTING: ICD-10-CM

## 2025-02-17 DIAGNOSIS — N11.8 XGP (XANTHOGRANULOMATOUS PYELONEPHRITIS): Primary | ICD-10-CM

## 2025-02-17 DIAGNOSIS — R42 DIZZINESS: ICD-10-CM

## 2025-02-17 DIAGNOSIS — R63.4 WEIGHT LOSS: ICD-10-CM

## 2025-02-17 DIAGNOSIS — N20.0 NEPHROLITHIASIS: ICD-10-CM

## 2025-02-17 DIAGNOSIS — N28.9 NON-FUNCTIONING KIDNEY: ICD-10-CM

## 2025-02-17 DIAGNOSIS — D50.9 IRON DEFICIENCY ANEMIA, UNSPECIFIED IRON DEFICIENCY ANEMIA TYPE: ICD-10-CM

## 2025-02-17 DIAGNOSIS — K92.1 MELENA: ICD-10-CM

## 2025-02-17 DIAGNOSIS — N13.30 HYDRONEPHROSIS OF RIGHT KIDNEY: ICD-10-CM

## 2025-02-17 DIAGNOSIS — R29.6 FALLS FREQUENTLY: ICD-10-CM

## 2025-02-17 DIAGNOSIS — R77.8 ELEVATED TOTAL PROTEIN: ICD-10-CM

## 2025-02-17 DIAGNOSIS — D50.9 IRON DEFICIENCY ANEMIA, UNSPECIFIED IRON DEFICIENCY ANEMIA TYPE: Primary | ICD-10-CM

## 2025-02-17 DIAGNOSIS — E78.00 HYPERCHOLESTEROLEMIA: ICD-10-CM

## 2025-02-17 DIAGNOSIS — D50.0 IRON DEFICIENCY ANEMIA DUE TO CHRONIC BLOOD LOSS: Primary | ICD-10-CM

## 2025-02-17 DIAGNOSIS — E43 SEVERE PROTEIN-CALORIE MALNUTRITION: Chronic | ICD-10-CM

## 2025-02-17 PROBLEM — D72.829 LEUCOCYTOSIS: Status: RESOLVED | Noted: 2024-11-29 | Resolved: 2025-02-17

## 2025-02-17 PROBLEM — N39.0 UTI (URINARY TRACT INFECTION): Status: RESOLVED | Noted: 2024-11-29 | Resolved: 2025-02-17

## 2025-02-17 LAB
ABO + RH BLD: NORMAL
BACTERIA #/AREA URNS AUTO: ABNORMAL /HPF
BILIRUB UR QL STRIP: NEGATIVE
BLD GP AB SCN CELLS X3 SERPL QL: NORMAL
CLARITY UR REFRACT.AUTO: CLEAR
COLOR UR AUTO: YELLOW
GLUCOSE UR QL STRIP: NEGATIVE
HGB UR QL STRIP: ABNORMAL
HYALINE CASTS UR QL AUTO: 0 /LPF
INR PPP: 1 (ref 0.8–1.2)
KETONES UR QL STRIP: NEGATIVE
LEUKOCYTE ESTERASE UR QL STRIP: ABNORMAL
MICROSCOPIC COMMENT: ABNORMAL
NITRITE UR QL STRIP: NEGATIVE
PH UR STRIP: 7 [PH] (ref 5–8)
PROT UR QL STRIP: ABNORMAL
PROTHROMBIN TIME: 11.1 SEC (ref 9–12.5)
RBC #/AREA URNS AUTO: >100 /HPF (ref 0–4)
SP GR UR STRIP: 1.02 (ref 1–1.03)
SPECIMEN OUTDATE: NORMAL
SQUAMOUS #/AREA URNS AUTO: 0 /HPF
URN SPEC COLLECT METH UR: ABNORMAL
WBC #/AREA URNS AUTO: 26 /HPF (ref 0–5)

## 2025-02-17 PROCEDURE — 81001 URINALYSIS AUTO W/SCOPE: CPT

## 2025-02-17 PROCEDURE — 87086 URINE CULTURE/COLONY COUNT: CPT

## 2025-02-17 PROCEDURE — 36415 COLL VENOUS BLD VENIPUNCTURE: CPT | Performed by: ANESTHESIOLOGY

## 2025-02-17 PROCEDURE — 86850 RBC ANTIBODY SCREEN: CPT | Performed by: ANESTHESIOLOGY

## 2025-02-17 PROCEDURE — 3044F HG A1C LEVEL LT 7.0%: CPT | Mod: CPTII,S$GLB,, | Performed by: NURSE PRACTITIONER

## 2025-02-17 PROCEDURE — 3008F BODY MASS INDEX DOCD: CPT | Mod: CPTII,S$GLB,, | Performed by: NURSE PRACTITIONER

## 2025-02-17 PROCEDURE — 3074F SYST BP LT 130 MM HG: CPT | Mod: CPTII,S$GLB,, | Performed by: NURSE PRACTITIONER

## 2025-02-17 PROCEDURE — 3078F DIAST BP <80 MM HG: CPT | Mod: CPTII,S$GLB,, | Performed by: NURSE PRACTITIONER

## 2025-02-17 PROCEDURE — 85610 PROTHROMBIN TIME: CPT | Performed by: NURSE PRACTITIONER

## 2025-02-17 RX ORDER — MULTIVITAMIN
1 TABLET ORAL DAILY
COMMUNITY

## 2025-02-17 RX ORDER — SULFAMETHOXAZOLE AND TRIMETHOPRIM 800; 160 MG/1; MG/1
1 TABLET ORAL 2 TIMES DAILY
Qty: 20 TABLET | Refills: 0 | Status: SHIPPED | OUTPATIENT
Start: 2025-02-17 | End: 2025-02-27

## 2025-02-17 RX ORDER — NAPROXEN SODIUM 220 MG/1
81 TABLET, FILM COATED ORAL DAILY
COMMUNITY

## 2025-02-17 RX ORDER — ACETAMINOPHEN 500 MG
500 TABLET ORAL EVERY 6 HOURS PRN
COMMUNITY

## 2025-02-17 RX ORDER — BISACODYL 5 MG
5 TABLET, DELAYED RELEASE (ENTERIC COATED) ORAL DAILY PRN
COMMUNITY

## 2025-02-17 NOTE — OUTPATIENT SUBJECTIVE & OBJECTIVE
Outpatient Subjective & Objective      Chief Complaint: Preoperative evaulation, perioperative medical management, and complication reduction plan.     Functional Capacity:  Able to climb a flight of stairs without CP SOB or Syncope.  Able to meet 4 METs      Anesthesia issues: None    Difficulty mouth opening: none    Steroid use in the last 12 months: none     Dental Issues: NONE    Family anesthesia difficulty: None     Family Hx of Thrombosis NONE    Past Medical History:   Diagnosis Date    Arthritis     Disorder of kidney and ureter     Eczema     Fever 11/30/2024    Hyperlipidemia     Leucocytosis 11/29/2024    Male erectile dysfunction, unspecified      Past Surgical History:   Procedure Laterality Date    COLONOSCOPY N/A 10/2/2024    Procedure: COLONOSCOPY;  Surgeon: Brennan Balderrama MD;  Location: [facility];  Service: Endoscopy;  Laterality: N/A;    CYSTOSCOPY W/ URETERAL STENT PLACEMENT Right 11/30/2024    Procedure: CYSTOSCOPY, WITH URETERAL STENT INSERTION;  Surgeon: Dain Eugene MD;  Location: [facility] OR [address] [place] [address];  Service: Urology;  Laterality: Right;    DILATION OF URETHRA N/A 11/30/2024    Procedure: DILATION, URETHRA;  Surgeon: Dain Eugene MD;  Location: [facility] OR [address] [place] [address];  Service: Urology;  Laterality: N/A;    ESOPHAGOGASTRODUODENOSCOPY N/A 10/2/2024    Procedure: EGD (ESOPHAGOGASTRODUODENOSCOPY);  Surgeon: Brennan Balderrama MD;  Location: [facility];  Service: Endoscopy;  Laterality: N/A;    FRACTURE SURGERY      INTRALUMINAL GASTROINTESTINAL TRACT IMAGING VIA CAPSULE N/A 10/22/2024    Procedure: IMAGING PROCEDURE, GI TRACT, INTRALUMINAL, VIA CAPSULE;  Surgeon: Brennan Balderrama MD;  Location: [facility];  Service: Endoscopy;  Laterality: N/A;    LIPOMA RESECTION Left 1979    left knee    QUADRICEPS TENDON REPAIR Left 2012       Review of Systems   Constitutional:  Negative for chills, fatigue, fever and unexpected weight change.   HENT:  Negative for trouble swallowing and voice  "change.    Eyes:  Negative for photophobia and visual disturbance.        No acute visual changes   Respiratory:  Negative for cough, shortness of breath and wheezing.         STOP bang  Score 3    High risk RIVERA   Cardiovascular:  Negative for chest pain, palpitations and leg swelling.   Gastrointestinal:  Negative for abdominal pain, blood in stool, constipation, diarrhea, nausea and vomiting.        No FLD, Hepatitis, Cirrhosis  No BRB or black tarry stool    +hemorrhoid   Genitourinary:  Negative for difficulty urinating, dysuria, frequency, hematuria and urgency.        Nocturia 3   Musculoskeletal:  Positive for arthralgias. Negative for gait problem, myalgias, neck pain and neck stiffness.   Neurological:  Negative for dizziness, tremors, seizures, syncope, weakness, light-headedness, numbness and headaches.   Psychiatric/Behavioral:  Negative for agitation, behavioral problems, confusion, decreased concentration, dysphoric mood, hallucinations, self-injury, sleep disturbance and suicidal ideas. The patient is not nervous/anxious and is not hyperactive.           VITALS  Visit Vitals  /71 (BP Location: Right arm, Patient Position: Sitting)   Pulse 82   Temp 97.7 °F (36.5 °C) (Oral)   Ht 5' 8" (1.727 m)   Wt 71.1 kg (156 lb 10.2 oz)   SpO2 97%   BMI 23.82 kg/m²          Physical Exam  Constitutional:       General: He is not in acute distress.     Appearance: He is well-developed. He is not diaphoretic.   HENT:      Head: Normocephalic.      Right Ear: Hearing normal.      Left Ear: Hearing normal.      Nose: Nose normal.      Mouth/Throat:      Lips: Pink.      Mouth: Mucous membranes are moist.   Eyes:      General: Lids are normal.      Conjunctiva/sclera: Conjunctivae normal.      Pupils: Pupils are equal, round, and reactive to light.   Neck:      Vascular: No carotid bruit.      Trachea: Trachea and phonation normal.   Cardiovascular:      Rate and Rhythm: Normal rate and regular rhythm.      " Pulses:           Carotid pulses are 2+ on the right side and 2+ on the left side.       Radial pulses are 2+ on the right side and 2+ on the left side.        Posterior tibial pulses are 2+ on the right side and 2+ on the left side.      Heart sounds: Normal heart sounds. No murmur heard.     No friction rub. No gallop.   Pulmonary:      Effort: Pulmonary effort is normal.      Breath sounds: Normal breath sounds.      Comments: Clear and equal  Anterior and Posterior BBS  Abdominal:      General: Abdomen is protuberant. Bowel sounds are normal. There is no distension.      Palpations: Abdomen is soft.      Tenderness: There is no abdominal tenderness.   Musculoskeletal:         General: No tenderness or deformity. Normal range of motion.      Cervical back: Normal range of motion.      Right lower leg: No edema.      Left lower leg: No edema.   Lymphadenopathy:      Head:      Right side of head: No submental, submandibular, tonsillar, preauricular, posterior auricular or occipital adenopathy.      Left side of head: No submental, submandibular, tonsillar, preauricular, posterior auricular or occipital adenopathy.      Cervical:      Right cervical: No superficial cervical adenopathy.     Left cervical: No superficial cervical adenopathy.   Skin:     General: Skin is warm and dry.      Capillary Refill: Capillary refill takes 2 to 3 seconds.      Coloration: Skin is not pale.      Findings: No erythema or rash.   Neurological:      Mental Status: He is alert and oriented to person, place, and time.      GCS: GCS eye subscore is 4. GCS verbal subscore is 5. GCS motor subscore is 6.      Motor: No abnormal muscle tone.      Coordination: Coordination normal.   Psychiatric:         Attention and Perception: Attention and perception normal.         Mood and Affect: Mood and affect normal.         Speech: Speech normal.         Behavior: Behavior normal. Behavior is cooperative.         Thought Content: Thought content  normal.         Cognition and Memory: Cognition and memory normal.         Judgment: Judgment normal.          Significant Labs:  Lab Results   Component Value Date    WBC 7.79 01/27/2025    HGB 12.3 (L) 01/27/2025    HCT 40.0 01/27/2025     01/27/2025    CHOL 110 (L) 06/20/2024    TRIG 48 06/20/2024    HDL 41 06/20/2024    ALT 15 01/27/2025    AST 18 01/27/2025     01/27/2025    K 4.0 01/27/2025     01/27/2025    CREATININE 1.1 01/27/2025    BUN 20 01/27/2025    CO2 24 01/27/2025    TSH 1.836 06/20/2024    PSA 2.1 06/20/2024    INR 1.0 02/17/2025    HGBA1C 4.9 01/27/2025       Diagnostic Studies: No relevant studies.    EKG:   Results for orders placed or performed during the hospital encounter of 11/29/24   EKG 12-lead    Collection Time: 12/02/24  7:32 PM   Result Value Ref Range    QRS Duration 92 ms    [facility]HS QTC Calculation 427 ms    Narrative    Test Reason : R07.9,    Vent. Rate :  81 BPM     Atrial Rate :  81 BPM     P-R Int : 136 ms          QRS Dur :  92 ms      QT Int : 368 ms       P-R-T Axes :  65  30  36 degrees    QTcB Int : 427 ms    Normal sinus rhythm  Normal ECG  When compared with ECG of 29-Nov-2024 13:31,  No significant change was found  Confirmed by Sb Murillo ([id number]) on 12/2/2024 8:32:08 PM    Referred By: AAAREFERRAL SELF           Confirmed By: Sb Murillo       2D ECHO:  TTE:  Results for orders placed or performed during the hospital encounter of 11/29/24   Echo   Result Value Ref Range    LV Diastolic Volume 93.67 mL    Echo EF Estimated 42 %    LV Systolic Volume 54.27 mL    IVS 0.7 0.6 - 1.1 cm    LVIDd 4.5 3.5 - 6.0 cm    LVIDs 3.6 2.1 - 4.0 cm    LVOT diameter 2.1 cm    PW 0.9 0.6 - 1.1 cm    IVRT 70.41 ms    AV LVOT peak gradient 4 mmHg    LVOT mn grad 2 mmHg    LVOT peak eduar 1.0 m/s    LVOT peak VTI 19.6 cm    RV- lópez basal diam 3.1 cm    RV S' 14.35 cm/s    LA size 2.66 cm    Left Atrium Major Axis 5.78 cm    Left Atrium Minor Axis 5.86 cm    LA Vol (MOD) 56.59 mL     RA Major Axis 4.23 cm    AV valve area 2.4 cm2    AV area by cont VTI 2.4 cm2    AV regurgitation pressure 1/2 time 577.21 ms    AV peak gradient 9.0 mmHg    AV mean gradient 6.5 mmHg    Ao peak rasheed 1.5 m/s    Ao VTI 28.8 cm    MV Peak A Rasheed 0.57 m/s    E wave deceleration time 205.33 ms    MV Peak E Rasheed 0.67 m/s    E/A ratio 1.18     LV LATERAL E/E' RATIO 3.94     LV SEPTAL E/E' RATIO 5.15     TDI LATERAL 0.17 m/s    TDI SEPTAL 0.13 m/s    TV peak gradient 26 mmHg    TR Max Rasheed 2.53 m/s    Ascending aorta 2.88 cm    STJ 2.84 cm    Sinus 3.45 cm    LA WIDTH 3.69 cm    RA Width 3.50 cm    TAPSE 1.97 cm    BSA 1.75 m2    LVOT stroke volume 67.9 cm3    LV Systolic Volume Index 30.8 mL/m2    LV Diastolic Volume Index 53.22 mL/m2    LVOT area 3.5 cm2    FS 20.0 (A) 28 - 44 %    Left Ventricle Relative Wall Thickness 0.40 cm    LV mass 113.6 g    LV Mass Index 64.6 g/m2    E/E' ratio 4.47 m/s    KEHINDE 27.6 mL/m2    LA Vol 48.55 cm3    RV/LV Ratio 0.69 cm    KEHINDE (MOD) 32.2 mL/m2    AV Velocity Ratio 0.67     AV index (prosthetic) 0.68     KEVIN by Velocity Ratio 2.3 cm²    Triscuspid Valve Regurgitation Peak Gradient 26 mmHg    Mean e' 0.15 m/s    ZLVIDS 1.41     ZLVIDD -0.78     EF 50 %    Jefferson's Biplane MOD Ejection Fraction 49 %    TV resting pulmonary artery pressure 29 mmHg    RV TB RVSP 6 mmHg    Est. RA pres 3 mmHg    Narrative      Left Ventricle: The left ventricle is normal in size. Ventricular mass   is normal. Normal wall thickness. Low normal ro slight  global hypokinesis   present. There is low normal to mildly reduced systolic function with a   visually estimated ejection fraction of 50 - 55%. Ejection fraction is   approximately 50%. Quantitated ejection fraction is 49%. There is normal   diastolic function.    Right Ventricle: Normal right ventricular cavity size. Wall thickness   is normal. Systolic function is normal.    Aortic Valve: There is aortic valve sclerosis. There is mild aortic    regurgitation.    Mitral Valve: There is mild regurgitation.    Tricuspid Valve: There is mild to moderate regurgitation.    Pulmonary Artery: The estimated pulmonary artery systolic pressure is   29 mmHg.    IVC/SVC: Normal venous pressure at 3 mmHg.         BEKA:  No results found for this or any previous visit.     Imaging     Active Cardiac Conditions: None      Revised Cardiac Risk Index   High -Risk Surgery  Intraperitoneal; Intrathoracic; suprainguinal vascular Yes- + 1 No- 0   History of Ischemic Heart Disease   (Hx of MI/positive exercise test/current chest pain due to ischemia/use of nitrate therapy/EKG with pathological Q waves) Yes- + 1 No- 0   History of CHF  (Pulmonary edema/bilateral rales or S3 gallop/PND/CXR showing pulmonary vascular redistribution) Yes- + 1 No- 0   History of CVA   (Prior stroke or TIA) Yes- + 1 No- 0   Pre-operative treatment with insulin Yes- + 1 No- 0   Pre-operative creatinine > 2mg/dl Yes- + 1 No- 0   Total:    Risk Status:  Estimated risk of cardiac complications after non-cardiac surgery using the Revised Cardiac Risk Index for Preoperative risk is 3.9 %      ARISCAT (Canet) risk index: Low: 1.6% risk of post-op pulmonary complications.    American Society of Anesthesiologists Physical Status classification (ASA): 3         Outpatient Subjective & Objective

## 2025-02-17 NOTE — DISCHARGE INSTRUCTIONS
.Your surgery has been scheduled for:________2/27/25__________________________________    You should report to:  ____[facility], located on the [place] side of the first floor of the           [facility] ([phone])  __X__[facility], located on the [facility] side of the            Second floor of the [facility] ([phone])  ____3rd Floor [facility] located on the [facility] side of the [facility] [phone]  ____[facility]: located at [address] [place] [address]. [address].     Please Note   Tell your doctor if you take Aspirin, products containing Aspirin, herbal medications  or blood thinners, such as Coumadin, Ticlid, or Plavix.  (Consult your provider regarding holding or stopping before surgery).  Arrange for someone to drive you home following surgery.  You will not be allowed to leave the surgical facility alone or drive yourself home following sedation and anesthesia.    Before Surgery  Stop taking all herbal medications, vitamins, and supplements 7 days prior to surgery  No Motrin/Advil (Ibuprofen) 7 days before surgery  No Aleve (Naproxen) 7 days before surgery   No Goody's/BC Powder 7 days before surgery  Refrain from drinking alcoholic beverages for 24 hours before and after surgery  Stop or limit smoking at least 24 hours prior to surgery  You may take Tylenol for pain    Night before Surgery  Do not eat or drink after midnight  Take a shower or bath (shower is recommended).  Bathe with Hibiclens soap or an antibacterial soap from the neck down.  If not supplied by your surgeon, hibiclens soap will need to be purchased over the counter in pharmacy.  Rinse soap off thoroughly.  Shampoo your hair with your regular shampoo    The Day of Surgery  Take another bath or shower with hibiclens or any antibacterial soap, to reduce the chance of infection.  Take heart  and blood pressure medications with a small sip of water, as advised by the perioperative team.  Do not take fluid pills  You may brush your teeth and rinse your mouth, but do not swallow any additional water.   Do not apply perfumes, powder, body lotions or deodorant on the day of surgery.  Nail polish should be removed.  Do not wear makeup or moisturizer  Wear comfortable clothes, such as a button front shirt and loose fitting pants.  Leave all jewelry, including body piercings, and valuables at home.    Bring any devices you will need after surgery such as crutches or canes.  If you have sleep apnea, please bring your CPAP machine  In the event that your physical condition changes including the onset of a cold or respiratory illness, or if you have to delay or cancel your surgery, please notify your surgeon.

## 2025-02-17 NOTE — HPI
This is a 69 y.o. 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    The history has been obtained by speaking with the patient and reviewing the electronic medical record and/or outside health information. Significant health conditions for the perioperative period are discussed below in assessment and plan.     Patient reports current health status to be Good.  Denies any new symptoms before surgery.

## 2025-02-17 NOTE — PROGRESS NOTES
Subjective:       Patient ID: Felipe Jiménez is a 69 y.o. male.    Chief Complaint: Anemia    HPI    New patient visit for acute on chronic anemia during hospital admission in December. Found to have evidence of both GI and  bleeding in addition to a renal abscess. Scheduled for right nephrectomy next week. For acute on chronic anemia was treated with blood transfusion support and feraheme 1020mg IV dose. Hemoglobin now nearly normal at 12.3 grams. Currently taking oral iron replacement without adverse side effects.     Review of Systems   Constitutional: Negative.    HENT: Negative.     Eyes: Negative.    Respiratory: Negative.     Cardiovascular: Negative.    Gastrointestinal: Negative.  Negative for blood in stool.   Endocrine: Negative.    Genitourinary: Negative.  Negative for hematuria.   Integumentary:  Negative.   Allergic/Immunologic: Negative for environmental allergies and food allergies.   Neurological: Negative.    Hematological:  Negative for adenopathy. Does not bruise/bleed easily.   Psychiatric/Behavioral: Negative.           Objective:      Physical Exam  Vitals and nursing note reviewed.   Constitutional:       Appearance: He is well-developed.   HENT:      Head: Normocephalic and atraumatic.   Eyes:      General: No scleral icterus.     Conjunctiva/sclera: Conjunctivae normal.   Cardiovascular:      Rate and Rhythm: Normal rate.   Pulmonary:      Effort: Pulmonary effort is normal. No respiratory distress.   Abdominal:      General: There is no distension.   Musculoskeletal:         General: Normal range of motion.      Cervical back: Normal range of motion and neck supple.   Skin:     General: Skin is warm and dry.   Neurological:      Mental Status: He is alert and oriented to person, place, and time.      Cranial Nerves: No cranial nerve deficit.   Psychiatric:         Behavior: Behavior normal.       Current Medications[1]   Lab Results   Component Value Date    WBC 7.79 01/27/2025    HGB  12.3 (L) 01/27/2025    HCT 40.0 01/27/2025    MCV 94 01/27/2025     01/27/2025        CMP  Sodium   Date Value Ref Range Status   01/27/2025 140 136 - 145 mmol/L Final     Potassium   Date Value Ref Range Status   01/27/2025 4.0 3.5 - 5.1 mmol/L Final     Chloride   Date Value Ref Range Status   01/27/2025 109 95 - 110 mmol/L Final     CO2   Date Value Ref Range Status   01/27/2025 24 23 - 29 mmol/L Final     Glucose   Date Value Ref Range Status   01/27/2025 89 70 - 110 mg/dL Final     BUN   Date Value Ref Range Status   01/27/2025 20 8 - 23 mg/dL Final     Creatinine   Date Value Ref Range Status   01/27/2025 1.1 0.5 - 1.4 mg/dL Final     Calcium   Date Value Ref Range Status   01/27/2025 10.1 8.7 - 10.5 mg/dL Final     Total Protein   Date Value Ref Range Status   01/27/2025 8.8 (H) 6.0 - 8.4 g/dL Final     Albumin   Date Value Ref Range Status   01/27/2025 3.8 3.5 - 5.2 g/dL Final     Total Bilirubin   Date Value Ref Range Status   01/27/2025 0.3 0.1 - 1.0 mg/dL Final     Comment:     For infants and newborns, interpretation of results should be based  on gestational age, weight and in agreement with clinical  observations.    Premature Infant recommended reference ranges:  Up to 24 hours.............<8.0 mg/dL  Up to 48 hours............<12.0 mg/dL  3-5 days..................<15.0 mg/dL  6-29 days.................<15.0 mg/dL       Alkaline Phosphatase   Date Value Ref Range Status   01/27/2025 67 40 - 150 U/L Final     AST   Date Value Ref Range Status   01/27/2025 18 10 - 40 U/L Final     ALT   Date Value Ref Range Status   01/27/2025 15 10 - 44 U/L Final     Anion Gap   Date Value Ref Range Status   01/27/2025 7 (L) 8 - 16 mmol/L Final          Assessment:       Problem List Items Addressed This Visit          Renal/    Nephrolithiasis       Plan:       Patient currently in recovery from acute blood loss anemia on chronic iron deficiency anemia.  Hemoglobin response is appropriate after full iron IV  iron dose > 1gram in December. It takes 120 day for full blood production cycle and hemoglobin already improved to 12 from 8 grams.  Recommend continue oral iron.   Nephrectomy planned for next week.   Noted newly elevated total protein, this is likely reactive immune system, suspect polyclonal. Short-lived antibodies can live for a few months (from infection in December).    Will update labs in 2 months for CBC, CMP, SPEP, free light chain, iron panel and ferritin to ensure appropriate recovery.    A total of 20 minutes was spent in pre-visit chart review, personal interpretation of labs and imaging, and medication review. Total visit time 30 minutes, >50 % counseling.       BMT Chart Routing      Follow up with physician 2 months.   Follow up with BHARGAV    Provider visit type    Infusion scheduling note    Injection scheduling note    Labs CBC, CMP, ferritin, free light chains, iron and TIBC and SPEP   Scheduling:  Preferred lab:  Lab interval:  labs 1 week before return visit   Imaging    Pharmacy appointment    Other referrals                                [1]   Current Outpatient Medications   Medication Sig Dispense Refill    acetaminophen (TYLENOL) 500 MG tablet Take 500 mg by mouth every 6 (six) hours as needed for Pain.      ASHWAGANDHA EXTRACT ORAL Take by mouth.      aspirin 81 MG Chew Take 81 mg by mouth once daily.      bisacodyL (DULCOLAX) 5 mg EC tablet Take 5 mg by mouth daily as needed for Constipation.      multivitamin (THERAGRAN) per tablet Take 1 tablet by mouth once daily.      rosuvastatin (CRESTOR) 20 MG tablet Take 1 tablet (20 mg total) by mouth once daily. 90 tablet 3     No current facility-administered medications for this visit.

## 2025-02-17 NOTE — PROGRESS NOTES
[facility]  Progress Note    Patient Name: Felipe Jiménez  MRN: [id number]  Date of Evaluation- 02/17/2025  PCP- Shakir Mckay MD    Future cases for Felipe Jiménez [[id number]]       Case ID Status Date Time Goran Procedure Provider Location    [id number] [facility] 2/27/2025  7:00  DV5 ROBOTIC NEPHRECTOMY Dain Eugene MD [[id number]] N[facility] OR 2ND FLR            HPI:  This is a 69 y.o. 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    The history has been obtained by speaking with the patient and reviewing the electronic medical record and/or outside health information. Significant health conditions for the perioperative period are discussed below in assessment and plan.     Patient reports current health status to be Good.  Denies any new symptoms before surgery.       Subjective/ Objective:     Chief Complaint: Preoperative evaulation, perioperative medical management, and complication reduction plan.     Functional Capacity:  Able to climb a flight of stairs without CP SOB or Syncope.  Able to meet 4 METs      Anesthesia issues: None    Difficulty mouth opening: none    Steroid use in the last 12 months: none     Dental Issues: NONE    Family anesthesia difficulty: None     Family Hx of Thrombosis NONE    Past Medical History:   Diagnosis Date    Arthritis     Disorder of kidney and ureter     Eczema     Fever 11/30/2024    Hyperlipidemia     Leucocytosis 11/29/2024    Male erectile dysfunction, unspecified      Past Surgical History:   Procedure Laterality Date    COLONOSCOPY N/A 10/2/2024    Procedure: COLONOSCOPY;  Surgeon: Brennan Balderrama MD;  Location: [facility];  Service: Endoscopy;  Laterality: N/A;    CYSTOSCOPY W/ URETERAL STENT PLACEMENT Right 11/30/2024    Procedure: CYSTOSCOPY, WITH URETERAL STENT INSERTION;  Surgeon: Dain Eugene  MD NABILA;  [facility];  Service: Urology;  Laterality: Right;    DILATION OF URETHRA N/A 11/30/2024    Procedure: DILATION, URETHRA;  Surgeon: Dain Eugene MD;  [facility];  Service: Urology;  Laterality: N/A;    ESOPHAGOGASTRODUODENOSCOPY N/A 10/2/2024    Procedure: EGD (ESOPHAGOGASTRODUODENOSCOPY);  Surgeon: Brennan Balderrama MD;  Location: [facility];  Service: Endoscopy;  Laterality: N/A;    FRACTURE SURGERY      INTRALUMINAL GASTROINTESTINAL TRACT IMAGING VIA CAPSULE N/A 10/22/2024    Procedure: IMAGING PROCEDURE, GI TRACT, INTRALUMINAL, VIA CAPSULE;  Surgeon: Brennan Balderrama MD;  Location: [facility];  Service: Endoscopy;  Laterality: N/A;    LIPOMA RESECTION Left 1979    left knee    QUADRICEPS TENDON REPAIR Left 2012       Review of Systems   Constitutional:  Negative for chills, fatigue, fever and unexpected weight change.   HENT:  Negative for trouble swallowing and voice change.    Eyes:  Negative for photophobia and visual disturbance.        No acute visual changes   Respiratory:  Negative for cough, shortness of breath and wheezing.         STOP bang  Score 3    High risk RIVERA   Cardiovascular:  Negative for chest pain, palpitations and leg swelling.   Gastrointestinal:  Negative for abdominal pain, blood in stool, constipation, diarrhea, nausea and vomiting.        No FLD, Hepatitis, Cirrhosis  No BRB or black tarry stool    +hemorrhoid   Genitourinary:  Negative for difficulty urinating, dysuria, frequency, hematuria and urgency.        Nocturia 3   Musculoskeletal:  Positive for arthralgias. Negative for gait problem, myalgias, neck pain and neck stiffness.   Neurological:  Negative for dizziness, tremors, seizures, syncope, weakness, light-headedness, numbness and headaches.   Psychiatric/Behavioral:  Negative for agitation, behavioral problems, confusion, decreased concentration, dysphoric mood, hallucinations, self-injury, sleep disturbance and suicidal ideas.  "The patient is not nervous/anxious and is not hyperactive.           VITALS  Visit Vitals  /71 (BP Location: Right arm, Patient Position: Sitting)   Pulse 82   Temp 97.7 °F (36.5 °C) (Oral)   Ht 5' 8" (1.727 m)   Wt 71.1 kg (156 lb 10.2 oz)   SpO2 97%   BMI 23.82 kg/m²          Physical Exam  Constitutional:       General: He is not in acute distress.     Appearance: He is well-developed. He is not diaphoretic.   HENT:      Head: Normocephalic.      Right Ear: Hearing normal.      Left Ear: Hearing normal.      Nose: Nose normal.      Mouth/Throat:      Lips: Pink.      Mouth: Mucous membranes are moist.   Eyes:      General: Lids are normal.      Conjunctiva/sclera: Conjunctivae normal.      Pupils: Pupils are equal, round, and reactive to light.   Neck:      Vascular: No carotid bruit.      Trachea: Trachea and phonation normal.   Cardiovascular:      Rate and Rhythm: Normal rate and regular rhythm.      Pulses:           Carotid pulses are 2+ on the right side and 2+ on the left side.       Radial pulses are 2+ on the right side and 2+ on the left side.        Posterior tibial pulses are 2+ on the right side and 2+ on the left side.      Heart sounds: Normal heart sounds. No murmur heard.     No friction rub. No gallop.   Pulmonary:      Effort: Pulmonary effort is normal.      Breath sounds: Normal breath sounds.      Comments: Clear and equal  Anterior and Posterior BBS  Abdominal:      General: Abdomen is protuberant. Bowel sounds are normal. There is no distension.      Palpations: Abdomen is soft.      Tenderness: There is no abdominal tenderness.   Musculoskeletal:         General: No tenderness or deformity. Normal range of motion.      Cervical back: Normal range of motion.      Right lower leg: No edema.      Left lower leg: No edema.   Lymphadenopathy:      Head:      Right side of head: No submental, submandibular, tonsillar, preauricular, posterior auricular or occipital adenopathy.      Left " side of head: No submental, submandibular, tonsillar, preauricular, posterior auricular or occipital adenopathy.      Cervical:      Right cervical: No superficial cervical adenopathy.     Left cervical: No superficial cervical adenopathy.   Skin:     General: Skin is warm and dry.      Capillary Refill: Capillary refill takes 2 to 3 seconds.      Coloration: Skin is not pale.      Findings: No erythema or rash.   Neurological:      Mental Status: He is alert and oriented to person, place, and time.      GCS: GCS eye subscore is 4. GCS verbal subscore is 5. GCS motor subscore is 6.      Motor: No abnormal muscle tone.      Coordination: Coordination normal.   Psychiatric:         Attention and Perception: Attention and perception normal.         Mood and Affect: Mood and affect normal.         Speech: Speech normal.         Behavior: Behavior normal. Behavior is cooperative.         Thought Content: Thought content normal.         Cognition and Memory: Cognition and memory normal.         Judgment: Judgment normal.          Significant Labs:  Lab Results   Component Value Date    WBC 7.79 01/27/2025    HGB 12.3 (L) 01/27/2025    HCT 40.0 01/27/2025     01/27/2025    CHOL 110 (L) 06/20/2024    TRIG 48 06/20/2024    HDL 41 06/20/2024    ALT 15 01/27/2025    AST 18 01/27/2025     01/27/2025    K 4.0 01/27/2025     01/27/2025    CREATININE 1.1 01/27/2025    BUN 20 01/27/2025    CO2 24 01/27/2025    TSH 1.836 06/20/2024    PSA 2.1 06/20/2024    INR 1.0 02/17/2025    HGBA1C 4.9 01/27/2025       Diagnostic Studies: No relevant studies.    EKG:   Results for orders placed or performed during the hospital encounter of 11/29/24   EKG 12-lead    Collection Time: 12/02/24  7:32 PM   Result Value Ref Range    QRS Duration 92 ms    OHS QTC Calculation 427 ms    Narrative    Test Reason : R07.9,    Vent. Rate :  81 BPM     Atrial Rate :  81 BPM     P-R Int : 136 ms          QRS Dur :  92 ms      QT Int : 368 ms        P-R-T Axes :  65  30  36 degrees    QTcB Int : 427 ms    Normal sinus rhythm  Normal ECG  When compared with ECG of 29-Nov-2024 13:31,  No significant change was found  Confirmed by Sb Murillo ([id number]) on 12/2/2024 8:32:08 PM    Referred By: AAAREFERRAL SELF           Confirmed By: Sb Murillo       2D ECHO:  TTE:  Results for orders placed or performed during the hospital encounter of 11/29/24   Echo   Result Value Ref Range    LV Diastolic Volume 93.67 mL    Echo EF Estimated 42 %    LV Systolic Volume 54.27 mL    IVS 0.7 0.6 - 1.1 cm    LVIDd 4.5 3.5 - 6.0 cm    LVIDs 3.6 2.1 - 4.0 cm    LVOT diameter 2.1 cm    PW 0.9 0.6 - 1.1 cm    IVRT 70.41 ms    AV LVOT peak gradient 4 mmHg    LVOT mn grad 2 mmHg    LVOT peak rasheed 1.0 m/s    LVOT peak VTI 19.6 cm    RV- lópez basal diam 3.1 cm    RV S' 14.35 cm/s    LA size 2.66 cm    Left Atrium Major Axis 5.78 cm    Left Atrium Minor Axis 5.86 cm    LA Vol (MOD) 56.59 mL    RA Major Olga 4.23 cm    AV valve area 2.4 cm2    AV area by cont VTI 2.4 cm2    AV regurgitation pressure 1/2 time 577.21 ms    AV peak gradient 9.0 mmHg    AV mean gradient 6.5 mmHg    Ao peak rasheed 1.5 m/s    Ao VTI 28.8 cm    MV Peak A Rasheed 0.57 m/s    E wave deceleration time 205.33 ms    MV Peak E Rasheed 0.67 m/s    E/A ratio 1.18     LV LATERAL E/E' RATIO 3.94     LV SEPTAL E/E' RATIO 5.15     TDI LATERAL 0.17 m/s    TDI SEPTAL 0.13 m/s    TV peak gradient 26 mmHg    TR Max Rasheed 2.53 m/s    Ascending aorta 2.88 cm    STJ 2.84 cm    Sinus 3.45 cm    LA WIDTH 3.69 cm    RA Width 3.50 cm    TAPSE 1.97 cm    BSA 1.75 m2    LVOT stroke volume 67.9 cm3    LV Systolic Volume Index 30.8 mL/m2    LV Diastolic Volume Index 53.22 mL/m2    LVOT area 3.5 cm2    FS 20.0 (A) 28 - 44 %    Left Ventricle Relative Wall Thickness 0.40 cm    LV mass 113.6 g    LV Mass Index 64.6 g/m2    E/E' ratio 4.47 m/s    KEHINDE 27.6 mL/m2    LA Vol 48.55 cm3    RV/LV Ratio 0.69 cm    KEHINDE (MOD) 32.2 mL/m2    AV Velocity Ratio 0.67      AV index (prosthetic) 0.68     KEVIN by Velocity Ratio 2.3 cm²    Triscuspid Valve Regurgitation Peak Gradient 26 mmHg    Mean e' 0.15 m/s    ZLVIDS 1.41     ZLVIDD -0.78     EF 50 %    Jefferson's Biplane MOD Ejection Fraction 49 %    TV resting pulmonary artery pressure 29 mmHg    RV TB RVSP 6 mmHg    Est. RA pres 3 mmHg    Narrative      Left Ventricle: The left ventricle is normal in size. Ventricular mass   is normal. Normal wall thickness. Low normal ro slight  global hypokinesis   present. There is low normal to mildly reduced systolic function with a   visually estimated ejection fraction of 50 - 55%. Ejection fraction is   approximately 50%. Quantitated ejection fraction is 49%. There is normal   diastolic function.    Right Ventricle: Normal right ventricular cavity size. Wall thickness   is normal. Systolic function is normal.    Aortic Valve: There is aortic valve sclerosis. There is mild aortic   regurgitation.    Mitral Valve: There is mild regurgitation.    Tricuspid Valve: There is mild to moderate regurgitation.    Pulmonary Artery: The estimated pulmonary artery systolic pressure is   29 mmHg.    IVC/SVC: Normal venous pressure at 3 mmHg.         BEKA:  No results found for this or any previous visit.     Imaging     Active Cardiac Conditions: None      Revised Cardiac Risk Index   High -Risk Surgery  Intraperitoneal; Intrathoracic; suprainguinal vascular Yes- + 1 No- 0   History of Ischemic Heart Disease   (Hx of MI/positive exercise test/current chest pain due to ischemia/use of nitrate therapy/EKG with pathological Q waves) Yes- + 1 No- 0   History of CHF  (Pulmonary edema/bilateral rales or S3 gallop/PND/CXR showing pulmonary vascular redistribution) Yes- + 1 No- 0   History of CVA   (Prior stroke or TIA) Yes- + 1 No- 0   Pre-operative treatment with insulin Yes- + 1 No- 0   Pre-operative creatinine > 2mg/dl Yes- + 1 No- 0   Total:    Risk Status:  Estimated risk of cardiac complications after  non-cardiac surgery using the Revised Cardiac Risk Index for Preoperative risk is 3.9 %      ARISCAT (Canet) risk index: Low: 1.6% risk of post-op pulmonary complications.    American Society of Anesthesiologists Physical Status classification (ASA): 3             Preoperative cardiac risk assessment-  The patient does not have any active cardiac conditions . Revised cardiac risk index predictors- ---.Functional capacity is more than 4 Mets. He will be undergoing a Urological procedure that carries a Moderate Risk risk     The estimated risk of the rate of adverse cardiac outcomes  3.9    No further cardiac work up is indicated prior to proceeding with the surgery     Orders Placed This Encounter    Protime-INR       American Society of Anesthesiologists Physical status classification ( ASA ) class: 3     Postoperative pulmonary complication risk assessment: 1.6     ARISCAT ( Canet) risk index- risk class -  Low, if duration of surgery is under 3 hours, intermediate, if duration of surgery is over 3 hours        Assessment/Plan:     Iron deficiency anemia  Component      Latest Ref Rng 1/27/2025   RBC      4.60 - 6.20 M/uL 4.25 (L)    Hemoglobin      14.0 - 18.0 g/dL 12.3 (L)    Hematocrit      40.0 - 54.0 % 40.0       Legend:  (L) Low    Hypercholesterolemia  Continue Crestor    Melena  Patient denies dark or BRB in stool  H&H trending up      Weight loss          Dizziness  R/T illness/anemia- has resolved    Falls frequently  PAtient states this was related to his kidney infection and anemia and has now resolved    Hydronephrosis of right kidney  Surgery is planned for right kidney 2/27/25    Severe protein-calorie malnutrition  Patient's BMI has increased from 21.29 to 23.67    H/O echocardiogram  Echocardiogram 11/30/24        Left Ventricle: The left ventricle is normal in size. Ventricular mass is normal. Normal wall thickness. Low normal ro slight  global hypokinesis present. There is low normal to mildly  reduced systolic function with a visually estimated ejection fraction of 50 - 55%. Ejection fraction is approximately 50%. Quantitated ejection fraction is 49%. There is normal diastolic function.    Right Ventricle: Normal right ventricular cavity size. Wall thickness is normal. Systolic function is normal.    Aortic Valve: There is aortic valve sclerosis. There is mild aortic regurgitation.    Mitral Valve: There is mild regurgitation.    Tricuspid Valve: There is mild to moderate regurgitation.    Pulmonary Artery: The estimated pulmonary artery systolic pressure is 29 mmHg.    IVC/SVC: Normal venous pressure at 3 mmHg.         Preventive perioperative care    Thromboembolic prophylaxis:  His risk factors for thrombosis include surgical procedure, age, and reduced mobility.I suggest  thromboembolic prophylaxis ( mechanical/pharmacological, weighing the risk benefits of pharmacological agent use considering geetha procedural bleeding )  during the perioperative period.I suggested being active in the post operative period.      Postoperative pulmonary complication prophylaxis-Risk factors for post operative pulmonary complications include age over 65 years, surgery lasting over 3 hours, and ASA class >2- I suggest incentive spirometry use, early ambulation, and pain control so as to avoid diaphragmatic splinting  Brush teeth twice per day, oral rinses, sleep with the head of the bed up 30 degrees     Renal complication prophylaxis-Risk factors for renal complications include age . I suggest keeping him well hydrated and avoidance/ minimizing the use of  NSAID's,BOUDREAUX 2 Inhibitors ,IV contrast if possible in the perioperative period.I suggested drinking 2 litre's of water a day      Surgical site Infection Prophylaxis-I  suggest appropriate antibiotic for Prophylaxis against Surgical site infections Shower with Hibiclens in the night before surgery and the morning of surgery        In view of urological procedure the  patient  is at risk of postoperative urinary retention.  I suggest avoidance / minimizing the of  Benzodiazepines,Anticholinergic medication,antihistamines ( Benadryl) , if possible in the perioperative period. I suggest using the minimum possible use of opioids for the minimum period of time in the perioperative period. Benadryl avoidance suggested      This visit was focused on Preoperative evaluation, Perioperative Medical management, complication reduction plans. I suggest that the patient follows up with primary care or relevant sub specialists for ongoing health care.    I appreciate the opportunity to be involved in this patients care. Please feel free to contact me if there were any questions about this consultation.    Patient is optimized    I spent a total of 46 minutes on the day of the visit.This includes face to face time and non-face to face time preparing to see the patient (eg, review of tests), obtaining and/or reviewing separately obtained history, documenting clinical information in the electronic or other health record, independently interpreting results and communicating results to the patient/family/caregiver, or care coordinator.      Mike Juares NP  Perioperative Medicine  [facility]   Pager [phone]

## 2025-02-17 NOTE — ASSESSMENT & PLAN NOTE
Echocardiogram 11/30/24        Left Ventricle: The left ventricle is normal in size. Ventricular mass is normal. Normal wall thickness. Low normal ro slight  global hypokinesis present. There is low normal to mildly reduced systolic function with a visually estimated ejection fraction of 50 - 55%. Ejection fraction is approximately 50%. Quantitated ejection fraction is 49%. There is normal diastolic function.    Right Ventricle: Normal right ventricular cavity size. Wall thickness is normal. Systolic function is normal.    Aortic Valve: There is aortic valve sclerosis. There is mild aortic regurgitation.    Mitral Valve: There is mild regurgitation.    Tricuspid Valve: There is mild to moderate regurgitation.    Pulmonary Artery: The estimated pulmonary artery systolic pressure is 29 mmHg.    IVC/SVC: Normal venous pressure at 3 mmHg.

## 2025-02-18 LAB — BACTERIA UR CULT: NORMAL

## 2025-02-20 ENCOUNTER — TELEPHONE (OUTPATIENT)
Dept: HEMATOLOGY/ONCOLOGY | Facility: CLINIC | Age: 70
End: 2025-02-20
Payer: MEDICARE

## 2025-02-20 NOTE — TELEPHONE ENCOUNTER
Left message advising patient to reach out to internal medicine who prescribed medication at [phone].

## 2025-02-20 NOTE — TELEPHONE ENCOUNTER
----- Message from Joy sent at 2/20/2025 10:56 AM CST -----  Regarding: Rx Inquiry  Type:  RX Inquiry Name of Caller:Felipe De Los Santoscription Name:rosuvastatin (CRESTOR) 20 MG tabletWhat do they need to clarify?:Patient has questions about rx instructionsBest Call Back Number: [phone]

## 2025-02-20 NOTE — TELEPHONE ENCOUNTER
----- Message from Joy sent at 2/20/2025 10:56 AM CST -----  Regarding: Rx Inquiry  Type:  RX Inquiry Name of Caller:Felipe De Los Santoscription Name:rosuvastatin (CRESTOR) 20 MG tabletWhat do they need to clarify?:Patient has questions about rx instructionsBest Call Back Number: [phone]

## 2025-02-21 ENCOUNTER — TELEPHONE (OUTPATIENT)
Dept: UROLOGY | Facility: CLINIC | Age: 70
End: 2025-02-21
Payer: MEDICARE

## 2025-02-21 NOTE — TELEPHONE ENCOUNTER
Spoke with patient.  Confirmed he has discontinued ASA in preparation for upcoming surgery.  Patient denies use of GLP1 medications.

## 2025-02-26 ENCOUNTER — ANESTHESIA EVENT (OUTPATIENT)
Dept: SURGERY | Facility: HOSPITAL | Age: 70
End: 2025-02-26
Payer: MEDICARE

## 2025-02-26 ENCOUNTER — TELEPHONE (OUTPATIENT)
Dept: UROLOGY | Facility: CLINIC | Age: 70
End: 2025-02-26
Payer: MEDICARE

## 2025-02-26 NOTE — TELEPHONE ENCOUNTER
Instructions for Day of Surgery    Report To:    ROS[facility], 2nd floor of [facility]    Arrival time: 5am    Night Before Surgery     Nothing to eat or drink after midnight the night before your surgery  Take medications as instructed the morning of surgery  No alcoholic beverages 24 hours prior to surgery

## 2025-02-26 NOTE — ANESTHESIA PREPROCEDURE EVALUATION
[facility]  Anesthesia Pre-Operative Evaluation   02/26/2025        Felipe Jiménez, 1955  [id number]  Procedure(s) (LRB):  DV5 ROBOTIC NEPHRECTOMY (Right)  Nephrectomy (Right)    Subjective    Felipe Jiménez is a 69 y.o. 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    Patient now presents for above procedure(s).     Prev Airway: 2024- Grade I view with Nelson 3    LDA:        Closed/Suction Drain 12/01/24 1219 Right Back Bulb 14 Fr. (Active)   Number of days: 87           Problem List[1]    Review of patient's allergies indicates:  No Known Allergies    Current Inpatient Medications:       Medications Ordered Prior to Encounter[2]    Past Surgical History:   Procedure Laterality Date    COLONOSCOPY N/A 10/2/2024    Procedure: COLONOSCOPY;  Surgeon: Brennan Balderrama MD;  Location: [facility];  Service: Endoscopy;  Laterality: N/A;    CYSTOSCOPY W/ URETERAL STENT PLACEMENT Right 11/30/2024    Procedure: CYSTOSCOPY, WITH URETERAL STENT INSERTION;  Surgeon: Dain Eugene MD;  [facility];  Service: Urology;  Laterality: Right;    DILATION OF URETHRA N/A 11/30/2024    Procedure: DILATION, URETHRA;  Surgeon: Dain Eugene MD;  [facility];  Service: Urology;  Laterality: N/A;    ESOPHAGOGASTRODUODENOSCOPY N/A 10/2/2024    Procedure: EGD (ESOPHAGOGASTRODUODENOSCOPY);  Surgeon: Brennan Balderrama MD;  Location: [facility];  Service: Endoscopy;  Laterality: N/A;    FRACTURE SURGERY      INTRALUMINAL GASTROINTESTINAL TRACT IMAGING VIA CAPSULE N/A 10/22/2024    Procedure: IMAGING PROCEDURE, GI TRACT, INTRALUMINAL, VIA CAPSULE;  Surgeon: Brennan Balderrama MD;  Location: [facility];  Service: Endoscopy;  Laterality: N/A;    LIPOMA RESECTION Left 1979    left knee    QUADRICEPS TENDON REPAIR Left 2012       Social History:  Tobacco Use: Medium Risk (2/17/2025)    Patient  History     Smoking Tobacco Use: Former     Smokeless Tobacco Use: Never     Passive Exposure: Not on file       Alcohol Use: Not At Risk (12/1/2024)    AUDIT-C     Frequency of Alcohol Consumption: Never     Average Number of Drinks: Patient does not drink     Frequency of Binge Drinking: Never       Objective    Vital Signs Range:  BMI Readings from Last 1 Encounters:   02/17/25 23.67 kg/m²               Significant Labs:        Component Value Date/Time    WBC 7.79 01/27/2025 1202    HGB 12.3 (L) 01/27/2025 1202    HCT 40.0 01/27/2025 1202     01/27/2025 1202     01/27/2025 1202    K 4.0 01/27/2025 1202     01/27/2025 1202    CO2 24 01/27/2025 1202    GLU 89 01/27/2025 1202    BUN 20 01/27/2025 1202    CREATININE 1.1 01/27/2025 1202    MG 2.2 12/06/2024 0419    PHOS 3.5 12/06/2024 0419    CALCIUM 10.1 01/27/2025 1202    ALBUMIN 3.8 01/27/2025 1202    PROT 8.8 (H) 01/27/2025 1202    ALKPHOS 67 01/27/2025 1202    BILITOT 0.3 01/27/2025 1202    AST 18 01/27/2025 1202    ALT 15 01/27/2025 1202    INR 1.0 02/17/2025 0907    HGBA1C 4.9 01/27/2025 1202        Please see Results Review for additional labs.     Diagnostic Studies: All relevant studies, reviewed.      EKG:   Results for orders placed or performed during the hospital encounter of 11/29/24   EKG 12-lead    Collection Time: 12/02/24  7:32 PM   Result Value Ref Range    QRS Duration 92 ms    [facility] QTC Calculation 427 ms    Narrative    Test Reason : R07.9,    Vent. Rate :  81 BPM     Atrial Rate :  81 BPM     P-R Int : 136 ms          QRS Dur :  92 ms      QT Int : 368 ms       P-R-T Axes :  65  30  36 degrees    QTcB Int : 427 ms    Normal sinus rhythm  Normal ECG  When compared with ECG of 29-Nov-2024 13:31,  No significant change was found  Confirmed by Sb Murillo ([id number]) on 12/2/2024 8:32:08 PM    Referred By: AAAREFERRAL SELF           Confirmed By: Sb Murillo       ECHO:  Results for orders placed during the hospital encounter of  11/29/24    Echo    Interpretation Summary    Left Ventricle: The left ventricle is normal in size. Ventricular mass is normal. Normal wall thickness. Low normal ro slight  global hypokinesis present. There is low normal to mildly reduced systolic function with a visually estimated ejection fraction of 50 - 55%. Ejection fraction is approximately 50%. Quantitated ejection fraction is 49%. There is normal diastolic function.    Right Ventricle: Normal right ventricular cavity size. Wall thickness is normal. Systolic function is normal.    Aortic Valve: There is aortic valve sclerosis. There is mild aortic regurgitation.    Mitral Valve: There is mild regurgitation.    Tricuspid Valve: There is mild to moderate regurgitation.    Pulmonary Artery: The estimated pulmonary artery systolic pressure is 29 mmHg.    IVC/SVC: Normal venous pressure at 3 mmHg.        Pre-op Assessment    I have reviewed the Patient Summary Reports.     I have reviewed the Nursing Notes. I have reviewed the NPO Status.   I have reviewed the Medications.     Review of Systems  Anesthesia Hx:  No problems with previous Anesthesia             Denies Family Hx of Anesthesia complications.    Denies Personal Hx of Anesthesia complications.                    Hematology/Oncology:       -- Anemia:                                  Cardiovascular:  Cardiovascular Normal Exercise tolerance: good                                             Pulmonary:  Pulmonary Normal                       Renal/:  Chronic Renal Disease                Hepatic/GI:        Gastritis             Neurological:  Neurology Normal                                          Physical Exam  General: Well nourished, Cooperative, Alert and Oriented    Airway:  Mallampati: II   Mouth Opening: Normal  TM Distance: > 6 cm  Tongue: Normal  Neck ROM: Normal ROM    Dental:  Intact        Anesthesia Plan  Type of Anesthesia, risks & benefits discussed:    Anesthesia Type: Gen ETT  Intra-op  Monitoring Plan: Standard ASA Monitors  Post Op Pain Control Plan: multimodal analgesia, IV/PO Opioids PRN and peripheral nerve block  Induction:  IV  Airway Plan: Direct, Post-Induction  Informed Consent: Informed consent signed with the Patient and all parties understand the risks and agree with anesthesia plan.  All questions answered.   ASA Score: 3  Day of Surgery Review of History & Physical: H&P Update referred to the surgeon/provider.    Ready For Surgery From Anesthesia Perspective.     .           [1]   Patient Active Problem List  Diagnosis    Hypercholesterolemia    Shoulder joint pain    Injury of tendon of rotator cuff    Iron deficiency anemia    Positive D dimer    Dizziness    Falls frequently    Nephrolithiasis    Hydronephrosis of right kidney    Perinephric abscess    Urethral stricture    Severe protein-calorie malnutrition    Melena    H/O echocardiogram   [2]   No current facility-administered medications on file prior to encounter.     Current Outpatient Medications on File Prior to Encounter   Medication Sig Dispense Refill    rosuvastatin (CRESTOR) 20 MG tablet Take 1 tablet (20 mg total) by mouth once daily. 90 tablet 3

## 2025-02-27 ENCOUNTER — ANESTHESIA (OUTPATIENT)
Dept: SURGERY | Facility: HOSPITAL | Age: 70
End: 2025-02-27
Payer: MEDICARE

## 2025-02-27 ENCOUNTER — HOSPITAL ENCOUNTER (INPATIENT)
Facility: HOSPITAL | Age: 70
LOS: 1 days | Discharge: HOME | DRG: 661 | End: 2025-02-28
Attending: UROLOGY | Admitting: UROLOGY
Payer: MEDICARE

## 2025-02-27 DIAGNOSIS — N11.8 XGP (XANTHOGRANULOMATOUS PYELONEPHRITIS): Primary | ICD-10-CM

## 2025-02-27 DIAGNOSIS — Z01.818 PREOPERATIVE TESTING: ICD-10-CM

## 2025-02-27 PROCEDURE — 37000008 HC ANESTHESIA 1ST 15 MINUTES: Performed by: UROLOGY

## 2025-02-27 PROCEDURE — 8E0W4CZ ROBOTIC ASSISTED PROCEDURE OF TRUNK REGION, PERCUTANEOUS ENDOSCOPIC APPROACH: ICD-10-PCS | Performed by: UROLOGY

## 2025-02-27 PROCEDURE — 63600175 PHARM REV CODE 636 W HCPCS

## 2025-02-27 PROCEDURE — 71000016 HC POSTOP RECOV ADDL HR: Performed by: UROLOGY

## 2025-02-27 PROCEDURE — 36000712 HC OR TIME LEV V 1ST 15 MIN: Performed by: UROLOGY

## 2025-02-27 PROCEDURE — 36000713 HC OR TIME LEV V EA ADD 15 MIN: Performed by: UROLOGY

## 2025-02-27 PROCEDURE — 63600175 PHARM REV CODE 636 W HCPCS: Performed by: STUDENT IN AN ORGANIZED HEALTH CARE EDUCATION/TRAINING PROGRAM

## 2025-02-27 PROCEDURE — 88341 IMHCHEM/IMCYTCHM EA ADD ANTB: CPT | Mod: 26,,, | Performed by: PATHOLOGY

## 2025-02-27 PROCEDURE — 37000009 HC ANESTHESIA EA ADD 15 MINS: Performed by: UROLOGY

## 2025-02-27 PROCEDURE — 21400001 HC TELEMETRY ROOM

## 2025-02-27 PROCEDURE — 11000001 HC ACUTE MED/SURG PRIVATE ROOM

## 2025-02-27 PROCEDURE — 52310 CYSTOSCOPY AND TREATMENT: CPT | Mod: 59,,, | Performed by: UROLOGY

## 2025-02-27 PROCEDURE — 94761 N-INVAS EAR/PLS OXIMETRY MLT: CPT

## 2025-02-27 PROCEDURE — 50545 LAPARO RADICAL NEPHRECTOMY: CPT | Mod: RT,,, | Performed by: UROLOGY

## 2025-02-27 PROCEDURE — 25000003 PHARM REV CODE 250

## 2025-02-27 PROCEDURE — 0TT04ZZ RESECTION OF RIGHT KIDNEY, PERCUTANEOUS ENDOSCOPIC APPROACH: ICD-10-PCS | Performed by: UROLOGY

## 2025-02-27 PROCEDURE — 88341 IMHCHEM/IMCYTCHM EA ADD ANTB: CPT | Mod: 59 | Performed by: PATHOLOGY

## 2025-02-27 PROCEDURE — 27201423 OPTIME MED/SURG SUP & DEVICES STERILE SUPPLY: Performed by: UROLOGY

## 2025-02-27 PROCEDURE — 25000003 PHARM REV CODE 250: Performed by: STUDENT IN AN ORGANIZED HEALTH CARE EDUCATION/TRAINING PROGRAM

## 2025-02-27 PROCEDURE — 71000015 HC POSTOP RECOV 1ST HR: Performed by: UROLOGY

## 2025-02-27 PROCEDURE — 88307 TISSUE EXAM BY PATHOLOGIST: CPT | Performed by: PATHOLOGY

## 2025-02-27 PROCEDURE — 88307 TISSUE EXAM BY PATHOLOGIST: CPT | Mod: 26,,, | Performed by: PATHOLOGY

## 2025-02-27 PROCEDURE — 0TP98DZ REMOVAL OF INTRALUMINAL DEVICE FROM URETER, VIA NATURAL OR ARTIFICIAL OPENING ENDOSCOPIC: ICD-10-PCS | Performed by: UROLOGY

## 2025-02-27 PROCEDURE — 99900035 HC TECH TIME PER 15 MIN (STAT)

## 2025-02-27 PROCEDURE — 88342 IMHCHEM/IMCYTCHM 1ST ANTB: CPT | Mod: 26,,, | Performed by: PATHOLOGY

## 2025-02-27 PROCEDURE — 27000221 HC OXYGEN, UP TO 24 HOURS

## 2025-02-27 PROCEDURE — 64461 PVB THORACIC SINGLE INJ SITE: CPT

## 2025-02-27 PROCEDURE — 63600175 PHARM REV CODE 636 W HCPCS: Mod: JZ,TB

## 2025-02-27 PROCEDURE — 71000033 HC RECOVERY, INTIAL HOUR: Performed by: UROLOGY

## 2025-02-27 PROCEDURE — 88342 IMHCHEM/IMCYTCHM 1ST ANTB: CPT | Performed by: PATHOLOGY

## 2025-02-27 RX ORDER — HALOPERIDOL LACTATE 5 MG/ML
0.5 INJECTION, SOLUTION INTRAMUSCULAR EVERY 10 MIN PRN
Status: DISCONTINUED | OUTPATIENT
Start: 2025-02-27 | End: 2025-02-27 | Stop reason: HOSPADM

## 2025-02-27 RX ORDER — SODIUM CHLORIDE 9 MG/ML
INJECTION, SOLUTION INTRAVENOUS CONTINUOUS
Status: DISCONTINUED | OUTPATIENT
Start: 2025-02-27 | End: 2025-02-28

## 2025-02-27 RX ORDER — ATORVASTATIN CALCIUM 40 MG/1
80 TABLET, FILM COATED ORAL DAILY
Status: DISCONTINUED | OUTPATIENT
Start: 2025-02-28 | End: 2025-02-28 | Stop reason: HOSPADM

## 2025-02-27 RX ORDER — DEXMEDETOMIDINE HYDROCHLORIDE 100 UG/ML
INJECTION, SOLUTION INTRAVENOUS
Status: DISCONTINUED | OUTPATIENT
Start: 2025-02-27 | End: 2025-02-27

## 2025-02-27 RX ORDER — FAMOTIDINE 20 MG/1
20 TABLET, FILM COATED ORAL 2 TIMES DAILY
Status: DISCONTINUED | OUTPATIENT
Start: 2025-02-27 | End: 2025-02-28 | Stop reason: HOSPADM

## 2025-02-27 RX ORDER — SODIUM CHLORIDE, SODIUM LACTATE, POTASSIUM CHLORIDE, CALCIUM CHLORIDE 600; 310; 30; 20 MG/100ML; MG/100ML; MG/100ML; MG/100ML
INJECTION, SOLUTION INTRAVENOUS CONTINUOUS
Status: ACTIVE | OUTPATIENT
Start: 2025-02-27 | End: 2025-02-27

## 2025-02-27 RX ORDER — ACETAMINOPHEN 500 MG
1000 TABLET ORAL
Status: DISCONTINUED | OUTPATIENT
Start: 2025-02-27 | End: 2025-02-27 | Stop reason: HOSPADM

## 2025-02-27 RX ORDER — SODIUM CHLORIDE 0.9 % (FLUSH) 0.9 %
10 SYRINGE (ML) INJECTION
Status: DISCONTINUED | OUTPATIENT
Start: 2025-02-27 | End: 2025-02-27 | Stop reason: HOSPADM

## 2025-02-27 RX ORDER — LABETALOL HCL 20 MG/4 ML
20 SYRINGE (ML) INTRAVENOUS ONCE
Status: COMPLETED | OUTPATIENT
Start: 2025-02-27 | End: 2025-02-27

## 2025-02-27 RX ORDER — CEFAZOLIN 2 G/1
2 INJECTION, POWDER, FOR SOLUTION INTRAMUSCULAR; INTRAVENOUS
Status: COMPLETED | OUTPATIENT
Start: 2025-02-27 | End: 2025-02-27

## 2025-02-27 RX ORDER — DEXAMETHASONE SODIUM PHOSPHATE 4 MG/ML
INJECTION, SOLUTION INTRA-ARTICULAR; INTRALESIONAL; INTRAMUSCULAR; INTRAVENOUS; SOFT TISSUE
Status: DISCONTINUED | OUTPATIENT
Start: 2025-02-27 | End: 2025-02-27

## 2025-02-27 RX ORDER — BUPIVACAINE HYDROCHLORIDE 7.5 MG/ML
INJECTION, SOLUTION EPIDURAL; RETROBULBAR
Status: COMPLETED | OUTPATIENT
Start: 2025-02-27 | End: 2025-02-27

## 2025-02-27 RX ORDER — HEPARIN SODIUM 5000 [USP'U]/ML
5000 INJECTION, SOLUTION INTRAVENOUS; SUBCUTANEOUS EVERY 8 HOURS
Status: DISCONTINUED | OUTPATIENT
Start: 2025-02-27 | End: 2025-02-28 | Stop reason: HOSPADM

## 2025-02-27 RX ORDER — ONDANSETRON 8 MG/1
8 TABLET, ORALLY DISINTEGRATING ORAL EVERY 8 HOURS PRN
Status: DISCONTINUED | OUTPATIENT
Start: 2025-02-27 | End: 2025-02-28 | Stop reason: HOSPADM

## 2025-02-27 RX ORDER — METHOCARBAMOL 500 MG/1
500 TABLET, FILM COATED ORAL 4 TIMES DAILY
Status: DISCONTINUED | OUTPATIENT
Start: 2025-02-27 | End: 2025-02-28 | Stop reason: HOSPADM

## 2025-02-27 RX ORDER — ACETAMINOPHEN 500 MG
1000 TABLET ORAL
Status: COMPLETED | OUTPATIENT
Start: 2025-02-27 | End: 2025-02-27

## 2025-02-27 RX ORDER — ONDANSETRON HYDROCHLORIDE 2 MG/ML
INJECTION, SOLUTION INTRAVENOUS
Status: DISCONTINUED | OUTPATIENT
Start: 2025-02-27 | End: 2025-02-27

## 2025-02-27 RX ORDER — KETAMINE HCL IN 0.9 % NACL 50 MG/5 ML
SYRINGE (ML) INTRAVENOUS
Status: DISCONTINUED | OUTPATIENT
Start: 2025-02-27 | End: 2025-02-27

## 2025-02-27 RX ORDER — FENTANYL CITRATE 50 UG/ML
INJECTION, SOLUTION INTRAMUSCULAR; INTRAVENOUS
Status: DISCONTINUED | OUTPATIENT
Start: 2025-02-27 | End: 2025-02-27

## 2025-02-27 RX ORDER — LIDOCAINE HYDROCHLORIDE 20 MG/ML
INJECTION, SOLUTION EPIDURAL; INFILTRATION; INTRACAUDAL; PERINEURAL
Status: DISCONTINUED | OUTPATIENT
Start: 2025-02-27 | End: 2025-02-27

## 2025-02-27 RX ORDER — PREGABALIN 75 MG/1
150 CAPSULE ORAL
Status: COMPLETED | OUTPATIENT
Start: 2025-02-27 | End: 2025-02-27

## 2025-02-27 RX ORDER — KETOROLAC TROMETHAMINE 15 MG/ML
15 INJECTION, SOLUTION INTRAMUSCULAR; INTRAVENOUS
Status: DISCONTINUED | OUTPATIENT
Start: 2025-02-27 | End: 2025-02-27 | Stop reason: HOSPADM

## 2025-02-27 RX ORDER — ROCURONIUM BROMIDE 10 MG/ML
INJECTION, SOLUTION INTRAVENOUS
Status: DISCONTINUED | OUTPATIENT
Start: 2025-02-27 | End: 2025-02-27

## 2025-02-27 RX ORDER — ACETAMINOPHEN 500 MG
1000 TABLET ORAL EVERY 6 HOURS
Status: DISCONTINUED | OUTPATIENT
Start: 2025-02-27 | End: 2025-02-28 | Stop reason: HOSPADM

## 2025-02-27 RX ORDER — LABETALOL HCL 20 MG/4 ML
10 SYRINGE (ML) INTRAVENOUS ONCE
Status: DISCONTINUED | OUTPATIENT
Start: 2025-02-27 | End: 2025-02-27

## 2025-02-27 RX ORDER — POLYETHYLENE GLYCOL 3350 17 G/17G
17 POWDER, FOR SOLUTION ORAL DAILY PRN
Status: DISCONTINUED | OUTPATIENT
Start: 2025-02-27 | End: 2025-02-28 | Stop reason: HOSPADM

## 2025-02-27 RX ORDER — PROMETHAZINE HYDROCHLORIDE 12.5 MG/1
25 TABLET ORAL EVERY 6 HOURS PRN
Status: DISCONTINUED | OUTPATIENT
Start: 2025-02-27 | End: 2025-02-28 | Stop reason: HOSPADM

## 2025-02-27 RX ORDER — MIDAZOLAM HYDROCHLORIDE 1 MG/ML
.5-4 INJECTION, SOLUTION INTRAMUSCULAR; INTRAVENOUS
Status: DISCONTINUED | OUTPATIENT
Start: 2025-02-27 | End: 2025-02-27 | Stop reason: HOSPADM

## 2025-02-27 RX ORDER — PHENYLEPHRINE HCL IN 0.9% NACL 1 MG/10 ML
SYRINGE (ML) INTRAVENOUS
Status: DISCONTINUED | OUTPATIENT
Start: 2025-02-27 | End: 2025-02-27

## 2025-02-27 RX ORDER — FENTANYL CITRATE 50 UG/ML
25-200 INJECTION, SOLUTION INTRAMUSCULAR; INTRAVENOUS
Status: DISCONTINUED | OUTPATIENT
Start: 2025-02-27 | End: 2025-02-27 | Stop reason: HOSPADM

## 2025-02-27 RX ORDER — PROPOFOL 10 MG/ML
VIAL (ML) INTRAVENOUS
Status: DISCONTINUED | OUTPATIENT
Start: 2025-02-27 | End: 2025-02-27

## 2025-02-27 RX ORDER — TALC
6 POWDER (GRAM) TOPICAL NIGHTLY PRN
Status: DISCONTINUED | OUTPATIENT
Start: 2025-02-27 | End: 2025-02-28 | Stop reason: HOSPADM

## 2025-02-27 RX ORDER — FENTANYL CITRATE 50 UG/ML
25 INJECTION, SOLUTION INTRAMUSCULAR; INTRAVENOUS EVERY 5 MIN PRN
Status: DISCONTINUED | OUTPATIENT
Start: 2025-02-27 | End: 2025-02-27 | Stop reason: HOSPADM

## 2025-02-27 RX ORDER — OXYCODONE HYDROCHLORIDE 5 MG/1
5 TABLET ORAL EVERY 4 HOURS PRN
Refills: 0 | Status: DISCONTINUED | OUTPATIENT
Start: 2025-02-27 | End: 2025-02-28 | Stop reason: HOSPADM

## 2025-02-27 RX ORDER — HYDROMORPHONE HYDROCHLORIDE 1 MG/ML
0.2 INJECTION, SOLUTION INTRAMUSCULAR; INTRAVENOUS; SUBCUTANEOUS EVERY 5 MIN PRN
Status: DISCONTINUED | OUTPATIENT
Start: 2025-02-27 | End: 2025-02-27 | Stop reason: HOSPADM

## 2025-02-27 RX ORDER — LIDOCAINE HYDROCHLORIDE 10 MG/ML
1 INJECTION, SOLUTION EPIDURAL; INFILTRATION; INTRACAUDAL; PERINEURAL ONCE AS NEEDED
Status: DISCONTINUED | OUTPATIENT
Start: 2025-02-27 | End: 2025-02-28 | Stop reason: HOSPADM

## 2025-02-27 RX ORDER — GLUCAGON 1 MG
1 KIT INJECTION
Status: DISCONTINUED | OUTPATIENT
Start: 2025-02-27 | End: 2025-02-27 | Stop reason: HOSPADM

## 2025-02-27 RX ORDER — KETOROLAC TROMETHAMINE 15 MG/ML
15 INJECTION, SOLUTION INTRAMUSCULAR; INTRAVENOUS EVERY 6 HOURS
Status: DISCONTINUED | OUTPATIENT
Start: 2025-02-27 | End: 2025-02-28 | Stop reason: HOSPADM

## 2025-02-27 RX ORDER — LIDOCAINE HYDROCHLORIDE 10 MG/ML
1 INJECTION, SOLUTION EPIDURAL; INFILTRATION; INTRACAUDAL; PERINEURAL ONCE
Status: DISCONTINUED | OUTPATIENT
Start: 2025-02-27 | End: 2025-02-27 | Stop reason: HOSPADM

## 2025-02-27 RX ADMIN — ACETAMINOPHEN 1000 MG: 500 TABLET ORAL at 06:02

## 2025-02-27 RX ADMIN — CEFAZOLIN 2 G: 2 INJECTION, POWDER, FOR SOLUTION INTRAMUSCULAR; INTRAVENOUS at 07:02

## 2025-02-27 RX ADMIN — DEXMEDETOMIDINE 8 MCG: 200 INJECTION, SOLUTION INTRAVENOUS at 08:02

## 2025-02-27 RX ADMIN — Medication 10 MG: at 09:02

## 2025-02-27 RX ADMIN — SODIUM CHLORIDE, POTASSIUM CHLORIDE, SODIUM LACTATE AND CALCIUM CHLORIDE: 600; 310; 30; 20 INJECTION, SOLUTION INTRAVENOUS at 09:02

## 2025-02-27 RX ADMIN — ACETAMINOPHEN 1000 MG: 500 TABLET ORAL at 05:02

## 2025-02-27 RX ADMIN — ACETAMINOPHEN 1000 MG: 500 TABLET ORAL at 11:02

## 2025-02-27 RX ADMIN — KETOROLAC TROMETHAMINE 15 MG: 15 INJECTION, SOLUTION INTRAMUSCULAR; INTRAVENOUS at 06:02

## 2025-02-27 RX ADMIN — Medication 200 MCG: at 08:02

## 2025-02-27 RX ADMIN — SUGAMMADEX 200 MG: 100 INJECTION, SOLUTION INTRAVENOUS at 09:02

## 2025-02-27 RX ADMIN — KETOROLAC TROMETHAMINE 15 MG: 15 INJECTION, SOLUTION INTRAMUSCULAR; INTRAVENOUS at 11:02

## 2025-02-27 RX ADMIN — METHOCARBAMOL 500 MG: 500 TABLET ORAL at 05:02

## 2025-02-27 RX ADMIN — POLYETHYLENE GLYCOL 3350 17 G: 17 POWDER, FOR SOLUTION ORAL at 06:02

## 2025-02-27 RX ADMIN — MIDAZOLAM 2 MG: 1 INJECTION INTRAMUSCULAR; INTRAVENOUS at 06:02

## 2025-02-27 RX ADMIN — SODIUM CHLORIDE, SODIUM GLUCONATE, SODIUM ACETATE, POTASSIUM CHLORIDE, MAGNESIUM CHLORIDE, SODIUM PHOSPHATE, DIBASIC, AND POTASSIUM PHOSPHATE: .53; .5; .37; .037; .03; .012; .00082 INJECTION, SOLUTION INTRAVENOUS at 07:02

## 2025-02-27 RX ADMIN — BUPIVACAINE HYDROCHLORIDE 15 ML: 7.5 INJECTION, SOLUTION EPIDURAL; RETROBULBAR at 06:02

## 2025-02-27 RX ADMIN — FENTANYL CITRATE 50 MCG: 50 INJECTION INTRAMUSCULAR; INTRAVENOUS at 07:02

## 2025-02-27 RX ADMIN — GLYCOPYRROLATE 0.2 MG: 0.2 INJECTION, SOLUTION INTRAMUSCULAR; INTRAVENOUS at 07:02

## 2025-02-27 RX ADMIN — ROCURONIUM BROMIDE 30 MG: 10 INJECTION INTRAVENOUS at 08:02

## 2025-02-27 RX ADMIN — SODIUM CHLORIDE: 0.9 INJECTION, SOLUTION INTRAVENOUS at 07:02

## 2025-02-27 RX ADMIN — ROCURONIUM BROMIDE 70 MG: 10 INJECTION INTRAVENOUS at 07:02

## 2025-02-27 RX ADMIN — DEXAMETHASONE SODIUM PHOSPHATE 8 MG: 4 INJECTION INTRA-ARTICULAR; INTRALESIONAL; INTRAMUSCULAR; INTRAVENOUS; SOFT TISSUE at 07:02

## 2025-02-27 RX ADMIN — FENTANYL CITRATE 50 MCG: 50 INJECTION INTRAMUSCULAR; INTRAVENOUS at 06:02

## 2025-02-27 RX ADMIN — PROPOFOL 150 MG: 10 INJECTION, EMULSION INTRAVENOUS at 07:02

## 2025-02-27 RX ADMIN — Medication 200 MCG: at 07:02

## 2025-02-27 RX ADMIN — PREGABALIN 150 MG: 75 CAPSULE ORAL at 06:02

## 2025-02-27 RX ADMIN — METHOCARBAMOL 500 MG: 500 TABLET ORAL at 10:02

## 2025-02-27 RX ADMIN — Medication 20 MG: at 07:02

## 2025-02-27 RX ADMIN — METHOCARBAMOL 500 MG: 500 TABLET ORAL at 12:02

## 2025-02-27 RX ADMIN — LIDOCAINE HYDROCHLORIDE 80 MG: 20 INJECTION, SOLUTION EPIDURAL; INFILTRATION; INTRACAUDAL at 07:02

## 2025-02-27 RX ADMIN — LABETALOL HYDROCHLORIDE 20 MG: 5 INJECTION, SOLUTION INTRAVENOUS at 09:02

## 2025-02-27 RX ADMIN — FAMOTIDINE 20 MG: 20 TABLET, FILM COATED ORAL at 10:02

## 2025-02-27 RX ADMIN — HEPARIN SODIUM 5000 UNITS: 5000 INJECTION INTRAVENOUS; SUBCUTANEOUS at 09:02

## 2025-02-27 RX ADMIN — OXYCODONE 5 MG: 5 TABLET ORAL at 10:02

## 2025-02-27 RX ADMIN — FAMOTIDINE 20 MG: 20 TABLET, FILM COATED ORAL at 09:02

## 2025-02-27 RX ADMIN — DEXMEDETOMIDINE 8 MCG: 200 INJECTION, SOLUTION INTRAVENOUS at 09:02

## 2025-02-27 RX ADMIN — ONDANSETRON 4 MG: 2 INJECTION INTRAMUSCULAR; INTRAVENOUS at 09:02

## 2025-02-27 RX ADMIN — KETOROLAC TROMETHAMINE 15 MG: 15 INJECTION, SOLUTION INTRAMUSCULAR; INTRAVENOUS at 05:02

## 2025-02-27 RX ADMIN — Medication 10 MG: at 08:02

## 2025-02-27 RX ADMIN — METHOCARBAMOL 500 MG: 500 TABLET ORAL at 09:02

## 2025-02-27 NOTE — OP NOTE
[facility]  Operative Note     Date: 02/27/2025      Pre-Op Diagnosis: xanthogranulomatous pyelonephritis  Patient Active Problem List    Diagnosis Date Noted    XGP (xanthogranulomatous pyelonephritis) 02/27/2025    H/O echocardiogram 02/17/2025    Melena 12/04/2024    Urethral stricture 12/01/2024    Severe protein-calorie malnutrition 12/01/2024    Nephrolithiasis 11/30/2024    Hydronephrosis of right kidney 11/30/2024    Perinephric abscess 11/30/2024    Iron deficiency anemia 11/29/2024    Positive D dimer 11/29/2024    Dizziness 11/29/2024    Falls frequently 11/29/2024    Hypercholesterolemia 06/01/2023    Injury of tendon of rotator cuff 12/26/2019    Shoulder joint pain 02/16/2017     Post-Op Diagnosis: same     Procedure(s) Performed:   1.  Right robotic radical nephrectomy  2.  Right ureteral stent removal     Specimen(s):   - Right kidney and Gerota's fascia     Surgeon: Dain Eugene MD     Bedside assistant: Lebron Lofton     Assistant: MD Yasmany Prescott MD     Anesthesia: General endotracheal anesthesia     Indications: Felipe Jiménez is a 69 y.o. male  with right renal non functioning kidney with xanthogranulomatous pyelonephritis.  After discussion of management options and risks and benefits associated with each the patient has elected to pursue surgical management.       Findings:   - Right radical nephrectomy completed without immediate complication  - Right ureteral stent removed     EBL: 50 mL     Drains:   1.  16 Fr mendez catheter     Anesthesia: General endotracheal anesthesia     Complications:  none     Procedure in Detail: After discussion of risks and benefits of the procedure, informed consent was obtained.  The patient was brought to the operating room and placed supine on the operating table.  SCDs were applied and working prior to induction of anesthesia.  General anesthesia was administered. A 16 Fr Mendez catheter was placed in the standard fashion  with 10 ml sterile water used to inflate the balloon. An OG tube was placed. The patient was then moved into the modified flank position with the right side up. The patient was appropriately padded and secured to the table. The patient was then prepped and draped in the usual sterile fashion. Timeout was performed and preoperative antibiotics were confirmed.       A Veress needle was introduced into the abdomen. Entry into the peritoneal cavity was confirmed with the drop test and an initial insufflation pressure of <10mmHg. Aspiration during our drop test revealed no blood or succus. The peritoneal cavity was insufflated up to 15 mmHg and the Veress was removed. The location of the 8 mm camera port was marked with a marking pen and incised sharply using a 15 blade. The 8 mm port was then introduced.  The camera was passed through the port and the abdomen was inspected and found to be free of bowel or vascular injury. Using direct vision the other 2 robotic ports were placed, just below the right costal margin and lower on the right abdomen, ensuring at least 8 cm between ports to allow robotic mobility. A 12 mm assistant port was placed in the lower midline and a 5 mm assistant port was placed in the upper midline. Each trocar was introduced under direct vision ensuring no injury to the underlying abdominal contents.       Dissection began along the white line of Toldt, reflecting the colon medially away from the kidney. The hepatic reflection was released.  The psoas muscle was identified.  Care was taken to avoid injury to the duodenum. Once the colon was reflected, dissection was carried out just below the kidney, and the ureter was identified.  The ureter was elevated and we continued our dissection toward the lower pole of the kidney proximally until we identified the renal hilum.      Careful dissection of the hilum was performed.  The renal veins were easily identified anteriorly.  There were two renal veins  and one renal artery. The vessels were then skeletonized adequately. The artery was taken with a vascular staple load. A second load was used to divide the renal vein. Hemostasis was excellent.      The kidney was then freed from the remaining superior, posterior and lateral attachments. The ureter was divided and the indwelling ureteral stent was removed. Hemostasis was again assessed and was excellent. The mass was placed into an EndoCatch bag via the 12mm midline assistant port. The robot was undocked and all trocars were removed. The 12 mm midline incision was extended from skin down to fascia to allow removal of the specimen. This was inspected and found to be intact. This was passed off the field for pathologic analysis.       The extraction site fascia was closed using interrupted 1-0 PDS in a running fashion.  All skin incisions were closed using 4-0 monocryl. Dermabond was applied to all of the incisions.     The patient tolerated the procedure well and was transferred to the recovery room in stable condition.     Disposition: The patient will remain on the urology service overnight for observation.       Panchito Quiñones MD    I have reviewed the operative note performed by Dr. Quiñones, and I concur with her/his documentation of Felipe Jiménez. I was present for the critical or key portions of the procedure.     clears

## 2025-02-27 NOTE — NURSING TRANSFER
Nursing Transfer Note      Reason patient is being transferred: Post procedure    Transfer To: 522    Transfer via bed    Transported by PCT x 2    Transfer Vital Signs:See flowsheet    Order for Tele Monitor? No    Additional Lines: Katz Catheter    Medicines sent: None    Any special needs or follow-up needed: Routine    Patient belongings transferred with patient:  None    Chart send with patient: Yes    Notified: daughter    Patient reassessed at: 2/27/2025 3764

## 2025-02-27 NOTE — ANESTHESIA PROCEDURE NOTES
Right DOUG SS    Patient location during procedure: pre-op   Block not for primary anesthetic.  Reason for block: at surgeon's request and post-op pain management   Post-op Pain Location: right abdominal pain   Start time: 2/27/2025 6:34 AM  Timeout: 2/27/2025 6:33 AM   End time: 2/27/2025 6:40 AM    Staffing  Authorizing Provider: Nikita Drake MD  Performing Provider: Francisco Javier Rios MD    Staffing  Performed by: Francisco Javier Rios MD  Authorized by: Nikita Drake MD    Preanesthetic Checklist  Completed: patient identified, IV checked, site marked, risks and benefits discussed, surgical consent, monitors and equipment checked, pre-op evaluation and timeout performed  Peripheral Block  Patient position: sitting  Prep: ChloraPrep  Patient monitoring: heart rate, cardiac monitor, continuous pulse ox, continuous capnometry and frequent blood pressure checks  Block type: erector spinae plane  Laterality: right  Injection technique: single shot  Interspace: T9-10    Needle  Needle type: Stimuplex   Needle gauge: 20 G  Needle length: 4 in  Needle localization: anatomical landmarks and ultrasound guidance   -ultrasound image captured on disc.  Assessment  Injection assessment: negative aspiration, negative parasthesia and local visualized surrounding nerve  Paresthesia pain: none  Heart rate change: no  Slow fractionated injection: yes  Pain Tolerance: comfortable throughout block and no complaints  Medications:    Medications: bupivacaine (pf) (MARCAINE) injection 0.75% - Perineural   15 mL - 2/27/2025 6:40:00 AM    Additional Notes  A time out was conducted. Site claudine confirmed with team and patient. Allergies reviewed.   Vital signs stable throughout block. RN monitoring vitals throughout.   Needle advanced under continuous ultrasound guidance.  Local injected incrementally after confirming negative aspiration. No signs or symptoms of intravascular or intraneural injection noted.   No persistent  paresthesias elicited or expressed. Patient tolerated procedure well.  30cc of 0.375% bupi with epinephrine 1:300K, PF dexamethasone 1 mg, and clonidine 50 mcg used for the block.

## 2025-02-27 NOTE — TRANSFER OF CARE
"Anesthesia Transfer of Care Note    Patient: Felipe Jiménez    Procedure(s) Performed: Procedure(s) (LRB):  DV5 ROBOTIC NEPHRECTOMY (Right)  URETERAL STENT REMOVAL (Right)    Patient location: PACU    Anesthesia Type: general    Transport from OR: Transported from OR on 6-10 L/min O2 by face mask with adequate spontaneous ventilation    Post pain: adequate analgesia    Post assessment: no apparent anesthetic complications and tolerated procedure well    Post vital signs: stable    Level of consciousness: sedated    Nausea/Vomiting: no nausea/vomiting    Complications: none    Transfer of care protocol was followed      Last vitals: Visit Vitals  /66 (BP Location: Right arm, Patient Position: Lying)   Pulse 85   Temp 36.8 °C (98.2 °F)   Resp 20   Ht 5' 8" (1.727 m)   Wt 68 kg (150 lb)   SpO2 97%   BMI 22.81 kg/m²     "

## 2025-02-27 NOTE — ANESTHESIA POSTPROCEDURE EVALUATION
Anesthesia Post Evaluation    Patient: Felipe Jiménez    Procedure(s) Performed: Procedure(s) (LRB):  DV5 ROBOTIC NEPHRECTOMY (Right)  URETERAL STENT REMOVAL (Right)    Final Anesthesia Type: general      Patient location during evaluation: PACU  Patient participation: Yes- Able to Participate  Level of consciousness: awake and alert  Post-procedure vital signs: reviewed and stable  Pain management: adequate  Airway patency: patent  RIVERA mitigation strategies: Extubation while patient is awake, Multimodal analgesia and Use of major conduction anesthesia (spinal/epidural) or peripheral nerve block  PONV status at discharge: No PONV  Anesthetic complications: no      Cardiovascular status: stable  Respiratory status: unassisted and spontaneous ventilation  Hydration status: euvolemic  Follow-up not needed.              Vitals Value Taken Time   /60 02/27/25 12:32   Temp 36.4 °C (97.6 °F) 02/27/25 12:00   Pulse 69 02/27/25 12:32   Resp 10 02/27/25 12:32   SpO2 100 % 02/27/25 12:32   Vitals shown include unfiled device data.      Event Time   Out of Recovery 10:15:00         Pain/Rojas Score: Pain Rating Prior to Med Admin: 0 (2/27/2025 11:52 AM)  Pain Rating Post Med Admin: 0 (2/27/2025  7:00 AM)  Rojas Score: 10 (2/27/2025 10:15 AM)

## 2025-02-27 NOTE — ANESTHESIA PROCEDURE NOTES
Intubation    Date/Time: 2/27/2025 7:13 AM    Performed by: Gerald Gonzalez DO  Authorized by: Iraj Calderon MD    Intubation:     Induction:  Intravenous    Intubated:  Postinduction    Mask Ventilation:  Easy mask    Attempts:  1    Attempted By:  Student    Method of Intubation:  Video laryngoscopy    Blade:  Nelson 3    Laryngeal View Grade: Grade I - full view of cords      Difficult Airway Encountered?: No      Complications:  None    Airway Device:  Oral endotracheal tube    Airway Device Size:  7.5    Style/Cuff Inflation:  Cuffed (inflated to minimal occlusive pressure)    Tube secured:  22    Secured at:  The lips    Placement Verified By:  Capnometry    Complicating Factors:  None    Findings Post-Intubation:  BS equal bilateral and atraumatic/condition of teeth unchanged

## 2025-02-27 NOTE — BRIEF OP NOTE
[facility] - Surgery ([facility])  Brief Operative Note    SUMMARY     Surgery Date: 2/27/2025     Surgeons and Role:     * Dain Eugene MD - Primary     * Ammon Singh MD - Resident - Assisting     * Panchito Quiñones MD - Resident - Chief        Pre-op Diagnosis:  XGP (xanthogranulomatous pyelonephritis) [N11.8]  Non-functioning kidney [N28.9]    Post-op Diagnosis:  Post-Op Diagnosis Codes:     * XGP (xanthogranulomatous pyelonephritis) [N11.8]     * Non-functioning kidney [N28.9]    Procedure(s) (LRB):  DV5 ROBOTIC NEPHRECTOMY (Right)  URETERAL STENT REMOVAL (Right)    Anesthesia: General/Regional    Implants:  * No implants in log *    Operative Findings:   Right robotic nephrectomy completed without complication    Estimated Blood Loss: * No values recorded between 2/27/2025  7:34 AM and 2/27/2025  8:57 AM *    Estimated Blood Loss has not been documented. EBL = 50 cc.         Specimens:   Specimen (24h ago, onward)      None            [id number]

## 2025-02-28 ENCOUNTER — PATIENT MESSAGE (OUTPATIENT)
Dept: UROLOGY | Facility: CLINIC | Age: 70
End: 2025-02-28
Payer: MEDICARE

## 2025-02-28 ENCOUNTER — NURSE TRIAGE (OUTPATIENT)
Dept: ADMINISTRATIVE | Facility: CLINIC | Age: 70
End: 2025-02-28
Payer: MEDICARE

## 2025-02-28 VITALS
BODY MASS INDEX: 23.04 KG/M2 | RESPIRATION RATE: 18 BRPM | DIASTOLIC BLOOD PRESSURE: 75 MMHG | OXYGEN SATURATION: 98 % | HEART RATE: 90 BPM | TEMPERATURE: 98 F | WEIGHT: 152 LBS | SYSTOLIC BLOOD PRESSURE: 149 MMHG | HEIGHT: 68 IN

## 2025-02-28 LAB
ANION GAP SERPL CALC-SCNC: 7 MMOL/L (ref 8–16)
BASOPHILS # BLD AUTO: 0.01 K/UL (ref 0–0.2)
BASOPHILS NFR BLD: 0.1 % (ref 0–1.9)
BUN SERPL-MCNC: 22 MG/DL (ref 8–23)
CALCIUM SERPL-MCNC: 9.2 MG/DL (ref 8.7–10.5)
CHLORIDE SERPL-SCNC: 108 MMOL/L (ref 95–110)
CO2 SERPL-SCNC: 20 MMOL/L (ref 23–29)
CREAT SERPL-MCNC: 1.6 MG/DL (ref 0.5–1.4)
DIFFERENTIAL METHOD BLD: ABNORMAL
EOSINOPHIL # BLD AUTO: 0 K/UL (ref 0–0.5)
EOSINOPHIL NFR BLD: 0 % (ref 0–8)
ERYTHROCYTE [DISTWIDTH] IN BLOOD BY AUTOMATED COUNT: 17.5 % (ref 11.5–14.5)
EST. GFR  (NO RACE VARIABLE): 45.8 ML/MIN/1.73 M^2
GLUCOSE SERPL-MCNC: 94 MG/DL (ref 70–110)
HCT VFR BLD AUTO: 35.7 % (ref 40–54)
HGB BLD-MCNC: 11.5 G/DL (ref 14–18)
IMM GRANULOCYTES # BLD AUTO: 0.07 K/UL (ref 0–0.04)
IMM GRANULOCYTES NFR BLD AUTO: 0.5 % (ref 0–0.5)
LYMPHOCYTES # BLD AUTO: 1.2 K/UL (ref 1–4.8)
LYMPHOCYTES NFR BLD: 8.7 % (ref 18–48)
MCH RBC QN AUTO: 29.8 PG (ref 27–31)
MCHC RBC AUTO-ENTMCNC: 32.2 G/DL (ref 32–36)
MCV RBC AUTO: 93 FL (ref 82–98)
MONOCYTES # BLD AUTO: 0.7 K/UL (ref 0.3–1)
MONOCYTES NFR BLD: 5.4 % (ref 4–15)
NEUTROPHILS # BLD AUTO: 11.5 K/UL (ref 1.8–7.7)
NEUTROPHILS NFR BLD: 85.3 % (ref 38–73)
NRBC BLD-RTO: 0 /100 WBC
PLATELET # BLD AUTO: 262 K/UL (ref 150–450)
PMV BLD AUTO: 9.1 FL (ref 9.2–12.9)
POTASSIUM SERPL-SCNC: 4.6 MMOL/L (ref 3.5–5.1)
RBC # BLD AUTO: 3.86 M/UL (ref 4.6–6.2)
SODIUM SERPL-SCNC: 135 MMOL/L (ref 136–145)
WBC # BLD AUTO: 13.52 K/UL (ref 3.9–12.7)

## 2025-02-28 PROCEDURE — 80048 BASIC METABOLIC PNL TOTAL CA: CPT | Performed by: STUDENT IN AN ORGANIZED HEALTH CARE EDUCATION/TRAINING PROGRAM

## 2025-02-28 PROCEDURE — 63600175 PHARM REV CODE 636 W HCPCS: Mod: JZ,TB | Performed by: STUDENT IN AN ORGANIZED HEALTH CARE EDUCATION/TRAINING PROGRAM

## 2025-02-28 PROCEDURE — 25000003 PHARM REV CODE 250: Performed by: STUDENT IN AN ORGANIZED HEALTH CARE EDUCATION/TRAINING PROGRAM

## 2025-02-28 PROCEDURE — 36415 COLL VENOUS BLD VENIPUNCTURE: CPT | Performed by: STUDENT IN AN ORGANIZED HEALTH CARE EDUCATION/TRAINING PROGRAM

## 2025-02-28 PROCEDURE — 85025 COMPLETE CBC W/AUTO DIFF WBC: CPT | Performed by: STUDENT IN AN ORGANIZED HEALTH CARE EDUCATION/TRAINING PROGRAM

## 2025-02-28 RX ORDER — ACETAMINOPHEN 500 MG
1000 TABLET ORAL EVERY 8 HOURS
Qty: 42 TABLET | Refills: 0 | Status: SHIPPED | OUTPATIENT
Start: 2025-02-28 | End: 2025-03-07

## 2025-02-28 RX ORDER — POLYETHYLENE GLYCOL 3350 17 G/17G
17 POWDER, FOR SOLUTION ORAL DAILY
Qty: 238 G | Refills: 1 | Status: SHIPPED | OUTPATIENT
Start: 2025-02-28 | End: 2025-03-20

## 2025-02-28 RX ORDER — OXYCODONE HYDROCHLORIDE 5 MG/1
5 TABLET ORAL EVERY 6 HOURS PRN
Qty: 10 TABLET | Refills: 0 | Status: SHIPPED | OUTPATIENT
Start: 2025-02-28

## 2025-02-28 RX ORDER — IBUPROFEN 600 MG/1
600 TABLET ORAL 3 TIMES DAILY
Qty: 15 TABLET | Refills: 0 | Status: SHIPPED | OUTPATIENT
Start: 2025-02-28 | End: 2025-03-05

## 2025-02-28 RX ADMIN — ACETAMINOPHEN 1000 MG: 500 TABLET ORAL at 05:02

## 2025-02-28 RX ADMIN — METHOCARBAMOL 500 MG: 500 TABLET ORAL at 08:02

## 2025-02-28 RX ADMIN — KETOROLAC TROMETHAMINE 15 MG: 15 INJECTION, SOLUTION INTRAMUSCULAR; INTRAVENOUS at 12:02

## 2025-02-28 RX ADMIN — FAMOTIDINE 20 MG: 20 TABLET, FILM COATED ORAL at 08:02

## 2025-02-28 RX ADMIN — KETOROLAC TROMETHAMINE 15 MG: 15 INJECTION, SOLUTION INTRAMUSCULAR; INTRAVENOUS at 05:02

## 2025-02-28 RX ADMIN — HEPARIN SODIUM 5000 UNITS: 5000 INJECTION INTRAVENOUS; SUBCUTANEOUS at 05:02

## 2025-02-28 RX ADMIN — ATORVASTATIN CALCIUM 80 MG: 40 TABLET, FILM COATED ORAL at 08:02

## 2025-02-28 NOTE — NURSING
Patient noted with elevated BP that is documented in the flowsheet. Patient denies any chest pain, headache or pain to his incision site. Notified Dr. Lipscomb with Urology of above. Stated he will order some PRN's for patient. Awaiting orders. Care ongoing.

## 2025-02-28 NOTE — NURSING
Katz removed via urology team. Order received for voiding trial. Patient educated on voiding trial and urinal provided. Informed patient to notify nurse once he voids. Care ongoing.

## 2025-02-28 NOTE — PLAN OF CARE
Problem: Adult Inpatient Plan of Care  Goal: Plan of Care Review  Outcome: Met  Goal: Patient-Specific Goal (Individualized)  Outcome: Met  Goal: Absence of Hospital-Acquired Illness or Injury  Outcome: Met  Goal: Optimal Comfort and Wellbeing  Outcome: Met  Goal: Readiness for Transition of Care  Outcome: Met     Problem: Infection  Goal: Absence of Infection Signs and Symptoms  Outcome: Met     Problem: Wound  Goal: Optimal Coping  Outcome: Met  Goal: Optimal Functional Ability  Outcome: Met  Goal: Absence of Infection Signs and Symptoms  Outcome: Met  Goal: Improved Oral Intake  Outcome: Met  Goal: Optimal Pain Control and Function  Outcome: Met  Goal: Skin Health and Integrity  Outcome: Met  Goal: Optimal Wound Healing  Outcome: Met     Problem: Fall Injury Risk  Goal: Absence of Fall and Fall-Related Injury  Outcome: Met   Pt AAOX4 voided 350 ml  and bladder scan 120 ml. Pt denies any pain or discomfort, ambulating in hallways.

## 2025-02-28 NOTE — DISCHARGE INSTRUCTIONS
"Discharge Instructions After Abdominal Operation     Pain Control: It is expected to have pain after surgery, but this should improve over the next several weeks. You may be prescribed a narcotic pain medication and/or a muscle relaxer on discharge. You can take this medication as prescribed if the pain is severe but try to decrease the frequency at which you use the narcotic over the initial two (2) weeks.  You may find you need less narcotic pain medication if you combine or stagger it with Tylenol® (acetaminophen) and/or Advil® (ibuprofen). For example, you may take Tylenol 1000mg, then take Advil 600mg 3 hours later, then repeat Tylenol 3 hours after the Advil, and so on.  You should not take more than 4000 mg of Tylenol® or 3200 mg of Advil® in a 24-hour period.  Narcotics and muscle relaxers can cause drowsiness, so sometimes it is helpful to take before bed for a more comfortable rest, but you CANNOT drive, operate machinery, or do mentally important work while on this medication.  Narcotics cause painful constipation - see below on how to prevent this or what to take if this happens.  This is unlikely to be your case.    Medications:  Okay to restart your other home meds.    Wound Care: The incisions can be left open to air unless there is drainage, in which case you should cover the wound with a dry, clean bandage or piece of gauze. Change the dressing 1-2 times each day as needed to maintain a relatively dry dressing. You may have "skin glue" covering your incisions which will peel/crumble off on its own over the next week or two. If you have staples in place, staples are removed in 2-3 weeks by a nurse or physician.  You should shower every day without a dressing on the incisions and let the water run over the incisions. Do not soak in a bathtub, hot tub or pool until the incisions are completely healed, which usually takes ~4-6 weeks.    Bowel Function: Diarrhea and loose stool are normal and expected " after intestinal surgery. Bowel function initially tends to be erratic (increased frequency, gas, liquid/loose consistency, seepage, urgency), but it will improve over the next several months as your body adjusts to the surgical changes.    Diet: Unless directed otherwise by your surgeon, after surgery you should do the following:  Eat several (4-6) small meals each day.  If you experience difficulty eating, add in supplemental drinks (Boost®, Ensure®, Glucerna®).  If you are having loose stools, your diet should include foods to add bulk to the stool such as applesauce, bananas, cheese, peanut butter, pasta, and potatoes.  Follow a Low Fiber Diet for six (6) weeks. Avoid particular foods including the following:  Raw vegetables, beans, corn, mushrooms, legumes, peas, potato skins, sauerkraut, stewed tomatoes, brussels sprouts  Fresh fruit, dried fruit (such as raisins or prunes), coconut, juices with pulp. (It is okay to eat fruits such as bananas, melons, canned fruits, and avocado.)  Meat with casings (such as hot dogs, kielbasa, sausage), shellfish  Nuts, popcorn, seeds, chunky peanut butter  Coarse whole grains including breads/rolls with nuts, cereals with nuts, coarse whole grains, poppy, sesame seeds    Activity: Walking is encouraged after surgery. Light aerobic activity such as climbing stairs or leisurely bike riding is acceptable but listen to your body and let pain be your guide when reintroducing activity. If it hurts, don't do it. Avoid the following activities for six (6) weeks after surgery:  Lifting objects over 10 pounds  Strenuous activity such as press-ups, push-ups, crunches, sit-ups, vigorous pulling/pushing, and repetitive twisting or bending    Driving: You should not drive a vehicle for the two (2) weeks after surgery or while taking narcotic pain medications. When you return to driving, sit in your vehicle without starting the ignition and step on the brake firmly and rapidly a few times  to assure you are confident with this task. Do not go alone the first time you drive.    Potential Problems:   Constipation is common when taking narcotics. You may feel full and may have gas pains. Drink plenty of non-caffeinated, non-alcoholic beverages and walk as much as possible. You can add a capful of miralax 1-2 times daily with liquids.  If this doesn't help, take senna 2 tablets 1-2 times a day, or docusate 200mg twice daily. Lastly, try milk of magnesium one dose a day.    Bowel obstruction or ileus is characterized by persistent abdominal cramps, bloating, constipation, nausea, or vomiting. If the symptoms are mild, you should restrict your dietary intake to only liquids, and avoid solid food for 2-3 days. If the symptoms are more severe or persist beyond 24 hours, please call your surgeon's office for advice.    Frequent stools and loose stools are best managed by using bulking agents or anti-diarrheal medication. Please call your surgeon if diarrhea is not improving after a few weeks to discuss starting one of the medications below.  Benefiber®, Citrucel®, Fibercon®, Konsyl®, and Metamucil® are bulking agents that are available at most grocery stores and pharmacies. The medication should be mixed using one (1) teaspoon of the powder in the minimum amount of fluid required to dissolve the agent and taken 1-2 times each day. Benefiber® can be alternatively sprinkled over food 1-2 times each day.  Imodium® (loperamide) is also available without a prescription. It is most effective if taken before meals. You should not take more than eight (8) tablets (32 mg) of Imodium in a 24-hour period. Please call your surgeon's office if you start taking Imodium®.     Dehydration can commonly occur, and its symptoms or signs include dark urine, dizziness when standing, dry mouth, increased heart rate, leg cramps, and low volumes (less than 800 ml) of urine. If these occur, you should immediately call your surgeon's  office. To avoid dehydration, you should do the following:  Drink a variety of fluids. Use an oral rehydration salt solution like Pedialyte, G2 Gatorade, Nuun tabs, etc. and sip the solution between meals.  Eat salty foods or add salt to your food.  Use an anti-diarrheal medication or bulking agent as previously instructed by your surgeon.     Wound infection can occur after any surgery and is characterized by increased drainage of cloudy fluid, odor, pain around the wound, redness of the skin around the wound extending 2-3 fingerbreadths outward, or temperature greater than 101 degrees. Please immediately call your surgeon's office if you begin experiencing these symptoms or signs. Discharge Instructions After Abdominal Operation     Pain Control: It is expected to have pain after surgery, but this should improve over the next several weeks. You may be prescribed a narcotic pain medication and/or a muscle relaxer on discharge. You can take this medication as prescribed if the pain is severe but try to decrease the frequency at which you use the narcotic over the initial two (2) weeks.  You may find you need less narcotic pain medication if you combine or stagger it with Tylenol® (acetaminophen) and/or Advil® (ibuprofen). For example, you may take Tylenol 1000mg, then take Advil 600mg 3 hours later, then repeat Tylenol 3 hours after the Advil, and so on.  You should not take more than 4000 mg of Tylenol® or 3200 mg of Advil® in a 24-hour period.  Narcotics and muscle relaxers can cause drowsiness, so sometimes it is helpful to take before bed for a more comfortable rest, but you CANNOT drive, operate machinery, or do mentally important work while on this medication.  Narcotics cause painful constipation - see below on how to prevent this or what to take if this happens.  This is unlikely to be your case.    Medications:  Okay to restart your other home meds.    Wound Care: The incisions can be left open to air  "unless there is drainage, in which case you should cover the wound with a dry, clean bandage or piece of gauze. Change the dressing 1-2 times each day as needed to maintain a relatively dry dressing. You may have "skin glue" covering your incisions which will peel/crumble off on its own over the next week or two. If you have staples in place, staples are removed in 2-3 weeks by a nurse or physician.  You should shower every day without a dressing on the incisions and let the water run over the incisions. Do not soak in a bathtub, hot tub or pool until the incisions are completely healed, which usually takes ~4-6 weeks.    Bowel Function: Diarrhea and loose stool are normal and expected after intestinal surgery. Bowel function initially tends to be erratic (increased frequency, gas, liquid/loose consistency, seepage, urgency), but it will improve over the next several months as your body adjusts to the surgical changes.    Diet: Unless directed otherwise by your surgeon, after surgery you should do the following:  Eat several (4-6) small meals each day.  If you experience difficulty eating, add in supplemental drinks (Boost®, Ensure®, Glucerna®).  If you are having loose stools, your diet should include foods to add bulk to the stool such as applesauce, bananas, cheese, peanut butter, pasta, and potatoes.  Follow a Low Fiber Diet for six (6) weeks. Avoid particular foods including the following:  Raw vegetables, beans, corn, mushrooms, legumes, peas, potato skins, sauerkraut, stewed tomatoes, brussels sprouts  Fresh fruit, dried fruit (such as raisins or prunes), coconut, juices with pulp. (It is okay to eat fruits such as bananas, melons, canned fruits, and avocado.)  Meat with casings (such as hot dogs, kielbasa, sausage), shellfish  Nuts, popcorn, seeds, chunky peanut butter  Coarse whole grains including breads/rolls with nuts, cereals with nuts, coarse whole grains, poppy, sesame seeds    Activity: Walking is " encouraged after surgery. Light aerobic activity such as climbing stairs or leisurely bike riding is acceptable but listen to your body and let pain be your guide when reintroducing activity. If it hurts, don't do it. Avoid the following activities for six (6) weeks after surgery:  Lifting objects over 10 pounds  Strenuous activity such as press-ups, push-ups, crunches, sit-ups, vigorous pulling/pushing, and repetitive twisting or bending    Driving: You should not drive a vehicle for the two (2) weeks after surgery or while taking narcotic pain medications. When you return to driving, sit in your vehicle without starting the ignition and step on the brake firmly and rapidly a few times to assure you are confident with this task. Do not go alone the first time you drive.    Potential Problems:   Constipation is common when taking narcotics. You may feel full and may have gas pains. Drink plenty of non-caffeinated, non-alcoholic beverages and walk as much as possible. You can add a capful of miralax 1-2 times daily with liquids.  If this doesn't help, take senna 2 tablets 1-2 times a day, or docusate 200mg twice daily. Lastly, try milk of magnesium one dose a day.    Bowel obstruction or ileus is characterized by persistent abdominal cramps, bloating, constipation, nausea, or vomiting. If the symptoms are mild, you should restrict your dietary intake to only liquids, and avoid solid food for 2-3 days. If the symptoms are more severe or persist beyond 24 hours, please call your surgeon's office for advice.    Frequent stools and loose stools are best managed by using bulking agents or anti-diarrheal medication. Please call your surgeon if diarrhea is not improving after a few weeks to discuss starting one of the medications below.  Benefiber®, Citrucel®, Fibercon®, Konsyl®, and Metamucil® are bulking agents that are available at most grocery stores and pharmacies. The medication should be mixed using one (1) teaspoon  of the powder in the minimum amount of fluid required to dissolve the agent and taken 1-2 times each day. Benefiber® can be alternatively sprinkled over food 1-2 times each day.  Imodium® (loperamide) is also available without a prescription. It is most effective if taken before meals. You should not take more than eight (8) tablets (32 mg) of Imodium in a 24-hour period. Please call your surgeon's office if you start taking Imodium®.     Dehydration can commonly occur, and its symptoms or signs include dark urine, dizziness when standing, dry mouth, increased heart rate, leg cramps, and low volumes (less than 800 ml) of urine. If these occur, you should immediately call your surgeon's office. To avoid dehydration, you should do the following:  Drink a variety of fluids. Use an oral rehydration salt solution like Pedialyte, G2 Gatorade, Nuun tabs, etc. and sip the solution between meals.  Eat salty foods or add salt to your food.  Use an anti-diarrheal medication or bulking agent as previously instructed by your surgeon.     Wound infection can occur after any surgery and is characterized by increased drainage of cloudy fluid, odor, pain around the wound, redness of the skin around the wound extending 2-3 fingerbreadths outward, or temperature greater than 101 degrees. Please immediately call your surgeon's office if you begin experiencing these symptoms or signs.

## 2025-02-28 NOTE — PLAN OF CARE
[facility]  Discharge Final Note    Primary Care Provider: Shakir Mckay MD    Expected Discharge Date: 2/28/2025    Final Discharge Note (most recent)       Final Note - 02/28/25 1506          Final Note    Assessment Type Final Discharge Note     Anticipated Discharge Disposition Home or Self Care     What phone number can be called within the next 1-3 days to see how you are doing after discharge? --   [phone]                    Important Message from Medicare             Contact Info       Dain Eugene MD   Specialty: Urology    [address]  [facility] [address]   Phone: [phone]       Next Steps: Follow up in 2 week(s)          Patient discharged home to care of family on 2/28/25.    Gracie Vazquez RNCM  Case Management  [facility]  [phone]

## 2025-02-28 NOTE — DISCHARGE SUMMARY
[facility]  Urology  Discharge Summary      Patient Name: Maia Morrison  MRN: [id number]  Admission Date: 2/27/2025  Hospital Length of Stay: 1 days  Discharge Date and Time:  02/28/2025 6:53 AM  Attending Physician: Dain Eugene MD   Discharging Provider: Eulalio French MD  Primary Care Provider: Shakir Mckay MD     HPI: Maia Morrison is a 69 y.o. male with history of xanthogranulomatous pyelonephritis of the right kidney. The patient underwent urgent cystoscopy with right ureteral placement on 11/30/24. The patient was found to have a local abscess, for which he underwent IR drain placement on 12/1/24.   Culture remained no growth. His antimicrobial therapy was optimized from Zosyn to Unasyn and he was discharged on a course of Augmentin which he completed on or around 12/18. Drain was removed in office on 1/14.     MAG3 scan obtained on 1/10/25 showed 87% Left and 13% right differential function.       The patient endorses recent dysuria. Denies recent hematuria, fevers, chills, flank pain, or abdominal pain.     Procedure(s) (LRB):  DV5 ROBOTIC NEPHRECTOMY (Right)  URETERAL STENT REMOVAL (Right)     Hospital Course:   Patient was admitted to the urology service. he was made NPO, given IVF, as well as pain and nausea control. Started on antibiotics. MAIA MORRISON 69 y.o.male underwent: Procedure(s) (LRB):  DV5 ROBOTIC NEPHRECTOMY (Right)  URETERAL STENT REMOVAL (Right). The patient tolerated the procedure well, was transferred to recovery post-op, and then transferred to the floor for continuation of medical care. The patient's clinical condition progressively improved. Had ROBF. Leukocytosis improved. Patient was HDS throughout admission. By the time of discharge, he was meeting all post op milestones, tolerating a diet without nausea or vomiting, pain was well controlled with oral medications, and he was ambulating without difficulty. Voiding appropriately. On POD 1 the patient was  discharged to home. On discharge, the patient's incisions were c/d/i and the surgical site was soft and appropriately tender to palpation. The patient will follow up in urology clinic in 2 weeks. Discussed POC and ED precautions with patient. Patient verbalized understanding and is agreeable to plan. All questions answered.    Please see hospital and op notes for further detail regarding patient's admission.    Patient's discharge was discussed with Dr. Eugene.         Indwelling Lines/Drains at time of discharge:   Lines/Drains/Airways       Drain  Duration                  Urethral Catheter 02/27/25 0715 Double-lumen;Non-latex;Straight-tip;Silicone 18 Fr. <1 day                    Significant Diagnostic Studies: Labs: All labs within the past 24 hours have been reviewed    Pending Diagnostic Studies:       Procedure Component Value Units Date/Time    Specimen to Pathology, Surgery Urology [[id number]] Collected: 02/27/25 0908    Order Status: Sent Lab Status: In process Updated: 02/27/25 1229    Specimen: Tissue             Final Active Diagnoses:    Diagnosis Date Noted POA    PRINCIPAL PROBLEM:  XGP (xanthogranulomatous pyelonephritis) [N11.8] 02/27/2025 Yes      Problems Resolved During this Admission:        Discharged Condition: good    Disposition: Home or Self Care    Follow Up:   Follow-up Information       Dain Eugene MD Follow up in 2 week(s).    Specialty: Urology  [facility] information:  [facility]  [facility] [address]  [phone]                             Patient Instructions:      Diet Adult Regular     Notify your health care provider if you experience any of the following:  temperature >100.4     Notify your health care provider if you experience any of the following:  persistent nausea and vomiting or diarrhea     Notify your health care provider if you experience any of the following:  severe uncontrolled pain     Notify your health care provider if you experience any of the  following:  redness, tenderness, or signs of infection (pain, swelling, redness, odor or green/yellow discharge around incision site)     Type & Screen   Standing Status: Future Number of Occurrences: 1 Standing Exp. Date: 04/05/26     Activity as tolerated       Medications:  Reconciled Home Medications:      Medication List        START taking these medications      ibuprofen 600 MG tablet  Commonly known as: ADVIL,MOTRIN  Take 1 tablet (600 mg total) by mouth 3 (three) times daily. for 5 days     oxyCODONE 5 MG immediate release tablet  Commonly known as: ROXICODONE  Take 1 tablet (5 mg total) by mouth every 6 (six) hours as needed for Pain.     polyethylene glycol 17 gram Pwpk  Commonly known as: GLYCOLAX  Take 17 g by mouth once daily. for 20 days            CHANGE how you take these medications      * acetaminophen 500 MG tablet  Commonly known as: TYLENOL  Take 500 mg by mouth every 6 (six) hours as needed for Pain.  What changed: Another medication with the same name was added. Make sure you understand how and when to take each.     * acetaminophen 500 MG tablet  Commonly known as: TYLENOL  Take 2 tablets (1,000 mg total) by mouth every 8 (eight) hours. for 7 days  What changed: You were already taking a medication with the same name, and this prescription was added. Make sure you understand how and when to take each.           * This list has 2 medication(s) that are the same as other medications prescribed for you. Read the directions carefully, and ask your doctor or other care provider to review them with you.                CONTINUE taking these medications      ASHWAGANDHA EXTRACT ORAL  Take by mouth.     aspirin 81 MG Chew  Take 81 mg by mouth once daily.     bisacodyL 5 mg EC tablet  Commonly known as: DULCOLAX  Take 5 mg by mouth daily as needed for Constipation.     multivitamin per tablet  Commonly known as: THERAGRAN  Take 1 tablet by mouth once daily.     rosuvastatin 20 MG tablet  Commonly  known as: CRESTOR  Take 1 tablet (20 mg total) by mouth once daily.            STOP taking these medications      sulfamethoxazole-trimethoprim 800-160mg 800-160 mg Tab  Commonly known as: BACTRIM DS              Eulalio French MD    Patient was seen and examined on the date of discharge and determined to be suitable for discharge.  Total time spent preparing discharge services: 20 minutes.  Time was spent speaking with consultants and case management, reviewing records, and/or discussing the plan of care with patient/family.    Eulalio French, DO  Urology   PGY-1

## 2025-02-28 NOTE — PROGRESS NOTES
[facility] - [facility]  Urology  Progress Note    Patient Name: Felipe Jiménez  MRN: [id number]  Admission Date: 2/27/2025  Hospital Length of Stay: 1 days  Code Status: Full Code   Attending Provider: Dain Eugene MD   Primary Care Physician: Shakir Mckay MD    Subjective:     HPI:  No notes on file    Interval History: NAEON. POD 1 s/p right radical nephrectomy. Patient states his pain is well controlled. Ambulated the hallways yesterday. Katz with clear yellow urine, removed this AM. Tolerated diet yesterday and denies any n/v.       Objective:     Temp:  [96.4 °F (35.8 °C)-98.2 °F (36.8 °C)] 97.5 °F (36.4 °C)  Pulse:  [] 81  Resp:  [7-29] 17  SpO2:  [95 %-100 %] 97 %  BP: ()/(47-91) 123/61     Body mass index is 23.11 kg/m².           Drains       Drain  Duration                  Urethral Catheter 02/27/25 0715 Double-lumen;Non-latex;Straight-tip;Silicone 18 Fr. <1 day                     Physical Exam  Vitals and nursing note reviewed.   Constitutional:       General: He is not in acute distress.     Appearance: He is not ill-appearing or toxic-appearing.   Cardiovascular:      Rate and Rhythm: Normal rate.   Pulmonary:      Effort: Pulmonary effort is normal. No respiratory distress.      Breath sounds: No wheezing.   Abdominal:      General: Abdomen is flat. There is no distension.      Palpations: Abdomen is soft. There is no mass.      Tenderness: There is abdominal tenderness (appropriate post-op tenderness).      Hernia: No hernia is present.      Comments: Incisions c/d/i   Genitourinary:     Comments: Katz with clear yellow urine; removed at bedside  Neurological:      General: No focal deficit present.      Mental Status: He is alert and oriented to person, place, and time.      Motor: No weakness.   Psychiatric:         Mood and Affect: Mood normal.         Behavior: Behavior normal.         Thought Content: Thought content normal.           Significant Labs:    BMP:  Recent Labs   Lab  02/28/25  0437   *   K 4.6      CO2 20*   BUN 22   CREATININE 1.6*   CALCIUM 9.2       CBC:   Recent Labs   Lab 02/28/25  0437   WBC 13.52*   HGB 11.5*   HCT 35.7*          Recent Lab Results         02/28/25  0437        Anion Gap 7       Baso # 0.01       Basophil % 0.1       BUN 22       Calcium 9.2       Chloride 108       CO2 20       Creatinine 1.6       Differential Method Automated       eGFR 45.8       Eos # 0.0       Eos % 0.0       Glucose 94       Gran # (ANC) 11.5       Gran % 85.3       Hematocrit 35.7       Hemoglobin 11.5       Immature Grans (Abs) 0.07  Comment: Mild elevation in immature granulocytes is non specific and   can be seen in a variety of conditions including stress response,   acute inflammation, trauma and pregnancy. Correlation with other   laboratory and clinical findings is essential.         Immature Granulocytes 0.5       Lymph # 1.2       Lymph % 8.7       MCH 29.8       MCHC 32.2       MCV 93       Mono # 0.7       Mono % 5.4       MPV 9.1       nRBC 0       Platelet Count 262       Potassium 4.6       RBC 3.86       RDW 17.5       Sodium 135       WBC 13.52               Significant Imaging:  All pertinent imaging results/findings from the past 24 hours have been reviewed.    Assessment/Plan:     * XGP (xanthogranulomatous pyelonephritis)  Felipe Jiménez is a 69 y.o. male with history of xanthogranulomatous pyelonephritis of the right kidney. He is now s/p right robotics nephrectomy on 2/28 with Dr. Eugene.     -- [facility]  -- Encourage ambulation  -- Katz removed at bedside; attempt voiding trail  -- Home meds restarted  -- Diet: Regular diet  -- Encourage use of IS  -- Labs pending this AM; will f/u results  -- DVT ppx: Heparin     Dispo: Likely discharge today pending labs this AM and voiding trail       Eulalio French MD  Urology  [facility] - Surgery

## 2025-02-28 NOTE — ASSESSMENT & PLAN NOTE
Felipe Jiménez is a 69 y.o. male with history of xanthogranulomatous pyelonephritis of the right kidney. He is now s/p right robotics nephrectomy on 2/28 with Dr. Eugene.     -- [facility]  -- Encourage ambulation  -- Katz removed at bedside; attempt voiding trail  -- Home meds restarted  -- Diet: Regular diet  -- Encourage use of IS  -- Labs pending this AM; will f/u results  -- DVT ppx: Heparin     Dispo: Likely discharge today pending labs this AM and voiding trail

## 2025-02-28 NOTE — SUBJECTIVE & OBJECTIVE
Interval History: NAEON. POD 1 s/p right radical nephrectomy. Patient states his pain is well controlled. Ambulated the hallways yesterday. Katz with clear yellow urine, removed this AM. Tolerated diet yesterday and denies any n/v.       Objective:     Temp:  [96.4 °F (35.8 °C)-98.2 °F (36.8 °C)] 97.5 °F (36.4 °C)  Pulse:  [] 81  Resp:  [7-29] 17  SpO2:  [95 %-100 %] 97 %  BP: ()/(47-91) 123/61     Body mass index is 23.11 kg/m².           Drains       Drain  Duration                  Urethral Catheter 02/27/25 0715 Double-lumen;Non-latex;Straight-tip;Silicone 18 Fr. <1 day                     Physical Exam  Vitals and nursing note reviewed.   Constitutional:       General: He is not in acute distress.     Appearance: He is not ill-appearing or toxic-appearing.   Cardiovascular:      Rate and Rhythm: Normal rate.   Pulmonary:      Effort: Pulmonary effort is normal. No respiratory distress.      Breath sounds: No wheezing.   Abdominal:      General: Abdomen is flat. There is no distension.      Palpations: Abdomen is soft. There is no mass.      Tenderness: There is abdominal tenderness (appropriate post-op tenderness).      Hernia: No hernia is present.      Comments: Incisions c/d/i   Genitourinary:     Comments: Katz with clear yellow urine; removed at bedside  Neurological:      General: No focal deficit present.      Mental Status: He is alert and oriented to person, place, and time.      Motor: No weakness.   Psychiatric:         Mood and Affect: Mood normal.         Behavior: Behavior normal.         Thought Content: Thought content normal.           Significant Labs:    BMP:  Recent Labs   Lab 02/28/25 0437   *   K 4.6      CO2 20*   BUN 22   CREATININE 1.6*   CALCIUM 9.2       CBC:   Recent Labs   Lab 02/28/25 0437   WBC 13.52*   HGB 11.5*   HCT 35.7*          Recent Lab Results         02/28/25 0437        Anion Gap 7       Baso # 0.01       Basophil % 0.1       BUN 22        Calcium 9.2       Chloride 108       CO2 20       Creatinine 1.6       Differential Method Automated       eGFR 45.8       Eos # 0.0       Eos % 0.0       Glucose 94       Gran # (ANC) 11.5       Gran % 85.3       Hematocrit 35.7       Hemoglobin 11.5       Immature Grans (Abs) 0.07  Comment: Mild elevation in immature granulocytes is non specific and   can be seen in a variety of conditions including stress response,   acute inflammation, trauma and pregnancy. Correlation with other   laboratory and clinical findings is essential.         Immature Granulocytes 0.5       Lymph # 1.2       Lymph % 8.7       MCH 29.8       MCHC 32.2       MCV 93       Mono # 0.7       Mono % 5.4       MPV 9.1       nRBC 0       Platelet Count 262       Potassium 4.6       RBC 3.86       RDW 17.5       Sodium 135       WBC 13.52               Significant Imaging:  All pertinent imaging results/findings from the past 24 hours have been reviewed.

## 2025-03-01 ENCOUNTER — HOSPITAL ENCOUNTER (EMERGENCY)
Facility: HOSPITAL | Age: 70
Discharge: LEFT BEFORE DISCHARGE | End: 2025-03-01
Payer: MEDICARE

## 2025-03-01 ENCOUNTER — PATIENT OUTREACH (OUTPATIENT)
Facility: OTHER | Age: 70
End: 2025-03-01
Payer: MEDICARE

## 2025-03-01 ENCOUNTER — OCHSNER VIRTUAL EMERGENCY DEPARTMENT (OUTPATIENT)
Facility: CLINIC | Age: 70
End: 2025-03-01
Payer: MEDICARE

## 2025-03-01 ENCOUNTER — NURSE TRIAGE (OUTPATIENT)
Dept: ADMINISTRATIVE | Facility: CLINIC | Age: 70
End: 2025-03-01
Payer: MEDICARE

## 2025-03-01 VITALS — TEMPERATURE: 98 F | HEART RATE: 108 BPM | OXYGEN SATURATION: 97 % | RESPIRATION RATE: 16 BRPM

## 2025-03-01 DIAGNOSIS — R14.0 ABDOMINAL BLOATING: Primary | ICD-10-CM

## 2025-03-01 PROCEDURE — 99999 HC NO LEVEL OF SERVICE - ED ONLY: CPT

## 2025-03-01 NOTE — ED NOTES
Patient approached triage desk and states has to leave. Declines staying to see a provider. Charge nurse informed.

## 2025-03-01 NOTE — PLAN OF CARE-OVED
[facility] Emergency Department Plan of Care Note  Referral Source: Nurse On-Call                               Chief Complaint   Patient presents with    Bloated     70 yo M s/p right nephrectomy 2/27, reporting extreme discomfort from abdominal bloating. Last BM 2/27 before procedure, has been passing gas, reports associated nausea no vomiting. Tried Gas X and Miralax without relief. Unable to reach urologist on call this am.        Recommendation: Emergency Department            Emergency Department: [facility]               Encounter Diagnosis   Name Primary?    Abdominal bloating Yes      Maggie Jennings MD   12:13 PM

## 2025-03-01 NOTE — TELEPHONE ENCOUNTER
"Robotic RT nephrectomy yesterday, d/c'd today.   Pt states "I haven't had a bowel movement". States he has taken x6 doses of miralax today.   Last BM was yesterday AM approx 0300, prior to surgery.   C/o feeling bloated and gassy.   Care advice provided per protocol, with recommendation to see MD within 3 days of call.  Pt questioning taking ex lax. Explained to pt that some bloating/gassiness is normal following a robotic procedure, and to not take anymore laxatives at least until tomorrow, as pt has no c/o abdominal pain, rectal pain/fullness, and last BM was yesterday. Advised to take some gas x.   Pt VU.       Reason for Disposition   [1] MILD SWELLING of abdomen (e.g., looks distended or swollen) AND [2] new-onset or getting worse   [1] MILD SWELLING of abdomen (e.g., looks distended or swollen) AND [2] new-onset or getting worse    Additional Information   Constipation   Negative: [1] Vomiting AND [2] contains bile (green color)   Negative: Patient sounds very sick or weak to the triager   Negative: [1] Vomiting AND [2] abdomen looks much more swollen than usual   Negative: [1] Constant abdominal pain AND [2] present > 2 hours   Negative: [1] Rectal pain or fullness from fecal impaction (rectum full of stool) AND [2] NOT better after SITZ bath, suppository or enema   Negative: [1] MILD abdominal pain (e.g., does not interfere with normal activities) AND [2] pain comes and goes (cramps) AND [3] fever   Negative: Last bowel movement (BM) > 4 days ago   Negative: Leaking stool   Negative: Unable to have a bowel movement (BM) without manually removing stool (using finger to pull out stool or perform disimpaction)   Negative: Unable to have a bowel movement (BM) without laxative or enema   Negative: [1] Significant weight loss (more than 10 pounds [4.5 kg]; 5% or more) AND [2] not dieting   Abdomen bloating or swelling is main symptom   Negative: Sounds like a life-threatening emergency to the triager   Negative: " [1] MODERATE-SEVERE SWELLING of abdomen (e.g., looks very distended or swollen) AND [2] NEW-onset or much worse AND [3] vomiting   Negative: Blood in bowel movements  (Exception: Blood on surface of BM with constipation.)   Negative: Black or tarry bowel movements  (Exception: Chronic-unchanged black-grey BMs AND is taking iron pills or Pepto-Bismol.)   Negative: [1] MODERATE-SEVERE SWELLING of abdomen (e.g., looks very distended or swollen) AND [2] NEW-onset or much worse   Negative: [1] MILD SWELLING of abdomen (e.g., looks distended or swollen) AND [2] new-onset or getting worse AND [3] fever   Negative: [1] MILD-MODERATE abdomen pain AND [2] constant AND [3] present > 2 hours   Negative: [1] Vomiting AND [2] contains bile (green color)   Negative: [1] MILD abdomen pain (e.g., does not interfere with normal activities) AND [2] pain comes and goes (cramps) AND [3] present > 48 hours  (Exception: Mild abdomen pain is a chronic symptom, recurrent or ongoing AND present > 4 weeks.)   Negative: White of the eyes have turned yellow (i.e., jaundice)    Protocols used: Post-Op Symptoms and [id number], Constipation-A-AH, Abdomen Bloating and Swelling-A-AH

## 2025-03-01 NOTE — TELEPHONE ENCOUNTER
Pt has DV5 robotic nephrectomy on 2/27. He has not used the restroom since, he is very bloated and trying to get some relief. He has tried miralax. Also took some gas x to help with the bloating. Last BM on Thursday morning before his procedure and it was small. Pt tried to walk last night to relieve the bloating and drinking plenty of tea, water, gatorade with minimal relief. Answered yes to constant abd pain present for greater than 2 hours. Says it's more of an extreme discomfort from the bloating than abd pain, though. No vomiting but some nausea. He is passing some gas.    Dispo- see HCP within 4 hours or pcp triage. Did not hear back from OCP, referred to [facility]. Dr. Jennings recommended ED eval to r/o ileus. Pt advised and VU, instructed to go to [facility] ED.  Reason for Disposition   [1] Constant abdominal pain AND [2] present > 2 hours    Additional Information   Negative: [1] Vomiting AND [2] contains bile (green color)   Negative: Patient sounds very sick or weak to the triager   Negative: [1] Vomiting AND [2] abdomen looks much more swollen than usual    Protocols used: Constipation-A-AH

## 2025-03-01 NOTE — PROGRESS NOTES
"Patient called the [facility] RN on 3/1/2025 for c/o "has DV5 robotic nephrectomy on 2/27. He has not used the restroom since, he is very bloated and trying to get some relief. He has tried miralax. Also took some gas x to help with the bloating. Last BM on Thursday morning before his procedure and it was small. Pt tried to walk last night to relieve the bloating and drinking plenty of tea, water, gatorade with minimal relief. Answered yes to constant abd pain present for greater than 2 hours. Says it's more of an extreme discomfort from the bloating than abd pain, though. No vomiting but some nausea. He is passing some gas." [facility] Provider Dr Jennings disposition recommendation pt to go to ED.  Follow up scheduled for 3/2/25.      "

## 2025-03-03 ENCOUNTER — PATIENT OUTREACH (OUTPATIENT)
Dept: ADMINISTRATIVE | Facility: CLINIC | Age: 70
End: 2025-03-03
Payer: MEDICARE

## 2025-03-03 ENCOUNTER — PATIENT OUTREACH (OUTPATIENT)
Facility: OTHER | Age: 70
End: 2025-03-03
Payer: MEDICARE

## 2025-03-03 NOTE — PROGRESS NOTES
C3 nurse spoke with Felipe Jiménez for a TCC post hospital discharge follow up call. The patient does not have a scheduled HOSFU appointment with his PCP, Shakir Mckay MD within 5-7 days post hospital discharge date of 2/28/25. Pt denies needing help scheduling his HOSFU with a PCP and denies needing a NPHV, stating he would contact his PCP to schedule his HOSFU. No messages routed at this time.

## 2025-03-03 NOTE — PROGRESS NOTES
Pt was instructed to go to the ED on 3/1/25 after contacting [facility] RN for c/o constipation. No ED encounter noted. Called patient, states he is feeling much better and has no needs or concerns at this time.

## 2025-03-07 DIAGNOSIS — N11.8 XGP (XANTHOGRANULOMATOUS PYELONEPHRITIS): ICD-10-CM

## 2025-03-07 RX ORDER — OXYCODONE HYDROCHLORIDE 5 MG/1
5 TABLET ORAL EVERY 6 HOURS PRN
Qty: 10 TABLET | Refills: 0 | OUTPATIENT
Start: 2025-03-07

## 2025-03-07 NOTE — TELEPHONE ENCOUNTER
----- Message from Gloria sent at 3/7/2025 10:13 AM CST -----  Contact: [phone]  Requesting an RX refill or new RX.Is this a refill or new RX: Refill RX name and strength (copy/paste from chart):  oxyCODONE (ROXICODONE) 5 MG immediate release tabletIs this a 30 day or 90 day RX: Pharmacy name and phone # (copy/paste from chart):  [facility] [address] [place], [place] - [address] [facility] [address]Hspuq: [phone] Fax: [phone] doctors have asked that we provide their patients with the following 2 reminders -- prescription refills can take up to 72 hours, and a friendly reminder that in the future you can use your [facility] account to request refills: call back

## 2025-03-07 NOTE — TELEPHONE ENCOUNTER
----- Message from Lynne sent at 3/7/2025  1:06 PM CST -----  Contact: Self/[phone]  Patient is returning a phone call.Who left a message for the patient: Derrick Does patient know what this is regarding:  Would you like a call back, or a response through your [facility] portal?:   call back Comments:

## 2025-03-10 LAB
FINAL PATHOLOGIC DIAGNOSIS: NORMAL
GROSS: NORMAL
Lab: NORMAL
MICROSCOPIC EXAM: NORMAL

## 2025-03-11 ENCOUNTER — RESULTS FOLLOW-UP (OUTPATIENT)
Dept: UROLOGY | Facility: CLINIC | Age: 70
End: 2025-03-11

## 2025-03-21 ENCOUNTER — TELEPHONE (OUTPATIENT)
Dept: INTERNAL MEDICINE | Facility: CLINIC | Age: 70
End: 2025-03-21
Payer: MEDICARE

## 2025-03-21 NOTE — TELEPHONE ENCOUNTER
----- Message from Riddhi sent at 3/21/2025 12:34 PM CDT -----  Contact: [phone]  .1MEDICALADVICE Patient is calling for Medical Advice regarding:needs advice on his medications How long has patient had these symptoms:asking for a return call Pharmacy name and phone#:Patient wants a call back or thru [facility]:call back Comments:Please advise patient replies from provider may take up to 48 hours.

## 2025-03-21 NOTE — TELEPHONE ENCOUNTER
Wanted to know   Can he take  multi vitamin (centrum  silver)  Vit D   Ashwagandha 500 mg  once a day  Please advise    Will not start taken until it is confirmed  He can begin medication

## 2025-03-21 NOTE — TELEPHONE ENCOUNTER
----- Message from Evelin sent at 3/21/2025  3:50 PM CDT -----  Contact: [phone] Patient  Patient is returning a phone call.Who left a message for the patient: Zenobia Jimenez LPNDoes patient know what this is regarding:  Would you like a call back, or a response through your [facility] portal?:   Call Back Please. Thank youComments:

## 2025-03-25 ENCOUNTER — TELEPHONE (OUTPATIENT)
Dept: INTERNAL MEDICINE | Facility: CLINIC | Age: 70
End: 2025-03-25
Payer: MEDICARE

## 2025-03-25 NOTE — TELEPHONE ENCOUNTER
----- Message from Jeanne sent at 3/25/2025 12:08 PM CDT -----  Contact: Patient [phone]  .1MEDICALADVICE     Message for Dr. Nely PompaPatient is calling for Medical Advice regarding:Please contact regarding medication questionPatient [phone] long has patient had these symptoms:Pharmacy name and phone#:Patient wants a call back or thru [facility]:Comments:Please advise patient replies from provider may take up to 48 hours.

## 2025-03-26 ENCOUNTER — DOCUMENT SCAN (OUTPATIENT)
Dept: HOME HEALTH SERVICES | Facility: HOSPITAL | Age: 70
End: 2025-03-26
Payer: MEDICARE

## 2025-03-26 NOTE — TELEPHONE ENCOUNTER
Contracted and explained  What he can resume and what to avoid   Will call if he comes up with any other questions

## 2025-04-01 ENCOUNTER — LAB VISIT (OUTPATIENT)
Dept: LAB | Facility: HOSPITAL | Age: 70
End: 2025-04-01
Attending: UROLOGY
Payer: MEDICARE

## 2025-04-01 ENCOUNTER — OFFICE VISIT (OUTPATIENT)
Dept: UROLOGY | Facility: CLINIC | Age: 70
End: 2025-04-01
Payer: MEDICARE

## 2025-04-01 ENCOUNTER — RESULTS FOLLOW-UP (OUTPATIENT)
Dept: UROLOGY | Facility: CLINIC | Age: 70
End: 2025-04-01

## 2025-04-01 VITALS
SYSTOLIC BLOOD PRESSURE: 151 MMHG | WEIGHT: 156.31 LBS | HEART RATE: 77 BPM | DIASTOLIC BLOOD PRESSURE: 83 MMHG | HEIGHT: 68 IN | BODY MASS INDEX: 23.69 KG/M2

## 2025-04-01 DIAGNOSIS — N11.8 XGP (XANTHOGRANULOMATOUS PYELONEPHRITIS): ICD-10-CM

## 2025-04-01 DIAGNOSIS — N18.31 STAGE 3A CHRONIC KIDNEY DISEASE: ICD-10-CM

## 2025-04-01 DIAGNOSIS — N20.0 KIDNEY STONES: Primary | ICD-10-CM

## 2025-04-01 LAB
ANION GAP (OHS): 7 MMOL/L (ref 8–16)
BUN SERPL-MCNC: 16 MG/DL (ref 8–23)
CALCIUM SERPL-MCNC: 9.2 MG/DL (ref 8.7–10.5)
CHLORIDE SERPL-SCNC: 109 MMOL/L (ref 95–110)
CO2 SERPL-SCNC: 23 MMOL/L (ref 23–29)
CREAT SERPL-MCNC: 1.2 MG/DL (ref 0.5–1.4)
GFR SERPLBLD CREATININE-BSD FMLA CKD-EPI: >60 ML/MIN/1.73/M2
GLUCOSE SERPL-MCNC: 89 MG/DL (ref 70–110)
POTASSIUM SERPL-SCNC: 3.8 MMOL/L (ref 3.5–5.1)
SODIUM SERPL-SCNC: 139 MMOL/L (ref 136–145)

## 2025-04-01 PROCEDURE — 1159F MED LIST DOCD IN RCRD: CPT | Mod: CPTII,S$GLB,, | Performed by: UROLOGY

## 2025-04-01 PROCEDURE — 1101F PT FALLS ASSESS-DOCD LE1/YR: CPT | Mod: CPTII,S$GLB,, | Performed by: UROLOGY

## 2025-04-01 PROCEDURE — 36415 COLL VENOUS BLD VENIPUNCTURE: CPT

## 2025-04-01 PROCEDURE — 3077F SYST BP >= 140 MM HG: CPT | Mod: CPTII,S$GLB,, | Performed by: UROLOGY

## 2025-04-01 PROCEDURE — 99999 PR PBB SHADOW E&M-EST. PATIENT-LVL III: CPT | Mod: PBBFAC,,, | Performed by: UROLOGY

## 2025-04-01 PROCEDURE — 82435 ASSAY OF BLOOD CHLORIDE: CPT

## 2025-04-01 PROCEDURE — 99024 POSTOP FOLLOW-UP VISIT: CPT | Mod: S$GLB,,, | Performed by: UROLOGY

## 2025-04-01 PROCEDURE — 84295 ASSAY OF SERUM SODIUM: CPT

## 2025-04-01 PROCEDURE — 1126F AMNT PAIN NOTED NONE PRSNT: CPT | Mod: CPTII,S$GLB,, | Performed by: UROLOGY

## 2025-04-01 PROCEDURE — 3044F HG A1C LEVEL LT 7.0%: CPT | Mod: CPTII,S$GLB,, | Performed by: UROLOGY

## 2025-04-01 PROCEDURE — 3079F DIAST BP 80-89 MM HG: CPT | Mod: CPTII,S$GLB,, | Performed by: UROLOGY

## 2025-04-01 PROCEDURE — 3288F FALL RISK ASSESSMENT DOCD: CPT | Mod: CPTII,S$GLB,, | Performed by: UROLOGY

## 2025-04-01 PROCEDURE — 1160F RVW MEDS BY RX/DR IN RCRD: CPT | Mod: CPTII,S$GLB,, | Performed by: UROLOGY

## 2025-04-01 NOTE — PROGRESS NOTES
Subjective:       Patient ID: Felipe Jiménez is a 69 y.o. male.    Chief Complaint:  post-op      History of Present Illness  HPI  Felipe Jiménez is a 69 y.o.  male with history of xanthogranulomatous pyelonephritis of the right kidney. The patient underwent urgent cystoscopy with right ureteral placement on 11/30/24. The patient was found to have a local abscess, for which he underwent IR drain placement on 12/1/24.   Culture remained no growth. His antimicrobial therapy was optimized from Zosyn to Unasyn and he was discharged on a course of Augmentin which he completed on or around 12/18. Drain was removed in office on 1/14.    MAG3 scan obtained on 1/10/25 showed 87% Left and 13% right differential function.      The patient underwent a right robotic nephrectomy on 2/27/25.  He was discharged home on pod#1.     Patient presents today for post-operative follow up after right kidney removal. He denies pain at incision sites and fevers. He reports irritation at the lower incision site due to contact with waistband and belt. Pre-op CT from December 19th showed a normal left kidney with no evidence of kidney or ureteral stones, and no hydronephrosis. Pre-operative kidney function testing demonstrated 13% function of the right kidney. Post-operative day one creatinine was 1.6 mg/dL, increased from baseline of 1.3-1.0 mg/dL.           Review of Systems  Review of Systems  All other systems reviewed and negative except pertinent positives noted in HPI.       Objective:     Physical Exam  Constitutional:       General: He is not in acute distress.     Appearance: He is well-developed.   HENT:      Head: Normocephalic and atraumatic.   Eyes:      General: No scleral icterus.  Neck:      Trachea: No tracheal deviation.   Pulmonary:      Effort: Pulmonary effort is normal. No respiratory distress.   Abdominal:      Comments: Incisions healing well, no signs of infection     Neurological:      Mental Status: He is alert and  oriented to person, place, and time.   Psychiatric:         Behavior: Behavior normal.         Thought Content: Thought content normal.         Judgment: Judgment normal.         Lab Review  Lab Results   Component Value Date    COLORU Yellow 02/17/2025    SPECGRAV 1.020 02/17/2025    PHUR 7.0 02/17/2025    NITRITE Negative 02/17/2025    KETONESU Negative 02/17/2025    UROBILINOGEN 4 (A) 01/28/2004         Assessment:        1. Kidney stones    2. Stage 3a chronic kidney disease    3. XGP (xanthogranulomatous pyelonephritis)            Plan:     Kidney stones    Stage 3a chronic kidney disease  -     Basic Metabolic Panel; Future; Expected date: 04/01/2025    XGP (xanthogranulomatous pyelonephritis)      -bmp today  -General risk factors for kidney stones and the conservative measures to prevent kidney stones in the future were discussed with the patient in detail.  The patient was encouraged to drink 2-3 liters of water a day, limit iced tea and graham as well as foods high in oxalate.  They were cautioned to try to limit salt and red meat intake.  We also discussed adding citrate to the diet with the addition of aydin or lemon juice to their water or alternatively with crystal light.   -kub/renal US/bmp in 1 year.   F/u after.       This note was generated with the assistance of ambient listening technology. Verbal consent was obtained by the patient and accompanying visitor(s) for the recording of patient appointment to facilitate this note. I attest to having reviewed and edited the generated note for accuracy, though some syntax or spelling errors may persist. Please contact the author of this note for any clarification.

## 2025-04-23 ENCOUNTER — TELEPHONE (OUTPATIENT)
Dept: INTERNAL MEDICINE | Facility: CLINIC | Age: 70
End: 2025-04-23
Payer: MEDICARE

## 2025-04-23 NOTE — TELEPHONE ENCOUNTER
----- Message from Stone sent at 4/23/2025  2:23 PM CDT -----  Regarding: Upcoming Labwork Callback Request  Contact: Pt [phone]  .1MEDICALADVICE Patient is calling for Medical Advice regarding: Patient needing a call from office to discuss upcoming lab work. He said he did not want to speak to scheduling and to send a message to office requesting for a call. He would like to be reached at number provided.How long has patient had these symptoms:Pharmacy name and phone#:Patient wants a call back or thru [facility]:Comments:Please advise patient replies from provider may take up to 48 hours.  
Left message for  regarding missed labs as well as the clinic callback number.   
Needs to get labs reorder   Missed lab on 04/21  Would like a call back and do labs prior to appointment   with Dr Villegas    
Take medications as prescribed for hypertension. Followup with PMD for reevaluation of elevated blood pressure.  Return to ED if you develop severe headache, chest pain or arm/leg weakness/numbness.

## 2025-04-28 ENCOUNTER — TELEPHONE (OUTPATIENT)
Dept: HEMATOLOGY/ONCOLOGY | Facility: CLINIC | Age: 70
End: 2025-04-28
Payer: MEDICARE

## 2025-04-28 ENCOUNTER — LAB VISIT (OUTPATIENT)
Dept: LAB | Facility: HOSPITAL | Age: 70
End: 2025-04-28
Attending: UROLOGY
Payer: MEDICARE

## 2025-04-28 ENCOUNTER — OFFICE VISIT (OUTPATIENT)
Dept: HEMATOLOGY/ONCOLOGY | Facility: CLINIC | Age: 70
End: 2025-04-28
Payer: MEDICARE

## 2025-04-28 VITALS
DIASTOLIC BLOOD PRESSURE: 71 MMHG | TEMPERATURE: 97 F | OXYGEN SATURATION: 97 % | HEIGHT: 68 IN | WEIGHT: 158.63 LBS | BODY MASS INDEX: 24.04 KG/M2 | SYSTOLIC BLOOD PRESSURE: 118 MMHG | HEART RATE: 105 BPM

## 2025-04-28 DIAGNOSIS — Z00.00 ENCOUNTER FOR MEDICARE ANNUAL WELLNESS EXAM: ICD-10-CM

## 2025-04-28 DIAGNOSIS — D50.0 IRON DEFICIENCY ANEMIA DUE TO CHRONIC BLOOD LOSS: Primary | ICD-10-CM

## 2025-04-28 DIAGNOSIS — R77.8 ELEVATED TOTAL PROTEIN: ICD-10-CM

## 2025-04-28 DIAGNOSIS — R77.9 ELEVATED SERUM PROTEIN LEVEL: ICD-10-CM

## 2025-04-28 DIAGNOSIS — D50.0 IRON DEFICIENCY ANEMIA DUE TO CHRONIC BLOOD LOSS: ICD-10-CM

## 2025-04-28 LAB
ABSOLUTE EOSINOPHIL (OHS): 0.04 K/UL
ABSOLUTE MONOCYTE (OHS): 0.31 K/UL (ref 0.3–1)
ABSOLUTE NEUTROPHIL COUNT (OHS): 3.75 K/UL (ref 1.8–7.7)
ALBUMIN SERPL BCP-MCNC: 3.9 G/DL (ref 3.5–5.2)
ALP SERPL-CCNC: 68 UNIT/L (ref 40–150)
ALT SERPL W/O P-5'-P-CCNC: 13 UNIT/L (ref 10–44)
ANION GAP (OHS): 5 MMOL/L (ref 8–16)
AST SERPL-CCNC: 21 UNIT/L (ref 11–45)
BASOPHILS # BLD AUTO: 0.02 K/UL
BASOPHILS NFR BLD AUTO: 0.4 %
BILIRUB SERPL-MCNC: 0.5 MG/DL (ref 0.1–1)
BUN SERPL-MCNC: 16 MG/DL (ref 8–23)
CALCIUM SERPL-MCNC: 9.7 MG/DL (ref 8.7–10.5)
CHLORIDE SERPL-SCNC: 110 MMOL/L (ref 95–110)
CO2 SERPL-SCNC: 27 MMOL/L (ref 23–29)
CREAT SERPL-MCNC: 1.3 MG/DL (ref 0.5–1.4)
ERYTHROCYTE [DISTWIDTH] IN BLOOD BY AUTOMATED COUNT: 14.2 % (ref 11.5–14.5)
FERRITIN SERPL-MCNC: 720 NG/ML (ref 20–300)
GFR SERPLBLD CREATININE-BSD FMLA CKD-EPI: 59 ML/MIN/1.73/M2
GLUCOSE SERPL-MCNC: 100 MG/DL (ref 70–110)
HCT VFR BLD AUTO: 45.3 % (ref 40–54)
HGB BLD-MCNC: 14.5 GM/DL (ref 14–18)
IMM GRANULOCYTES # BLD AUTO: 0.01 K/UL (ref 0–0.04)
IMM GRANULOCYTES NFR BLD AUTO: 0.2 % (ref 0–0.5)
IRON SATN MFR SERPL: 42 % (ref 20–50)
IRON SERPL-MCNC: 132 UG/DL (ref 45–160)
LYMPHOCYTES # BLD AUTO: 1.19 K/UL (ref 1–4.8)
MCH RBC QN AUTO: 30.1 PG (ref 27–31)
MCHC RBC AUTO-ENTMCNC: 32 G/DL (ref 32–36)
MCV RBC AUTO: 94 FL (ref 82–98)
NUCLEATED RBC (/100WBC) (OHS): 0 /100 WBC
PLATELET # BLD AUTO: 295 K/UL (ref 150–450)
PMV BLD AUTO: 8.8 FL (ref 9.2–12.9)
POTASSIUM SERPL-SCNC: 4.6 MMOL/L (ref 3.5–5.1)
PROT SERPL-MCNC: 8.2 GM/DL (ref 6–8.4)
RBC # BLD AUTO: 4.82 M/UL (ref 4.6–6.2)
RELATIVE EOSINOPHIL (OHS): 0.8 %
RELATIVE LYMPHOCYTE (OHS): 22.4 % (ref 18–48)
RELATIVE MONOCYTE (OHS): 5.8 % (ref 4–15)
RELATIVE NEUTROPHIL (OHS): 70.4 % (ref 38–73)
SODIUM SERPL-SCNC: 142 MMOL/L (ref 136–145)
TIBC SERPL-MCNC: 315 UG/DL (ref 250–450)
TRANSFERRIN SERPL-MCNC: 213 MG/DL (ref 200–375)
WBC # BLD AUTO: 5.32 K/UL (ref 3.9–12.7)

## 2025-04-28 PROCEDURE — 36415 COLL VENOUS BLD VENIPUNCTURE: CPT

## 2025-04-28 PROCEDURE — 3288F FALL RISK ASSESSMENT DOCD: CPT | Mod: CPTII,S$GLB,, | Performed by: INTERNAL MEDICINE

## 2025-04-28 PROCEDURE — 1126F AMNT PAIN NOTED NONE PRSNT: CPT | Mod: CPTII,S$GLB,, | Performed by: INTERNAL MEDICINE

## 2025-04-28 PROCEDURE — 84165 PROTEIN E-PHORESIS SERUM: CPT

## 2025-04-28 PROCEDURE — 84466 ASSAY OF TRANSFERRIN: CPT

## 2025-04-28 PROCEDURE — 1159F MED LIST DOCD IN RCRD: CPT | Mod: CPTII,S$GLB,, | Performed by: INTERNAL MEDICINE

## 2025-04-28 PROCEDURE — 99214 OFFICE O/P EST MOD 30 MIN: CPT | Mod: S$GLB,,, | Performed by: INTERNAL MEDICINE

## 2025-04-28 PROCEDURE — 85025 COMPLETE CBC W/AUTO DIFF WBC: CPT

## 2025-04-28 PROCEDURE — 3078F DIAST BP <80 MM HG: CPT | Mod: CPTII,S$GLB,, | Performed by: INTERNAL MEDICINE

## 2025-04-28 PROCEDURE — 3044F HG A1C LEVEL LT 7.0%: CPT | Mod: CPTII,S$GLB,, | Performed by: INTERNAL MEDICINE

## 2025-04-28 PROCEDURE — 99999 PR PBB SHADOW E&M-EST. PATIENT-LVL III: CPT | Mod: PBBFAC,,, | Performed by: INTERNAL MEDICINE

## 2025-04-28 PROCEDURE — 3008F BODY MASS INDEX DOCD: CPT | Mod: CPTII,S$GLB,, | Performed by: INTERNAL MEDICINE

## 2025-04-28 PROCEDURE — 82728 ASSAY OF FERRITIN: CPT

## 2025-04-28 PROCEDURE — 1101F PT FALLS ASSESS-DOCD LE1/YR: CPT | Mod: CPTII,S$GLB,, | Performed by: INTERNAL MEDICINE

## 2025-04-28 PROCEDURE — 83521 IG LIGHT CHAINS FREE EACH: CPT

## 2025-04-28 PROCEDURE — 86334 IMMUNOFIX E-PHORESIS SERUM: CPT

## 2025-04-28 PROCEDURE — 84295 ASSAY OF SERUM SODIUM: CPT

## 2025-04-28 PROCEDURE — 3074F SYST BP LT 130 MM HG: CPT | Mod: CPTII,S$GLB,, | Performed by: INTERNAL MEDICINE

## 2025-04-28 RX ORDER — FERROUS GLUCONATE 324(38)MG
1 TABLET ORAL 2 TIMES DAILY
COMMUNITY
Start: 2025-04-15

## 2025-04-28 NOTE — TELEPHONE ENCOUNTER
----- Message from Tech Amaigermán sent at 4/28/2025 10:09 AM CDT -----  Contact: [phone]  .1MEDICALADVICE Patient is calling for Medical Advice regarding: pt is coming up to appt now, was lostPatient wants a call back or thru [facility]: callComments:Please advise patient replies from provider may take up to 48 hours.

## 2025-04-28 NOTE — PROGRESS NOTES
Subjective:       Patient ID: Felipe Jiménez is a 69 y.o. male.    Chief Complaint: Anemia    HPI    New patient visit for acute on chronic anemia during hospital admission in December. Found to have evidence of both GI and  bleeding in addition to a renal abscess. Scheduled for right nephrectomy next week. For acute on chronic anemia was treated with blood transfusion support and feraheme 1020mg IV dose. Hemoglobin now nearly normal at 12.3 grams. Currently taking oral iron replacement without adverse side effects.     April 28, 2025 Follow-up  No acute interval events  Missed pre-visit lab appointment - will get today after visit    Review of Systems   Constitutional: Negative.  Negative for appetite change and unexpected weight change.   HENT: Negative.  Negative for mouth sores.    Eyes: Negative.  Negative for visual disturbance.   Respiratory: Negative.  Negative for cough and shortness of breath.    Cardiovascular: Negative.  Negative for chest pain.   Gastrointestinal: Negative.  Negative for abdominal pain, blood in stool and diarrhea.   Endocrine: Negative.    Genitourinary: Negative.  Negative for frequency and hematuria.   Musculoskeletal:  Positive for back pain.   Integumentary:  Negative for rash. Negative.   Allergic/Immunologic: Negative for environmental allergies and food allergies.   Neurological: Negative.  Negative for headaches.   Hematological:  Negative for adenopathy. Does not bruise/bleed easily.   Psychiatric/Behavioral: Negative.  The patient is not nervous/anxious.          Objective:      Physical Exam  Vitals and nursing note reviewed.   Constitutional:       Appearance: He is well-developed.   HENT:      Head: Normocephalic and atraumatic.   Eyes:      General: No scleral icterus.     Conjunctiva/sclera: Conjunctivae normal.   Cardiovascular:      Rate and Rhythm: Normal rate.   Pulmonary:      Effort: Pulmonary effort is normal. No respiratory distress.   Abdominal:      General:  There is no distension.   Musculoskeletal:         General: Normal range of motion.      Cervical back: Normal range of motion and neck supple.   Skin:     General: Skin is warm and dry.   Neurological:      Mental Status: He is alert and oriented to person, place, and time.      Cranial Nerves: No cranial nerve deficit.   Psychiatric:         Behavior: Behavior normal.       Current Medications[1]   Lab Results   Component Value Date    WBC 13.52 (H) 02/28/2025    HGB 11.5 (L) 02/28/2025    HCT 35.7 (L) 02/28/2025    MCV 93 02/28/2025     02/28/2025        CMP  Sodium   Date Value Ref Range Status   04/01/2025 139 136 - 145 mmol/L Final   02/28/2025 135 (L) 136 - 145 mmol/L Final     Potassium   Date Value Ref Range Status   04/01/2025 3.8 3.5 - 5.1 mmol/L Final   02/28/2025 4.6 3.5 - 5.1 mmol/L Final     Chloride   Date Value Ref Range Status   04/01/2025 109 95 - 110 mmol/L Final   02/28/2025 108 95 - 110 mmol/L Final     CO2   Date Value Ref Range Status   04/01/2025 23 23 - 29 mmol/L Final   02/28/2025 20 (L) 23 - 29 mmol/L Final     Glucose   Date Value Ref Range Status   02/28/2025 94 70 - 110 mg/dL Final     BUN   Date Value Ref Range Status   04/01/2025 16 8 - 23 mg/dL Final     Creatinine   Date Value Ref Range Status   04/01/2025 1.2 0.5 - 1.4 mg/dL Final     Calcium   Date Value Ref Range Status   04/01/2025 9.2 8.7 - 10.5 mg/dL Final   02/28/2025 9.2 8.7 - 10.5 mg/dL Final     Total Protein   Date Value Ref Range Status   01/27/2025 8.8 (H) 6.0 - 8.4 g/dL Final     Albumin   Date Value Ref Range Status   01/27/2025 3.8 3.5 - 5.2 g/dL Final     Total Bilirubin   Date Value Ref Range Status   01/27/2025 0.3 0.1 - 1.0 mg/dL Final     Comment:     For infants and newborns, interpretation of results should be based  on gestational age, weight and in agreement with clinical  observations.    Premature Infant recommended reference ranges:  Up to 24 hours.............<8.0 mg/dL  Up to 48  hours............<12.0 mg/dL  3-5 days..................<15.0 mg/dL  6-29 days.................<15.0 mg/dL       Alkaline Phosphatase   Date Value Ref Range Status   01/27/2025 67 40 - 150 U/L Final     AST   Date Value Ref Range Status   01/27/2025 18 10 - 40 U/L Final     ALT   Date Value Ref Range Status   01/27/2025 15 10 - 44 U/L Final     Anion Gap   Date Value Ref Range Status   04/01/2025 7 (L) 8 - 16 mmol/L Final          Assessment:       Problem List Items Addressed This Visit    None        Plan:       Patient currently in recovery from acute blood loss anemia on chronic iron deficiency anemia.  Hemoglobin response is appropriate after full iron IV iron dose > 1gram in December. It takes 120 day for full blood production cycle and hemoglobin already improved to 12 from 8 grams.  Recommend continue oral iron.   Nephrectomy planned for next week.   Noted newly elevated total protein, this is likely reactive immune system, suspect polyclonal. Short-lived antibodies can live for a few months (from infection in December).    Will update labs today for CBC, CMP, SPEP, free light chain, iron panel and ferritin to ensure appropriate recovery.    A total of 20 minutes was spent in pre-visit chart review, personal interpretation of labs and imaging, and medication review. Total visit time 30 minutes, >50 % counseling.     BMT Route Chart for Scheduling                    [1]   Current Outpatient Medications   Medication Sig Dispense Refill    acetaminophen (TYLENOL) 500 MG tablet Take 500 mg by mouth every 6 (six) hours as needed for Pain.      aspirin 81 MG Chew Take 81 mg by mouth once daily.      ferrous gluconate (FERGON) 324 MG tablet Take 1 tablet by mouth 2 (two) times daily.      multivitamin (THERAGRAN) per tablet Take 1 tablet by mouth once daily.      rosuvastatin (CRESTOR) 20 MG tablet Take 1 tablet (20 mg total) by mouth once daily. 90 tablet 3    ASHWAGANDHA EXTRACT ORAL Take by mouth. (Patient not  taking: Reported on 3/3/2025)      bisacodyL (DULCOLAX) 5 mg EC tablet Take 5 mg by mouth daily as needed for Constipation.      oxyCODONE (ROXICODONE) 5 MG immediate release tablet Take 1 tablet (5 mg total) by mouth every 6 (six) hours as needed for Pain. 10 tablet 0     No current facility-administered medications for this visit.

## 2025-04-29 LAB
ALBUMIN, SPE (OHS): 4.46 G/DL (ref 3.35–5.55)
ALPHA 1 GLOB (OHS): 0.26 GM/DL (ref 0.17–0.41)
ALPHA 2 GLOB (OHS): 0.66 GM/DL (ref 0.43–0.99)
BETA GLOB (OHS): 0.7 GM/DL (ref 0.5–1.1)
GAMMA GLOBULIN (OHS): 1.72 GM/DL (ref 0.67–1.58)
KAPPA LC FREE SER-MCNC: 3.39 MG/L (ref 0.26–1.65)
KAPPA LC FREE/LAMBDA FREE SER: 3.69 MG/DL (ref 0.33–1.94)
LAMBDA LC FREE SERPL-MCNC: 1.09 MG/DL (ref 0.57–2.63)
PATHOLOGIST REVIEW - SPE (OHS): NORMAL
PROT SERPL-MCNC: 7.8 GM/DL (ref 6–8.4)

## 2025-05-01 LAB — PATHOLOGIST INTERPRETATION - IFE SERUM (OHS): NORMAL

## 2025-05-08 ENCOUNTER — PATIENT MESSAGE (OUTPATIENT)
Dept: ADMINISTRATIVE | Facility: CLINIC | Age: 70
End: 2025-05-08
Payer: MEDICARE

## 2025-05-12 ENCOUNTER — TELEPHONE (OUTPATIENT)
Dept: INTERNAL MEDICINE | Facility: CLINIC | Age: 70
End: 2025-05-12
Payer: MEDICARE

## 2025-05-12 NOTE — TELEPHONE ENCOUNTER
----- Message from Latosha sent at 5/12/2025 11:37 AM CDT -----  Contact: [phone] Patient  .1MEDICALADVICE Patient is calling for Medical Advice regarding: Pt in need of help with scheduling - having issues with portalHow long has patient had these symptoms:Pharmacy name and phone#:Patient wants a call back or [facility]: call back Comments:Please advise patient replies from provider may take up to 48 hours.

## 2025-05-21 ENCOUNTER — TELEPHONE (OUTPATIENT)
Dept: INTERNAL MEDICINE | Facility: CLINIC | Age: 70
End: 2025-05-21
Payer: MEDICARE

## 2025-05-21 NOTE — TELEPHONE ENCOUNTER
----- Message from Diaz sent at 5/21/2025  1:08 PM CDT -----  Contact: pt @ [phone]  Name of Who is Calling: pt  What is the request in detail: calling to schedule semi annual   Can the clinic reply by [facility]: no  What Number to Call Back [facility]: [phone]

## 2025-05-27 ENCOUNTER — OFFICE VISIT (OUTPATIENT)
Dept: FAMILY MEDICINE | Facility: CLINIC | Age: 70
End: 2025-05-27
Payer: MEDICARE

## 2025-05-27 DIAGNOSIS — N18.31 STAGE 3A CHRONIC KIDNEY DISEASE: ICD-10-CM

## 2025-05-27 DIAGNOSIS — G89.29 CHRONIC RIGHT HIP PAIN: ICD-10-CM

## 2025-05-27 DIAGNOSIS — E78.00 HYPERCHOLESTEROLEMIA: ICD-10-CM

## 2025-05-27 DIAGNOSIS — M25.551 CHRONIC RIGHT HIP PAIN: ICD-10-CM

## 2025-05-27 DIAGNOSIS — G89.29 CHRONIC RIGHT-SIDED LOW BACK PAIN, UNSPECIFIED WHETHER SCIATICA PRESENT: ICD-10-CM

## 2025-05-27 DIAGNOSIS — I70.90 ATHEROSCLEROSIS: ICD-10-CM

## 2025-05-27 DIAGNOSIS — Z00.00 ENCOUNTER FOR MEDICARE ANNUAL WELLNESS EXAM: Primary | ICD-10-CM

## 2025-05-27 DIAGNOSIS — M54.50 CHRONIC RIGHT-SIDED LOW BACK PAIN, UNSPECIFIED WHETHER SCIATICA PRESENT: ICD-10-CM

## 2025-05-27 DIAGNOSIS — D50.9 IRON DEFICIENCY ANEMIA, UNSPECIFIED IRON DEFICIENCY ANEMIA TYPE: ICD-10-CM

## 2025-05-27 DIAGNOSIS — N11.8 XGP (XANTHOGRANULOMATOUS PYELONEPHRITIS): ICD-10-CM

## 2025-05-27 DIAGNOSIS — Z59.9 FINANCIAL DIFFICULTIES: ICD-10-CM

## 2025-05-27 DIAGNOSIS — Z90.5 HISTORY OF RIGHT NEPHRECTOMY: ICD-10-CM

## 2025-05-27 DIAGNOSIS — Z13.9 ENCOUNTER FOR SCREENING INVOLVING SOCIAL DETERMINANTS OF HEALTH (SDOH): ICD-10-CM

## 2025-05-27 PROBLEM — R42 DIZZINESS: Status: RESOLVED | Noted: 2024-11-29 | Resolved: 2025-05-27

## 2025-05-27 PROBLEM — S46.009A INJURY OF TENDON OF ROTATOR CUFF: Status: RESOLVED | Noted: 2019-12-26 | Resolved: 2025-05-27

## 2025-05-27 PROBLEM — M25.519 SHOULDER JOINT PAIN: Status: RESOLVED | Noted: 2017-02-16 | Resolved: 2025-05-27

## 2025-05-27 SDOH — SOCIAL DETERMINANTS OF HEALTH (SDOH): PROBLEM RELATED TO HOUSING AND ECONOMIC CIRCUMSTANCES, UNSPECIFIED: Z59.9

## 2025-05-27 NOTE — PATIENT INSTRUCTIONS
Counseling and Referral of Other Preventative  (Italic type indicates deductible and co-insurance are waived)    Patient Name: Felipe Jiménez  Today's Date: 5/27/2025    Health Maintenance       Date Due Completion Date    Shingles Vaccine (1 of 2) Never done ---    Pneumococcal Vaccines (Age 50+) (1 of 1 - PCV) Never done ---    PROSTATE-SPECIFIC ANTIGEN 06/20/2025 6/20/2024    Hemoglobin A1c (Prediabetes) 01/27/2026 1/27/2025    Lipid Panel 06/20/2029 6/20/2024    Colorectal Cancer Screening 10/02/2029 10/2/2024    TETANUS VACCINE 11/25/2034 11/25/2024        No orders of the defined types were placed in this encounter.      The following information is provided to all patients.  This information is to help you find resources for any of the problems found today that may be affecting your health:                  Living healthy guide: www.[facility].[place].[facility]      Understanding Diabetes: www.diabetes.org      Eating healthy: www.cdc.gov/healthyweight      [facility] home safety checklist: www.cdc.gov/steadi/patient.html      Agency on Aging: www.goea.[place].[facility]      Alcoholics anonymous (AA): www.aa.org      Physical Activity: www.riina.nih.gov/ny1fhwo      Tobacco use: www.quitwithusla.org

## 2025-05-27 NOTE — PROGRESS NOTES
The patient location is: [place]  The chief complaint leading to consultation is: Medicare Wellness Visit       Visit type: audiovisual     Face to Face time with patient: 45   60 minutes of total time spent on the encounter, which includes face to face time and non-face to face time preparing to see the patient (eg, review of tests), Obtaining and/or reviewing separately obtained history, Documenting clinical information in the electronic or other health record, Independently interpreting results (not separately reported) and communicating results to the patient/family/caregiver, or Care coordination (not separately reported).            Each patient to whom he or she provides medical services by telemedicine is:  (1) informed of the relationship between the physician and patient and the respective role of any other health care provider with respect to management of the patient; and (2) notified that he or she may decline to receive medical services by telemedicine and may withdraw from such care at any time.      Felipe Jiménez presented for a  Medicare AWV and comprehensive Health Risk Assessment today. The following components were reviewed and updated:    Medical history  Family History  Social history  Allergies and Current Medications  Health Risk Assessment  Health Maintenance  Care Team         ** See Completed Assessments for Annual Wellness Visit within the encounter summary.**         The following assessments were completed:  Living Situation  CAGE  Depression Screening  Timed Get Up and Go  Whisper Test  Cognitive Function Screening  Nutrition Screening  ADL Screening  PAQ Screening      Opioid documentation for eAWV      Patient does not have a current opioid prescription.            Review for Substance Use Disorders: Patient does not use substance      Current Medications[1]         There were no vitals filed for this visit.   Physical Exam  Vitals reviewed: Virtual visit. No VS taken..    Constitutional:       General: He is awake. He is not in acute distress.  Pulmonary:      Effort: Pulmonary effort is normal. No respiratory distress.   Neurological:      Mental Status: He is alert and oriented to person, place, and time.   Psychiatric:         Mood and Affect: Mood normal.         Behavior: Behavior normal. Behavior is cooperative.         Cognition and Memory: Cognition and memory normal.               Diagnoses and health risks identified today and associated recommendations/orders:    1. Encounter for Medicare annual wellness exam  -Chart reviewed. Problem list updated. Discussed current medical diagnosis, current medications, medical/surgical/family/social history; updated provider list; documented vital signs; identified any cognitive impairment; and updated risk factor list. Addressed any outstanding health maintenance. Provided patient with personalized health advice. Continue to follow up with PCP and any specialists.      2. Hypercholesterolemia  3. Atherosclerosis  -Chronic. Stable. On rosuvastatin and ASA. Followed by PCP.     4. Stage 3a chronic kidney disease  -Chronic. GFR 45.8, Creat 1.6 (2/2025). Followed by PCP/Urology.     5. XGP (xanthogranulomatous pyelonephritis)  6. History of right nephrectomy  -Chronic. Stable. Followed by Urology.     7. Iron deficiency anemia, unspecified iron deficiency anemia type  -Chronic. H/H 11/35. On ferrous gluconate. Followed by Hematology/Oncology.     8. Chronic right-sided low back pain, unspecified whether sciatica present  9. Chronic right hip pain  - Ambulatory Referral/Consult to Physical Therapy; Future  -Chronic. Stable. On OTC tylenol. Requesting referral for PT. Followed by PCP.     10. Encounter for screening involving social determinants of health (SDoH)  11. Financial difficulties  - Ambulatory referral/consult to Outpatient Case Management  -Reports financial difficulties surrounding utilities. Requesting assistance if possible.      Provided Felipe with a 5-10 year written screening schedule and personal prevention plan. Recommendations were developed using the USPSTF age appropriate recommendations. Education, counseling, and referrals were provided as needed. After Visit Summary printed and given to patient which includes a list of additional screenings\tests needed.    Follow up in about 1 year (around 5/27/2026) for Your next wellness visit.    Advance Care Planning     I offered to discuss advanced care planning, including how to pick a person who would make decisions for you if you were unable to make them for yourself, called a health care power of , and what kind of decisions you might make such as use of life sustaining treatments such as ventilators and tube feeding when faced with a life limiting illness recorded on a living will that they will need to know. (How you want to be cared for as you near the end of your natural life)     X  Patient has advanced directives written and agrees to provide copies to the institution.    Dana Marks, KASSANDRAP-C  Family Medicine  [facility]        [1]   Current Outpatient Medications:     acetaminophen (TYLENOL) 500 MG tablet, Take 500 mg by mouth every 6 (six) hours as needed for Pain., Disp: , Rfl:     ASHWAGANDHA EXTRACT ORAL, Take by mouth., Disp: , Rfl:     aspirin 81 MG Chew, Take 81 mg by mouth once daily., Disp: , Rfl:     multivitamin (THERAGRAN) per tablet, Take 1 tablet by mouth once daily., Disp: , Rfl:     rosuvastatin (CRESTOR) 20 MG tablet, Take 1 tablet (20 mg total) by mouth once daily., Disp: 90 tablet, Rfl: 3    ferrous gluconate (FERGON) 324 MG tablet, Take 1 tablet (324 mg total) by mouth 2 (two) times daily., Disp: 180 tablet, Rfl: 11

## 2025-05-27 NOTE — TELEPHONE ENCOUNTER
----- Message from Lynne sent at 5/27/2025 11:26 AM CDT -----  Contact: Self/ [phone]  Requesting an RX refill or new RX.Is this a refill or new RX: NewRX name and strength :  ferrous gluconate (FERGON) 324 MG tabletIs this a 30 day or 90 day RX: 60Pharmacy name and phone # :  [facility] [id number] [facility] [address]Grsnv: [phone] Fax: [phone] The doctors have asked that we provide their patients with the following 2 reminders -- prescription refills can take up to 72 hours, and a friendly reminder that in the future you can use your [facility] account to request refills:

## 2025-05-28 ENCOUNTER — TELEPHONE (OUTPATIENT)
Dept: INTERNAL MEDICINE | Facility: CLINIC | Age: 70
End: 2025-05-28
Payer: MEDICARE

## 2025-05-28 ENCOUNTER — PATIENT OUTREACH (OUTPATIENT)
Dept: ADMINISTRATIVE | Facility: OTHER | Age: 70
End: 2025-05-28
Payer: MEDICARE

## 2025-05-28 RX ORDER — FERROUS GLUCONATE 324(38)MG
1 TABLET ORAL 2 TIMES DAILY
Qty: 180 TABLET | Refills: 11 | Status: SHIPPED | OUTPATIENT
Start: 2025-05-28 | End: 2025-05-29 | Stop reason: SDUPTHER

## 2025-05-28 NOTE — PROGRESS NOTES
CHW - Outreach Attempt    Community Health Worker left a voicemail message for 1st attempt to contact patient regarding: Referral states :Requesting assistance with utilities and food     Community Health Worker to attempt to contact patient on: 5/28/25

## 2025-05-28 NOTE — TELEPHONE ENCOUNTER
----- Message from Senait sent at 5/28/2025  1:57 PM CDT -----  .1MEDICALADVICE Patient is calling for Medical Advice regarding: pt states that his medication was sent to the incorrect pharmacy, it was supposed to be sent to the pharmacy below. Please resend.How long has patient had these symptoms:Pharmacy name and phone#: [facility] - [place], [place] - [address] [facility] [address]Rzubw: [phone] Fax: [phone] wants a call back or thru [facility]:Comments:Please advise patient replies from provider may take up to 48 hours.

## 2025-05-29 PROBLEM — I70.90 ATHEROSCLEROSIS: Status: ACTIVE | Noted: 2025-05-29

## 2025-05-29 PROBLEM — N18.31 STAGE 3A CHRONIC KIDNEY DISEASE: Status: ACTIVE | Noted: 2025-05-29

## 2025-05-29 NOTE — TELEPHONE ENCOUNTER
----- Message from Juliana sent at 5/29/2025 10:29 AM CDT -----  Contact: [phone]  Requesting an RX refill or new RX.Is this a refill or new RX: RX name and strength  ferrous gluconate (FERGON) 324 MG tablet 180 tabletIs this a 30 day or 90 day RX: Pharmacy name and phone #  [facility] - [place] [address] [address] Phone: [phone]: [phone] Please resend to [place] - [place] [address] [place] [employer] Phone: [phone]: [phone]

## 2025-05-29 NOTE — TELEPHONE ENCOUNTER
----- Message from Mariela sent at 5/29/2025  3:49 PM CDT -----  Contact: [phone]  .1MEDICALADVICE Patient is calling for Medical Advice regarding: Patient is calling to notify medication ferrous gluconate (FERGON) 324 MG tablet should be transfer to:[facility] [address] [facility] [address]Mjype: [phone] Fax: [phone] wants a call back or thru [facility]: call back Comments: Patient states he need it for today. Patient will be out of medication by tomorrow. Please advise patient replies from provider may take up to 48 hours.

## 2025-05-29 NOTE — TELEPHONE ENCOUNTER
----- Message from Med Assistant Flores sent at 5/29/2025  1:08 PM CDT -----  Contact: [phone]  Caller is calling to inform provider informationCaller:Felipe (pt)Reason For Call:pt is requesting provider to resend medication ferrous gluconate (FERGON) 324 MG tablet to correct pharmacy below.[facility] [address] [place], [place] [address] [facility] [address]Emokn: [phone] Fax: [phone]: Not open 24 hoursBest Call Back:[phone]

## 2025-05-30 ENCOUNTER — PATIENT OUTREACH (OUTPATIENT)
Dept: ADMINISTRATIVE | Facility: OTHER | Age: 70
End: 2025-05-30
Payer: MEDICARE

## 2025-05-30 RX ORDER — FERROUS GLUCONATE 324(38)MG
1 TABLET ORAL 2 TIMES DAILY
Qty: 180 TABLET | Refills: 11 | Status: SHIPPED | OUTPATIENT
Start: 2025-05-30

## 2025-05-30 NOTE — PROGRESS NOTES
CHW - Outreach Attempt    Community Health Worker left a voicemail message for 2nd attempt to contact patient regarding:  Referral states :Requesting assistance with utilities and food     Community Health Worker to attempt to contact patient on: 5/30/25

## 2025-06-02 ENCOUNTER — PATIENT OUTREACH (OUTPATIENT)
Dept: ADMINISTRATIVE | Facility: OTHER | Age: 70
End: 2025-06-02
Payer: MEDICARE

## 2025-06-12 DIAGNOSIS — D47.2 MGUS (MONOCLONAL GAMMOPATHY OF UNKNOWN SIGNIFICANCE): ICD-10-CM

## 2025-06-12 DIAGNOSIS — D50.0 IRON DEFICIENCY ANEMIA DUE TO CHRONIC BLOOD LOSS: Primary | ICD-10-CM

## 2025-06-12 NOTE — PROGRESS NOTES
Called patient to inform him of need for follow-up visit of prior iron deficiency anemia and to repeat labs that showed MGUS.    VM left     Requesting labs and visit July/August

## 2025-07-28 ENCOUNTER — OFFICE VISIT (OUTPATIENT)
Dept: HEMATOLOGY/ONCOLOGY | Facility: CLINIC | Age: 70
End: 2025-07-28
Payer: MEDICARE

## 2025-07-28 ENCOUNTER — LAB VISIT (OUTPATIENT)
Dept: LAB | Facility: HOSPITAL | Age: 70
End: 2025-07-28
Attending: INTERNAL MEDICINE
Payer: MEDICARE

## 2025-07-28 VITALS
TEMPERATURE: 98 F | SYSTOLIC BLOOD PRESSURE: 134 MMHG | DIASTOLIC BLOOD PRESSURE: 63 MMHG | WEIGHT: 170.19 LBS | HEIGHT: 68 IN | RESPIRATION RATE: 18 BRPM | OXYGEN SATURATION: 98 % | BODY MASS INDEX: 25.79 KG/M2 | HEART RATE: 69 BPM

## 2025-07-28 DIAGNOSIS — D50.0 IRON DEFICIENCY ANEMIA DUE TO CHRONIC BLOOD LOSS: ICD-10-CM

## 2025-07-28 DIAGNOSIS — D47.2 MGUS (MONOCLONAL GAMMOPATHY OF UNKNOWN SIGNIFICANCE): Primary | ICD-10-CM

## 2025-07-28 DIAGNOSIS — D47.2 MGUS (MONOCLONAL GAMMOPATHY OF UNKNOWN SIGNIFICANCE): ICD-10-CM

## 2025-07-28 LAB
ABSOLUTE EOSINOPHIL (OHS): 0.08 K/UL
ABSOLUTE MONOCYTE (OHS): 0.57 K/UL (ref 0.3–1)
ABSOLUTE NEUTROPHIL COUNT (OHS): 4.65 K/UL (ref 1.8–7.7)
ALBUMIN SERPL BCP-MCNC: 4.2 G/DL (ref 3.5–5.2)
ALP SERPL-CCNC: 64 UNIT/L (ref 40–150)
ALT SERPL W/O P-5'-P-CCNC: 17 UNIT/L (ref 0–55)
ANION GAP (OHS): 7 MMOL/L (ref 8–16)
AST SERPL-CCNC: 27 UNIT/L (ref 0–50)
BASOPHILS # BLD AUTO: 0.01 K/UL
BASOPHILS NFR BLD AUTO: 0.1 %
BILIRUB SERPL-MCNC: 0.3 MG/DL (ref 0.1–1)
BUN SERPL-MCNC: 16 MG/DL (ref 8–23)
CALCIUM SERPL-MCNC: 9.4 MG/DL (ref 8.7–10.5)
CHLORIDE SERPL-SCNC: 107 MMOL/L (ref 95–110)
CO2 SERPL-SCNC: 24 MMOL/L (ref 23–29)
CREAT SERPL-MCNC: 1.4 MG/DL (ref 0.5–1.4)
ERYTHROCYTE [DISTWIDTH] IN BLOOD BY AUTOMATED COUNT: 14.4 % (ref 11.5–14.5)
FERRITIN SERPL-MCNC: 701 NG/ML (ref 20–300)
GFR SERPLBLD CREATININE-BSD FMLA CKD-EPI: 54 ML/MIN/1.73/M2
GLUCOSE SERPL-MCNC: 105 MG/DL (ref 70–110)
HCT VFR BLD AUTO: 42.1 % (ref 40–54)
HGB BLD-MCNC: 13.8 GM/DL (ref 14–18)
IMM GRANULOCYTES # BLD AUTO: 0.02 K/UL (ref 0–0.04)
IMM GRANULOCYTES NFR BLD AUTO: 0.3 % (ref 0–0.5)
IRON SATN MFR SERPL: 26 % (ref 20–50)
IRON SERPL-MCNC: 82 UG/DL (ref 45–160)
LYMPHOCYTES # BLD AUTO: 2.03 K/UL (ref 1–4.8)
MCH RBC QN AUTO: 31.2 PG (ref 27–31)
MCHC RBC AUTO-ENTMCNC: 32.8 G/DL (ref 32–36)
MCV RBC AUTO: 95 FL (ref 82–98)
NUCLEATED RBC (/100WBC) (OHS): 0 /100 WBC
PLATELET # BLD AUTO: 304 K/UL (ref 150–450)
PMV BLD AUTO: 9.4 FL (ref 9.2–12.9)
POTASSIUM SERPL-SCNC: 3.9 MMOL/L (ref 3.5–5.1)
PROT SERPL-MCNC: 8.1 GM/DL (ref 6–8.4)
RBC # BLD AUTO: 4.42 M/UL (ref 4.6–6.2)
RELATIVE EOSINOPHIL (OHS): 1.1 %
RELATIVE LYMPHOCYTE (OHS): 27.6 % (ref 18–48)
RELATIVE MONOCYTE (OHS): 7.7 % (ref 4–15)
RELATIVE NEUTROPHIL (OHS): 63.2 % (ref 38–73)
SODIUM SERPL-SCNC: 138 MMOL/L (ref 136–145)
TIBC SERPL-MCNC: 318 UG/DL (ref 250–450)
TRANSFERRIN SERPL-MCNC: 215 MG/DL (ref 200–375)
WBC # BLD AUTO: 7.36 K/UL (ref 3.9–12.7)

## 2025-07-28 PROCEDURE — 84466 ASSAY OF TRANSFERRIN: CPT

## 2025-07-28 PROCEDURE — 36415 COLL VENOUS BLD VENIPUNCTURE: CPT

## 2025-07-28 PROCEDURE — 3008F BODY MASS INDEX DOCD: CPT | Mod: CPTII,S$GLB,, | Performed by: INTERNAL MEDICINE

## 2025-07-28 PROCEDURE — 99214 OFFICE O/P EST MOD 30 MIN: CPT | Mod: S$GLB,,, | Performed by: INTERNAL MEDICINE

## 2025-07-28 PROCEDURE — 1159F MED LIST DOCD IN RCRD: CPT | Mod: CPTII,S$GLB,, | Performed by: INTERNAL MEDICINE

## 2025-07-28 PROCEDURE — 3044F HG A1C LEVEL LT 7.0%: CPT | Mod: CPTII,S$GLB,, | Performed by: INTERNAL MEDICINE

## 2025-07-28 PROCEDURE — 3075F SYST BP GE 130 - 139MM HG: CPT | Mod: CPTII,S$GLB,, | Performed by: INTERNAL MEDICINE

## 2025-07-28 PROCEDURE — 3078F DIAST BP <80 MM HG: CPT | Mod: CPTII,S$GLB,, | Performed by: INTERNAL MEDICINE

## 2025-07-28 PROCEDURE — 82040 ASSAY OF SERUM ALBUMIN: CPT

## 2025-07-28 PROCEDURE — 3288F FALL RISK ASSESSMENT DOCD: CPT | Mod: CPTII,S$GLB,, | Performed by: INTERNAL MEDICINE

## 2025-07-28 PROCEDURE — 82728 ASSAY OF FERRITIN: CPT

## 2025-07-28 PROCEDURE — 1126F AMNT PAIN NOTED NONE PRSNT: CPT | Mod: CPTII,S$GLB,, | Performed by: INTERNAL MEDICINE

## 2025-07-28 PROCEDURE — 84165 PROTEIN E-PHORESIS SERUM: CPT

## 2025-07-28 PROCEDURE — 83521 IG LIGHT CHAINS FREE EACH: CPT

## 2025-07-28 PROCEDURE — 1101F PT FALLS ASSESS-DOCD LE1/YR: CPT | Mod: CPTII,S$GLB,, | Performed by: INTERNAL MEDICINE

## 2025-07-28 PROCEDURE — 85025 COMPLETE CBC W/AUTO DIFF WBC: CPT

## 2025-07-28 PROCEDURE — 99999 PR PBB SHADOW E&M-EST. PATIENT-LVL III: CPT | Mod: PBBFAC,,, | Performed by: INTERNAL MEDICINE

## 2025-07-28 NOTE — PROGRESS NOTES
Subjective:       Patient ID: Felipe Jiménez is a 69 y.o. male.    Chief Complaint: No chief complaint on file.    HPI    New patient visit for acute on chronic anemia during hospital admission in December. Found to have evidence of both GI and  bleeding in addition to a renal abscess. Scheduled for right nephrectomy next week. For acute on chronic anemia was treated with blood transfusion support and feraheme 1020mg IV dose. Hemoglobin now nearly normal at 12.3 grams. Currently taking oral iron replacement without adverse side effects.     July 28, 2025 Follow-up  No acute interval events  Stable CBC on oral iron replacement  Discussed paraprotein labs showing MGUS and plan for observation    Review of Systems   Constitutional: Negative.  Negative for appetite change and unexpected weight change.   HENT: Negative.  Negative for mouth sores.    Eyes: Negative.  Negative for visual disturbance.   Respiratory: Negative.  Negative for cough and shortness of breath.    Cardiovascular: Negative.  Negative for chest pain.   Gastrointestinal: Negative.  Negative for abdominal pain, blood in stool and diarrhea.   Endocrine: Negative.    Genitourinary: Negative.  Negative for frequency and hematuria.   Musculoskeletal:  Positive for back pain.   Integumentary:  Negative for rash. Negative.   Allergic/Immunologic: Negative for environmental allergies and food allergies.   Neurological: Negative.  Negative for headaches.   Hematological:  Negative for adenopathy. Does not bruise/bleed easily.   Psychiatric/Behavioral: Negative.  The patient is not nervous/anxious.          Objective:      Physical Exam  Vitals and nursing note reviewed.   Constitutional:       Appearance: He is well-developed.   HENT:      Head: Normocephalic and atraumatic.   Eyes:      General: No scleral icterus.     Conjunctiva/sclera: Conjunctivae normal.   Cardiovascular:      Rate and Rhythm: Normal rate.   Pulmonary:      Effort: Pulmonary effort is  normal. No respiratory distress.   Abdominal:      General: There is no distension.   Musculoskeletal:         General: Normal range of motion.      Cervical back: Normal range of motion and neck supple.   Skin:     General: Skin is warm and dry.   Neurological:      Mental Status: He is alert and oriented to person, place, and time.      Cranial Nerves: No cranial nerve deficit.   Psychiatric:         Behavior: Behavior normal.       Current Medications[1]   Lab Results   Component Value Date    WBC 7.36 07/28/2025    HGB 13.8 (L) 07/28/2025    HCT 42.1 07/28/2025    MCV 95 07/28/2025     07/28/2025        CMP  Sodium   Date Value Ref Range Status   07/28/2025 138 136 - 145 mmol/L Final   02/28/2025 135 (L) 136 - 145 mmol/L Final     Potassium   Date Value Ref Range Status   07/28/2025 3.9 3.5 - 5.1 mmol/L Final   02/28/2025 4.6 3.5 - 5.1 mmol/L Final     Chloride   Date Value Ref Range Status   07/28/2025 107 95 - 110 mmol/L Final   02/28/2025 108 95 - 110 mmol/L Final     CO2   Date Value Ref Range Status   07/28/2025 24 23 - 29 mmol/L Final   02/28/2025 20 (L) 23 - 29 mmol/L Final     Glucose   Date Value Ref Range Status   07/28/2025 105 70 - 110 mg/dL Final   02/28/2025 94 70 - 110 mg/dL Final     BUN   Date Value Ref Range Status   07/28/2025 16 8 - 23 mg/dL Final     Creatinine   Date Value Ref Range Status   07/28/2025 1.4 0.5 - 1.4 mg/dL Final     Calcium   Date Value Ref Range Status   07/28/2025 9.4 8.7 - 10.5 mg/dL Final   02/28/2025 9.2 8.7 - 10.5 mg/dL Final     Protein Total   Date Value Ref Range Status   07/28/2025 8.1 6.0 - 8.4 gm/dL Final     Total Protein   Date Value Ref Range Status   01/27/2025 8.8 (H) 6.0 - 8.4 g/dL Final     Albumin   Date Value Ref Range Status   07/28/2025 4.2 3.5 - 5.2 g/dL Final   01/27/2025 3.8 3.5 - 5.2 g/dL Final     Total Bilirubin   Date Value Ref Range Status   01/27/2025 0.3 0.1 - 1.0 mg/dL Final     Comment:     For infants and newborns, interpretation of  results should be based  on gestational age, weight and in agreement with clinical  observations.    Premature Infant recommended reference ranges:  Up to 24 hours.............<8.0 mg/dL  Up to 48 hours............<12.0 mg/dL  3-5 days..................<15.0 mg/dL  6-29 days.................<15.0 mg/dL       Bilirubin Total   Date Value Ref Range Status   07/28/2025 0.3 0.1 - 1.0 mg/dL Final     Comment:     For infants and newborns, interpretation of results should be based   on gestational age, weight and in agreement with clinical   observations.    Premature Infant recommended reference ranges:   0-24 hours:  <8.0 mg/dL   24-48 hours: <12.0 mg/dL   3-5 days:    <15.0 mg/dL   6-29 days:   <15.0 mg/dL     Alkaline Phosphatase   Date Value Ref Range Status   01/27/2025 67 40 - 150 U/L Final     ALP   Date Value Ref Range Status   07/28/2025 64 40 - 150 unit/L Final     AST   Date Value Ref Range Status   07/28/2025 27 0 - 50 unit/L Final     Comment:       An activated reagent was used, which may result in slightly higher values compared to standard method.   01/27/2025 18 10 - 40 U/L Final     ALT   Date Value Ref Range Status   07/28/2025 17 0 - 55 unit/L Final     Comment:       An activated reagent was used, which may result in slightly higher values compared to standard method.   01/27/2025 15 10 - 44 U/L Final     Anion Gap   Date Value Ref Range Status   07/28/2025 7 (L) 8 - 16 mmol/L Final          Assessment:       Problem List Items Addressed This Visit          Oncology    Iron deficiency anemia    Relevant Orders    CBC w/ DIFF    Comprehensive Metabolic Panel    Immunoglobulin Free LT Chains Blood    SPEP - Protein electrophoresis, serum    IRON AND TIBC    Ferritin     Other Visit Diagnoses         MGUS (monoclonal gammopathy of unknown significance)    -  Primary    Relevant Orders    CBC w/ DIFF    Comprehensive Metabolic Panel    Immunoglobulin Free LT Chains Blood    SPEP - Protein  electrophoresis, serum    IRON AND TIBC    Ferritin              Plan:       Ok to stop oral iron replacment  Plan for return visit in 4 months with repeat MGUS labs.    Will update labs today for CBC, CMP, SPEP, free light chain, iron panel and ferritin to ensure appropriate recovery.    A total of 20 minutes was spent in pre-visit chart review, personal interpretation of labs and imaging, and medication review. Total visit time 30 minutes, >50 % counseling.       BMT Chart Routing      Follow up with physician 4 months.   Follow up with BHARGAV    Provider visit type Benign hem   Infusion scheduling note    Injection scheduling note    Labs CBC, CMP, ferritin, iron and TIBC, free light chains and SPEP   Scheduling:  Preferred lab:  Lab interval:  labs 1 week before return visit   Imaging    Pharmacy appointment    Other referrals                                      [1]   Current Outpatient Medications   Medication Sig Dispense Refill    acetaminophen (TYLENOL) 500 MG tablet Take 500 mg by mouth every 6 (six) hours as needed for Pain.      ASHWAGANDHA EXTRACT ORAL Take by mouth.      aspirin 81 MG Chew Take 81 mg by mouth once daily.      ferrous gluconate (FERGON) 324 MG tablet Take 1 tablet (324 mg total) by mouth 2 (two) times daily. 180 tablet 11    multivitamin (THERAGRAN) per tablet Take 1 tablet by mouth once daily.      rosuvastatin (CRESTOR) 20 MG tablet Take 1 tablet (20 mg total) by mouth once daily. 90 tablet 3     No current facility-administered medications for this visit.

## 2025-07-29 LAB
ALBUMIN, SPE (OHS): 4.28 G/DL (ref 3.35–5.55)
ALPHA 1 GLOB (OHS): 0.31 GM/DL (ref 0.17–0.41)
ALPHA 2 GLOB (OHS): 0.68 GM/DL (ref 0.43–0.99)
BETA GLOB (OHS): 0.67 GM/DL (ref 0.5–1.1)
GAMMA GLOBULIN (OHS): 1.58 GM/DL (ref 0.67–1.58)
KAPPA LC FREE SER-MCNC: 3.73 MG/L (ref 0.26–1.65)
KAPPA LC FREE/LAMBDA FREE SER: 4.25 MG/DL (ref 0.33–1.94)
LAMBDA LC FREE SERPL-MCNC: 1.14 MG/DL (ref 0.57–2.63)
PATHOLOGIST REVIEW - SPE (OHS): NORMAL
PROT SERPL-MCNC: 7.5 GM/DL (ref 6–8.4)

## 2025-07-30 ENCOUNTER — TELEPHONE (OUTPATIENT)
Dept: HEMATOLOGY/ONCOLOGY | Facility: CLINIC | Age: 70
End: 2025-07-30
Payer: MEDICARE

## 2025-07-30 NOTE — TELEPHONE ENCOUNTER
Copied from CRM #[id number]. Topic: General Inquiry - Patient Advice  >> Jul 30, 2025 11:58 AM Easton wrote:  Consult/Advisory     Name Of Caller: Felipe        Contact Preference: [phone]      Nature of call: Calling to see if he needs to continue the ferrous gluconate (FERGON) 324 MG tablet or discontinue it

## 2025-07-30 NOTE — TELEPHONE ENCOUNTER
Spoke with  and advised that he was able to discontinue the ferrous gluconate (FERGON) 324 MG tablet. Pt verbalized that he was going to see his primary care provider tomorrow and would let him know. Pt verbalized agreement and understanding to medication change.

## 2025-07-31 ENCOUNTER — OFFICE VISIT (OUTPATIENT)
Dept: INTERNAL MEDICINE | Facility: CLINIC | Age: 70
End: 2025-07-31
Payer: MEDICARE

## 2025-07-31 VITALS
SYSTOLIC BLOOD PRESSURE: 114 MMHG | HEART RATE: 81 BPM | BODY MASS INDEX: 25.92 KG/M2 | DIASTOLIC BLOOD PRESSURE: 64 MMHG | HEIGHT: 68 IN | WEIGHT: 171.06 LBS | OXYGEN SATURATION: 96 %

## 2025-07-31 DIAGNOSIS — Z12.5 ENCOUNTER FOR SCREENING FOR MALIGNANT NEOPLASM OF PROSTATE: ICD-10-CM

## 2025-07-31 DIAGNOSIS — I70.90 ATHEROSCLEROSIS: ICD-10-CM

## 2025-07-31 DIAGNOSIS — Z00.00 ANNUAL PHYSICAL EXAM: Primary | ICD-10-CM

## 2025-07-31 DIAGNOSIS — E78.2 MIXED HYPERLIPIDEMIA: ICD-10-CM

## 2025-07-31 DIAGNOSIS — N11.8 XGP (XANTHOGRANULOMATOUS PYELONEPHRITIS): ICD-10-CM

## 2025-07-31 DIAGNOSIS — M25.559 HIP PAIN, UNSPECIFIED LATERALITY: ICD-10-CM

## 2025-07-31 DIAGNOSIS — Z79.899 OTHER LONG TERM (CURRENT) DRUG THERAPY: ICD-10-CM

## 2025-07-31 DIAGNOSIS — D47.2 MGUS (MONOCLONAL GAMMOPATHY OF UNKNOWN SIGNIFICANCE): ICD-10-CM

## 2025-07-31 DIAGNOSIS — M54.12 CERVICAL RADICULOPATHY: ICD-10-CM

## 2025-07-31 DIAGNOSIS — K76.89 LIVER CYST: ICD-10-CM

## 2025-07-31 DIAGNOSIS — Z90.5 HISTORY OF NEPHRECTOMY: ICD-10-CM

## 2025-07-31 PROCEDURE — 1160F RVW MEDS BY RX/DR IN RCRD: CPT | Mod: CPTII,S$GLB,, | Performed by: STUDENT IN AN ORGANIZED HEALTH CARE EDUCATION/TRAINING PROGRAM

## 2025-07-31 PROCEDURE — 3074F SYST BP LT 130 MM HG: CPT | Mod: CPTII,S$GLB,, | Performed by: STUDENT IN AN ORGANIZED HEALTH CARE EDUCATION/TRAINING PROGRAM

## 2025-07-31 PROCEDURE — 1101F PT FALLS ASSESS-DOCD LE1/YR: CPT | Mod: CPTII,S$GLB,, | Performed by: STUDENT IN AN ORGANIZED HEALTH CARE EDUCATION/TRAINING PROGRAM

## 2025-07-31 PROCEDURE — 3288F FALL RISK ASSESSMENT DOCD: CPT | Mod: CPTII,S$GLB,, | Performed by: STUDENT IN AN ORGANIZED HEALTH CARE EDUCATION/TRAINING PROGRAM

## 2025-07-31 PROCEDURE — 99999 PR PBB SHADOW E&M-EST. PATIENT-LVL III: CPT | Mod: PBBFAC,GC,,

## 2025-07-31 PROCEDURE — 3044F HG A1C LEVEL LT 7.0%: CPT | Mod: CPTII,S$GLB,, | Performed by: STUDENT IN AN ORGANIZED HEALTH CARE EDUCATION/TRAINING PROGRAM

## 2025-07-31 PROCEDURE — 99397 PER PM REEVAL EST PAT 65+ YR: CPT | Mod: GE,S$GLB,, | Performed by: STUDENT IN AN ORGANIZED HEALTH CARE EDUCATION/TRAINING PROGRAM

## 2025-07-31 PROCEDURE — 1159F MED LIST DOCD IN RCRD: CPT | Mod: CPTII,S$GLB,, | Performed by: STUDENT IN AN ORGANIZED HEALTH CARE EDUCATION/TRAINING PROGRAM

## 2025-07-31 PROCEDURE — 3008F BODY MASS INDEX DOCD: CPT | Mod: CPTII,S$GLB,, | Performed by: STUDENT IN AN ORGANIZED HEALTH CARE EDUCATION/TRAINING PROGRAM

## 2025-07-31 PROCEDURE — 1126F AMNT PAIN NOTED NONE PRSNT: CPT | Mod: CPTII,S$GLB,, | Performed by: STUDENT IN AN ORGANIZED HEALTH CARE EDUCATION/TRAINING PROGRAM

## 2025-07-31 PROCEDURE — 3078F DIAST BP <80 MM HG: CPT | Mod: CPTII,S$GLB,, | Performed by: STUDENT IN AN ORGANIZED HEALTH CARE EDUCATION/TRAINING PROGRAM

## 2025-07-31 NOTE — PROGRESS NOTES
"  Subjective     Chief Complaint: follow up    History of Present Illness:  Mr. Felipe Jiménez is a 69 y.o. male w/ hx of LENORA, HLD, recent dx of MGUS who presents for a follow up visit for his chronic conditions. He feels fine, says he sometimes needs to take Miralax for constipation but believes it will improve now that he is off his iron supplements.     LENORA  Recently seen by Dr. Villegas of Heme/Onc, instructed he may d/c his iron tablets which he states he has done. He denies fatigue, sob, ramirez, chest pain. Reports some constipation.     HLD  Reports taking his Statin daily. Reports walking between 3-6 miles daily, and he is now starting a mediterranean. No myalgias noted.       In May 2025 he noted two episodes of blurred vision. The first episode he was also dizzy, noting the room was spinning and it lasted a couple of minutes. He was not nauseated at this time, but says he had a few more episodes of blurred vision that lasted a for ~ 1 minute each.     Reporting R hip pain that started in June of this year, he says it radiates half way down his thigh and gets better with "bed chu" heating pads. Nothing seems to bring it on or make it worse.        Review of Systems   Constitutional:  Negative for chills and fever.   Respiratory:  Negative for cough and shortness of breath.    Cardiovascular:  Negative for chest pain and leg swelling.   Gastrointestinal:  Negative for diarrhea, nausea and vomiting.   All other systems reviewed and are negative.    PAST HISTORY:     Past Medical History:   Diagnosis Date    Arthritis     Disorder of kidney and ureter     Eczema     Fever 11/30/2024    Hyperlipidemia     Leucocytosis 11/29/2024    Male erectile dysfunction, unspecified     UTI (urinary tract infection) 11/29/2024    Weight loss 11/30/2024       Past Surgical History:   Procedure Laterality Date    COLONOSCOPY N/A 10/2/2024    Procedure: COLONOSCOPY;  Surgeon: Brennan Balderrama MD;  Location: [facility];  Service: " Endoscopy;  Laterality: N/A;    CYSTOSCOPY W/ URETERAL STENT PLACEMENT Right 2024    Procedure: CYSTOSCOPY, WITH URETERAL STENT INSERTION;  Surgeon: Dain Eugene MD;  Location: [facility] OR 1ST FLR;  Service: Urology;  Laterality: Right;    CYSTOSCOPY W/ URETERAL STENT REMOVAL Right 2025    Procedure: URETERAL STENT REMOVAL;  Surgeon: Dain Eugene MD;  Location: [facility] OR 2ND FLR;  Service: Urology;  Laterality: Right;    DILATION OF URETHRA N/A 2024    Procedure: DILATION, URETHRA;  Surgeon: Dain Eugene MD;  Location: [facility] OR 1ST FLR;  Service: Urology;  Laterality: N/A;    DV5 ROBOTIC NEPHRECTOMY Right 2025    Procedure: DV5 ROBOTIC NEPHRECTOMY;  Surgeon: Dain Eugene MD;  Location: [facility] OR 2ND FLR;  Service: Urology;  Laterality: Right;    ESOPHAGOGASTRODUODENOSCOPY N/A 10/2/2024    Procedure: EGD (ESOPHAGOGASTRODUODENOSCOPY);  Surgeon: Brennan Balderrama MD;  Location: [facility];  Service: Endoscopy;  Laterality: N/A;    FRACTURE SURGERY      INTRALUMINAL GASTROINTESTINAL TRACT IMAGING VIA CAPSULE N/A 10/22/2024    Procedure: IMAGING PROCEDURE, GI TRACT, INTRALUMINAL, VIA CAPSULE;  Surgeon: Brennan Balderrama MD;  Location: [facility];  Service: Endoscopy;  Laterality: N/A;    LIPOMA RESECTION Left     left knee    QUADRICEPS TENDON REPAIR Left        Family History   Problem Relation Name Age of Onset    Heart disease Mother      Heart attacks under age 50 Mother  43         2/2 heart attack.    Heart disease Father      Heart attacks under age 50 Father  38         2/2 heart attack.    No Known Problems Brother      No Known Problems Brother      No Known Problems Brother      No Known Problems Daughter      No Known Problems Daughter      Heart disease Sister      Heart failure Sister         Social History[1]    MEDICATIONS & ALLERGIES:     Current Outpatient Medications on File Prior to Visit   Medication Sig     "acetaminophen (TYLENOL) 500 MG tablet Take 500 mg by mouth every 6 (six) hours as needed for Pain.    ASHWAGANDHA EXTRACT ORAL Take by mouth.    aspirin 81 MG Chew Take 81 mg by mouth once daily.    ferrous gluconate (FERGON) 324 MG tablet Take 1 tablet (324 mg total) by mouth 2 (two) times daily.    multivitamin (THERAGRAN) per tablet Take 1 tablet by mouth once daily.    rosuvastatin (CRESTOR) 20 MG tablet Take 1 tablet (20 mg total) by mouth once daily.     No current facility-administered medications on file prior to visit.       Review of patient's allergies indicates:  No Known Allergies    OBJECTIVE:     Vital Signs:  Vitals:    07/31/25 0934   BP: 114/64   BP Location: Left arm   Patient Position: Sitting   Pulse: 81   SpO2: 96%   Weight: 77.6 kg (171 lb 1.2 oz)   Height: 5' 8" (1.727 m)       Body mass index is 26.01 kg/m².     Physical Exam  Vitals and nursing note reviewed.   Constitutional:       General: He is not in acute distress.     Appearance: Normal appearance. He is not ill-appearing.   HENT:      Head: Normocephalic and atraumatic.   Eyes:      General: No scleral icterus.     Extraocular Movements: Extraocular movements intact.      Conjunctiva/sclera: Conjunctivae normal.      Pupils: Pupils are equal, round, and reactive to light.   Neck:      Comments: (-) Lehrmitte sign  Cardiovascular:      Rate and Rhythm: Normal rate and regular rhythm.      Heart sounds: No murmur heard.     No friction rub. No gallop.   Pulmonary:      Effort: Pulmonary effort is normal.      Breath sounds: Normal breath sounds. No wheezing, rhonchi or rales.   Musculoskeletal:         General: Tenderness: R sided iliotibial band tenderness. Normal range of motion.      Cervical back: No rigidity or tenderness.      Right lower leg: No edema.      Left lower leg: No edema.      Comments: R SLR (-)   Lymphadenopathy:      Cervical: No cervical adenopathy.   Neurological:      Mental Status: He is alert and oriented to " person, place, and time.   Psychiatric:         Behavior: Behavior normal.         Health Maintenance Due   Topic Date Due    Shingles Vaccine (1 of 2) Never done    Pneumococcal Vaccines (Age 50+) (1 of 1 - PCV) Never done    PROSTATE-SPECIFIC ANTIGEN  06/20/2025         ASSESSMENT & PLAN:   Mr. Felipe Jiménez is a 69 y.o. male w/ hx of LENORA, HLD, recent dx of MGUS who presents for a follow up visit for his chronic conditions.     Annual physical exam  -     Lipid Panel; Future; Expected date: 08/07/2025  -     PSA, Screening; Future; Expected date: 08/07/2025  -     Vitamin B12; Future; Expected date: 08/07/2025  -     Vitamin D; Future; Expected date: 08/07/2025    MGUS (monoclonal gammopathy of unknown significance)    History of nephrectomy    XGP (xanthogranulomatous pyelonephritis)    Liver cyst  -     US Abdomen Limited_Liver; Future; Expected date: 12/30/2025    Cervical radiculopathy  -     X-Ray Cervical Spine Complete 5 view; Future; Expected date: 08/07/2025  -     Vitamin B12; Future; Expected date: 08/07/2025  -     Vitamin D; Future; Expected date: 08/07/2025    Atherosclerosis    Mixed hyperlipidemia    Hip pain, unspecified laterality  -     X-Ray Hip 2 or 3 views Right with Pelvis when performed; Future; Expected date: 08/07/2025  -     X-Ray Lumbar Complete Including Flex And Ext; Future; Expected date: 08/07/2025    Encounter for screening for malignant neoplasm of prostate  -     PSA, Screening; Future; Expected date: 08/07/2025    Other long term (current) drug therapy  -     PSA, Screening; Future; Expected date: 08/07/2025  -     Vitamin B12; Future; Expected date: 08/07/2025  -     Vitamin D; Future; Expected date: 08/07/2025    - Patient presents to me in good spirits, his blurry vision and dizziness episode may possibly represent BPPV. Recent CT head did not have any overt abnormalities. He is also c/o an episode of a shock like sensation from his R sided neck to down to his shoulder and  intermittent R sided hip pain that radiates to his mid R thigh. Physical exam benign however will order cervical, lumbar, and hip X-ray. Will continue to monitor. He is due for his annual lipid panel. MGUS being managed by Dr. Villegas, currently observing. Patient to receive the Prevnar 20 next week. Will monitor incidental liver cyst found on CT via Liver US in 2025.     RTC in 6 months or sooner     Discussed with Dr. Leon - staff attestation to follow      Shakir Mckay DO   Internal Medicine, PGY-2  [facility]  [address] [place] [id number]  [phone]         [1]   Social History  Tobacco Use    Smoking status: Former     Current packs/day: 0.00     Average packs/day: 1 pack/day for 20.0 years (20.0 ttl pk-yrs)     Types: Cigarettes     Start date: 1987     Quit date: 2007     Years since quittin.5    Smokeless tobacco: Never   Substance Use Topics    Alcohol use: Yes     Comment: Infrequently    Drug use: Not Currently

## 2025-08-06 ENCOUNTER — HOSPITAL ENCOUNTER (OUTPATIENT)
Dept: RADIOLOGY | Facility: HOSPITAL | Age: 70
Discharge: HOME | End: 2025-08-06
Payer: MEDICARE

## 2025-08-06 DIAGNOSIS — M25.559 HIP PAIN, UNSPECIFIED LATERALITY: ICD-10-CM

## 2025-08-06 DIAGNOSIS — M54.12 CERVICAL RADICULOPATHY: ICD-10-CM

## 2025-08-06 PROCEDURE — 72050 X-RAY EXAM NECK SPINE 4/5VWS: CPT | Mod: TC

## 2025-08-06 PROCEDURE — 72114 X-RAY EXAM L-S SPINE BENDING: CPT | Mod: TC

## 2025-08-06 PROCEDURE — 73502 X-RAY EXAM HIP UNI 2-3 VIEWS: CPT | Mod: TC,RT

## 2025-08-06 PROCEDURE — 73502 X-RAY EXAM HIP UNI 2-3 VIEWS: CPT | Mod: 26,RT,, | Performed by: RADIOLOGY

## 2025-08-06 PROCEDURE — 72050 X-RAY EXAM NECK SPINE 4/5VWS: CPT | Mod: 26,,, | Performed by: RADIOLOGY

## 2025-08-07 DIAGNOSIS — M16.11 OSTEOARTHRITIS OF RIGHT HIP, UNSPECIFIED OSTEOARTHRITIS TYPE: Primary | ICD-10-CM

## 2025-08-07 DIAGNOSIS — M54.12 CERVICAL RADICULOPATHY: ICD-10-CM

## 2025-08-08 ENCOUNTER — TELEPHONE (OUTPATIENT)
Dept: INTERNAL MEDICINE | Facility: CLINIC | Age: 70
End: 2025-08-08
Payer: MEDICARE

## 2025-08-11 ENCOUNTER — HOSPITAL ENCOUNTER (OUTPATIENT)
Dept: RADIOLOGY | Facility: HOSPITAL | Age: 70
Discharge: HOME | End: 2025-08-11
Payer: MEDICARE

## 2025-08-11 DIAGNOSIS — K76.89 LIVER CYST: ICD-10-CM

## 2025-08-11 PROCEDURE — 76705 ECHO EXAM OF ABDOMEN: CPT | Mod: TC

## 2025-08-11 PROCEDURE — 76705 ECHO EXAM OF ABDOMEN: CPT | Mod: 26,,, | Performed by: INTERNAL MEDICINE

## 2025-08-12 ENCOUNTER — TELEPHONE (OUTPATIENT)
Dept: INTERNAL MEDICINE | Facility: CLINIC | Age: 70
End: 2025-08-12
Payer: MEDICARE

## 2025-08-14 ENCOUNTER — TELEPHONE (OUTPATIENT)
Dept: NEPHROLOGY | Facility: HOSPITAL | Age: 70
End: 2025-08-14
Payer: MEDICARE

## 2025-08-29 ENCOUNTER — TELEPHONE (OUTPATIENT)
Dept: INTERNAL MEDICINE | Facility: CLINIC | Age: 70
End: 2025-08-29
Payer: MEDICARE

## (undated) DEVICE — SYR 30CC LUER LOCK

## (undated) DEVICE — GOWN SURGICAL X-LARGE

## (undated) DEVICE — SUT SILK 2-0 STRANDS 30IN

## (undated) DEVICE — TIP YANKAUERS BULB NO VENT

## (undated) DEVICE — DRAPE STERI INSTRUMENT 1018

## (undated) DEVICE — PENCIL ROCKER SWITCH 10FT CORD

## (undated) DEVICE — ADAPTER HOSE 10FT 8MM

## (undated) DEVICE — TOWEL OR DISP STRL BLUE 4/PK

## (undated) DEVICE — ELECTRODE MEGADYNE RETURN DUAL

## (undated) DEVICE — SEALER VESSEL EXTEND

## (undated) DEVICE — SUT PROLENE 4-0 RB-1 BL MO

## (undated) DEVICE — SET TRI-LUMEN FILTERED TUBE

## (undated) DEVICE — SUT VICRYL UNDYED BRAID 2-0 54

## (undated) DEVICE — SOL NACL IRR 3000ML

## (undated) DEVICE — TRAY MINOR GEN SURG OMC

## (undated) DEVICE — DRAPE COLUMN DAVINCI XI

## (undated) DEVICE — SUT 2/0 27IN PDS II VIO MO

## (undated) DEVICE — BAG INZII TISS RETRV 12/15MM

## (undated) DEVICE — SUT SILK 0 STRANDS 30IN BLK

## (undated) DEVICE — COVER LIGHT HANDLE

## (undated) DEVICE — SYR 10CC LUER LOCK

## (undated) DEVICE — LOOP VESSEL BLUE MAXI

## (undated) DEVICE — DRAPE INCISE IOBAN 2 23X17IN

## (undated) DEVICE — TRAY GENERAL SURGERY OMC

## (undated) DEVICE — IRRIGATOR ENDOSCOPY DISP.

## (undated) DEVICE — NDL HYPO STD REG BVL 22GX1.5IN

## (undated) DEVICE — TRAY CYSTO BASIN OMC

## (undated) DEVICE — CLIP HEMO-LOK MLX LARGE LF

## (undated) DEVICE — PACK CYSTOSCOPY III SIRUS

## (undated) DEVICE — SOL NACL 0.9% IV INJ 1000ML

## (undated) DEVICE — CANNULA REDUCER 12-8MM

## (undated) DEVICE — APPLICATOR CHLORAPREP ORN 26ML

## (undated) DEVICE — SYR ONLY LUER LOCK 20CC

## (undated) DEVICE — TRAY CATH 1-LYR URIMTR 16FR

## (undated) DEVICE — DRESSING ADH ISLAND 3.6 X 14

## (undated) DEVICE — DRAPE ABDOMINAL TIBURON 14X11

## (undated) DEVICE — COVER TIP CURVED SCISSORS XI

## (undated) DEVICE — TAPE SILK 3IN

## (undated) DEVICE — GUIDE WIRE MOTION .035 X 150CM

## (undated) DEVICE — Device

## (undated) DEVICE — ADHESIVE DERMABOND ADVANCED

## (undated) DEVICE — SEAL CANN UNIVERSAL 5-12MM

## (undated) DEVICE — UNDERGLOVES BIOGEL PI SIZE 7.5

## (undated) DEVICE — SYS SEE SHARP SCP ANTIFG LNG

## (undated) DEVICE — SOL ELECTROLUBE ANTI-STIC

## (undated) DEVICE — SUT MCRYL PLUS 4-0 PS2 27IN

## (undated) DEVICE — SUT PDS II O CTX MONO 60

## (undated) DEVICE — NDL INSUF ULTRA VERESS 120MM

## (undated) DEVICE — PORT AIRSEAL 12/120MM LPI

## (undated) DEVICE — SYR SLIP TIP 20CC

## (undated) DEVICE — TUBING SUC UNIV W/CONN 12FT

## (undated) DEVICE — SPONGE LAP 18X18 PREWASHED

## (undated) DEVICE — SPONGE GAUZE 16PLY 4X4

## (undated) DEVICE — DRAPE SCOPE PILLOW WARMER

## (undated) DEVICE — SUT VICRYL UNDYED BRAID 0 54IN

## (undated) DEVICE — DRAPE ARM DAVINCI XI